# Patient Record
Sex: FEMALE | Employment: OTHER | URBAN - METROPOLITAN AREA
[De-identification: names, ages, dates, MRNs, and addresses within clinical notes are randomized per-mention and may not be internally consistent; named-entity substitution may affect disease eponyms.]

---

## 2017-02-13 ENCOUNTER — OFFICE VISIT (OUTPATIENT)
Dept: FAMILY MEDICINE CLINIC | Age: 62
End: 2017-02-13

## 2017-02-13 VITALS
WEIGHT: 193 LBS | SYSTOLIC BLOOD PRESSURE: 185 MMHG | DIASTOLIC BLOOD PRESSURE: 102 MMHG | HEIGHT: 65 IN | BODY MASS INDEX: 32.15 KG/M2 | HEART RATE: 63 BPM | TEMPERATURE: 98.1 F | RESPIRATION RATE: 18 BRPM | OXYGEN SATURATION: 99 %

## 2017-02-13 DIAGNOSIS — Z76.89 ESTABLISHING CARE WITH NEW DOCTOR, ENCOUNTER FOR: ICD-10-CM

## 2017-02-13 DIAGNOSIS — I10 ESSENTIAL HYPERTENSION: Primary | ICD-10-CM

## 2017-02-13 RX ORDER — MELOXICAM 15 MG/1
15 TABLET ORAL DAILY
COMMUNITY
End: 2017-09-25 | Stop reason: ALTCHOICE

## 2017-02-13 RX ORDER — TRAMADOL HYDROCHLORIDE 50 MG/1
TABLET ORAL
Refills: 0 | COMMUNITY
Start: 2017-02-01 | End: 2021-10-29 | Stop reason: ALTCHOICE

## 2017-02-13 RX ORDER — OXYCODONE HYDROCHLORIDE 5 MG/1
TABLET ORAL
Refills: 0 | COMMUNITY
Start: 2016-11-30 | End: 2018-04-24 | Stop reason: ALTCHOICE

## 2017-02-13 RX ORDER — ACETAMINOPHEN 500 MG
TABLET ORAL
COMMUNITY
End: 2017-09-25 | Stop reason: ALTCHOICE

## 2017-02-13 RX ORDER — MULTIVITAMIN WITH IRON
1 TABLET ORAL DAILY
COMMUNITY

## 2017-02-13 RX ORDER — GABAPENTIN 300 MG/1
CAPSULE ORAL
Refills: 0 | COMMUNITY
Start: 2017-02-01 | End: 2021-10-29 | Stop reason: SDUPTHER

## 2017-02-13 RX ORDER — LISINOPRIL 20 MG/1
20 TABLET ORAL DAILY
Qty: 30 TAB | Refills: 3 | Status: SHIPPED | OUTPATIENT
Start: 2017-02-13 | End: 2017-05-24 | Stop reason: SDUPTHER

## 2017-02-13 NOTE — MR AVS SNAPSHOT
Visit Information Date & Time Provider Department Dept. Phone Encounter #  
 2/13/2017 11:30 AM Haryr Matta MD [facility] MAIN OFFICE-ANNEX [phone] [id number] Follow-up Instructions Return in about 2 weeks (around 2/27/2017) for BP check. Upcoming Health Maintenance Date Due Hepatitis C Screening 1955 DTaP/Tdap/Td series (1 - Tdap) 12/1/1975 PAP AKA CERVICAL CYTOLOGY 12/1/1975 BREAST CANCER SCRN MAMMOGRAM 12/1/2004 FOBT Q 1 YEAR AGE 50-75 12/1/2004 ZOSTER VACCINE AGE 60> 12/1/2014 Allergies as of 2/13/2017  Review Complete On: 2/13/2017 By: Gracie Miner LPN No Known Allergies Current Immunizations  Never Reviewed No immunizations on file. Not reviewed this visit You Were Diagnosed With   
  
 Codes Comments Essential hypertension    -  Primary ICD-10-CM: I10 
ICD-9-CM: 401.9 Vitals BP Pulse Temp Resp Height(growth percentile) Weight(growth percentile) (!) 185/102 (BP 1 Location: Left arm, BP Patient Position: Sitting) 63 98.1 °F (36.7 °C) (Oral) 18 5' 4.5\" (1.638 m) 193 lb (87.5 kg) LMP SpO2 BMI OB Status Smoking Status 02/13/2005 99% 32.62 kg/m2 Postmenopausal Never Smoker Vitals History BMI and BSA Data Body Mass Index Body Surface Area  
 32.62 kg/m 2 2 m 2 Preferred Pharmacy Pharmacy Name Phone [address], [facility] [address] Your Updated Medication List  
  
   
This list is accurate as of: 2/13/17  1:12 PM.  Always use your most recent med list.  
  
  
  
  
 cyanocobalamin (vitamin B-12) 1,500 mcg Tbdl Take 1,500 mg by mouth two (2) times a day. DAILY MULTI-VITAMINS/IRON tablet Generic drug:  multivitamin with iron Take 1 Tab by mouth daily. gabapentin 300 mg capsule Commonly known as:  NEURONTIN One tablet three times a day. GLUCOSAMINE 1500 COMPLEX PO Take  by mouth.  
  
 lisinopril 20 mg tablet Commonly known as:  Samanta Reasons Take 1 Tab by mouth daily. meloxicam 15 mg tablet Commonly known as:  MOBIC Take 15 mg by mouth daily. oxyCODONE IR 5 mg immediate release tablet Commonly known as:  ROXICODONE  
one table every 4 hours as needed  
  
 traMADol 50 mg tablet Commonly known as:  ULTRAM  
one tablet every 6 hours as needed TYLENOL EXTRA STRENGTH 500 mg tablet Generic drug:  acetaminophen Take  by mouth every six (6) hours as needed for Pain. Prescriptions Sent to Pharmacy Refills  
 lisinopril (PRINIVIL, ZESTRIL) 20 mg tablet 3 Sig: Take 1 Tab by mouth daily. Class: Normal  
 Pharmacy: [address] [facility], [address] [place] [address] [address] [place] [address] #: [phone] Route: Oral  
  
We Performed the Following CBC WITH AUTOMATED DIFF [66648 CPT(R)] METABOLIC PANEL, COMPREHENSIVE [46810 CPT(R)] TSH 3RD GENERATION [24443 CPT(R)] URINALYSIS W/MICROSCOPIC [65315 CPT(R)] VITAMIN D, 25 HYDROXY [id number] CPT(R)] Follow-up Instructions Return in about 2 weeks (around 2/27/2017) for BP check. Patient Instructions Learning About High Blood Pressure What is high blood pressure? Blood pressure is a measure of how hard the blood pushes against the walls of your arteries. It's normal for blood pressure to go up and down throughout the day, but if it stays up, you have high blood pressure. Another name for high blood pressure is hypertension. Two numbers tell you your blood pressure. The first number is the systolic pressure. It shows how hard the blood pushes when your heart is pumping. The second number is the diastolic pressure. It shows how hard the blood pushes between heartbeats, when your heart is relaxed and filling with blood. A blood pressure of less than 120/80 (say \"120 over 80\") is ideal for an adult. High blood pressure is 140/90 or higher. You have high blood pressure if your top number is 140 or higher or your bottom number is 90 or higher, or both. Many people fall into the category in between, called prehypertension. People with prehypertension need to make lifestyle changes to bring their blood pressure down and help prevent or delay high blood pressure. What happens when you have high blood pressure? · Blood flows through your arteries with too much force. Over time, this damages the walls of your arteries. But you can't feel it. High blood pressure usually doesn't cause symptoms. · Fat and calcium start to build up in your arteries. This buildup is called plaque. Plaque makes your arteries narrower and stiffer. Blood can't flow through them as easily. · This lack of good blood flow starts to damage some of the organs in your body. This can lead to problems such as coronary artery disease and heart attack, heart failure, stroke, kidney failure, and eye damage. How can you prevent high blood pressure? · Stay at a healthy weight. · Try to limit how much sodium you eat to less than 2,300 milligrams (mg) a day. If you limit your sodium to 1,500 mg a day, you can lower your blood pressure even more. ¨ Buy foods that are labeled \"unsalted,\" \"sodium-free,\" or \"low-sodium. \" Foods labeled \"reduced-sodium\" and \"light sodium\" may still have too much sodium. ¨ Flavor your food with garlic, lemon juice, onion, vinegar, herbs, and spices instead of salt. Do not use soy sauce, steak sauce, onion salt, garlic salt, mustard, or ketchup on your food. ¨ Use less salt (or none) when recipes call for it. You can often use half the salt a recipe calls for without losing flavor. · Be physically active. Get at least 30 minutes of exercise on most days of the week. Walking is a good choice.  You also may want to do other activities, such as running, swimming, cycling, or playing tennis or team sports. · Limit alcohol to 2 drinks a day for men and 1 drink a day for women. · Eat plenty of fruits, vegetables, and low-fat dairy products. Eat less saturated and total fats. How is high blood pressure treated? · Your doctor will suggest making lifestyle changes. For example, your doctor may ask you to eat healthy foods, quit smoking, lose extra weight, and be more active. · If lifestyle changes don't help enough or your blood pressure is very high, you will have to take medicine every day. Follow-up care is a key part of your treatment and safety. Be sure to make and go to all appointments, and call your doctor if you are having problems. It's also a good idea to know your test results and keep a list of the medicines you take. Where can you learn more? Go to http://david-yves.info/. Enter P501 in the search box to learn more about \"Learning About High Blood Pressure. \" Current as of: March 23, 2016 Content Version: 11.1 © 9649-7666 Healthwise, Incorporated. Care instructions adapted under license by [employer] (which disclaims liability or warranty for this information). If you have questions about a medical condition or this instruction, always ask your healthcare professional. [address] any warranty or liability for your use of this information. Introducing [facility]! Yolis Murdock introduces [employer] patient portal. Now you can access parts of your medical record, email your doctor's office, and request medication refills online. 1. In your internet browser, go to [email or web address] 2. Click on the First Time User? Click Here link in the Sign In box. You will see the New Member Sign Up page. 3. Enter your [employer] Access Code exactly as it appears below.  You will not need to use this code after youve completed the sign-up process. If you do not sign up before the expiration date, you must request a new code. · [employer] Access Code: [email or web address] Expires: 5/14/2017  1:12 PM 
 
4. Enter the last four digits of your Social Security Number (xxxx) and Date of Birth (mm/dd/yyyy) as indicated and click Submit. You will be taken to the next sign-up page. 5. Create a [employer] ID. This will be your [employer] login ID and cannot be changed, so think of one that is secure and easy to remember. 6. Create a [employer] password. You can change your password at any time. 7. Enter your Password Reset Question and Answer. This can be used at a later time if you forget your password. 8. Enter your e-mail address. You will receive e-mail notification when new information is available in [address] 9. Click Sign Up. You can now view and download portions of your medical record. 10. Click the Download Summary menu link to download a portable copy of your medical information. If you have questions, please visit the Frequently Asked Questions section of the [employer] website. Remember, [employer] is NOT to be used for urgent needs. For medical emergencies, dial 911. Now available from your iPhone and Android! Please provide this summary of care documentation to your next provider. Your primary care clinician is listed as NONE. If you have any questions after today's visit, please call [phone].

## 2017-02-13 NOTE — PROGRESS NOTES
Chief Complaint   Patient presents with   [address] Dr. Robert Balderas ([address] [facility],), Neuro Surgeon Dr. Gregorio Spicer [phone]. Since surgery 6/2015 Pt has had right side weakness, left side numbness. Pt states she needs referrals for PT. Pt has home health aid [facility] [phone]. B/P elevated doctor notified.
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
## 2017-02-13 NOTE — PATIENT INSTRUCTIONS
Learning About High Blood Pressure  What is high blood pressure? Blood pressure is a measure of how hard the blood pushes against the walls of your arteries. It's normal for blood pressure to go up and down throughout the day, but if it stays up, you have high blood pressure. Another name for high blood pressure is hypertension. Two numbers tell you your blood pressure. The first number is the systolic pressure. It shows how hard the blood pushes when your heart is pumping. The second number is the diastolic pressure. It shows how hard the blood pushes between heartbeats, when your heart is relaxed and filling with blood. A blood pressure of less than 120/80 (say \"120 over 80\") is ideal for an adult. High blood pressure is 140/90 or higher. You have high blood pressure if your top number is 140 or higher or your bottom number is 90 or higher, or both. Many people fall into the category in between, called prehypertension. People with prehypertension need to make lifestyle changes to bring their blood pressure down and help prevent or delay high blood pressure. What happens when you have high blood pressure? · Blood flows through your arteries with too much force. Over time, this damages the walls of your arteries. But you can't feel it. High blood pressure usually doesn't cause symptoms. · Fat and calcium start to build up in your arteries. This buildup is called plaque. Plaque makes your arteries narrower and stiffer. Blood can't flow through them as easily. · This lack of good blood flow starts to damage some of the organs in your body. This can lead to problems such as coronary artery disease and heart attack, heart failure, stroke, kidney failure, and eye damage. How can you prevent high blood pressure? · Stay at a healthy weight. · Try to limit how much sodium you eat to less than 2,300 milligrams (mg) a day.  If you limit your sodium to 1,500 mg a day, you can lower your blood pressure even more.  ¨ Buy foods that are labeled \"unsalted,\" \"sodium-free,\" or \"low-sodium. \" Foods labeled \"reduced-sodium\" and \"light sodium\" may still have too much sodium. ¨ Flavor your food with garlic, lemon juice, onion, vinegar, herbs, and spices instead of salt. Do not use soy sauce, steak sauce, onion salt, garlic salt, mustard, or ketchup on your food. ¨ Use less salt (or none) when recipes call for it. You can often use half the salt a recipe calls for without losing flavor. · Be physically active. Get at least 30 minutes of exercise on most days of the week. Walking is a good choice. You also may want to do other activities, such as running, swimming, cycling, or playing tennis or team sports. · Limit alcohol to 2 drinks a day for men and 1 drink a day for women. · Eat plenty of fruits, vegetables, and low-fat dairy products. Eat less saturated and total fats. How is high blood pressure treated? · Your doctor will suggest making lifestyle changes. For example, your doctor may ask you to eat healthy foods, quit smoking, lose extra weight, and be more active. · If lifestyle changes don't help enough or your blood pressure is very high, you will have to take medicine every day. Follow-up care is a key part of your treatment and safety. Be sure to make and go to all appointments, and call your doctor if you are having problems. It's also a good idea to know your test results and keep a list of the medicines you take. Where can you learn more? Go to http://david-yves.info/. Enter P501 in the search box to learn more about \"Learning About High Blood Pressure. \"  Current as of: March 23, 2016  Content Version: 11.1  © 1828-7096 [employer]. Care instructions adapted under license by [employer] (which disclaims liability or warranty for this information).  If you have questions about a medical condition or this instruction, always ask your healthcare professional. [employer] disclaims any warranty or liability for your use of this information.

## 2017-02-14 LAB
25(OH)D3+25(OH)D2 SERPL-MCNC: 31.3 NG/ML (ref 30–100)
ALBUMIN SERPL-MCNC: 4.5 G/DL (ref 3.6–4.8)
ALBUMIN/GLOB SERPL: 1.4 {RATIO} (ref 1.1–2.5)
ALP SERPL-CCNC: 101 IU/L (ref 39–117)
ALT SERPL-CCNC: 25 IU/L (ref 0–32)
APPEARANCE UR: CLEAR
AST SERPL-CCNC: 23 IU/L (ref 0–40)
BACTERIA #/AREA URNS HPF: ABNORMAL /[HPF]
BASOPHILS # BLD AUTO: 0 X10E3/UL (ref 0–0.2)
BASOPHILS NFR BLD AUTO: 0 %
BILIRUB SERPL-MCNC: <0.2 MG/DL (ref 0–1.2)
BILIRUB UR QL STRIP: NEGATIVE
BUN SERPL-MCNC: 16 MG/DL (ref 8–27)
BUN/CREAT SERPL: 19 (ref 11–26)
CALCIUM SERPL-MCNC: 10 MG/DL (ref 8.7–10.3)
CASTS URNS QL MICRO: ABNORMAL /LPF
CHLORIDE SERPL-SCNC: 106 MMOL/L (ref 96–106)
CO2 SERPL-SCNC: 22 MMOL/L (ref 18–29)
COLOR UR: YELLOW
CREAT SERPL-MCNC: 0.84 MG/DL (ref 0.57–1)
CRYSTALS URNS MICRO: ABNORMAL
EOSINOPHIL # BLD AUTO: 0.1 X10E3/UL (ref 0–0.4)
EOSINOPHIL NFR BLD AUTO: 3 %
EPI CELLS #/AREA URNS HPF: ABNORMAL /HPF
ERYTHROCYTE [DISTWIDTH] IN BLOOD BY AUTOMATED COUNT: 14.4 % (ref 12.3–15.4)
GLOBULIN SER CALC-MCNC: 3.3 G/DL (ref 1.5–4.5)
GLUCOSE SERPL-MCNC: 95 MG/DL (ref 65–99)
GLUCOSE UR QL: NEGATIVE
HCT VFR BLD AUTO: 44 % (ref 34–46.6)
HGB BLD-MCNC: 14.5 G/DL (ref 11.1–15.9)
HGB UR QL STRIP: NEGATIVE
IMM GRANULOCYTES # BLD: 0 X10E3/UL (ref 0–0.1)
IMM GRANULOCYTES NFR BLD: 0 %
KETONES UR QL STRIP: NEGATIVE
LEUKOCYTE ESTERASE UR QL STRIP: NEGATIVE
LYMPHOCYTES # BLD AUTO: 2.3 X10E3/UL (ref 0.7–3.1)
LYMPHOCYTES NFR BLD AUTO: 54 %
MCH RBC QN AUTO: 31.9 PG (ref 26.6–33)
MCHC RBC AUTO-ENTMCNC: 33 G/DL (ref 31.5–35.7)
MCV RBC AUTO: 97 FL (ref 79–97)
MICRO URNS: NORMAL
MICRO URNS: NORMAL
MONOCYTES # BLD AUTO: 0.3 X10E3/UL (ref 0.1–0.9)
MONOCYTES NFR BLD AUTO: 7 %
MUCOUS THREADS URNS QL MICRO: PRESENT
NEUTROPHILS # BLD AUTO: 1.6 X10E3/UL (ref 1.4–7)
NEUTROPHILS NFR BLD AUTO: 36 %
NITRITE UR QL STRIP: NEGATIVE
PH UR STRIP: 6 [PH] (ref 5–7.5)
PLATELET # BLD AUTO: 274 X10E3/UL (ref 150–379)
POTASSIUM SERPL-SCNC: 4.3 MMOL/L (ref 3.5–5.2)
PROT SERPL-MCNC: 7.8 G/DL (ref 6–8.5)
PROT UR QL STRIP: NEGATIVE
RBC # BLD AUTO: 4.54 X10E6/UL (ref 3.77–5.28)
RBC #/AREA URNS HPF: ABNORMAL /HPF
SODIUM SERPL-SCNC: 148 MMOL/L (ref 134–144)
SP GR UR: 1.02 (ref 1–1.03)
TSH SERPL DL<=0.005 MIU/L-ACNC: 1.18 UIU/ML (ref 0.45–4.5)
UNIDENT CRYS URNS QL MICRO: PRESENT
UROBILINOGEN UR STRIP-MCNC: 0.2 MG/DL (ref 0.2–1)
WBC # BLD AUTO: 4.3 X10E3/UL (ref 3.4–10.8)
WBC #/AREA URNS HPF: ABNORMAL /HPF

## 2017-03-07 ENCOUNTER — OFFICE VISIT (OUTPATIENT)
Dept: FAMILY MEDICINE CLINIC | Age: 62
End: 2017-03-07

## 2017-03-07 VITALS
RESPIRATION RATE: 20 BRPM | BODY MASS INDEX: 38.82 KG/M2 | HEIGHT: 65 IN | HEART RATE: 65 BPM | TEMPERATURE: 98.4 F | OXYGEN SATURATION: 96 % | SYSTOLIC BLOOD PRESSURE: 169 MMHG | DIASTOLIC BLOOD PRESSURE: 87 MMHG | WEIGHT: 233 LBS

## 2017-03-07 DIAGNOSIS — Z23 ENCOUNTER FOR IMMUNIZATION: ICD-10-CM

## 2017-03-07 DIAGNOSIS — M62.81 MUSCLE WEAKNESS: ICD-10-CM

## 2017-03-07 DIAGNOSIS — I10 ESSENTIAL HYPERTENSION: Primary | ICD-10-CM

## 2017-03-07 RX ORDER — GLUCOSAMINE SULFATE 1500 MG
POWDER IN PACKET (EA) ORAL DAILY
COMMUNITY
End: 2021-07-13 | Stop reason: ALTCHOICE

## 2017-03-07 RX ORDER — HYDROCHLOROTHIAZIDE 25 MG/1
25 TABLET ORAL DAILY
Qty: 30 TAB | Refills: 3 | Status: SHIPPED | OUTPATIENT
Start: 2017-03-07 | End: 2017-04-06

## 2017-03-07 NOTE — MR AVS SNAPSHOT
Visit Information Date & Time Provider Department Dept. Phone Encounter #  
 3/7/2017 11:00 AM Harper Carpio MD [address] [place] [address] OFFICE-ANNEX [phone] [id number] Follow-up Instructions Return in about 1 month (around 4/7/2017) for recheck BP. Follow-up and Disposition History Your Appointments 4/4/2017 11:30 AM  
Any with Harper Carpio MD  
[facility] OFFICE-ANNEX ([facility]) Appt Note: 1 mo f/u  
 [address] [facility] [id number] [phone] [address] [facility] [address] Upcoming Health Maintenance Date Due Hepatitis C Screening 1955 PAP AKA CERVICAL CYTOLOGY 12/1/1975 BREAST CANCER SCRN MAMMOGRAM 12/1/2004 FOBT Q 1 YEAR AGE 50-75 12/1/2004 ZOSTER VACCINE AGE 60> 12/1/2014 DTaP/Tdap/Td series (2 - Td) 3/7/2027 Allergies as of 3/7/2017  Review Complete On: 3/7/2017 By: Antonio Henao LPN No Known Allergies Current Immunizations  Reviewed on 3/7/2017 Name Date Tdap 3/7/2017 Reviewed by Lorena Fernandez LPN on 3/1/5668 at 31:12 AM  
You Were Diagnosed With   
  
 Codes Comments Essential hypertension    -  Primary ICD-10-CM: I10 
ICD-9-CM: 401.9 Encounter for immunization     ICD-10-CM: F53 ICD-9-CM: V03.89 Muscle weakness     ICD-10-CM: M62.81 ICD-9-CM: 728.87 Vitals BP Pulse Temp Resp Height(growth percentile) Weight(growth percentile) 169/87 (BP 1 Location: Right arm, BP Patient Position: Sitting) 65 98.4 °F (36.9 °C) (Oral) 20 5' 4.5\" (1.638 m) 233 lb (105.7 kg) LMP SpO2 BMI OB Status Smoking Status 02/13/2005 96% 39.38 kg/m2 Postmenopausal Never Smoker Vitals History BMI and BSA Data Body Mass Index Body Surface Area  
 39.38 kg/m 2 2.19 m 2 Preferred Pharmacy Pharmacy Name Phone  [facility] AT [address] [phone] Your Updated Medication List  
  
   
This list is accurate as of: 3/7/17  1:03 PM.  Always use your most recent med list.  
  
  
  
  
 cyanocobalamin (vitamin B-12) 1,500 mcg Tbdl Take 1,500 mg by mouth two (2) times a day. DAILY MULTI-VITAMINS/IRON tablet Generic drug:  multivitamin with iron Take 1 Tab by mouth daily. gabapentin 300 mg capsule Commonly known as:  NEURONTIN One tablet three times a day. GLUCOSAMINE 1500 COMPLEX PO Take  by mouth. hydroCHLOROthiazide 25 mg tablet Commonly known as:  HYDRODIURIL Take 1 Tab by mouth daily for 30 days. lisinopril 20 mg tablet Commonly known as:  Delsie Commander Take 1 Tab by mouth daily. meloxicam 15 mg tablet Commonly known as:  MOBIC Take 15 mg by mouth daily. oxyCODONE IR 5 mg immediate release tablet Commonly known as:  ROXICODONE  
one table every 4 hours as needed  
  
 traMADol 50 mg tablet Commonly known as:  ULTRAM  
one tablet every 6 hours as needed TYLENOL EXTRA STRENGTH 500 mg tablet Generic drug:  acetaminophen Take  by mouth every six (6) hours as needed for Pain.  
  
 varicella zoster vacine live 19,400 unit/0.65 mL Susr injection Commonly known as:  varicella-zoster vacine live 1 Vial by SubCUTAneous route once for 1 dose. VITAMIN D3 1,000 unit Cap Generic drug:  cholecalciferol Take  by mouth daily. Prescriptions Printed Refills  
 varicella zoster vacine live (VARICELLA-ZOSTER VACINE LIVE) 19,400 unit/0.65 mL susr injection 0 Si Vial by SubCUTAneous route once for 1 dose. Class: Print Route: SubCUTAneous Prescriptions Sent to Pharmacy Refills  
 hydroCHLOROthiazide (HYDRODIURIL) 25 mg tablet 3 Sig: Take 1 Tab by mouth daily for 30 days.   
 Class: Normal  
 Pharmacy: [address] [facility], [address] [facility] OF [facility] AT [facility] Ph #: [phone] Route: Oral  
  
We Performed the Following [place] IMMUNIZ ADMIN,1 SINGLE/COMB VAC/TOXOID [id number] CPT(R)] REFERRAL TO NEUROSURGERY [EYW30 Custom] Comments:  
 Please evaluate patient for ongoing care of weakness, pt will schedule appt. TETANUS, DIPHTHERIA TOXOIDS AND ACELLULAR PERTUSSIS VACCINE (TDAP), IN INDIVIDS. >=7, IM [id number] CPT(R)] Follow-up Instructions Return in about 1 month (around 2017) for recheck BP. Referral Information Referral ID Referred By Referred To  
  
 [id number] Tre Clarke MD   
   [facility] [place] [address]. [place], [address] Phone: [phone] Fax: [phone] Visits Status Start Date End Date 1 New Request 3/7/17 3/7/18 If your referral has a status of pending review or denied, additional information will be sent to support the outcome of this decision. Patient Instructions Vaccine Information Statement Tdap (Tetanus, Diphtheria, Pertussis) Vaccine: What You Need to Know Many Vaccine Information Statements are available in Swedish and other languages. See www.immunize.org/vis. Hojas de Información Sobre Vacunas están disponibles en español y en muchos otros idiomas. Visite WorthScale.si 1. Why get vaccinated? Tetanus, diphtheria, and pertussis are very serious diseases. Tdap vaccine can protect us from these diseases. And, Tdap vaccine given to pregnant women can protect  babies against pertussis. TETANUS (Lockjaw) is rare in the [facility] today. It causes painful muscle tightening and stiffness, usually all over the body. ? It can lead to tightening of muscles in the head and neck so you cant open your mouth, swallow, or sometimes even breathe. Tetanus kills about 1 out of 10 people who are infected even after receiving the best medical care. DIPHTHERIA is also rare in the [facility] today. It can cause a thick coating to form in the back of the throat. ? It can lead to breathing problems, heart failure, paralysis, and death. PERTUSSIS (Whooping Cough) causes severe coughing spells, which can cause difficulty breathing, vomiting, and disturbed sleep. ? It can also lead to weight loss, incontinence, and rib fractures. Up to 2 in 100 adolescents and 5 in 100 adults with pertussis are hospitalized or have complications, which could include pneumonia or death. These diseases are caused by bacteria. Diphtheria and pertussis are spread from person to person through secretions from coughing or sneezing. Tetanus enters the body through cuts, scratches, or wounds. Before vaccines, as many as 200,000 cases of diphtheria, 200,000 cases of pertussis, and hundreds of cases of tetanus, were reported in the [place] each year. Since vaccination began, reports of cases for tetanus and diphtheria have dropped by about 99% and for pertussis by about 80%. 2. Tdap vaccine Tdap vaccine can protect adolescents and adults from tetanus, diphtheria, and pertussis. One dose of Tdap is routinely given at age 6 or 15. People who did not get Tdap at that age should get it as soon as possible. Tdap is especially important for health care professionals and anyone having close contact with a baby younger than 12 months. Pregnant women should get a dose of Tdap during every pregnancy, to protect the  from pertussis. Infants are most at risk for severe, life-threatening complications from pertussis. Another vaccine, called Td, protects against tetanus and diphtheria, but not pertussis. A Td booster should be given every 10 years. Tdap may be given as one of these boosters if you have never gotten Tdap before. Tdap may also be given after a severe cut or burn to prevent tetanus infection. Your doctor or the person giving you the vaccine can give you more information. Tdap may safely be given at the same time as other vaccines. 3. Some people should not get this vaccine  A person who has ever had a life-threatening allergic reaction after a previous dose of any diphtheria, tetanus or pertussis containing vaccine, OR has a severe allergy to any part of this vaccine, should not get Tdap vaccine. Tell the person giving the vaccine about any severe allergies.  Anyone who had coma or long repeated seizures within 7 days after a childhood dose of DTP or DTaP, or a previous dose of Tdap, should not get Tdap, unless a cause other than the vaccine was found. They can still get Td.  Talk to your doctor if you: 
- have seizures or another nervous system problem, 
- had severe pain or swelling after any vaccine containing diphtheria, tetanus or pertussis,  
- ever had a condition called Guillain Barré Syndrome (GBS), 
- arent feeling well on the day the shot is scheduled. 4. Risks With any medicine, including vaccines, there is a chance of side effects. These are usually mild and go away on their own. Serious reactions are also possible but are rare. Most people who get Tdap vaccine do not have any problems with it. Mild Problems following Tdap 
(Did not interfere with activities)  Pain where the shot was given (about 3 in 4 adolescents or 2 in 3 adults)  Redness or swelling where the shot was given (about 1 person in 5)  Mild fever of at least 100.4°F (up to about 1 in 25 adolescents or 1 in 100 adults)  Headache (about 3 or 4 people in 10)  Tiredness (about 1 person in 3 or 4)  Nausea, vomiting, diarrhea, stomach ache (up to 1 in 4 adolescents or 1 in 10 adults)  Chills,  sore joints (about 1 person in 10)  Body aches (about 1 person in 3 or 4)  Rash, swollen glands (uncommon) Moderate Problems following Tdap (Interfered with activities, but did not require medical attention)  Pain where the shot was given (up to 1 in 5 or 6)  Redness or swelling where the shot was given (up to about 1 in 16 adolescents or 1 in 12 adults)  Fever over 102°F (about 1 in 100 adolescents or 1 in 250 adults)  Headache (about 1 in 7 adolescents or 1 in 10 adults)  Nausea, vomiting, diarrhea, stomach ache (up to 1 or 3 people in 100)  Swelling of the entire arm where the shot was given (up to about 1 in 500). Severe Problems following Tdap 
(Unable to perform usual activities; required medical attention)  Swelling, severe pain, bleeding, and redness in the arm where the shot was given (rare). Problems that could happen after any vaccine:  People sometimes faint after a medical procedure, including vaccination. Sitting or lying down for about 15 minutes can help prevent fainting, and injuries caused by a fall. Tell your doctor if you feel dizzy, or have vision changes or ringing in the ears.  Some people get severe pain in the shoulder and have difficulty moving the arm where a shot was given. This happens very rarely.  Any medication can cause a severe allergic reaction. Such reactions from a vaccine are very rare, estimated at fewer than 1 in a million doses, and would happen within a few minutes to a few hours after the vaccination. As with any medicine, there is a very remote chance of a vaccine causing a serious injury or death. The safety of vaccines is always being monitored. For more information, visit: www.cdc.gov/vaccinesafety/ 
 
5. What if there is a serious problem? What should I look for?  Look for anything that concerns you, such as signs of a severe allergic reaction, very high fever, or unusual behavior.  
 
 Signs of a severe allergic reaction can include hives, swelling of the face and throat, difficulty breathing, a fast heartbeat, dizziness, and weakness. These would usually start a few minutes to a few hours after the vaccination. What should I do?  If you think it is a severe allergic reaction or other emergency that cant wait, call 9-1-1 or get the person to the nearest hospital. Otherwise, call your doctor.  Afterward, the reaction should be reported to the Vaccine Adverse Event Reporting System (VAERS). Your doctor might file this report, or you can do it yourself through the VAERS web site at www.vaers. [facility].gov, or by calling 0-426.325.6054. VAERS does not give medical advice. 6. The National Vaccine Injury Compensation Program 
 
The [facility] Vaccine Injury Compensation Program (VICP) is a federal program that was created to compensate people who may have been injured by certain vaccines. Persons who believe they may have been injured by a vaccine can learn about the program and about filing a claim by calling 6-206.736.6190 or visiting the [employer] website at www.[facility].gov/vaccinecompensation. There is a time limit to file a claim for compensation. 7. How can I learn more?  Ask your doctor. He or she can give you the vaccine package insert or suggest other sources of information.  Call your local or state health department.  Contact the Centers for Disease Control and Prevention (CDC): 
- Call 0-381.494.5900 (1-800-CDC-INFO) or 
- Visit CDCs website at www.cdc.gov/vaccines Vaccine Information Statement Tdap Vaccine 
(2/24/2015) 42 UG[address] Loya [id number] [facility] and [employer] Centers for Disease Control and Prevention Office Use Only Introducing [facility] HEALTH SERVICES! Romayne Duster introduces [employer] patient portal. Now you can access parts of your medical record, email your doctor's office, and request medication refills online. 1. In your internet browser, go to [email or web address] 2. Click on the First Time User? Click Here link in the Sign In box.  You will see the New Member Sign Up page. 3. Enter your [facility] Access Code exactly as it appears below. You will not need to use this code after youve completed the sign-up process. If you do not sign up before the expiration date, you must request a new code. · [facility] Access Code: [email or web address] Expires: 5/14/2017  1:12 PM 
 
4. Enter the last four digits of your Social Security Number (xxxx) and Date of Birth (mm/dd/yyyy) as indicated and click Submit. You will be taken to the next sign-up page. 5. Create a HubHubt ID. This will be your [facility] login ID and cannot be changed, so think of one that is secure and easy to remember. 6. Create a [facility] password. You can change your password at any time. 7. Enter your Password Reset Question and Answer. This can be used at a later time if you forget your password. 8. Enter your e-mail address. You will receive e-mail notification when new information is available in [address]. 9. Click Sign Up. You can now view and download portions of your medical record. 10. Click the Download Summary menu link to download a portable copy of your medical information. If you have questions, please visit the Frequently Asked Questions section of the [facility] website. Remember, [facility] is NOT to be used for urgent needs. For medical emergencies, dial 911. Now available from your iPhone and Android! Please provide this summary of care documentation to your next provider. Your primary care clinician is listed as NONE. If you have any questions after today's visit, please call [phone].
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
## 2017-03-07 NOTE — PATIENT INSTRUCTIONS
Vaccine Information Statement     Tdap (Tetanus, Diphtheria, Pertussis) Vaccine: What You Need to Know    Many Vaccine Information Statements are available in Yoruba and other languages. See www.immunize.org/vis. Hojas de Información Sobre Vacunas están disponibles en español y en muchos otros idiomas. Visite [email or web address]    1. Why get vaccinated? Tetanus, diphtheria, and pertussis are very serious diseases. Tdap vaccine can protect us from these diseases. And, Tdap vaccine given to pregnant women can protect  babies against pertussis. TETANUS (Lockjaw) is rare in the [facility] today. It causes painful muscle tightening and stiffness, usually all over the body.  It can lead to tightening of muscles in the head and neck so you cant open your mouth, swallow, or sometimes even breathe. Tetanus kills about 1 out of 10 people who are infected even after receiving the best medical care. DIPHTHERIA is also rare in the [facility] today. It can cause a thick coating to form in the back of the throat.  It can lead to breathing problems, heart failure, paralysis, and death. PERTUSSIS (Whooping Cough) causes severe coughing spells, which can cause difficulty breathing, vomiting, and disturbed sleep.  It can also lead to weight loss, incontinence, and rib fractures. Up to 2 in 100 adolescents and 5 in 100 adults with pertussis are hospitalized or have complications, which could include pneumonia or death. These diseases are caused by bacteria. Diphtheria and pertussis are spread from person to person through secretions from coughing or sneezing. Tetanus enters the body through cuts, scratches, or wounds. Before vaccines, as many as 200,000 cases of diphtheria, 200,000 cases of pertussis, and hundreds of cases of tetanus, were reported in the [place] each year.  Since vaccination began, reports of cases for tetanus and diphtheria have dropped by about 99% and for pertussis by about 80%. 2. Tdap vaccine    Tdap vaccine can protect adolescents and adults from tetanus, diphtheria, and pertussis. One dose of Tdap is routinely given at age 6 or 15. People who did not get Tdap at that age should get it as soon as possible. Tdap is especially important for health care professionals and anyone having close contact with a baby younger than 12 months. Pregnant women should get a dose of Tdap during every pregnancy, to protect the  from pertussis. Infants are most at risk for severe, life-threatening complications from pertussis. Another vaccine, called Td, protects against tetanus and diphtheria, but not pertussis. A Td booster should be given every 10 years. Tdap may be given as one of these boosters if you have never gotten Tdap before. Tdap may also be given after a severe cut or burn to prevent tetanus infection. Your doctor or the person giving you the vaccine can give you more information. Tdap may safely be given at the same time as other vaccines. 3. Some people should not get this vaccine     A person who has ever had a life-threatening allergic reaction after a previous dose of any diphtheria, tetanus or pertussis containing vaccine, OR has a severe allergy to any part of this vaccine, should not get Tdap vaccine. Tell the person giving the vaccine about any severe allergies.  Anyone who had coma or long repeated seizures within 7 days after a childhood dose of DTP or DTaP, or a previous dose of Tdap, should not get Tdap, unless a cause other than the vaccine was found. They can still get Td.  Talk to your doctor if you:  - have seizures or another nervous system problem,  - had severe pain or swelling after any vaccine containing diphtheria, tetanus or pertussis,   - ever had a condition called Guillain Barré Syndrome (GBS),  - arent feeling well on the day the shot is scheduled.     4. Risks    With any medicine, including vaccines, there is a chance of side effects. These are usually mild and go away on their own. Serious reactions are also possible but are rare. Most people who get Tdap vaccine do not have any problems with it. Mild Problems following Tdap  (Did not interfere with activities)   Pain where the shot was given (about 3 in 4 adolescents or 2 in 3 adults)   Redness or swelling where the shot was given (about 1 person in 5)   Mild fever of at least 100.4°F (up to about 1 in 25 adolescents or 1 in 100 adults)   Headache (about 3 or 4 people in 10)   Tiredness (about 1 person in 3 or 4)   Nausea, vomiting, diarrhea, stomach ache (up to 1 in 4 adolescents or 1 in 10 adults)   Chills,  sore joints (about 1 person in 10)   Body aches (about 1 person in 3 or 4)    Rash, swollen glands (uncommon)    Moderate Problems following Tdap  (Interfered with activities, but did not require medical attention)   Pain where the shot was given (up to 1 in 5 or 6)    Redness or swelling where the shot was given (up to about 1 in 16 adolescents or 1 in 12 adults)   Fever over 102°F (about 1 in 100 adolescents or 1 in 250 adults)   Headache (about 1 in 7 adolescents or 1 in 10 adults)   Nausea, vomiting, diarrhea, stomach ache (up to 1 or 3 people in 100)   Swelling of the entire arm where the shot was given (up to about 1 in 500). Severe Problems following Tdap  (Unable to perform usual activities; required medical attention)   Swelling, severe pain, bleeding, and redness in the arm where the shot was given (rare). Problems that could happen after any vaccine:     People sometimes faint after a medical procedure, including vaccination. Sitting or lying down for about 15 minutes can help prevent fainting, and injuries caused by a fall. Tell your doctor if you feel dizzy, or have vision changes or ringing in the ears.      Some people get severe pain in the shoulder and have difficulty moving the arm where a shot was given. This happens very rarely.  Any medication can cause a severe allergic reaction. Such reactions from a vaccine are very rare, estimated at fewer than 1 in a million doses, and would happen within a few minutes to a few hours after the vaccination. As with any medicine, there is a very remote chance of a vaccine causing a serious injury or death. The safety of vaccines is always being monitored. For more information, visit: www.cdc.gov/vaccinesafety/    5. What if there is a serious problem? What should I look for?  Look for anything that concerns you, such as signs of a severe allergic reaction, very high fever, or unusual behavior.  Signs of a severe allergic reaction can include hives, swelling of the face and throat, difficulty breathing, a fast heartbeat, dizziness, and weakness. These would usually start a few minutes to a few hours after the vaccination. What should I do?  If you think it is a severe allergic reaction or other emergency that cant wait, call 9-1-1 or get the person to the nearest hospital. Otherwise, call your doctor.  Afterward, the reaction should be reported to the Vaccine Adverse Event Reporting System (VAERS). Your doctor might file this report, or you can do it yourself through the VAERS web site at www.vaers. [facility].gov, or by calling 7-639.324.3218. [employer] does not give medical advice. 6. The National Vaccine Injury Compensation Program    The [facility] Vaccine Injury Compensation Program (VICP) is a federal program that was created to compensate people who may have been injured by certain vaccines. Persons who believe they may have been injured by a vaccine can learn about the program and about filing a claim by calling 9-434.954.1660 or visiting the [facility] website at www.[facility].gov/vaccinecompensation. There is a time limit to file a claim for compensation. 7. How can I learn more?  Ask your doctor.  He or she can give you the vaccine package insert or suggest other sources of information.  Call your local or state health department.  Contact the Centers for Disease Control and Prevention (CDC):  - Call 1-556.253.7273 (1-800-CDC-INFO) or  - Visit CDCs website at www.cdc.gov/vaccines      Vaccine Information Statement   Tdap Vaccine  (2/24/2015)  42 COLL[address] [place] [id number]-[id number]    Department of Health and Human Services  Centers for Disease Control and Prevention    Office Use Only

## 2017-05-24 ENCOUNTER — OFFICE VISIT (OUTPATIENT)
Dept: FAMILY MEDICINE CLINIC | Age: 62
End: 2017-05-24

## 2017-05-24 VITALS
DIASTOLIC BLOOD PRESSURE: 78 MMHG | OXYGEN SATURATION: 98 % | RESPIRATION RATE: 18 BRPM | TEMPERATURE: 98.7 F | SYSTOLIC BLOOD PRESSURE: 128 MMHG | HEART RATE: 67 BPM

## 2017-05-24 DIAGNOSIS — I10 ESSENTIAL HYPERTENSION: Primary | ICD-10-CM

## 2017-05-24 RX ORDER — ZOSTER VACCINE LIVE 19400 [PFU]/.65ML
INJECTION, POWDER, LYOPHILIZED, FOR SUSPENSION SUBCUTANEOUS
Refills: 0 | COMMUNITY
Start: 2017-03-08 | End: 2017-05-24 | Stop reason: ALTCHOICE

## 2017-05-24 RX ORDER — CYCLOBENZAPRINE HCL 10 MG
TABLET ORAL
Refills: 0 | COMMUNITY
Start: 2017-05-09 | End: 2017-09-25 | Stop reason: ALTCHOICE

## 2017-05-24 RX ORDER — LISINOPRIL 20 MG/1
20 TABLET ORAL DAILY
Qty: 30 TAB | Refills: 2 | Status: SHIPPED | OUTPATIENT
Start: 2017-05-24 | End: 2017-08-17 | Stop reason: SDUPTHER

## 2017-05-24 RX ORDER — HYDROCHLOROTHIAZIDE 25 MG/1
TABLET ORAL
Qty: 30 TAB | Refills: 2 | Status: SHIPPED | OUTPATIENT
Start: 2017-05-24 | End: 2017-09-25 | Stop reason: SDUPTHER

## 2017-05-24 RX ORDER — HYDROCHLOROTHIAZIDE 25 MG/1
TABLET ORAL
Refills: 2 | COMMUNITY
Start: 2017-04-20 | End: 2017-05-24 | Stop reason: SDUPTHER

## 2017-05-24 NOTE — MR AVS SNAPSHOT
Visit Information Date & Time Provider Department Dept. Phone Encounter #  
 5/24/2017 11:45 AM Goran Joyce MD [address] OFFICE-[facility] [phone] [id number] Upcoming Health Maintenance Date Due Hepatitis C Screening 1955 PAP AKA CERVICAL CYTOLOGY 12/1/1975 BREAST CANCER SCRN MAMMOGRAM 12/1/2004 FOBT Q 1 YEAR AGE 50-75 12/1/2004 ZOSTER VACCINE AGE 60> 12/1/2014 INFLUENZA AGE 9 TO ADULT 8/1/2017 DTaP/Tdap/Td series (2 - Td) 3/7/2027 Allergies as of 5/24/2017  Review Complete On: 5/24/2017 By: Sandro San LPN No Known Allergies Current Immunizations  Reviewed on 3/7/2017 Name Date Tdap 3/7/2017 Not reviewed this visit You Were Diagnosed With   
  
 Codes Comments Essential hypertension    -  Primary ICD-10-CM: I10 
ICD-9-CM: 401.9 Vitals BP Pulse Temp Resp LMP SpO2  
 128/78 (BP 1 Location: Right arm, BP Patient Position: Sitting) 67 98.7 °F (37.1 °C) (Oral) 18 02/13/2005 98% OB Status Smoking Status Postmenopausal Never Smoker Preferred Pharmacy Pharmacy Name Phone [facility] [address] [phone] Your Updated Medication List  
  
   
This list is accurate as of: 5/24/17 12:37 PM.  Always use your most recent med list.  
  
  
  
  
 cyanocobalamin (vitamin B-12) 1,500 mcg Tbdl Take 1,500 mg by mouth two (2) times a day. cyclobenzaprine 10 mg tablet Commonly known as:  FLEXERIL TK 1 T PO BID PRN  
  
 DAILY MULTI-VITAMINS/IRON tablet Generic drug:  multivitamin with iron Take 1 Tab by mouth daily. gabapentin 300 mg capsule Commonly known as:  NEURONTIN One tablet three times a day. GLUCOSAMINE 1500 COMPLEX PO Take  by mouth. hydroCHLOROthiazide 25 mg tablet Commonly known as:  HYDRODIURIL TK 1 T PO D  
  
 lisinopril 20 mg tablet Commonly known as:  Gael Bold Take 1 Tab by mouth daily. meloxicam 15 mg tablet Commonly known as:  MOBIC Take 15 mg by mouth daily. oxyCODONE IR 5 mg immediate release tablet Commonly known as:  ROXICODONE  
one table every 4 hours as needed  
  
 traMADol 50 mg tablet Commonly known as:  ULTRAM  
one tablet every 6 hours as needed TYLENOL EXTRA STRENGTH 500 mg tablet Generic drug:  acetaminophen Take  by mouth every six (6) hours as needed for Pain. VITAMIN D3 1,000 unit Cap Generic drug:  cholecalciferol Take  by mouth daily. Introducing [facility]! Bryan Lamar introduces [employer] patient portal. Now you can access parts of your medical record, email your doctor's office, and request medication refills online. 1. In your internet browser, go to [email or web address] 2. Click on the First Time User? Click Here link in the Sign In box. You will see the New Member Sign Up page. 3. Enter your [employer] Access Code exactly as it appears below. You will not need to use this code after youve completed the sign-up process. If you do not sign up before the expiration date, you must request a new code. · [employer] Access Code: [email or web address] Expires: 8/22/2017 12:36 PM 
 
4. Enter the last four digits of your Social Security Number (xxxx) and Date of Birth (mm/dd/yyyy) as indicated and click Submit. You will be taken to the next sign-up page. 5. Create a Stratus[employer]t ID. This will be your [employer] login ID and cannot be changed, so think of one that is secure and easy to remember. 6. Create a [employer] password. You can change your password at any time. 7. Enter your Password Reset Question and Answer. This can be used at a later time if you forget your password. 8. Enter your e-mail address. You will receive e-mail notification when new information is available in [address] 9. Click Sign Up. You can now view and download portions of your medical record. 10. Click the Download Summary menu link to download a portable copy of your medical information. If you have questions, please visit the Frequently Asked Questions section of the [employer] website. Remember, [employer] is NOT to be used for urgent needs. For medical emergencies, dial 911. Now available from your iPhone and Android! Please provide this summary of care documentation to your next provider. Your primary care clinician is listed as NONE. If you have any questions after today's visit, please call [phone].

## 2017-05-24 NOTE — PROGRESS NOTES
Chief Complaint   Patient presents with    Hypertension     1 mo check  Going to [facility] outpatient.
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
## 2017-05-25 ENCOUNTER — TELEPHONE (OUTPATIENT)
Dept: FAMILY MEDICINE CLINIC | Age: 62
End: 2017-05-25

## 2017-05-25 NOTE — TELEPHONE ENCOUNTER
----- Message from Arelis Hudson sent at 5/25/2017  4:33 PM EDT -----  Regarding: Dr Paris/telephone  Pt (p) [phone], pt would like to know how many refills are on her lisinopril  medication.

## 2017-08-17 DIAGNOSIS — I10 ESSENTIAL HYPERTENSION: ICD-10-CM

## 2017-08-17 RX ORDER — LISINOPRIL 20 MG/1
20 TABLET ORAL DAILY
Qty: 30 TAB | Refills: 0 | Status: SHIPPED | OUTPATIENT
Start: 2017-08-17 | End: 2017-09-21 | Stop reason: SDUPTHER

## 2017-08-17 NOTE — TELEPHONE ENCOUNTER
----- Message from Mattie Landa sent at 8/16/2017  6:25 PM EDT -----  Regarding: Dr. Margaret Fernández  Patient advised she is a former patient of Dr. Elizabeth Mims and needs to get a refill for Lisinopril 20mg. Appointment was scheduled and was cancelled due to vacation for Dr. Anju King. Rescheduled for September 25th. Patient will be out of medication by then. Pharmacy is [facility] on [address] [address]. Best contact number for patient is [phone].

## 2017-09-21 DIAGNOSIS — I10 ESSENTIAL HYPERTENSION: ICD-10-CM

## 2017-09-24 RX ORDER — LISINOPRIL 20 MG/1
TABLET ORAL
Qty: 30 TAB | Refills: 0 | Status: SHIPPED | OUTPATIENT
Start: 2017-09-24 | End: 2017-09-25 | Stop reason: SDUPTHER

## 2017-09-25 ENCOUNTER — OFFICE VISIT (OUTPATIENT)
Dept: FAMILY MEDICINE CLINIC | Age: 62
End: 2017-09-25

## 2017-09-25 VITALS
SYSTOLIC BLOOD PRESSURE: 108 MMHG | BODY MASS INDEX: 37.25 KG/M2 | HEART RATE: 68 BPM | TEMPERATURE: 97.6 F | DIASTOLIC BLOOD PRESSURE: 63 MMHG | OXYGEN SATURATION: 97 % | HEIGHT: 65 IN | RESPIRATION RATE: 18 BRPM | WEIGHT: 223.6 LBS

## 2017-09-25 DIAGNOSIS — Z13.1 SCREENING FOR DIABETES MELLITUS (DM): ICD-10-CM

## 2017-09-25 DIAGNOSIS — I10 ESSENTIAL HYPERTENSION: ICD-10-CM

## 2017-09-25 DIAGNOSIS — E55.9 HYPOVITAMINOSIS D: ICD-10-CM

## 2017-09-25 DIAGNOSIS — Z12.31 ENCOUNTER FOR SCREENING MAMMOGRAM FOR BREAST CANCER: ICD-10-CM

## 2017-09-25 DIAGNOSIS — G72.9 MYOPATHY: ICD-10-CM

## 2017-09-25 DIAGNOSIS — Z23 ENCOUNTER FOR IMMUNIZATION: ICD-10-CM

## 2017-09-25 DIAGNOSIS — E78.5 DYSLIPIDEMIA (HIGH LDL; LOW HDL): ICD-10-CM

## 2017-09-25 DIAGNOSIS — R60.0 BILATERAL LEG EDEMA: ICD-10-CM

## 2017-09-25 DIAGNOSIS — Z12.11 COLON CANCER SCREENING: ICD-10-CM

## 2017-09-25 DIAGNOSIS — I10 HYPERTENSION GOAL BP (BLOOD PRESSURE) < 130/80: ICD-10-CM

## 2017-09-25 DIAGNOSIS — Z00.00 ENCOUNTER FOR ANNUAL PHYSICAL EXAM: Primary | ICD-10-CM

## 2017-09-25 DIAGNOSIS — Z11.59 NEED FOR HEPATITIS C SCREENING TEST: ICD-10-CM

## 2017-09-25 PROBLEM — G62.9 NEUROPATHY: Status: ACTIVE | Noted: 2017-09-25

## 2017-09-25 RX ORDER — LISINOPRIL 20 MG/1
TABLET ORAL
Qty: 30 TAB | Refills: 5 | Status: SHIPPED | OUTPATIENT
Start: 2017-09-25 | End: 2018-04-06 | Stop reason: SDUPTHER

## 2017-09-25 RX ORDER — HYDROCHLOROTHIAZIDE 25 MG/1
TABLET ORAL
Qty: 30 TAB | Refills: 5 | Status: SHIPPED | OUTPATIENT
Start: 2017-09-25 | End: 2018-04-09 | Stop reason: SDUPTHER

## 2017-09-25 RX ORDER — IBUPROFEN 200 MG
TABLET ORAL
COMMUNITY
End: 2017-09-25 | Stop reason: DRUGHIGH

## 2017-09-25 RX ORDER — BACLOFEN 10 MG/1
TABLET ORAL 3 TIMES DAILY
COMMUNITY
End: 2022-05-04

## 2017-09-25 RX ORDER — IBUPROFEN 600 MG/1
600 TABLET ORAL
Qty: 90 TAB | Refills: 1 | Status: SHIPPED | OUTPATIENT
Start: 2017-09-25 | End: 2021-08-11 | Stop reason: ALTCHOICE

## 2017-09-25 NOTE — PATIENT INSTRUCTIONS
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
## 2017-09-25 NOTE — MR AVS SNAPSHOT
Visit Information Date & Time Provider Department Dept. Phone Encounter #  
 9/25/2017 11:45 AM Rin Atwood MD [facility] at [address] [id number] Follow-up Instructions Return in about 4 months (around 1/25/2018) for routine follow up. Upcoming Health Maintenance Date Due Hepatitis C Screening 1955 PAP AKA CERVICAL CYTOLOGY 12/1/1975 BREAST CANCER SCRN MAMMOGRAM 12/1/2004 FOBT Q 1 YEAR AGE 50-75 12/1/2004 ZOSTER VACCINE AGE 60> 10/1/2014 DTaP/Tdap/Td series (2 - Td) 3/7/2027 Allergies as of 9/25/2017  Review Complete On: 9/25/2017 By: Joel Perez LPN No Known Allergies Current Immunizations  Reviewed on 3/7/2017 Name Date Tdap 3/7/2017 Not reviewed this visit You Were Diagnosed With   
  
 Codes Comments Encounter for annual physical exam    -  Primary ICD-10-CM: Z00.00 ICD-9-CM: V70.0 Need for hepatitis C screening test     ICD-10-CM: Z11.59 
ICD-9-CM: V73.89 Hypertension goal BP (blood pressure) < 130/80     ICD-10-CM: I10 
ICD-9-CM: 401.9 Encounter for screening mammogram for breast cancer     ICD-10-CM: Z12.31 
ICD-9-CM: V76.12 Hypovitaminosis D     ICD-10-CM: E55.9 ICD-9-CM: 268.9 Dyslipidemia (high LDL; low HDL)     ICD-10-CM: E78.4 ICD-9-CM: 272.4 Screening for diabetes mellitus (DM)     ICD-10-CM: Z13.1 ICD-9-CM: V77.1 Essential hypertension     ICD-10-CM: I10 
ICD-9-CM: 401.9 Colon cancer screening     ICD-10-CM: Z12.11 ICD-9-CM: V76.51 Bilateral leg edema     ICD-10-CM: R60.0 ICD-9-CM: 316. 3 Myopathy     ICD-10-CM: G72.9 ICD-9-CM: 359.9 Vitals BP Pulse Temp Resp Height(growth percentile) Weight(growth percentile) 108/63 (BP 1 Location: Left arm, BP Patient Position: Sitting) 68 97.6 °F (36.4 °C) (Oral) 18 5' 4.5\" (1.638 m) 223 lb 9.6 oz (101.4 kg) LMP SpO2 BMI OB Status Smoking Status 02/13/2005 97% 37.79 kg/m2 Postmenopausal Never Smoker Vitals History BMI and BSA Data Body Mass Index Body Surface Area  
 37.79 kg/m 2 2.15 m 2 Preferred Pharmacy Pharmacy Name Phone [facility], [address] [place] [address] [phone] Your Updated Medication List  
  
   
This list is accurate as of: 9/25/17 12:49 PM.  Always use your most recent med list.  
  
  
  
  
 baclofen 10 mg tablet Commonly known as:  LIORESAL Take  by mouth three (3) times daily. Comp. Minesh Salmons Use for support  
  
 cyanocobalamin (vitamin B-12) 1,500 mcg Tbdi Take 1,500 mg by mouth two (2) times a day. DAILY MULTI-VITAMINS/IRON tablet Generic drug:  multivitamin with iron Take 1 Tab by mouth daily. gabapentin 300 mg capsule Commonly known as:  NEURONTIN One tablet three times a day. GLUCOSAMINE 1500 COMPLEX PO Take  by mouth. hydroCHLOROthiazide 25 mg tablet Commonly known as:  HYDRODIURIL TK 1 T PO D  
  
 ibuprofen 600 mg tablet Commonly known as:  MOTRIN Take 1 Tab by mouth every eight (8) hours as needed for Pain. Indications: Pain  
  
 lisinopril 20 mg tablet Commonly known as:  PRINIVIL, ZESTRIL  
TAKE 1 TABLET BY MOUTH DAILY  
  
 oxyCODONE IR 5 mg immediate release tablet Commonly known as:  ROXICODONE  
one table every 4 hours as needed  
  
 traMADol 50 mg tablet Commonly known as:  ULTRAM  
one tablet every 6 hours as needed VITAMIN D3 1,000 unit Cap Generic drug:  cholecalciferol Take  by mouth daily. Prescriptions Sent to Pharmacy Refills  
 lisinopril (PRINIVIL, ZESTRIL) 20 mg tablet 5 Sig: TAKE 1 TABLET BY MOUTH DAILY Class: Normal  
 Pharmacy: [facility], [address] Ph #: [phone] hydroCHLOROthiazide (HYDRODIURIL) 25 mg tablet 5 Sig: TK 1 T PO D Class: Normal  
 Pharmacy: [address] Ph #: [phone]  
 ibuprofen (MOTRIN) 600 mg tablet 1 Sig: Take 1 Tab by mouth every eight (8) hours as needed for Pain. Indications: Pain Class: Normal  
 Pharmacy: [address] Ph #: [phone] Route: Oral  
 Comp. Stocking,Thigh,Long,Large misc 3 Sig: Use for support Class: Normal  
 Pharmacy: [address] Ph #: [phone] We Performed the Following AMB SUPPLY ORDER [[id number] Custom] Comments: Toilet raiser HEMOGLOBIN A1C WITH EAG [38598 CPT(R)] HEPATITIS C AB [60153 CPT(R)] LIPID PANEL [41341 CPT(R)] METABOLIC PANEL, COMPREHENSIVE [63562 CPT(R)] REFERRAL TO GASTROENTEROLOGY [PHS55 Custom] URINALYSIS W/ RFLX MICROSCOPIC [01905 CPT(R)] VITAMIN D, 25 HYDROXY R893536 CPT(R)] Follow-up Instructions Return in about 4 months (around 1/25/2018) for routine follow up. To-Do List   
 09/25/2017 Imaging:  [facility] MAMMO BI SCREENING INCL CAD Referral Information Referral ID Referred By Referred To  
  
 [id number] [facility] [address] [place], [address] Visits Status Start Date End Date 1 New Request 9/25/17 9/25/18 If your referral has a status of pending review or denied, additional information will be sent to support the outcome of this decision. Patient Instructions DASH Diet: Care Instructions Your Care Instructions The DASH diet is an eating plan that can help lower your blood pressure. DASH stands for Dietary Approaches to Stop Hypertension.  Hypertension is high blood pressure. The DASH diet focuses on eating foods that are high in calcium, potassium, and magnesium. These nutrients can lower blood pressure. The foods that are highest in these nutrients are fruits, vegetables, low-fat dairy products, nuts, seeds, and legumes. But taking calcium, potassium, and magnesium supplements instead of eating foods that are high in those nutrients does not have the same effect. The DASH diet also includes whole grains, fish, and poultry. The DASH diet is one of several lifestyle changes your doctor may recommend to lower your high blood pressure. Your doctor may also want you to decrease the amount of sodium in your diet. Lowering sodium while following the DASH diet can lower blood pressure even further than just the DASH diet alone. Follow-up care is a key part of your treatment and safety. Be sure to make and go to all appointments, and call your doctor if you are having problems. It's also a good idea to know your test results and keep a list of the medicines you take. How can you care for yourself at home? Following the DASH diet · Eat 4 to 5 servings of fruit each day. A serving is 1 medium-sized piece of fruit, ½ cup chopped or canned fruit, 1/4 cup dried fruit, or 4 ounces (½ cup) of fruit juice. Choose fruit more often than fruit juice. · Eat 4 to 5 servings of vegetables each day. A serving is 1 cup of lettuce or raw leafy vegetables, ½ cup of chopped or cooked vegetables, or 4 ounces (½ cup) of vegetable juice. Choose vegetables more often than vegetable juice. · Get 2 to 3 servings of low-fat and fat-free dairy each day. A serving is 8 ounces of milk, 1 cup of yogurt, or 1 ½ ounces of cheese. · Eat 6 to 8 servings of grains each day. A serving is 1 slice of bread, 1 ounce of dry cereal, or ½ cup of cooked rice, pasta, or cooked cereal. Try to choose whole-grain products as much as possible. · Limit lean meat, poultry, and fish to 2 servings each day. A serving is 3 ounces, about the size of a deck of cards. · Eat 4 to 5 servings of nuts, seeds, and legumes (cooked dried beans, lentils, and split peas) each week. A serving is 1/3 cup of nuts, 2 tablespoons of seeds, or ½ cup of cooked beans or peas. · Limit fats and oils to 2 to 3 servings each day. A serving is 1 teaspoon of vegetable oil or 2 tablespoons of salad dressing. · Limit sweets and added sugars to 5 servings or less a week. A serving is 1 tablespoon jelly or jam, ½ cup sorbet, or 1 cup of lemonade. · Eat less than 2,300 milligrams (mg) of sodium a day. If you limit your sodium to 1,500 mg a day, you can lower your blood pressure even more. Tips for success · Start small. Do not try to make dramatic changes to your diet all at once. You might feel that you are missing out on your favorite foods and then be more likely to not follow the plan. Make small changes, and stick with them. Once those changes become habit, add a few more changes. · Try some of the following: ¨ Make it a goal to eat a fruit or vegetable at every meal and at snacks. This will make it easy to get the recommended amount of fruits and vegetables each day. ¨ Try yogurt topped with fruit and nuts for a snack or healthy dessert. ¨ Add lettuce, tomato, cucumber, and onion to sandwiches. ¨ Combine a ready-made pizza crust with low-fat mozzarella cheese and lots of vegetable toppings. Try using tomatoes, squash, spinach, broccoli, carrots, cauliflower, and onions. ¨ Have a variety of cut-up vegetables with a low-fat dip as an appetizer instead of chips and dip. ¨ Sprinkle sunflower seeds or chopped almonds over salads. Or try adding chopped walnuts or almonds to cooked vegetables. ¨ Try some vegetarian meals using beans and peas. Add garbanzo or kidney beans to salads. Make burritos and tacos with mashed austin beans or black beans. Where can you learn more? Go to http://david-yves.info/. Enter T677 in the search box to learn more about \"DASH Diet: Care Instructions. \" Current as of: April 3, 2017 Content Version: 11.3 © 0526-5016 [employer]. Care instructions adapted under license by [employer] (which disclaims liability or warranty for this information). If you have questions about a medical condition or this instruction, always ask your healthcare professional. [place] [address] any warranty or liability for your use of this information. Introducing [facility]! [employer] introduces [employer] patient portal. Now you can access parts of your medical record, email your doctor's office, and request medication refills online. 1. In your internet browser, go to [email or web address] 2. Click on the First Time User? Click Here link in the Sign In box. You will see the New Member Sign Up page. 3. Enter your [employer] Access Code exactly as it appears below. You will not need to use this code after youve completed the sign-up process. If you do not sign up before the expiration date, you must request a new code. · [employer] Access Code: [email or web address] Expires: 12/24/2017 12:49 PM 
 
4. Enter the last four digits of your Social Security Number (xxxx) and Date of Birth (mm/dd/yyyy) as indicated and click Submit. You will be taken to the next sign-up page. 5. Create a [employer] ID. This will be your [employer] login ID and cannot be changed, so think of one that is secure and easy to remember. 6. Create a [employer] password. You can change your password at any time. 7. Enter your Password Reset Question and Answer. This can be used at a later time if you forget your password. 8. Enter your e-mail address. You will receive e-mail notification when new information is available in [address] 9. Click Sign Up. You can now view and download portions of your medical record. 10. Click the Download Summary menu link to download a portable copy of your medical information. If you have questions, please visit the Frequently Asked Questions section of the [employer] website. Remember, [employer] is NOT to be used for urgent needs. For medical emergencies, dial 911. Now available from your iPhone and Android! Please provide this summary of care documentation to your next provider. Your primary care clinician is listed as NONE. If you have any questions after today's visit, please call [phone].

## 2017-09-25 NOTE — PROGRESS NOTES
1. Have you been to the ER, urgent care clinic since your last visit? Hospitalized since your last visit? No     2. Have you seen or consulted any other health care providers outside of the [address] [place] [address] since your last visit? Include any pap smears or colon screening. No       Pt is a former patient of dr Ashley Reyes. States she is here for follow up    Mitra Drummond is a 57 y.o. female who presents for routine immunizations. She denies any symptoms , reactions or allergies that would exclude them from being immunized today. Risks and adverse reactions were discussed and the VIS was given to them. All questions were addressed. She was observed for15 min post injection. There were no reactions observed.     El Siegel LPN

## 2017-09-27 ENCOUNTER — TELEPHONE (OUTPATIENT)
Dept: FAMILY MEDICINE CLINIC | Age: 62
End: 2017-09-27

## 2017-09-27 NOTE — TELEPHONE ENCOUNTER
[address] - calling back and added to message     Re: compression stocking     Need compression rate to be able to fill   Diagnostic code also for edema and/or swelling     Added this today:  Pt also asking for a toilet riser with handles       Best number to reach them is [phone] X [phone]    Fax [phone]

## 2017-09-29 ENCOUNTER — HOSPITAL ENCOUNTER (OUTPATIENT)
Dept: GENERAL RADIOLOGY | Age: 62
Discharge: HOME | End: 2017-09-29
Payer: COMMERCIAL

## 2017-09-29 DIAGNOSIS — R52 PAIN: ICD-10-CM

## 2017-09-29 DIAGNOSIS — R60.9 SWELLING: ICD-10-CM

## 2017-09-29 PROCEDURE — 73562 X-RAY EXAM OF KNEE 3: CPT

## 2017-10-04 ENCOUNTER — HOSPITAL ENCOUNTER (OUTPATIENT)
Dept: MAMMOGRAPHY | Age: 62
Discharge: HOME | End: 2017-10-04
Attending: INTERNAL MEDICINE
Payer: MEDICAID

## 2017-10-04 DIAGNOSIS — Z12.31 ENCOUNTER FOR SCREENING MAMMOGRAM FOR BREAST CANCER: ICD-10-CM

## 2017-10-04 PROCEDURE — 77067 SCR MAMMO BI INCL CAD: CPT

## 2017-10-06 ENCOUNTER — TELEPHONE (OUTPATIENT)
Dept: FAMILY MEDICINE CLINIC | Age: 62
End: 2017-10-06

## 2017-10-06 NOTE — TELEPHONE ENCOUNTER
Pt is calling to have toilet raiser sent to [facility] 68 number to reach her is [phone]    Theoplis Shadow [phone] Fax for Erin put on the diagnosis code and add \"with handle\"

## 2017-10-31 ENCOUNTER — HOSPITAL ENCOUNTER (OUTPATIENT)
Dept: GENERAL RADIOLOGY | Age: 62
Discharge: HOME | End: 2017-10-31
Payer: MEDICAID

## 2017-10-31 DIAGNOSIS — M25.559 HIP PAIN: ICD-10-CM

## 2017-10-31 DIAGNOSIS — M54.50 LOWER BACK PAIN: ICD-10-CM

## 2017-10-31 PROCEDURE — 72110 X-RAY EXAM L-2 SPINE 4/>VWS: CPT

## 2017-10-31 PROCEDURE — 73502 X-RAY EXAM HIP UNI 2-3 VIEWS: CPT

## 2018-01-15 ENCOUNTER — HOSPITAL ENCOUNTER (OUTPATIENT)
Dept: MRI IMAGING | Age: 63
Discharge: HOME | End: 2018-01-15
Attending: PHYSICAL MEDICINE & REHABILITATION
Payer: MEDICAID

## 2018-01-15 DIAGNOSIS — M54.16 RIGHT LUMBAR RADICULOPATHY: ICD-10-CM

## 2018-01-15 PROCEDURE — 72148 MRI LUMBAR SPINE W/O DYE: CPT

## 2018-04-06 DIAGNOSIS — I10 ESSENTIAL HYPERTENSION: ICD-10-CM

## 2018-04-06 NOTE — TELEPHONE ENCOUNTER
Sara morales/Yuri FIGUEREDO     States pt can have 90 days of lisinopril     Didn't have a incoming phone line or fax to send a request

## 2018-04-09 ENCOUNTER — HOSPITAL ENCOUNTER (OUTPATIENT)
Dept: GENERAL RADIOLOGY | Age: 63
Discharge: HOME | End: 2018-04-09
Payer: MEDICAID

## 2018-04-09 DIAGNOSIS — M25.551 HIP PAIN, RIGHT: ICD-10-CM

## 2018-04-09 PROCEDURE — 73502 X-RAY EXAM HIP UNI 2-3 VIEWS: CPT

## 2018-04-09 RX ORDER — LISINOPRIL 20 MG/1
TABLET ORAL
Qty: 90 TAB | Refills: 1 | Status: SHIPPED | OUTPATIENT
Start: 2018-04-09 | End: 2018-10-10 | Stop reason: SDUPTHER

## 2018-04-23 ENCOUNTER — OFFICE VISIT (OUTPATIENT)
Dept: FAMILY MEDICINE CLINIC | Age: 63
End: 2018-04-23

## 2018-04-23 VITALS
DIASTOLIC BLOOD PRESSURE: 61 MMHG | OXYGEN SATURATION: 97 % | RESPIRATION RATE: 20 BRPM | HEIGHT: 65 IN | BODY MASS INDEX: 33.32 KG/M2 | TEMPERATURE: 98.1 F | HEART RATE: 64 BPM | SYSTOLIC BLOOD PRESSURE: 109 MMHG | WEIGHT: 200 LBS

## 2018-04-23 DIAGNOSIS — M21.371 FOOT DROP, RIGHT: Primary | ICD-10-CM

## 2018-04-23 DIAGNOSIS — E55.9 HYPOVITAMINOSIS D: ICD-10-CM

## 2018-04-23 DIAGNOSIS — E78.5 DYSLIPIDEMIA (HIGH LDL; LOW HDL): ICD-10-CM

## 2018-04-23 DIAGNOSIS — R73.02 IGT (IMPAIRED GLUCOSE TOLERANCE): ICD-10-CM

## 2018-04-23 DIAGNOSIS — Z11.59 NEED FOR HEPATITIS C SCREENING TEST: ICD-10-CM

## 2018-04-23 DIAGNOSIS — R35.0 FREQUENCY OF URINATION: ICD-10-CM

## 2018-04-23 DIAGNOSIS — Z12.11 COLON CANCER SCREENING: ICD-10-CM

## 2018-04-23 DIAGNOSIS — Z23 ENCOUNTER FOR IMMUNIZATION: ICD-10-CM

## 2018-04-23 DIAGNOSIS — Z13.29 SCREENING FOR THYROID DISORDER: ICD-10-CM

## 2018-04-23 DIAGNOSIS — I10 HYPERTENSION GOAL BP (BLOOD PRESSURE) < 130/80: ICD-10-CM

## 2018-04-23 DIAGNOSIS — Z01.419 WOMEN'S ANNUAL ROUTINE GYNECOLOGICAL EXAMINATION: ICD-10-CM

## 2018-04-23 RX ORDER — CELECOXIB 200 MG/1
CAPSULE ORAL
Refills: 1 | COMMUNITY
Start: 2018-04-11 | End: 2021-08-11 | Stop reason: SDUPTHER

## 2018-04-23 NOTE — MR AVS SNAPSHOT
[facility] [address] [facility] 83. 
[phone] Patient: Virginia Crockett MRN: [id number] CMZ:[id number] Visit Information Date & Time Provider Department Dept. Phone Encounter #  
 4/23/2018 12:00 PM Aakash Copeland MD [address] [facility] at [address] [place] [address] [id number] Follow-up Instructions Return in about 6 months (around 10/23/2018), or if symptoms worsen or fail to improve, for routine follow up. Upcoming Health Maintenance Date Due Hepatitis C Screening 1955 PAP AKA CERVICAL CYTOLOGY 12/1/1975 FOBT Q 1 YEAR AGE 50-75 12/1/2004 ZOSTER VACCINE AGE 60> 10/1/2014 BREAST CANCER SCRN MAMMOGRAM 10/4/2019 DTaP/Tdap/Td series (2 - Td) 3/7/2027 Allergies as of 4/23/2018  Review Complete On: 4/23/2018 By: Aakash Copeland MD  
 No Known Allergies Current Immunizations  Reviewed on 3/7/2017 Name Date Influenza Vaccine (Quad) PF 9/25/2017 Tdap 3/7/2017 Not reviewed this visit You Were Diagnosed With   
  
 Codes Comments Foot drop, right    -  Primary ICD-10-CM: M21.371 ICD-9-CM: 736.79 Dyslipidemia (high LDL; low HDL)     ICD-10-CM: E78.5 ICD-9-CM: 272.4 Hypovitaminosis D     ICD-10-CM: E55.9 ICD-9-CM: 268.9 Hypertension goal BP (blood pressure) < 130/80     ICD-10-CM: I10 
ICD-9-CM: 401.9 Colon cancer screening     ICD-10-CM: Z12.11 ICD-9-CM: V76.51 Encounter for immunization     ICD-10-CM: M38 ICD-9-CM: V03.89 Frequency of urination     ICD-10-CM: R35.0 ICD-9-CM: 788.41 Screening for thyroid disorder     ICD-10-CM: Z13.29 ICD-9-CM: V77.0 Need for hepatitis C screening test     ICD-10-CM: Z11.59 
ICD-9-CM: V73.89 IGT (impaired glucose tolerance)     ICD-10-CM: R73.02 
ICD-9-CM: 790.22 Vitals BP Pulse Temp Resp Height(growth percentile) Weight(growth percentile) 109/61 64 98.1 °F (36.7 °C) (Oral) 20 5' 4.5\" (1.638 m) 200 lb (90.7 kg) LMP SpO2 BMI OB Status Smoking Status 02/13/2005 97% 33.8 kg/m2 Postmenopausal Never Smoker Vitals History BMI and BSA Data Body Mass Index Body Surface Area  
 33.8 kg/m 2 2.03 m 2 Preferred Pharmacy Pharmacy Name Phone [facility] [address] [phone] Your Updated Medication List  
  
   
This list is accurate as of 4/23/18 12:48 PM.  Always use your most recent med list.  
  
  
  
  
 baclofen 10 mg tablet Commonly known as:  LIORESAL Take  by mouth three (3) times daily. celecoxib 200 mg capsule Commonly known as:  CELEBREX TK 1 C PO QD Comp. Jason Goodness Use for support  
  
 cyanocobalamin (vitamin B-12) 1,500 mcg Tbdi Take 1,500 mg by mouth two (2) times a day. DAILY MULTI-VITAMINS/IRON tablet Generic drug:  multivitamin with iron Take 1 Tab by mouth daily. gabapentin 300 mg capsule Commonly known as:  NEURONTIN One tablet three times a day. GLUCOSAMINE 1500 COMPLEX PO Take  by mouth. hydroCHLOROthiazide 25 mg tablet Commonly known as:  HYDRODIURIL  
TAKE 1 TABLET BY MOUTH EVERY DAY  
  
 ibuprofen 600 mg tablet Commonly known as:  MOTRIN Take 1 Tab by mouth every eight (8) hours as needed for Pain. Indications: Pain  
  
 lisinopril 20 mg tablet Commonly known as:  PRINIVIL, ZESTRIL  
TAKE 1 TABLET BY MOUTH DAILY  
  
 oxyCODONE IR 5 mg immediate release tablet Commonly known as:  ROXICODONE  
one table every 4 hours as needed  
  
 traMADol 50 mg tablet Commonly known as:  ULTRAM  
one tablet every 6 hours as needed  
  
 varicella-zoster recombinant (PF) 50 mcg/0.5 mL Susr injection Commonly known as:  SHINGRIX (PF)  
0.5 mL by IntraMUSCular route once for 1 dose. VITAMIN D3 1,000 unit Cap Generic drug:  cholecalciferol Take  by mouth daily. Prescriptions Sent to Pharmacy Refills  
 varicella-zoster recombinant, PF, (SHINGRIX, PF,) 50 mcg/0.5 mL susr injection 0 Si.5 mL by IntraMUSCular route once for 1 dose. Class: Normal  
 Pharmacy: [facility] [place], [address] Ph #: [phone] Route: IntraMUSCular We Performed the Following HEMOGLOBIN A1C WITH EAG [80345 CPT(R)] HEPATITIS C AB [22643 CPT(R)] LIPID PANEL [54832 CPT(R)] METABOLIC PANEL, COMPREHENSIVE [90870 CPT(R)] REFERRAL TO GASTROENTEROLOGY [WAO20 Custom] TSH 3RD GENERATION [64858 CPT(R)] URINALYSIS W/ RFLX MICROSCOPIC [82301 CPT(R)] VITAMIN D, 25 HYDROXY [id number] CPT(R)] Follow-up Instructions Return in about 6 months (around 10/23/2018), or if symptoms worsen or fail to improve, for routine follow up. Referral Information Referral ID Referred By Referred To  
  
 [id number] [facility] [address] [place], [address] [facility] Visits Status Start Date End Date 1 New Request 18 If your referral has a status of pending review or denied, additional information will be sent to support the outcome of this decision. Patient Instructions Well Visit, Women 48 to 72: Care Instructions Your Care Instructions Physical exams can help you stay healthy. Your doctor has checked your overall health and may have suggested ways to take good care of yourself. He or she also may have recommended tests. At home, you can help prevent illness with healthy eating, regular exercise, and other steps. Follow-up care is a key part of your treatment and safety. Be sure to make and go to all appointments, and call your doctor if you are having problems.  It's also a good idea to know your test results and keep a list of the medicines you take. How can you care for yourself at home? · Reach and stay at a healthy weight. This will lower your risk for many problems, such as obesity, diabetes, heart disease, and high blood pressure. · Get at least 30 minutes of exercise on most days of the week. Walking is a good choice. You also may want to do other activities, such as running, swimming, cycling, or playing tennis or team sports. · Do not smoke. Smoking can make health problems worse. If you need help quitting, talk to your doctor about stop-smoking programs and medicines. These can increase your chances of quitting for good. · Protect your skin from too much sun. When you're outdoors from 10 a.m. to 4 p.m., stay in the shade or cover up with clothing and a hat with a wide brim. Wear sunglasses that block UV rays. Even when it's cloudy, put broad-spectrum sunscreen (SPF 30 or higher) on any exposed skin. · See a dentist one or two times a year for checkups and to have your teeth cleaned. · Wear a seat belt in the car. · Limit alcohol to 1 drink a day. Too much alcohol can cause health problems. Follow your doctor's advice about when to have certain tests. These tests can spot problems early. · Cholesterol. Your doctor will tell you how often to have this done based on your age, family history, or other things that can increase your risk for heart attack and stroke. · Blood pressure. Have your blood pressure checked during a routine doctor visit. Your doctor will tell you how often to check your blood pressure based on your age, your blood pressure results, and other factors. · Mammogram. Ask your doctor how often you should have a mammogram, which is an X-ray of your breasts. A mammogram can spot breast cancer before it can be felt and when it is easiest to treat. · Pap test and pelvic exam. Ask your doctor how often you should have a Pap test. You may not need to have a Pap test as often as you used to. · Vision. Have your eyes checked every year or two or as often as your doctor suggests. Some experts recommend that you have yearly exams for glaucoma and other age-related eye problems starting at age 48. · Hearing. Tell your doctor if you notice any change in your hearing. You can have tests to find out how well you hear. · Diabetes. Ask your doctor whether you should have tests for diabetes. · Colon cancer. You should begin tests for colon cancer at age 48. You may have one of several tests. Your doctor will tell you how often to have tests based on your age and risk. Risks include whether you already had a precancerous polyp removed from your colon or whether your parents, sisters and brothers, or children have had colon cancer. · Thyroid disease. Talk to your doctor about whether to have your thyroid checked as part of a regular physical exam. Women have an increased chance of a thyroid problem. · Osteoporosis. You should begin tests for bone density at age 72. If you are younger than 72, ask your doctor whether you have factors that may increase your risk for this disease. You may want to have this test before age 72. · Heart attack and stroke risk. At least every 4 to 6 years, you should have your risk for heart attack and stroke assessed. Your doctor uses factors such as your age, blood pressure, cholesterol, and whether you smoke or have diabetes to show what your risk for a heart attack or stroke is over the next 10 years. When should you call for help? Watch closely for changes in your health, and be sure to contact your doctor if you have any problems or symptoms that concern you. Where can you learn more? Go to http://david-yves.info/. Enter B146 in the search box to learn more about \"Well Visit, Women 50 to 72: Care Instructions. \" Current as of: May 12, 2017 Content Version: 11.4 © 7381-1471 Healthwise, Incorporated.  Care instructions adapted under license by [address] (which disclaims liability or warranty for this information). If you have questions about a medical condition or this instruction, always ask your healthcare professional. [address] any warranty or liability for your use of this information. Introducing [facility] & HEALTH SERVICES! [facility] introduces Gecko patient portal. Now you can access parts of your medical record, email your doctor's office, and request medication refills online. 1. In your internet browser, go to [email or web address] 2. Click on the First Time User? Click Here link in the Sign In box. You will see the New Member Sign Up page. 3. Enter your Gecko Access Code exactly as it appears below. You will not need to use this code after youve completed the sign-up process. If you do not sign up before the expiration date, you must request a new code. · Gecko Access Code: [email or web address] Expires: 7/22/2018 12:47 PM 
 
4. Enter the last four digits of your Social Security Number (xxxx) and Date of Birth (mm/dd/yyyy) as indicated and click Submit. You will be taken to the next sign-up page. 5. Create a Gecko ID. This will be your Gecko login ID and cannot be changed, so think of one that is secure and easy to remember. 6. Create a Gecko password. You can change your password at any time. 7. Enter your Password Reset Question and Answer. This can be used at a later time if you forget your password. 8. Enter your e-mail address. You will receive e-mail notification when new information is available in [address] 9. Click Sign Up. You can now view and download portions of your medical record. 10. Click the Download Summary menu link to download a portable copy of your medical information. If you have questions, please visit the Frequently Asked Questions section of the Gecko website.  Remember, Gecko is NOT to be used for urgent needs. For medical emergencies, dial 911. Now available from your iPhone and Android! Please provide this summary of care documentation to your next provider. Your primary care clinician is listed as Barbra Rivers. If you have any questions after today's visit, please call [phone].

## 2018-04-23 NOTE — PATIENT INSTRUCTIONS
Well Visit, Women 48 to 72: Care Instructions  Your Care Instructions    Physical exams can help you stay healthy. Your doctor has checked your overall health and may have suggested ways to take good care of yourself. He or she also may have recommended tests. At home, you can help prevent illness with healthy eating, regular exercise, and other steps. Follow-up care is a key part of your treatment and safety. Be sure to make and go to all appointments, and call your doctor if you are having problems. It's also a good idea to know your test results and keep a list of the medicines you take. How can you care for yourself at home? · Reach and stay at a healthy weight. This will lower your risk for many problems, such as obesity, diabetes, heart disease, and high blood pressure. · Get at least 30 minutes of exercise on most days of the week. Walking is a good choice. You also may want to do other activities, such as running, swimming, cycling, or playing tennis or team sports. · Do not smoke. Smoking can make health problems worse. If you need help quitting, talk to your doctor about stop-smoking programs and medicines. These can increase your chances of quitting for good. · Protect your skin from too much sun. When you're outdoors from 10 a.m. to 4 p.m., stay in the shade or cover up with clothing and a hat with a wide brim. Wear sunglasses that block UV rays. Even when it's cloudy, put broad-spectrum sunscreen (SPF 30 or higher) on any exposed skin. · See a dentist one or two times a year for checkups and to have your teeth cleaned. · Wear a seat belt in the car. · Limit alcohol to 1 drink a day. Too much alcohol can cause health problems. Follow your doctor's advice about when to have certain tests. These tests can spot problems early. · Cholesterol.  Your doctor will tell you how often to have this done based on your age, family history, or other things that can increase your risk for heart attack and stroke. · Blood pressure. Have your blood pressure checked during a routine doctor visit. Your doctor will tell you how often to check your blood pressure based on your age, your blood pressure results, and other factors. · Mammogram. Ask your doctor how often you should have a mammogram, which is an X-ray of your breasts. A mammogram can spot breast cancer before it can be felt and when it is easiest to treat. · Pap test and pelvic exam. Ask your doctor how often you should have a Pap test. You may not need to have a Pap test as often as you used to. · Vision. Have your eyes checked every year or two or as often as your doctor suggests. Some experts recommend that you have yearly exams for glaucoma and other age-related eye problems starting at age 48. · Hearing. Tell your doctor if you notice any change in your hearing. You can have tests to find out how well you hear. · Diabetes. Ask your doctor whether you should have tests for diabetes. · Colon cancer. You should begin tests for colon cancer at age 48. You may have one of several tests. Your doctor will tell you how often to have tests based on your age and risk. Risks include whether you already had a precancerous polyp removed from your colon or whether your parents, sisters and brothers, or children have had colon cancer. · Thyroid disease. Talk to your doctor about whether to have your thyroid checked as part of a regular physical exam. Women have an increased chance of a thyroid problem. · Osteoporosis. You should begin tests for bone density at age 72. If you are younger than 72, ask your doctor whether you have factors that may increase your risk for this disease. You may want to have this test before age 72. · Heart attack and stroke risk. At least every 4 to 6 years, you should have your risk for heart attack and stroke assessed.  Your doctor uses factors such as your age, blood pressure, cholesterol, and whether you smoke or have diabetes to show what your risk for a heart attack or stroke is over the next 10 years. When should you call for help? Watch closely for changes in your health, and be sure to contact your doctor if you have any problems or symptoms that concern you. Where can you learn more? Go to http://david-yves.info/. Enter M152 in the search box to learn more about \"Well Visit, Women 50 to 72: Care Instructions. \"  Current as of: May 12, 2017  Content Version: 11.4  © 4832-1773 Healthwise, Incorporated. Care instructions adapted under license by [employer] (which disclaims liability or warranty for this information). If you have questions about a medical condition or this instruction, always ask your healthcare professional. [address] any warranty or liability for your use of this information.
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
## 2018-04-24 LAB
25(OH)D3+25(OH)D2 SERPL-MCNC: 40.6 NG/ML (ref 30–100)
ALBUMIN SERPL-MCNC: 4.2 G/DL (ref 3.6–4.8)
ALBUMIN/GLOB SERPL: 1.4 {RATIO} (ref 1.2–2.2)
ALP SERPL-CCNC: 76 IU/L (ref 39–117)
ALT SERPL-CCNC: 18 IU/L (ref 0–32)
APPEARANCE UR: CLEAR
AST SERPL-CCNC: 20 IU/L (ref 0–40)
BILIRUB SERPL-MCNC: 0.4 MG/DL (ref 0–1.2)
BILIRUB UR QL STRIP: NEGATIVE
BUN SERPL-MCNC: 21 MG/DL (ref 8–27)
BUN/CREAT SERPL: 27 (ref 12–28)
CALCIUM SERPL-MCNC: 10 MG/DL (ref 8.7–10.3)
CHLORIDE SERPL-SCNC: 103 MMOL/L (ref 96–106)
CHOLEST SERPL-MCNC: 305 MG/DL (ref 100–199)
CO2 SERPL-SCNC: 26 MMOL/L (ref 18–29)
COLOR UR: YELLOW
COMMENT, 011824: ABNORMAL
CREAT SERPL-MCNC: 0.77 MG/DL (ref 0.57–1)
EST. AVERAGE GLUCOSE BLD GHB EST-MCNC: 103 MG/DL
GFR SERPLBLD CREATININE-BSD FMLA CKD-EPI: 82 ML/MIN/1.73
GFR SERPLBLD CREATININE-BSD FMLA CKD-EPI: 95 ML/MIN/1.73
GLOBULIN SER CALC-MCNC: 2.9 G/DL (ref 1.5–4.5)
GLUCOSE SERPL-MCNC: 83 MG/DL (ref 65–99)
GLUCOSE UR QL: NEGATIVE
HBA1C MFR BLD: 5.2 % (ref 4.8–5.6)
HCV AB S/CO SERPL IA: <0.1 S/CO RATIO (ref 0–0.9)
HDLC SERPL-MCNC: 75 MG/DL
HGB UR QL STRIP: NEGATIVE
KETONES UR QL STRIP: NEGATIVE
LDLC SERPL CALC-MCNC: 217 MG/DL (ref 0–99)
LEUKOCYTE ESTERASE UR QL STRIP: NEGATIVE
MICRO URNS: NORMAL
NITRITE UR QL STRIP: NEGATIVE
PH UR STRIP: 6.5 [PH] (ref 5–7.5)
POTASSIUM SERPL-SCNC: 4 MMOL/L (ref 3.5–5.2)
PROT SERPL-MCNC: 7.1 G/DL (ref 6–8.5)
PROT UR QL STRIP: NEGATIVE
SODIUM SERPL-SCNC: 143 MMOL/L (ref 134–144)
SP GR UR: 1.03 (ref 1–1.03)
TRIGL SERPL-MCNC: 63 MG/DL (ref 0–149)
TSH SERPL DL<=0.005 MIU/L-ACNC: 0.42 UIU/ML (ref 0.45–4.5)
UROBILINOGEN UR STRIP-MCNC: 0.2 MG/DL (ref 0.2–1)
VLDLC SERPL CALC-MCNC: 13 MG/DL (ref 5–40)

## 2018-10-10 DIAGNOSIS — I10 ESSENTIAL HYPERTENSION: ICD-10-CM

## 2018-10-11 RX ORDER — LISINOPRIL 20 MG/1
TABLET ORAL
Qty: 90 TAB | Refills: 0 | Status: SHIPPED | OUTPATIENT
Start: 2018-10-11 | End: 2019-01-08 | Stop reason: SDUPTHER

## 2018-12-07 DIAGNOSIS — I10 ESSENTIAL HYPERTENSION: ICD-10-CM

## 2018-12-13 RX ORDER — HYDROCHLOROTHIAZIDE 25 MG/1
TABLET ORAL
Qty: 30 TAB | Refills: 0 | Status: SHIPPED | OUTPATIENT
Start: 2018-12-13 | End: 2019-08-23 | Stop reason: SDUPTHER

## 2019-01-08 DIAGNOSIS — I10 ESSENTIAL HYPERTENSION: ICD-10-CM

## 2019-01-08 RX ORDER — LISINOPRIL 20 MG/1
TABLET ORAL
Qty: 90 TAB | Refills: 0 | Status: SHIPPED | OUTPATIENT
Start: 2019-01-08 | End: 2019-04-20 | Stop reason: SDUPTHER

## 2019-04-20 DIAGNOSIS — I10 ESSENTIAL HYPERTENSION: ICD-10-CM

## 2019-04-20 RX ORDER — LISINOPRIL 20 MG/1
TABLET ORAL
Qty: 90 TAB | Refills: 0 | Status: SHIPPED | OUTPATIENT
Start: 2019-04-20 | End: 2019-07-22 | Stop reason: SDUPTHER

## 2019-07-22 DIAGNOSIS — I10 ESSENTIAL HYPERTENSION: ICD-10-CM

## 2019-07-22 NOTE — TELEPHONE ENCOUNTER
----- Message from [facility] sent at 7/22/2019  3:25 PM EDT -----  Regarding: /Refill   Contact: [phone]  Pt is requesting a Rx refill on \"Lisinopril 20 mg\" to be called into [facility] listed on file. Pt advised that she completely out of medication and unable to wait until scheduled appt.

## 2019-07-23 RX ORDER — LISINOPRIL 20 MG/1
TABLET ORAL
Qty: 90 TAB | Refills: 0 | Status: SHIPPED | OUTPATIENT
Start: 2019-07-23 | End: 2019-10-21 | Stop reason: SDUPTHER

## 2019-08-23 ENCOUNTER — OFFICE VISIT (OUTPATIENT)
Dept: FAMILY MEDICINE CLINIC | Age: 64
End: 2019-08-23

## 2019-08-23 VITALS
HEART RATE: 58 BPM | OXYGEN SATURATION: 97 % | HEIGHT: 64 IN | WEIGHT: 162 LBS | SYSTOLIC BLOOD PRESSURE: 97 MMHG | RESPIRATION RATE: 18 BRPM | TEMPERATURE: 98.5 F | DIASTOLIC BLOOD PRESSURE: 53 MMHG | BODY MASS INDEX: 27.66 KG/M2

## 2019-08-23 DIAGNOSIS — E78.5 DYSLIPIDEMIA (HIGH LDL; LOW HDL): ICD-10-CM

## 2019-08-23 DIAGNOSIS — E55.9 HYPOVITAMINOSIS D: ICD-10-CM

## 2019-08-23 DIAGNOSIS — R73.02 IGT (IMPAIRED GLUCOSE TOLERANCE): ICD-10-CM

## 2019-08-23 DIAGNOSIS — R35.0 FREQUENCY OF URINATION: ICD-10-CM

## 2019-08-23 DIAGNOSIS — Z13.29 SCREENING FOR THYROID DISORDER: ICD-10-CM

## 2019-08-23 DIAGNOSIS — Z01.00 ENCOUNTER FOR COMPLETE EYE EXAM: ICD-10-CM

## 2019-08-23 DIAGNOSIS — Z00.00 ENCOUNTER FOR ANNUAL PHYSICAL EXAM: Primary | ICD-10-CM

## 2019-08-23 DIAGNOSIS — R60.0 BILATERAL LEG EDEMA: ICD-10-CM

## 2019-08-23 DIAGNOSIS — M25.571 ACUTE RIGHT ANKLE PAIN: ICD-10-CM

## 2019-08-23 RX ORDER — ASCORBIC ACID 500 MG
500 TABLET ORAL DAILY
COMMUNITY
End: 2021-07-13 | Stop reason: ALTCHOICE
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
## 2019-08-23 NOTE — PATIENT INSTRUCTIONS
Leg and Ankle Edema: Care Instructions  Your Care Instructions  Swelling in the legs, ankles, and feet is called edema. It is common after you sit or stand for a while. Long plane flights or car rides often cause swelling in the legs and feet. You may also have swelling if you have to stand for long periods of time at your job. Problems with the veins in the legs (varicose veins) and changes in hormones can also cause swelling. Sometimes the swelling in the ankles and feet is caused by a more serious problem, such as heart failure, infection, blood clots, or liver or kidney disease. Follow-up care is a key part of your treatment and safety. Be sure to make and go to all appointments, and call your doctor if you are having problems. It's also a good idea to know your test results and keep a list of the medicines you take. How can you care for yourself at home? · If your doctor gave you medicine, take it as prescribed. Call your doctor if you think you are having a problem with your medicine. · Whenever you are resting, raise your legs up. Try to keep the swollen area higher than the level of your heart. · Take breaks from standing or sitting in one position. ? Walk around to increase the blood flow in your lower legs. ? Move your feet and ankles often while you stand, or tighten and relax your leg muscles. · Wear support stockings. Put them on in the morning, before swelling gets worse. · Eat a balanced diet. Lose weight if you need to. · Limit the amount of salt (sodium) in your diet. Salt holds fluid in the body and may increase swelling. When should you call for help? Call 911 anytime you think you may need emergency care. For example, call if:    · You have symptoms of a blood clot in your lung (called a pulmonary embolism). These may include:  ? Sudden chest pain. ? Trouble breathing. ?  Coughing up blood.    Call your doctor now or seek immediate medical care if:    · You have signs of a blood clot, such as:  ? Pain in your calf, back of the knee, thigh, or groin. ? Redness and swelling in your leg or groin.     · You have symptoms of infection, such as:  ? Increased pain, swelling, warmth, or redness. ? Red streaks or pus. ? A fever.    Watch closely for changes in your health, and be sure to contact your doctor if:    · Your swelling is getting worse.     · You have new or worsening pain in your legs.     · You do not get better as expected. Where can you learn more? Go to http://david-yves.info/. Enter C066 in the search box to learn more about \"Leg and Ankle Edema: Care Instructions. \"  Current as of: September 23, 2018  Content Version: 12.1  © 7460-4329 [employer]. Care instructions adapted under license by [employer] (which disclaims liability or warranty for this information). If you have questions about a medical condition or this instruction, always ask your healthcare professional. [place] [address] any warranty or liability for your use of this information.

## 2019-08-31 LAB
25(OH)D3+25(OH)D2 SERPL-MCNC: 34 NG/ML (ref 30–100)
ALBUMIN SERPL-MCNC: 4.2 G/DL (ref 3.6–4.8)
ALBUMIN/GLOB SERPL: 1.5 {RATIO} (ref 1.2–2.2)
ALP SERPL-CCNC: 51 IU/L (ref 39–117)
ALT SERPL-CCNC: 18 IU/L (ref 0–32)
APPEARANCE UR: CLEAR
AST SERPL-CCNC: 22 IU/L (ref 0–40)
BASOPHILS # BLD AUTO: 0 X10E3/UL (ref 0–0.2)
BASOPHILS NFR BLD AUTO: 1 %
BILIRUB SERPL-MCNC: 0.4 MG/DL (ref 0–1.2)
BILIRUB UR QL STRIP: NEGATIVE
BUN SERPL-MCNC: 12 MG/DL (ref 8–27)
BUN/CREAT SERPL: 13 (ref 12–28)
CALCIUM SERPL-MCNC: 10.1 MG/DL (ref 8.7–10.3)
CHLORIDE SERPL-SCNC: 105 MMOL/L (ref 96–106)
CHOLEST SERPL-MCNC: 248 MG/DL (ref 100–199)
CO2 SERPL-SCNC: 25 MMOL/L (ref 20–29)
COLOR UR: YELLOW
CREAT SERPL-MCNC: 0.93 MG/DL (ref 0.57–1)
EOSINOPHIL # BLD AUTO: 0.1 X10E3/UL (ref 0–0.4)
EOSINOPHIL NFR BLD AUTO: 3 %
ERYTHROCYTE [DISTWIDTH] IN BLOOD BY AUTOMATED COUNT: 15.6 % (ref 12.3–15.4)
EST. AVERAGE GLUCOSE BLD GHB EST-MCNC: 97 MG/DL
GLOBULIN SER CALC-MCNC: 2.8 G/DL (ref 1.5–4.5)
GLUCOSE SERPL-MCNC: 75 MG/DL (ref 65–99)
GLUCOSE UR QL: NEGATIVE
HBA1C MFR BLD: 5 % (ref 4.8–5.6)
HCT VFR BLD AUTO: 39.6 % (ref 34–46.6)
HDLC SERPL-MCNC: 81 MG/DL
HGB BLD-MCNC: 13 G/DL (ref 11.1–15.9)
HGB UR QL STRIP: NEGATIVE
IMM GRANULOCYTES # BLD AUTO: 0 X10E3/UL (ref 0–0.1)
IMM GRANULOCYTES NFR BLD AUTO: 0 %
KETONES UR QL STRIP: NEGATIVE
LDLC SERPL CALC-MCNC: 158 MG/DL (ref 0–99)
LEUKOCYTE ESTERASE UR QL STRIP: NEGATIVE
LYMPHOCYTES # BLD AUTO: 2 X10E3/UL (ref 0.7–3.1)
LYMPHOCYTES NFR BLD AUTO: 49 %
MCH RBC QN AUTO: 33.7 PG (ref 26.6–33)
MCHC RBC AUTO-ENTMCNC: 32.8 G/DL (ref 31.5–35.7)
MCV RBC AUTO: 103 FL (ref 79–97)
MICRO URNS: NORMAL
MONOCYTES # BLD AUTO: 0.3 X10E3/UL (ref 0.1–0.9)
MONOCYTES NFR BLD AUTO: 7 %
NEUTROPHILS # BLD AUTO: 1.6 X10E3/UL (ref 1.4–7)
NEUTROPHILS NFR BLD AUTO: 40 %
NITRITE UR QL STRIP: NEGATIVE
PH UR STRIP: 6 [PH] (ref 5–7.5)
PLATELET # BLD AUTO: 250 X10E3/UL (ref 150–450)
POTASSIUM SERPL-SCNC: 3.8 MMOL/L (ref 3.5–5.2)
PROT SERPL-MCNC: 7 G/DL (ref 6–8.5)
PROT UR QL STRIP: NEGATIVE
RBC # BLD AUTO: 3.86 X10E6/UL (ref 3.77–5.28)
SODIUM SERPL-SCNC: 145 MMOL/L (ref 134–144)
SP GR UR: 1.01 (ref 1–1.03)
TRIGL SERPL-MCNC: 46 MG/DL (ref 0–149)
TSH SERPL DL<=0.005 MIU/L-ACNC: 0.61 UIU/ML (ref 0.45–4.5)
UROBILINOGEN UR STRIP-MCNC: 0.2 MG/DL (ref 0.2–1)
VLDLC SERPL CALC-MCNC: 9 MG/DL (ref 5–40)
WBC # BLD AUTO: 3.9 X10E3/UL (ref 3.4–10.8)

## 2019-10-21 DIAGNOSIS — I10 ESSENTIAL HYPERTENSION: ICD-10-CM

## 2019-10-22 RX ORDER — LISINOPRIL 20 MG/1
TABLET ORAL
Qty: 90 TAB | Refills: 0 | Status: SHIPPED | OUTPATIENT
Start: 2019-10-22 | End: 2021-10-29

## 2021-07-13 ENCOUNTER — OFFICE VISIT (OUTPATIENT)
Dept: FAMILY MEDICINE CLINIC | Age: 66
End: 2021-07-13
Payer: MEDICARE

## 2021-07-13 VITALS
RESPIRATION RATE: 16 BRPM | TEMPERATURE: 99.4 F | HEART RATE: 60 BPM | OXYGEN SATURATION: 95 % | DIASTOLIC BLOOD PRESSURE: 66 MMHG | WEIGHT: 181.6 LBS | BODY MASS INDEX: 31 KG/M2 | HEIGHT: 64 IN | SYSTOLIC BLOOD PRESSURE: 137 MMHG

## 2021-07-13 DIAGNOSIS — R35.0 FREQUENCY OF URINATION: ICD-10-CM

## 2021-07-13 DIAGNOSIS — D64.9 NORMOCHROMIC NORMOCYTIC ANEMIA: ICD-10-CM

## 2021-07-13 DIAGNOSIS — Z00.00 ENCOUNTER FOR MEDICARE ANNUAL WELLNESS EXAM: Primary | ICD-10-CM

## 2021-07-13 DIAGNOSIS — Z12.11 COLON CANCER SCREENING: ICD-10-CM

## 2021-07-13 DIAGNOSIS — M81.0 POSTMENOPAUSAL OSTEOPOROSIS: ICD-10-CM

## 2021-07-13 DIAGNOSIS — E55.9 HYPOVITAMINOSIS D: ICD-10-CM

## 2021-07-13 DIAGNOSIS — Z12.31 ENCOUNTER FOR SCREENING MAMMOGRAM FOR BREAST CANCER: ICD-10-CM

## 2021-07-13 DIAGNOSIS — M25.451 SWELLING OF JOINT OF PELVIC REGION OR THIGH, RIGHT: ICD-10-CM

## 2021-07-13 DIAGNOSIS — Z71.89 ADVANCED CARE PLANNING/COUNSELING DISCUSSION: ICD-10-CM

## 2021-07-13 DIAGNOSIS — R73.02 IGT (IMPAIRED GLUCOSE TOLERANCE): ICD-10-CM

## 2021-07-13 DIAGNOSIS — Z13.29 SCREENING FOR THYROID DISORDER: ICD-10-CM

## 2021-07-13 DIAGNOSIS — E78.5 DYSLIPIDEMIA (HIGH LDL; LOW HDL): ICD-10-CM

## 2021-07-13 PROCEDURE — 3017F COLORECTAL CA SCREEN DOC REV: CPT | Performed by: INTERNAL MEDICINE

## 2021-07-13 PROCEDURE — G9899 SCRN MAM PERF RSLTS DOC: HCPCS | Performed by: INTERNAL MEDICINE

## 2021-07-13 PROCEDURE — G8427 DOCREV CUR MEDS BY ELIG CLIN: HCPCS | Performed by: INTERNAL MEDICINE

## 2021-07-13 PROCEDURE — 1158F ADVNC CARE PLAN TLK DOCD: CPT | Performed by: INTERNAL MEDICINE

## 2021-07-13 PROCEDURE — G8754 DIAS BP LESS 90: HCPCS | Performed by: INTERNAL MEDICINE

## 2021-07-13 PROCEDURE — G8510 SCR DEP NEG, NO PLAN REQD: HCPCS | Performed by: INTERNAL MEDICINE

## 2021-07-13 PROCEDURE — G8417 CALC BMI ABV UP PARAM F/U: HCPCS | Performed by: INTERNAL MEDICINE

## 2021-07-13 PROCEDURE — G8536 NO DOC ELDER MAL SCRN: HCPCS | Performed by: INTERNAL MEDICINE

## 2021-07-13 PROCEDURE — 1101F PT FALLS ASSESS-DOCD LE1/YR: CPT | Performed by: INTERNAL MEDICINE

## 2021-07-13 PROCEDURE — G8752 SYS BP LESS 140: HCPCS | Performed by: INTERNAL MEDICINE

## 2021-07-13 PROCEDURE — G0439 PPPS, SUBSEQ VISIT: HCPCS | Performed by: INTERNAL MEDICINE

## 2021-07-13 RX ORDER — PHENYTOIN SODIUM 100 MG/1
CAPSULE, EXTENDED RELEASE ORAL
Status: ON HOLD | COMMUNITY
Start: 2021-07-09 | End: 2022-08-11 | Stop reason: SDUPTHER

## 2021-07-13 RX ORDER — CARBAMAZEPINE 100 MG/1
TABLET, CHEWABLE ORAL
Status: ON HOLD | COMMUNITY
Start: 2021-04-05 | End: 2022-08-11 | Stop reason: SDUPTHER

## 2021-07-13 NOTE — PATIENT INSTRUCTIONS
Leg and Ankle Edema: Care Instructions  Your Care Instructions  Swelling in the legs, ankles, and feet is called edema. It is common after you sit or stand for a while. Long plane flights or car rides often cause swelling in the legs and feet. You may also have swelling if you have to stand for long periods of time at your job. Problems with the veins in the legs (varicose veins) and changes in hormones can also cause swelling. Sometimes the swelling in the ankles and feet is caused by a more serious problem, such as heart failure, infection, blood clots, or liver or kidney disease. Follow-up care is a key part of your treatment and safety. Be sure to make and go to all appointments, and call your doctor if you are having problems. It's also a good idea to know your test results and keep a list of the medicines you take. How can you care for yourself at home? · If your doctor gave you medicine, take it as prescribed. Call your doctor if you think you are having a problem with your medicine. · Whenever you are resting, raise your legs up. Try to keep the swollen area higher than the level of your heart. · Take breaks from standing or sitting in one position. ? Walk around to increase the blood flow in your lower legs. ? Move your feet and ankles often while you stand, or tighten and relax your leg muscles. · Wear support stockings. Put them on in the morning, before swelling gets worse. · Eat a balanced diet. Lose weight if you need to. · Limit the amount of salt (sodium) in your diet. Salt holds fluid in the body and may increase swelling. When should you call for help? Call 911 anytime you think you may need emergency care. For example, call if:    · You have symptoms of a blood clot in your lung (called a pulmonary embolism). These may include:  ? Sudden chest pain. ? Trouble breathing. ? Coughing up blood.    Call your doctor now or seek immediate medical care if:    · You have signs of a blood clot, such as:  ? Pain in your calf, back of the knee, thigh, or groin. ? Redness and swelling in your leg or groin.     · You have symptoms of infection, such as:  ? Increased pain, swelling, warmth, or redness. ? Red streaks or pus. ? A fever. Watch closely for changes in your health, and be sure to contact your doctor if:    · Your swelling is getting worse.     · You have new or worsening pain in your legs.     · You do not get better as expected. Where can you learn more? Go to http://www.gray.com/  Enter M521 in the search box to learn more about \"Leg and Ankle Edema: Care Instructions. \"  Current as of: February 26, 2020               Content Version: 12.8  © 2006-2021 [employer]. Care instructions adapted under license by [employer] (which disclaims liability or warranty for this information). If you have questions about a medical condition or this instruction, always ask your healthcare professional. [place] [address] any warranty or liability for your use of this information. Leg and Ankle Edema: Care Instructions  Your Care Instructions  Swelling in the legs, ankles, and feet is called edema. It is common after you sit or stand for a while. Long plane flights or car rides often cause swelling in the legs and feet. You may also have swelling if you have to stand for long periods of time at your job. Problems with the veins in the legs (varicose veins) and changes in hormones can also cause swelling. Sometimes the swelling in the ankles and feet is caused by a more serious problem, such as heart failure, infection, blood clots, or liver or kidney disease. Follow-up care is a key part of your treatment and safety. Be sure to make and go to all appointments, and call your doctor if you are having problems. It's also a good idea to know your test results and keep a list of the medicines you take.   How can you care for yourself at home? · If your doctor gave you medicine, take it as prescribed. Call your doctor if you think you are having a problem with your medicine. · Whenever you are resting, raise your legs up. Try to keep the swollen area higher than the level of your heart. · Take breaks from standing or sitting in one position. ? Walk around to increase the blood flow in your lower legs. ? Move your feet and ankles often while you stand, or tighten and relax your leg muscles. · Wear support stockings. Put them on in the morning, before swelling gets worse. · Eat a balanced diet. Lose weight if you need to. · Limit the amount of salt (sodium) in your diet. Salt holds fluid in the body and may increase swelling. When should you call for help? Call 911 anytime you think you may need emergency care. For example, call if:    · You have symptoms of a blood clot in your lung (called a pulmonary embolism). These may include:  ? Sudden chest pain. ? Trouble breathing. ? Coughing up blood. Call your doctor now or seek immediate medical care if:    · You have signs of a blood clot, such as:  ? Pain in your calf, back of the knee, thigh, or groin. ? Redness and swelling in your leg or groin.     · You have symptoms of infection, such as:  ? Increased pain, swelling, warmth, or redness. ? Red streaks or pus. ? A fever. Watch closely for changes in your health, and be sure to contact your doctor if:    · Your swelling is getting worse.     · You have new or worsening pain in your legs.     · You do not get better as expected. Where can you learn more? Go to http://www.gray.com/  Enter W056 in the search box to learn more about \"Leg and Ankle Edema: Care Instructions. \"  Current as of: February 26, 2020               Content Version: 12.8  © 1062-8702 [employer].    Care instructions adapted under license by [employer] (which disclaims liability or warranty for this information). If you have questions about a medical condition or this instruction, always ask your healthcare professional. [place] [address] any warranty or liability for your use of this information.

## 2021-07-13 NOTE — PROGRESS NOTES
Chief Complaint   Patient presents with    Follow-up     discuss R leg swelling        1. Have you been to the ER, urgent care clinic since your last visit? Hospitalized since your last visit? No    2. Have you seen or consulted any other health care providers outside of the [address] [place] [address] since your last visit? Include any pap smears or colon screening.  No
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
## 2021-07-20 ENCOUNTER — HOSPITAL ENCOUNTER (OUTPATIENT)
Dept: ULTRASOUND IMAGING | Age: 66
Discharge: HOME | End: 2021-07-20
Attending: INTERNAL MEDICINE
Payer: MEDICARE

## 2021-07-20 DIAGNOSIS — M25.451 SWELLING OF JOINT OF PELVIC REGION OR THIGH, RIGHT: ICD-10-CM

## 2021-07-20 PROCEDURE — 93971 EXTREMITY STUDY: CPT

## 2021-07-27 ENCOUNTER — HOSPITAL ENCOUNTER (OUTPATIENT)
Dept: MAMMOGRAPHY | Age: 66
Discharge: HOME | End: 2021-07-27
Attending: INTERNAL MEDICINE
Payer: MEDICARE

## 2021-07-27 DIAGNOSIS — Z12.31 ENCOUNTER FOR SCREENING MAMMOGRAM FOR BREAST CANCER: ICD-10-CM

## 2021-07-27 PROCEDURE — 77067 SCR MAMMO BI INCL CAD: CPT

## 2021-07-30 ENCOUNTER — HOSPITAL ENCOUNTER (OUTPATIENT)
Dept: MAMMOGRAPHY | Age: 66
Discharge: HOME | End: 2021-07-30
Attending: INTERNAL MEDICINE
Payer: MEDICARE

## 2021-07-30 DIAGNOSIS — M81.0 POSTMENOPAUSAL OSTEOPOROSIS: ICD-10-CM

## 2021-07-30 PROCEDURE — 77080 DXA BONE DENSITY AXIAL: CPT

## 2021-08-10 ENCOUNTER — OFFICE VISIT (OUTPATIENT)
Dept: FAMILY MEDICINE CLINIC | Age: 66
End: 2021-08-10
Payer: COMMERCIAL

## 2021-08-10 VITALS
HEART RATE: 62 BPM | WEIGHT: 186 LBS | BODY MASS INDEX: 31.76 KG/M2 | DIASTOLIC BLOOD PRESSURE: 73 MMHG | OXYGEN SATURATION: 98 % | RESPIRATION RATE: 20 BRPM | TEMPERATURE: 98.3 F | HEIGHT: 64 IN | SYSTOLIC BLOOD PRESSURE: 129 MMHG

## 2021-08-10 DIAGNOSIS — M25.451 SWELLING OF JOINT OF PELVIC REGION OR THIGH, RIGHT: ICD-10-CM

## 2021-08-10 DIAGNOSIS — E78.5 DYSLIPIDEMIA (HIGH LDL; LOW HDL): ICD-10-CM

## 2021-08-10 DIAGNOSIS — Z23 ENCOUNTER FOR IMMUNIZATION: ICD-10-CM

## 2021-08-10 DIAGNOSIS — M85.80 OSTEOPENIA, UNSPECIFIED LOCATION: Primary | ICD-10-CM

## 2021-08-10 PROCEDURE — G9899 SCRN MAM PERF RSLTS DOC: HCPCS | Performed by: INTERNAL MEDICINE

## 2021-08-10 PROCEDURE — 99214 OFFICE O/P EST MOD 30 MIN: CPT | Performed by: INTERNAL MEDICINE

## 2021-08-10 PROCEDURE — G8417 CALC BMI ABV UP PARAM F/U: HCPCS | Performed by: INTERNAL MEDICINE

## 2021-08-10 PROCEDURE — G8754 DIAS BP LESS 90: HCPCS | Performed by: INTERNAL MEDICINE

## 2021-08-10 PROCEDURE — 1090F PRES/ABSN URINE INCON ASSESS: CPT | Performed by: INTERNAL MEDICINE

## 2021-08-10 PROCEDURE — 90732 PPSV23 VACC 2 YRS+ SUBQ/IM: CPT | Performed by: INTERNAL MEDICINE

## 2021-08-10 PROCEDURE — 1101F PT FALLS ASSESS-DOCD LE1/YR: CPT | Performed by: INTERNAL MEDICINE

## 2021-08-10 PROCEDURE — 3017F COLORECTAL CA SCREEN DOC REV: CPT | Performed by: INTERNAL MEDICINE

## 2021-08-10 PROCEDURE — G8399 PT W/DXA RESULTS DOCUMENT: HCPCS | Performed by: INTERNAL MEDICINE

## 2021-08-10 PROCEDURE — G8536 NO DOC ELDER MAL SCRN: HCPCS | Performed by: INTERNAL MEDICINE

## 2021-08-10 PROCEDURE — G8432 DEP SCR NOT DOC, RNG: HCPCS | Performed by: INTERNAL MEDICINE

## 2021-08-10 PROCEDURE — 90471 IMMUNIZATION ADMIN: CPT | Performed by: INTERNAL MEDICINE

## 2021-08-10 PROCEDURE — G8752 SYS BP LESS 140: HCPCS | Performed by: INTERNAL MEDICINE

## 2021-08-10 PROCEDURE — G8427 DOCREV CUR MEDS BY ELIG CLIN: HCPCS | Performed by: INTERNAL MEDICINE

## 2021-08-10 RX ORDER — CALCIUM ACETATE 667 MG/1
TABLET ORAL
Qty: 90 TABLET | Refills: 0 | Status: SHIPPED | OUTPATIENT
Start: 2021-08-10 | End: 2021-11-13

## 2021-08-10 RX ORDER — ACETAMINOPHEN 500 MG
2000 TABLET ORAL DAILY
Qty: 90 CAPSULE | Refills: 1 | Status: SHIPPED | OUTPATIENT
Start: 2021-08-10 | End: 2021-10-29 | Stop reason: SDUPTHER

## 2021-08-10 NOTE — PATIENT INSTRUCTIONS
Learning About Low Bone Density  What is low bone density? Low bone density (sometimes called osteopenia) is a decrease in thickness, or density, in bones. That means the bones become thinner and weaker. It is much more common in women than in men. It's important to know that low bone density is not a disease. It can happen normally with aging. Having low bone density means that there is a greater risk that you may get osteoporosis. It also means that you are more likely to break a bone than someone who does not have low bone density. But not everyone with low bone density gets osteoporosis or breaks a bone. Low bone density doesn't cause any symptoms. It's usually found with a type of X-ray called a bone density test. Low bone density means that your bone density result (T-score) is between -1.0 and -2.5. What increases your risk for low bone density? Things that increase your risk include:  · Getting older. · Having a family history of osteoporosis. · Being thin. · Being white or . · Getting too little physical activity. · Smoking. · Drinking too much alcohol often. · Using certain medicines such as steroids. How can you prevent osteoporosis? There are things you can do to help prevent osteoporosis. Certain lifestyle changes will help slow the loss of bone density. · Eat food that has plenty of calcium and vitamin D. Yogurt, cheese, milk, and dark green vegetables are high in calcium. Eggs, fatty fish, cereal, and fortified milk are high in vitamin D.  · Ask your doctor if you need to take a calcium plus vitamin D supplement. You may be able to get enough calcium and vitamin D through your diet. · Get regular exercise. ? Do 30 minutes of weight-bearing exercise on most days of the week. Walking, jogging, stair climbing, and dancing are good choices. ? Do resistance exercises with weights or elastic bands 2 or 3 days a week.   · Limit alcohol to 2 drinks a day for men and 1 drink a day for women. Too much alcohol can cause health problems. · Do not smoke. Smoking can make bones thin faster. If you need help quitting, talk to your doctor about stop-smoking programs and medicines. These can increase your chances of quitting for good. Prescription medicines are available for treating low bone density. But these are more often used to treat osteoporosis. Follow-up care is a key part of your treatment and safety. Be sure to make and go to all appointments, and call your doctor if you are having problems. It's also a good idea to know your test results and keep a list of the medicines you take. Where can you learn more? Go to http://www.gray.com/  Enter M898 in the search box to learn more about \"Learning About Low Bone Density. \"  Current as of: December 7, 2020               Content Version: 12.8  © 5693-1631 [employer]. Care instructions adapted under license by [employer] (which disclaims liability or warranty for this information). If you have questions about a medical condition or this instruction, always ask your healthcare professional. [address] any warranty or liability for your use of this information.

## 2021-08-11 RX ORDER — CELECOXIB 200 MG/1
CAPSULE ORAL
Qty: 30 CAPSULE | Refills: 1 | Status: SHIPPED | OUTPATIENT
Start: 2021-08-11 | End: 2022-07-05
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
## 2021-09-21 DIAGNOSIS — I10 ESSENTIAL HYPERTENSION: ICD-10-CM

## 2021-09-21 RX ORDER — HYDROCHLOROTHIAZIDE 25 MG/1
25 TABLET ORAL DAILY
Qty: 30 TABLET | Refills: 0 | Status: SHIPPED | OUTPATIENT
Start: 2021-09-21 | End: 2021-09-21

## 2021-09-21 RX ORDER — HYDROCHLOROTHIAZIDE 25 MG/1
25 TABLET ORAL DAILY
Qty: 90 TABLET | Refills: 1 | Status: SHIPPED | OUTPATIENT
Start: 2021-09-21 | End: 2021-09-22 | Stop reason: DRUGHIGH

## 2021-09-21 NOTE — TELEPHONE ENCOUNTER
Patient is calling for a refill on her:hydroCHLOROthiazide (HYDRODIURIL) 25 mg tablet;  Please call @[phone].

## 2021-09-22 RX ORDER — HYDROCHLOROTHIAZIDE 12.5 MG/1
CAPSULE ORAL
Qty: 90 CAPSULE | Refills: 1 | Status: SHIPPED | OUTPATIENT
Start: 2021-09-22 | End: 2022-02-04 | Stop reason: SDUPTHER

## 2021-09-22 RX ORDER — HYDROCHLOROTHIAZIDE 12.5 MG/1
CAPSULE ORAL
Qty: 90 CAPSULE | Refills: 0 | Status: SHIPPED | OUTPATIENT
Start: 2021-09-22 | End: 2021-10-29 | Stop reason: SDUPTHER

## 2021-10-20 NOTE — TELEPHONE ENCOUNTER
----- Message from Shahzad Figueroa sent at 10/18/2021  8:19 AM EDT -----  Subject: Message to Provider    QUESTIONS  Information for Provider? Pt scheduled for 10/29 for med refills and would   like refills for her Gabapentin medication until then if possible because   she's completely out. Pt states that she was told that it was approved and   will be refills for her, please call her to further discuss, pt has   question regarding the screening and CSA signing.   ---------------------------------------------------------------------------  --------------  [address]  What is the best way for the office to contact you? OK to leave message on   voicemail  Preferred Call Back Phone Number? [phone]  ---------------------------------------------------------------------------  --------------  SCRIPT ANSWERS  Relationship to Patient?  Self

## 2021-10-29 ENCOUNTER — OFFICE VISIT (OUTPATIENT)
Dept: FAMILY MEDICINE CLINIC | Age: 66
End: 2021-10-29
Payer: COMMERCIAL

## 2021-10-29 VITALS
HEART RATE: 64 BPM | RESPIRATION RATE: 20 BRPM | BODY MASS INDEX: 31.58 KG/M2 | DIASTOLIC BLOOD PRESSURE: 65 MMHG | HEIGHT: 64 IN | OXYGEN SATURATION: 98 % | WEIGHT: 185 LBS | TEMPERATURE: 97.3 F | SYSTOLIC BLOOD PRESSURE: 124 MMHG

## 2021-10-29 DIAGNOSIS — Z02.89 PAIN MANAGEMENT CONTRACT AGREEMENT: ICD-10-CM

## 2021-10-29 DIAGNOSIS — G62.9 NEUROPATHY: ICD-10-CM

## 2021-10-29 DIAGNOSIS — E78.5 DYSLIPIDEMIA (HIGH LDL; LOW HDL): Primary | ICD-10-CM

## 2021-10-29 DIAGNOSIS — Z23 NEEDS FLU SHOT: ICD-10-CM

## 2021-10-29 DIAGNOSIS — M85.80 OSTEOPENIA, UNSPECIFIED LOCATION: ICD-10-CM

## 2021-10-29 DIAGNOSIS — R60.0 BILATERAL LEG EDEMA: ICD-10-CM

## 2021-10-29 PROCEDURE — 90471 IMMUNIZATION ADMIN: CPT | Performed by: INTERNAL MEDICINE

## 2021-10-29 PROCEDURE — 99214 OFFICE O/P EST MOD 30 MIN: CPT | Performed by: INTERNAL MEDICINE

## 2021-10-29 PROCEDURE — 90694 VACC AIIV4 NO PRSRV 0.5ML IM: CPT | Performed by: INTERNAL MEDICINE

## 2021-10-29 RX ORDER — ACETAMINOPHEN 500 MG
2000 TABLET ORAL DAILY
Qty: 90 CAPSULE | Refills: 1 | Status: SHIPPED | OUTPATIENT
Start: 2021-10-29 | End: 2022-02-04 | Stop reason: SDUPTHER

## 2021-10-29 RX ORDER — LOVASTATIN 20 MG/1
20 TABLET ORAL
Qty: 30 TABLET | Refills: 3 | Status: SHIPPED | OUTPATIENT
Start: 2021-10-29 | End: 2022-02-28

## 2021-10-29 RX ORDER — GABAPENTIN 300 MG/1
CAPSULE ORAL
Qty: 270 CAPSULE | Refills: 0 | Status: SHIPPED | OUTPATIENT
Start: 2021-10-29 | End: 2022-01-31
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
## 2021-10-29 NOTE — LETTER
10/29/2021    Ms. Ge Virginia Metzger  [address] [place] [address]    Dear Glenn Irizarry:    Please find your most recent results below.       Resulted Orders   TSH 3RD GENERATION   Result Value Ref Range    TSH 0.96 0.36 - 3.74 uIU/mL   URINALYSIS W/ RFLX MICROSCOPIC   Result Value Ref Range    Color YELLOW/STRAW      Appearance CLEAR CLEAR      Specific gravity 1.020 1.003 - 1.030      pH (UA) 5.5 5.0 - 8.0      Protein Negative Negative mg/dL    Glucose Negative Negative mg/dL    Ketone Negative Negative mg/dL    Bilirubin Negative Negative      Blood Negative Negative      Urobilinogen 0.2 0.2 - 1.0 EU/dL    Nitrites Negative Negative      Leukocyte Esterase TRACE (A) Negative     VITAMIN D, 25 HYDROXY   Result Value Ref Range    Vitamin D 25-Hydroxy 29.0 (L) 30 - 100 ng/mL   HEMOGLOBIN A1C WITH EAG   Result Value Ref Range    Hemoglobin A1c 5.0 4.0 - 5.6 %    Est. average glucose 97 mg/dL   LIPID PANEL   Result Value Ref Range    Cholesterol, total 300 (H) <200 MG/DL    Triglyceride 54 <150 MG/DL    HDL Cholesterol 107 MG/DL    LDL, calculated 182.2 (H) 0 - 100 MG/DL    VLDL, calculated 10.8 MG/DL    CHOL/HDL Ratio 2.8 0.0 - 5.0     CBC W/O DIFF   Result Value Ref Range    WBC 3.5 (L) 3.6 - 11.0 K/uL    RBC 4.05 3.80 - 5.20 M/uL    HGB 13.1 11.5 - 16.0 g/dL    HCT 40.0 35.0 - 47.0 %    MCV 98.8 80.0 - 99.0 FL    MCH 32.3 26.0 - 34.0 PG    MCHC 32.8 30.0 - 36.5 g/dL    RDW 13.4 11.5 - 14.5 %    PLATELET 887 925 - 255 K/uL    MPV 10.3 8.9 - 12.9 FL    NRBC 0.0 0  WBC    ABSOLUTE NRBC 0.00 0.00 - 9.13 K/uL   METABOLIC PANEL, COMPREHENSIVE   Result Value Ref Range    Sodium 142 136 - 145 mmol/L    Potassium 4.0 3.5 - 5.1 mmol/L    Chloride 108 97 - 108 mmol/L    CO2 32 21 - 32 mmol/L    Anion gap 2 (L) 5 - 15 mmol/L    Glucose 73 65 - 100 mg/dL    BUN 22 (H) 6 - 20 MG/DL    Creatinine 0.70 0.55 - 1.02 MG/DL    BUN/Creatinine ratio 31 (H) 12 - 20      GFR est AA >60 >60 ml/min/1.73m2    GFR est non-AA >60 >60 ml/min/1.73m2    Calcium 9.1 8.5 - 10.1 MG/DL    Bilirubin, total 0.3 0.2 - 1.0 MG/DL    ALT (SGPT) 33 12 - 78 U/L    AST (SGOT) 24 15 - 37 U/L    Alk. phosphatase 107 45 - 117 U/L    Protein, total 7.2 6.4 - 8.2 g/dL    Albumin 3.7 3.5 - 5.0 g/dL    Globulin 3.5 2.0 - 4.0 g/dL    A-G Ratio 1.1 1.1 - 2.2     URINE MICROSCOPIC ONLY   Result Value Ref Range    WBC 5-10 0 - 4 /hpf    RBC 0-5 0 - 5 /hpf    Epithelial cells FEW FEW /lpf    Bacteria Negative Negative /hpf     RECOMMENDATIONS:  Normal TSH, urine show trace leuk, Serum vit D is slightly low, continue supplement  Tot chol & LDL compared to last are elevated; Discuss result in clinic  Work on diet and exercise. Continue with current  medications.     Please call me if you have any questions: [phone]    Sincerely,      Miguel Davila MD

## 2021-10-31 NOTE — PATIENT INSTRUCTIONS
Influenza (Flu) Vaccine (Inactivated or Recombinant): What You Need to Know  Why get vaccinated? Influenza vaccine can prevent influenza (flu). Flu is a contagious disease that spreads around the [place] every year, usually between October and May. Anyone can get the flu, but it is more dangerous for some people. Infants and young children, people 72years of age and older, pregnant women, and people with certain health conditions or a weakened immune system are at greatest risk of flu complications. Pneumonia, bronchitis, sinus infections and ear infections are examples of flu-related complications. If you have a medical condition, such as heart disease, cancer or diabetes, flu can make it worse. Flu can cause fever and chills, sore throat, muscle aches, fatigue, cough, headache, and runny or stuffy nose. Some people may have vomiting and diarrhea, though this is more common in children than adults. Each year, thousands of people in the [facility] die from flu, and many more are hospitalized. Flu vaccine prevents millions of illnesses and flu-related visits to the doctor each year. Influenza vaccine  CDC recommends everyone 10months of age and older get vaccinated every flu season. Children 6 months through 6years of age may need 2 doses during a single flu season. Everyone else needs only 1 dose each flu season. It takes about 2 weeks for protection to develop after vaccination. There are many flu viruses, and they are always changing. Each year a new flu vaccine is made to protect against three or four viruses that are likely to cause disease in the upcoming flu season. Even when the vaccine doesn't exactly match these viruses, it may still provide some protection. Influenza vaccine does not cause flu. Influenza vaccine may be given at the same time as other vaccines.   Talk with your health care provider  Tell your vaccine provider if the person getting the vaccine:  · Has had an allergic reaction after a previous dose of influenza vaccine, or has any severe, life-threatening allergies. · Has ever had Guillain-Barré Syndrome (also called GBS). In some cases, your health care provider may decide to postpone influenza vaccination to a future visit. People with minor illnesses, such as a cold, may be vaccinated. People who are moderately or severely ill should usually wait until they recover before getting influenza vaccine. Your health care provider can give you more information. Risks of a vaccine reaction  · Soreness, redness, and swelling where shot is given, fever, muscle aches, and headache can happen after influenza vaccine. · There may be a very small increased risk of Guillain-Barré Syndrome (GBS) after inactivated influenza vaccine (the flu shot). [facility] children who get the flu shot along with pneumococcal vaccine (PCV13), and/or DTaP vaccine at the same time might be slightly more likely to have a seizure caused by fever. Tell your health care provider if a child who is getting flu vaccine has ever had a seizure. People sometimes faint after medical procedures, including vaccination. Tell your provider if you feel dizzy or have vision changes or ringing in the ears. As with any medicine, there is a very remote chance of a vaccine causing a severe allergic reaction, other serious injury, or death. What if there is a serious problem? An allergic reaction could occur after the vaccinated person leaves the clinic. If you see signs of a severe allergic reaction (hives, swelling of the face and throat, difficulty breathing, a fast heartbeat, dizziness, or weakness), call 9-1-1 and get the person to the nearest hospital.  For other signs that concern you, call your health care provider. Adverse reactions should be reported to the Vaccine Adverse Event Reporting System (VAERS). Your health care provider will usually file this report, or you can do it yourself.  Visit the VAERS website at www.vaers. [facility].gov or call 7-747.282.5804. VAERS is only for reporting reactions, and VAERS staff do not give medical advice. The National Vaccine Injury Compensation Program  The National Vaccine Injury Compensation Program (VICP) is a federal program that was created to compensate people who may have been injured by certain vaccines. Visit the VICP website at www.[facility].gov/vaccinecompensation or call 2-197.295.3335 to learn about the program and about filing a claim. There is a time limit to file a claim for compensation. How can I learn more? · Ask your healthcare provider. · Call your local or state health department. · Contact the Centers for Disease Control and Prevention (CDC):  ? Call 4-721.703.5726 (1-800-CDC-INFO) or  ? Visit CDC's website at www.cdc.gov/flu  Vaccine Information Statement (Interim)  Inactivated Influenza Vaccine  8/15/2019  42 U. [place] Chloe [id number]  Department of Health and Human Services  Centers for Disease Control and Prevention  Many Vaccine Information Statements are available in Hebrew and other languages. See www.immunize.org/vis. Muchas hojas de información sobre vacunas están disponibles en español y en otros idiomas. Visite www.immunize.org/vis. Care instructions adapted under license by [employer] (which disclaims liability or warranty for this information). If you have questions about a medical condition or this instruction, always ask your healthcare professional. [place] [address] any warranty or liability for your use of this information.

## 2021-11-09 LAB
ALPRAZ UR QL: NEGATIVE
AMPHETAMINES UR QL SCN: NEGATIVE NG/ML
BARBITURATES UR QL SCN: NEGATIVE NG/ML
BENZODIAZ UR QL: NEGATIVE NG/ML
BZE UR QL SCN: NEGATIVE NG/ML
CANNABINOIDS UR QL SCN: NEGATIVE NG/ML
CLONAZEPAM UR QL: NEGATIVE
CREAT UR-MCNC: 32.6 MG/DL (ref 20–300)
FLURAZEPAM UR QL: NEGATIVE
LORAZEPAM UR QL: NEGATIVE
METHADONE UR QL SCN: NEGATIVE NG/ML
MIDAZOLAM UR QL CFM: NEGATIVE
NORDIAZEPAM UR QL: NEGATIVE
OPIATES UR QL SCN: NEGATIVE NG/ML
OXAZEPAM UR QL: NEGATIVE
OXYCODONE+OXYMORPHONE UR QL SCN: NEGATIVE NG/ML
PCP UR QL: NEGATIVE NG/ML
PH UR: 5.8 [PH] (ref 4.5–8.9)
PLEASE NOTE:, 733157: NORMAL
PROPOXYPH UR QL SCN: NEGATIVE NG/ML
SP GR UR: 1.01
TEMAZEPAM UR QL CFM: NEGATIVE
TRIAZOLAM UR QL: NEGATIVE

## 2021-11-13 DIAGNOSIS — M85.80 OSTEOPENIA, UNSPECIFIED LOCATION: ICD-10-CM

## 2021-11-13 RX ORDER — CALCIUM ACETATE 667 MG/1
TABLET ORAL
Qty: 90 TABLET | Refills: 0 | Status: SHIPPED | OUTPATIENT
Start: 2021-11-13 | End: 2022-02-28

## 2022-01-28 DIAGNOSIS — G62.9 NEUROPATHY: ICD-10-CM

## 2022-01-31 RX ORDER — GABAPENTIN 300 MG/1
CAPSULE ORAL
Qty: 270 CAPSULE | Refills: 0 | Status: SHIPPED | OUTPATIENT
Start: 2022-01-31 | End: 2022-05-03

## 2022-02-02 ENCOUNTER — TELEPHONE (OUTPATIENT)
Dept: FAMILY MEDICINE CLINIC | Age: 67
End: 2022-02-02

## 2022-02-02 NOTE — TELEPHONE ENCOUNTER
----- Message from Indira Miller sent at 1/28/2022  2:20 PM EST -----  Subject: Refill Request    QUESTIONS  Name of Medication? gabapentin (NEURONTIN) 300 mg capsule  Patient-reported dosage and instructions? 300mg cap - 1 morning, 1 evening   and 2 at bedtime. How many days do you have left? 2  [facility]erred [facility]? [id number]  Pharmacy phone number (if available)? [phone]  ---------------------------------------------------------------------------  --------------  Omelia Counter INFO  What is the best way for the office to contact you? OK to leave message on   voicemail  Preferred Call Back Phone Number?  [phone]

## 2022-02-04 ENCOUNTER — OFFICE VISIT (OUTPATIENT)
Dept: FAMILY MEDICINE CLINIC | Age: 67
End: 2022-02-04
Payer: MEDICARE

## 2022-02-04 VITALS
HEIGHT: 64 IN | DIASTOLIC BLOOD PRESSURE: 67 MMHG | BODY MASS INDEX: 34.11 KG/M2 | WEIGHT: 199.8 LBS | RESPIRATION RATE: 16 BRPM | OXYGEN SATURATION: 99 % | SYSTOLIC BLOOD PRESSURE: 132 MMHG | HEART RATE: 60 BPM | TEMPERATURE: 98.7 F

## 2022-02-04 DIAGNOSIS — I89.0 LYMPHEDEMA: Primary | ICD-10-CM

## 2022-02-04 DIAGNOSIS — R35.0 FREQUENCY OF URINATION: ICD-10-CM

## 2022-02-04 DIAGNOSIS — E55.9 HYPOVITAMINOSIS D: ICD-10-CM

## 2022-02-04 DIAGNOSIS — E78.5 DYSLIPIDEMIA (HIGH LDL; LOW HDL): ICD-10-CM

## 2022-02-04 PROCEDURE — 99214 OFFICE O/P EST MOD 30 MIN: CPT | Performed by: INTERNAL MEDICINE

## 2022-02-04 PROCEDURE — G0463 HOSPITAL OUTPT CLINIC VISIT: HCPCS | Performed by: INTERNAL MEDICINE

## 2022-02-04 RX ORDER — HYDROCHLOROTHIAZIDE 12.5 MG/1
CAPSULE ORAL
Qty: 90 CAPSULE | Refills: 1 | Status: SHIPPED | OUTPATIENT
Start: 2022-02-04 | End: 2022-07-11 | Stop reason: SDUPTHER

## 2022-02-04 RX ORDER — ACETAMINOPHEN 500 MG
2000 TABLET ORAL DAILY
Qty: 90 CAPSULE | Refills: 1 | Status: SHIPPED | OUTPATIENT
Start: 2022-02-04 | End: 2022-05-03
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
## 2022-02-04 NOTE — PATIENT INSTRUCTIONS
Lymphedema: Care Instructions  Your Care Instructions     Lymphedema is fluid that builds up in the arms or legs. It is often caused by surgery to remove lymph nodes during cancer treatment, especially breast cancer surgery, which can cause fluid to build up in the arm. It can happen after radiation treatment to an area that involves lymph nodes. It also can be caused by a fractured bone or surgery to fix a fracture. And some medicines also can cause lymphedema. Some people get it for unknown reasons. Normally, lymph nodes trap bacteria and other substances as fluid flows through them. Then, the white cells in the body's defense, or immune, system can destroy the substances. But if there are few or no lymph nodes--or if the lymph system in an arm or leg has been damaged--fluid can build up in the affected arm or leg. You can take simple steps at home to help treat or prevent fluid buildup. Treatment may include raising the arm or leg to let gravity drain the fluid. You also can wear compression stockings or sleeves. Follow-up care is a key part of your treatment and safety. Be sure to make and go to all appointments, and call your doctor if you are having problems. It's also a good idea to know your test results and keep a list of the medicines you take. How can you care for yourself at home? · Wear a compression stocking or sleeve as your doctor suggests. It can help keep fluid from pooling in an arm or leg. Wear it during air travel. · Prop up the swollen arm or leg on a pillow anytime you sit or lie down. Try to keep it above the level of your heart. This will help reduce swelling. · Avoid crossing your legs if your legs are swollen. · Get some exercise on most days of the week. Increase the intensity of exercise slowly. Water aerobics can help reduce swelling by helping fluid move around. Wear your compression stocking or sleeve during exercise, but not during water exercise.   · See a physical therapist. He or she can teach you how to do self-massage to help fluid move around. You also can learn what activities would be best for you. · Keep your feet clean and wear clean socks or stockings every day. Check your feet often for signs of infection, such as redness or heat. Do not walk barefoot. · If you have had lymph nodes removed from under your arm:  ? Do not have blood drawn from the arm on the side of the lymph node surgery. ? Do not allow a blood pressure cuff to be placed on that arm. If you are in the hospital, make sure your nurse and other hospital staff know of your condition. ? Wear gloves when gardening or doing other activities that may lead to cuts on your fingers or hands. · If you have had lymph nodes removed from your groin:  ? Bathe your feet daily in lukewarm, not hot, water. Use a mild soap that has a moisturizer, or use a moisturizer separately. ? Check your feet for blisters or cuts. ? Wear comfortable and supportive shoes that fit properly. ? Wear the correct size of panty hose and stockings. Avoid garters or knee-high or thigh-high stockings. · Ask your doctor how to treat any cuts, scratches, insect bites, or other injuries that may occur. · Use sunscreen and insect repellent when outdoors to protect your skin from sunburn and insect bites. · Wear medical alert jewelry that says you have lymphedema. You can buy these at most drugstores and on the Internet. When should you call for help? Call your doctor now or seek immediate medical care if:    · You have signs of infection, such as:  ? Increased pain, swelling, warmth, or redness. ? Red streaks leading from the area. ? Pus draining from the area. ? A fever. Watch closely for changes in your health, and be sure to contact your doctor if:    · You have new or worse symptoms from lymphedema.     · You do not get better as expected. Where can you learn more?   Go to http://www.gray.com/  Enter V398 in the search box to learn more about \"Lymphedema: Care Instructions. \"  Current as of: December 17, 2020               Content Version: 13.0  © 4407-9213 [employer]. Care instructions adapted under license by [employer] (which disclaims liability or warranty for this information). If you have questions about a medical condition or this instruction, always ask your healthcare professional. [place] [address] any warranty or liability for your use of this information.

## 2022-02-04 NOTE — PROGRESS NOTES
Chief Complaint   Patient presents with    Cholesterol Problem     Check meds    1. Have you been to the ER, urgent care clinic since your last visit? Hospitalized since your last visit? No     2. Have you seen or consulted any other health care providers outside of the [address] [place] [address] since your last visit? Include any pap smears or colon screening.  No

## 2022-02-25 DIAGNOSIS — E78.5 DYSLIPIDEMIA (HIGH LDL; LOW HDL): ICD-10-CM

## 2022-02-25 DIAGNOSIS — M85.80 OSTEOPENIA, UNSPECIFIED LOCATION: ICD-10-CM

## 2022-02-28 RX ORDER — CALCIUM ACETATE 667 MG/1
CAPSULE ORAL
Qty: 90 CAPSULE | Refills: 1 | Status: ON HOLD | OUTPATIENT
Start: 2022-02-28 | End: 2022-08-03

## 2022-02-28 RX ORDER — LOVASTATIN 20 MG/1
TABLET ORAL
Qty: 30 TABLET | Refills: 3 | Status: SHIPPED | OUTPATIENT
Start: 2022-02-28 | End: 2022-06-09

## 2022-03-18 PROBLEM — I10 HYPERTENSION GOAL BP (BLOOD PRESSURE) < 130/80: Status: ACTIVE | Noted: 2017-09-25

## 2022-03-19 PROBLEM — E55.9 HYPOVITAMINOSIS D: Status: ACTIVE | Noted: 2017-09-25

## 2022-03-19 PROBLEM — G62.9 NEUROPATHY: Status: ACTIVE | Noted: 2017-09-25

## 2022-03-20 PROBLEM — E78.5 DYSLIPIDEMIA (HIGH LDL; LOW HDL): Status: ACTIVE | Noted: 2017-09-25

## 2022-05-02 DIAGNOSIS — G62.9 NEUROPATHY: ICD-10-CM

## 2022-05-02 DIAGNOSIS — E55.9 HYPOVITAMINOSIS D: ICD-10-CM

## 2022-05-03 RX ORDER — NICOTINE 11MG/24HR
PATCH, TRANSDERMAL 24 HOURS TRANSDERMAL
Qty: 90 CAPSULE | Refills: 1 | Status: SHIPPED | OUTPATIENT
Start: 2022-05-03

## 2022-05-03 RX ORDER — GABAPENTIN 300 MG/1
CAPSULE ORAL
Qty: 270 CAPSULE | Refills: 0 | Status: SHIPPED | OUTPATIENT
Start: 2022-05-03 | End: 2022-09-15

## 2022-05-04 RX ORDER — TRAMADOL HYDROCHLORIDE 50 MG/1
TABLET ORAL
Qty: 120 TABLET | OUTPATIENT
Start: 2022-05-04

## 2022-05-04 RX ORDER — BACLOFEN 10 MG/1
TABLET ORAL
Qty: 270 TABLET | Refills: 0 | Status: ON HOLD | OUTPATIENT
Start: 2022-05-04 | End: 2022-08-03

## 2022-05-06 ENCOUNTER — OFFICE VISIT (OUTPATIENT)
Dept: FAMILY MEDICINE CLINIC | Age: 67
End: 2022-05-06
Payer: MEDICARE

## 2022-05-06 VITALS
RESPIRATION RATE: 20 BRPM | SYSTOLIC BLOOD PRESSURE: 165 MMHG | DIASTOLIC BLOOD PRESSURE: 76 MMHG | BODY MASS INDEX: 33.63 KG/M2 | OXYGEN SATURATION: 97 % | WEIGHT: 197 LBS | HEART RATE: 56 BPM | TEMPERATURE: 97.7 F | HEIGHT: 64 IN

## 2022-05-06 DIAGNOSIS — E78.5 DYSLIPIDEMIA (HIGH LDL; LOW HDL): Primary | ICD-10-CM

## 2022-05-06 DIAGNOSIS — I89.0 LYMPHEDEMA: ICD-10-CM

## 2022-05-06 DIAGNOSIS — I10 HYPERTENSION GOAL BP (BLOOD PRESSURE) < 130/80: ICD-10-CM

## 2022-05-06 PROCEDURE — G8417 CALC BMI ABV UP PARAM F/U: HCPCS | Performed by: INTERNAL MEDICINE

## 2022-05-06 PROCEDURE — G8427 DOCREV CUR MEDS BY ELIG CLIN: HCPCS | Performed by: INTERNAL MEDICINE

## 2022-05-06 PROCEDURE — G8432 DEP SCR NOT DOC, RNG: HCPCS | Performed by: INTERNAL MEDICINE

## 2022-05-06 PROCEDURE — 99214 OFFICE O/P EST MOD 30 MIN: CPT | Performed by: INTERNAL MEDICINE

## 2022-05-06 PROCEDURE — 1090F PRES/ABSN URINE INCON ASSESS: CPT | Performed by: INTERNAL MEDICINE

## 2022-05-06 PROCEDURE — G8536 NO DOC ELDER MAL SCRN: HCPCS | Performed by: INTERNAL MEDICINE

## 2022-05-06 PROCEDURE — G8399 PT W/DXA RESULTS DOCUMENT: HCPCS | Performed by: INTERNAL MEDICINE

## 2022-05-06 PROCEDURE — 1101F PT FALLS ASSESS-DOCD LE1/YR: CPT | Performed by: INTERNAL MEDICINE

## 2022-05-06 PROCEDURE — G8754 DIAS BP LESS 90: HCPCS | Performed by: INTERNAL MEDICINE

## 2022-05-06 PROCEDURE — G8753 SYS BP > OR = 140: HCPCS | Performed by: INTERNAL MEDICINE

## 2022-05-06 PROCEDURE — 3017F COLORECTAL CA SCREEN DOC REV: CPT | Performed by: INTERNAL MEDICINE

## 2022-05-06 PROCEDURE — G9899 SCRN MAM PERF RSLTS DOC: HCPCS | Performed by: INTERNAL MEDICINE

## 2022-05-06 RX ORDER — LISINOPRIL 10 MG/1
10 TABLET ORAL DAILY
Qty: 30 TABLET | Refills: 3 | Status: SHIPPED | OUTPATIENT
Start: 2022-05-06 | End: 2022-07-11 | Stop reason: SDUPTHER
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
## 2022-05-06 NOTE — PATIENT INSTRUCTIONS
Learning About Low-Sodium Foods  What foods are low in sodium? The foods you eat contain nutrients, such as vitamins and minerals. Sodium is a nutrient. Your body needs the right amount to stay healthy and work as it should. You can use the list below to help you make choices about which foods to eat. Fruits  · Fresh, frozen, canned, or dried fruit  Vegetables  · Fresh or frozen vegetables, with no added salt  · Canned vegetables, low-sodium or with no added salt  Grains  · Bagels without salted tops  · Cereal with no added salt  · Corn tortillas  · Crackers with no added salt  · Oatmeal, cooked without salt  · Popcorn with no salt  · Pasta and noodles, cooked without salt  · Rice, cooked without salt  · Unsalted pretzels  Dairy and dairy alternatives  · Butter, unsalted  · Cream cheese  · Ice cream  · Milk  · Soy milk  Meats and other protein foods  · Beans and peas, canned with no salt  · Eggs  · Fresh fish (not smoked)  · Fresh meats (not smoked or cured)  · Nuts and nut butter, prepared without salt  · Poultry, not packaged with sodium solution  · Tofu, unseasoned  · Tuna, canned without salt  Seasonings  · Garlic  · Herbs and spices  · Lemon juice  · Mustard  · Olive oil  · Salt-free seasoning mixes  · Vinegar  Work with your doctor to find out how much of this nutrient you need. Depending on your health, you may need more or less of it in your diet. Where can you learn more? Go to http://www.gray.com/  Enter S460 in the search box to learn more about \"Learning About Low-Sodium Foods. \"  Current as of: September 8, 2021               Content Version: 13.2  © 8849-4879 [employer]. Care instructions adapted under license by [employer] (which disclaims liability or warranty for this information).  If you have questions about a medical condition or this instruction, always ask your healthcare professional. [employer] disclaims any warranty or liability for your use of this information.

## 2022-05-06 NOTE — LETTER
5/6/2022    [address]  [address] [facility] [address]      Dear Apolonia Elmore:    Please find your most recent results below. Resulted Orders   LIPID PANEL   Result Value Ref Range    Cholesterol, total 295 (H) <200 MG/DL    Triglyceride 50 <150 MG/DL    HDL Cholesterol 106 MG/DL    LDL, calculated 179 (H) 0 - 100 MG/DL    VLDL, calculated 10 MG/DL    CHOL/HDL Ratio 2.8 0.0 - 5.0     METABOLIC PANEL, COMPREHENSIVE   Result Value Ref Range    Sodium 139 136 - 145 mmol/L    Potassium 4.2 3.5 - 5.1 mmol/L    Chloride 105 97 - 108 mmol/L    CO2 27 21 - 32 mmol/L    Anion gap 7 5 - 15 mmol/L    Glucose 81 65 - 100 mg/dL    BUN 25 (H) 6 - 20 MG/DL    Creatinine 0.73 0.55 - 1.02 MG/DL    BUN/Creatinine ratio 34 (H) 12 - 20      GFR est AA >60 >60 ml/min/1.73m2    GFR est non-AA >60 >60 ml/min/1.73m2    Calcium 9.5 8.5 - 10.1 MG/DL    Bilirubin, total 0.3 0.2 - 1.0 MG/DL    ALT (SGPT) 48 12 - 78 U/L    AST (SGOT) 29 15 - 37 U/L    Alk. phosphatase 113 45 - 117 U/L    Protein, total 7.7 6.4 - 8.2 g/dL    Albumin 3.8 3.5 - 5.0 g/dL    Globulin 3.9 2.0 - 4.0 g/dL    A-G Ratio 1.0 (L) 1.1 - 2.2     VITAMIN D, 25 HYDROXY   Result Value Ref Range    Vitamin D 25-Hydroxy 31.9 30 - 100 ng/mL   URINALYSIS W/ RFLX MICROSCOPIC   Result Value Ref Range    Color YELLOW/STRAW      Appearance CLEAR CLEAR      Specific gravity 1.016 1.003 - 1.030      pH (UA) 6.0 5.0 - 8.0      Protein Negative Negative mg/dL    Glucose Negative Negative mg/dL    Ketone Negative Negative mg/dL    Bilirubin Negative Negative      Blood Negative Negative      Urobilinogen 0.2 0.2 - 1.0 EU/dL    Nitrites Negative Negative      Leukocyte Esterase MODERATE (A) Negative      WBC 5-10 0 - 4 /hpf    RBC 0-5 0 - 5 /hpf    Epithelial cells FEW FEW /lpf    Bacteria Negative Negative /hpf    Hyaline cast 0-2 0 - 5 /lpf       RECOMMENDATIONS:  None. Keep up the good work! Continue with current  medications.     Please call me if you have any questions: [phone]    Sincerely,      Eileen Guevara MD

## 2022-06-09 DIAGNOSIS — E78.5 DYSLIPIDEMIA (HIGH LDL; LOW HDL): ICD-10-CM

## 2022-06-09 RX ORDER — LOVASTATIN 20 MG/1
TABLET ORAL
Qty: 30 TABLET | Refills: 3 | Status: SHIPPED | OUTPATIENT
Start: 2022-06-09

## 2022-07-01 DIAGNOSIS — M25.451 SWELLING OF JOINT OF PELVIC REGION OR THIGH, RIGHT: ICD-10-CM

## 2022-07-01 NOTE — TELEPHONE ENCOUNTER
Patient called, stating she needs a refill for    Celebrex 200 mg    She is aware Dr Rose Huitron is out of the office,  Can someone else fill for her    [id number]    Patient's phone  [phone]

## 2022-07-05 RX ORDER — CELECOXIB 200 MG/1
CAPSULE ORAL
Qty: 30 CAPSULE | Refills: 1 | Status: SHIPPED | OUTPATIENT
Start: 2022-07-05 | End: 2022-07-06 | Stop reason: SDUPTHER

## 2022-07-06 RX ORDER — CELECOXIB 200 MG/1
CAPSULE ORAL
Qty: 30 CAPSULE | Refills: 1 | Status: SHIPPED | OUTPATIENT
Start: 2022-07-06

## 2022-07-11 ENCOUNTER — VIRTUAL VISIT (OUTPATIENT)
Dept: FAMILY MEDICINE CLINIC | Age: 67
End: 2022-07-11
Payer: MEDICARE

## 2022-07-11 DIAGNOSIS — Z02.89 PAIN MANAGEMENT CONTRACT AGREEMENT: ICD-10-CM

## 2022-07-11 DIAGNOSIS — Z12.11 COLON CANCER SCREENING: ICD-10-CM

## 2022-07-11 DIAGNOSIS — G62.9 NEUROPATHY: Primary | ICD-10-CM

## 2022-07-11 DIAGNOSIS — I89.0 LYMPHEDEMA: ICD-10-CM

## 2022-07-11 DIAGNOSIS — Z12.31 ENCOUNTER FOR SCREENING MAMMOGRAM FOR BREAST CANCER: ICD-10-CM

## 2022-07-11 DIAGNOSIS — I10 HYPERTENSION GOAL BP (BLOOD PRESSURE) < 130/80: ICD-10-CM

## 2022-07-11 PROCEDURE — 1090F PRES/ABSN URINE INCON ASSESS: CPT | Performed by: INTERNAL MEDICINE

## 2022-07-11 PROCEDURE — G8536 NO DOC ELDER MAL SCRN: HCPCS | Performed by: INTERNAL MEDICINE

## 2022-07-11 PROCEDURE — 99214 OFFICE O/P EST MOD 30 MIN: CPT | Performed by: INTERNAL MEDICINE

## 2022-07-11 PROCEDURE — 1101F PT FALLS ASSESS-DOCD LE1/YR: CPT | Performed by: INTERNAL MEDICINE

## 2022-07-11 PROCEDURE — G8756 NO BP MEASURE DOC: HCPCS | Performed by: INTERNAL MEDICINE

## 2022-07-11 PROCEDURE — G9899 SCRN MAM PERF RSLTS DOC: HCPCS | Performed by: INTERNAL MEDICINE

## 2022-07-11 PROCEDURE — 3017F COLORECTAL CA SCREEN DOC REV: CPT | Performed by: INTERNAL MEDICINE

## 2022-07-11 PROCEDURE — G8427 DOCREV CUR MEDS BY ELIG CLIN: HCPCS | Performed by: INTERNAL MEDICINE

## 2022-07-11 PROCEDURE — G8432 DEP SCR NOT DOC, RNG: HCPCS | Performed by: INTERNAL MEDICINE

## 2022-07-11 PROCEDURE — G8399 PT W/DXA RESULTS DOCUMENT: HCPCS | Performed by: INTERNAL MEDICINE

## 2022-07-11 PROCEDURE — 1123F ACP DISCUSS/DSCN MKR DOCD: CPT | Performed by: INTERNAL MEDICINE

## 2022-07-11 PROCEDURE — G8417 CALC BMI ABV UP PARAM F/U: HCPCS | Performed by: INTERNAL MEDICINE

## 2022-07-11 RX ORDER — TRAMADOL HYDROCHLORIDE 50 MG/1
50 TABLET ORAL
Qty: 90 TABLET | Refills: 0 | Status: ON HOLD | OUTPATIENT
Start: 2022-07-11 | End: 2022-08-11

## 2022-07-11 RX ORDER — LISINOPRIL 10 MG/1
10 TABLET ORAL DAILY
Qty: 30 TABLET | Refills: 3 | Status: ON HOLD | OUTPATIENT
Start: 2022-07-11 | End: 2022-08-03

## 2022-07-11 RX ORDER — HYDROCHLOROTHIAZIDE 12.5 MG/1
CAPSULE ORAL
Qty: 90 CAPSULE | Refills: 1 | Status: SHIPPED | OUTPATIENT
Start: 2022-07-11

## 2022-07-11 NOTE — PROGRESS NOTES
Chief Complaint   Patient presents with    Follow-up     Routine check up     Neck Pain      crackling sound on bothside x 3 weeks      1. \"Have you been to the ER, urgent care clinic since your last visit? Hospitalized since your last visit? \"NO      2. \"Have you seen or consulted any other health care providers outside of the [address] [place] [address] since your last visit? \" NO    3. For patients aged 39-70: Has the patient had a colonoscopy / FIT/ Cologuard? If the patient is female:    4. For patients aged 41-77: Has the patient had a mammogram within the past 2 years? NO      5. For patients aged 21-65: Has the patient had a pap smear?  NO
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
## 2022-07-29 ENCOUNTER — HOSPITAL ENCOUNTER (EMERGENCY)
Dept: CT IMAGING | Age: 67
Discharge: HOME | End: 2022-07-29
Attending: STUDENT IN AN ORGANIZED HEALTH CARE EDUCATION/TRAINING PROGRAM
Payer: MEDICARE

## 2022-07-29 ENCOUNTER — HOSPITAL ENCOUNTER (EMERGENCY)
Age: 67
Discharge: HOME | End: 2022-07-29
Attending: STUDENT IN AN ORGANIZED HEALTH CARE EDUCATION/TRAINING PROGRAM
Payer: MEDICARE

## 2022-07-29 VITALS
OXYGEN SATURATION: 100 % | DIASTOLIC BLOOD PRESSURE: 70 MMHG | HEART RATE: 60 BPM | RESPIRATION RATE: 18 BRPM | SYSTOLIC BLOOD PRESSURE: 127 MMHG | TEMPERATURE: 99.8 F

## 2022-07-29 DIAGNOSIS — M48.05 SPINAL STENOSIS OF THORACOLUMBAR REGION: Primary | ICD-10-CM

## 2022-07-29 DIAGNOSIS — S32.10XA CLOSED FRACTURE OF SACRUM, UNSPECIFIED PORTION OF SACRUM, INITIAL ENCOUNTER (HCC): ICD-10-CM

## 2022-07-29 PROCEDURE — 72131 CT LUMBAR SPINE W/O DYE: CPT

## 2022-07-29 PROCEDURE — 96372 THER/PROPH/DIAG INJ SC/IM: CPT

## 2022-07-29 PROCEDURE — 74011250636 HC RX REV CODE- 250/636: Performed by: STUDENT IN AN ORGANIZED HEALTH CARE EDUCATION/TRAINING PROGRAM

## 2022-07-29 PROCEDURE — 72192 CT PELVIS W/O DYE: CPT

## 2022-07-29 PROCEDURE — 99284 EMERGENCY DEPT VISIT MOD MDM: CPT

## 2022-07-29 RX ORDER — KETOROLAC TROMETHAMINE 30 MG/ML
30 INJECTION, SOLUTION INTRAMUSCULAR; INTRAVENOUS
Status: COMPLETED | OUTPATIENT
Start: 2022-07-29 | End: 2022-07-29

## 2022-07-29 RX ORDER — TRAMADOL HYDROCHLORIDE 50 MG/1
50 TABLET ORAL
Qty: 18 TABLET | Refills: 0 | Status: SHIPPED | OUTPATIENT
Start: 2022-07-29 | End: 2022-07-29

## 2022-07-29 RX ORDER — HYDROCODONE BITARTRATE AND ACETAMINOPHEN 5; 325 MG/1; MG/1
1 TABLET ORAL
Qty: 18 TABLET | Refills: 0 | Status: ON HOLD | OUTPATIENT
Start: 2022-07-29 | End: 2022-08-11

## 2022-07-29 RX ORDER — DEXAMETHASONE SODIUM PHOSPHATE 10 MG/ML
10 INJECTION INTRAMUSCULAR; INTRAVENOUS ONCE
Status: COMPLETED | OUTPATIENT
Start: 2022-07-29 | End: 2022-07-29

## 2022-07-29 RX ADMIN — KETOROLAC TROMETHAMINE 30 MG: 30 INJECTION, SOLUTION INTRAMUSCULAR; INTRAVENOUS at 13:13

## 2022-07-29 RX ADMIN — DEXAMETHASONE SODIUM PHOSPHATE 10 MG: 10 INJECTION, SOLUTION INTRAMUSCULAR; INTRAVENOUS at 13:13

## 2022-07-29 NOTE — ED TRIAGE NOTES
Pt arrives for low mid back pain after leaning to put trash in can 2 days ago. Today pt had difficulty getting up. Pain 9/10. Pt also has had some neck clicking that she has noticed recently.  EMS reports low grade fevers to 100.8f.
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
## 2022-08-02 ENCOUNTER — APPOINTMENT (OUTPATIENT)
Dept: GENERAL RADIOLOGY | Age: 67
DRG: 551 | End: 2022-08-02
Attending: EMERGENCY MEDICINE
Payer: MEDICARE

## 2022-08-02 ENCOUNTER — HOSPITAL ENCOUNTER (INPATIENT)
Age: 67
LOS: 9 days | Discharge: NURSING, REHAB OR LONG-TERM CARE | DRG: 551 | End: 2022-08-11
Attending: EMERGENCY MEDICINE | Admitting: FAMILY MEDICINE
Payer: MEDICARE

## 2022-08-02 DIAGNOSIS — Z02.89 PAIN MANAGEMENT CONTRACT AGREEMENT: ICD-10-CM

## 2022-08-02 DIAGNOSIS — S32.591D CLOSED BILATERAL FRACTURE OF PUBIC RAMI WITH ROUTINE HEALING, SUBSEQUENT ENCOUNTER: ICD-10-CM

## 2022-08-02 DIAGNOSIS — M48.05 SPINAL STENOSIS OF THORACOLUMBAR REGION: ICD-10-CM

## 2022-08-02 DIAGNOSIS — G62.9 NEUROPATHY: ICD-10-CM

## 2022-08-02 DIAGNOSIS — S32.592D CLOSED BILATERAL FRACTURE OF PUBIC RAMI WITH ROUTINE HEALING, SUBSEQUENT ENCOUNTER: ICD-10-CM

## 2022-08-02 DIAGNOSIS — S32.10XA CLOSED FRACTURE OF SACRUM, UNSPECIFIED PORTION OF SACRUM, INITIAL ENCOUNTER (HCC): Primary | ICD-10-CM

## 2022-08-02 DIAGNOSIS — R26.2 AMBULATORY DYSFUNCTION: ICD-10-CM

## 2022-08-02 PROBLEM — M53.3 SACRAL PAIN: Status: ACTIVE | Noted: 2022-08-02

## 2022-08-02 LAB
ALBUMIN SERPL-MCNC: 2 G/DL (ref 3.5–5)
ALBUMIN/GLOB SERPL: 0.4 (ref 1.1–2.2)
ALP SERPL-CCNC: 83 U/L (ref 45–117)
ALT SERPL-CCNC: 51 U/L (ref 12–78)
ANION GAP SERPL CALC-SCNC: 8 MMOL/L (ref 5–15)
APPEARANCE UR: CLEAR
AST SERPL-CCNC: 36 U/L (ref 15–37)
BACTERIA URNS QL MICRO: ABNORMAL /HPF
BASOPHILS # BLD: 0 K/UL (ref 0–0.1)
BASOPHILS NFR BLD: 0 % (ref 0–1)
BILIRUB SERPL-MCNC: 0.4 MG/DL (ref 0.2–1)
BILIRUB UR QL: NEGATIVE
BUN SERPL-MCNC: 11 MG/DL (ref 6–20)
BUN/CREAT SERPL: 21 (ref 12–20)
CALCIUM SERPL-MCNC: 9.7 MG/DL (ref 8.5–10.1)
CHLORIDE SERPL-SCNC: 105 MMOL/L (ref 97–108)
CO2 SERPL-SCNC: 28 MMOL/L (ref 21–32)
COLOR UR: ABNORMAL
CREAT SERPL-MCNC: 0.52 MG/DL (ref 0.55–1.02)
DIFFERENTIAL METHOD BLD: ABNORMAL
EOSINOPHIL # BLD: 0.1 K/UL (ref 0–0.4)
EOSINOPHIL NFR BLD: 1 % (ref 0–7)
EPITH CASTS URNS QL MICRO: ABNORMAL /LPF
ERYTHROCYTE [DISTWIDTH] IN BLOOD BY AUTOMATED COUNT: 13.3 % (ref 11.5–14.5)
GLOBULIN SER CALC-MCNC: 4.7 G/DL (ref 2–4)
GLUCOSE SERPL-MCNC: 109 MG/DL (ref 65–100)
GLUCOSE UR STRIP.AUTO-MCNC: NEGATIVE MG/DL
HCT VFR BLD AUTO: 35 % (ref 35–47)
HGB BLD-MCNC: 12.1 G/DL (ref 11.5–16)
HGB UR QL STRIP: ABNORMAL
IMM GRANULOCYTES # BLD AUTO: 0.1 K/UL (ref 0–0.04)
IMM GRANULOCYTES NFR BLD AUTO: 1 % (ref 0–0.5)
KETONES UR QL STRIP.AUTO: >80 MG/DL
LEUKOCYTE ESTERASE UR QL STRIP.AUTO: NEGATIVE
LYMPHOCYTES # BLD: 1.5 K/UL (ref 0.8–3.5)
LYMPHOCYTES NFR BLD: 16 % (ref 12–49)
MCH RBC QN AUTO: 34.3 PG (ref 26–34)
MCHC RBC AUTO-ENTMCNC: 34.6 G/DL (ref 30–36.5)
MCV RBC AUTO: 99.2 FL (ref 80–99)
MONOCYTES # BLD: 0.9 K/UL (ref 0–1)
MONOCYTES NFR BLD: 9 % (ref 5–13)
NEUTS SEG # BLD: 6.6 K/UL (ref 1.8–8)
NEUTS SEG NFR BLD: 73 % (ref 32–75)
NITRITE UR QL STRIP.AUTO: POSITIVE
NRBC # BLD: 0 K/UL (ref 0–0.01)
NRBC BLD-RTO: 0 PER 100 WBC
PH UR STRIP: 6 (ref 5–8)
PLATELET # BLD AUTO: 239 K/UL (ref 150–400)
PMV BLD AUTO: 10 FL (ref 8.9–12.9)
POTASSIUM SERPL-SCNC: 3.4 MMOL/L (ref 3.5–5.1)
PROT SERPL-MCNC: 6.7 G/DL (ref 6.4–8.2)
PROT UR STRIP-MCNC: 100 MG/DL
RBC # BLD AUTO: 3.53 M/UL (ref 3.8–5.2)
RBC #/AREA URNS HPF: ABNORMAL /HPF (ref 0–5)
SODIUM SERPL-SCNC: 141 MMOL/L (ref 136–145)
SP GR UR REFRACTOMETRY: 1.02 (ref 1–1.03)
UA: UC IF INDICATED,UAUC: ABNORMAL
UROBILINOGEN UR QL STRIP.AUTO: 1 EU/DL (ref 0.2–1)
WBC # BLD AUTO: 9 K/UL (ref 3.6–11)
WBC URNS QL MICRO: ABNORMAL /HPF (ref 0–4)

## 2022-08-02 PROCEDURE — 87186 SC STD MICRODIL/AGAR DIL: CPT

## 2022-08-02 PROCEDURE — 87086 URINE CULTURE/COLONY COUNT: CPT

## 2022-08-02 PROCEDURE — 74011250637 HC RX REV CODE- 250/637: Performed by: EMERGENCY MEDICINE

## 2022-08-02 PROCEDURE — 65270000032 HC RM SEMIPRIVATE

## 2022-08-02 PROCEDURE — 85025 COMPLETE CBC W/AUTO DIFF WBC: CPT

## 2022-08-02 PROCEDURE — 99285 EMERGENCY DEPT VISIT HI MDM: CPT

## 2022-08-02 PROCEDURE — 36415 COLL VENOUS BLD VENIPUNCTURE: CPT

## 2022-08-02 PROCEDURE — 81001 URINALYSIS AUTO W/SCOPE: CPT

## 2022-08-02 PROCEDURE — 87077 CULTURE AEROBIC IDENTIFY: CPT

## 2022-08-02 PROCEDURE — 73502 X-RAY EXAM HIP UNI 2-3 VIEWS: CPT

## 2022-08-02 PROCEDURE — 80053 COMPREHEN METABOLIC PANEL: CPT

## 2022-08-02 PROCEDURE — 74011250637 HC RX REV CODE- 250/637: Performed by: STUDENT IN AN ORGANIZED HEALTH CARE EDUCATION/TRAINING PROGRAM

## 2022-08-02 PROCEDURE — 74011000250 HC RX REV CODE- 250: Performed by: STUDENT IN AN ORGANIZED HEALTH CARE EDUCATION/TRAINING PROGRAM

## 2022-08-02 RX ORDER — CARBAMAZEPINE 200 MG/1
200 TABLET ORAL 2 TIMES DAILY
Status: DISCONTINUED | OUTPATIENT
Start: 2022-08-02 | End: 2022-08-11 | Stop reason: HOSPADM

## 2022-08-02 RX ORDER — SODIUM CHLORIDE 0.9 % (FLUSH) 0.9 %
5-40 SYRINGE (ML) INJECTION AS NEEDED
Status: DISCONTINUED | OUTPATIENT
Start: 2022-08-02 | End: 2022-08-11 | Stop reason: HOSPADM

## 2022-08-02 RX ORDER — HYDROCHLOROTHIAZIDE 25 MG/1
12.5 TABLET ORAL DAILY
Status: DISCONTINUED | OUTPATIENT
Start: 2022-08-03 | End: 2022-08-11 | Stop reason: HOSPADM

## 2022-08-02 RX ORDER — POLYETHYLENE GLYCOL 3350 17 G/17G
17 POWDER, FOR SOLUTION ORAL DAILY PRN
Status: DISCONTINUED | OUTPATIENT
Start: 2022-08-02 | End: 2022-08-11 | Stop reason: HOSPADM

## 2022-08-02 RX ORDER — PHENYTOIN SODIUM 100 MG/1
100 CAPSULE, EXTENDED RELEASE ORAL 3 TIMES DAILY
Status: DISCONTINUED | OUTPATIENT
Start: 2022-08-02 | End: 2022-08-11 | Stop reason: HOSPADM

## 2022-08-02 RX ORDER — HYDROCODONE BITARTRATE AND ACETAMINOPHEN 5; 325 MG/1; MG/1
2 TABLET ORAL
Status: DISCONTINUED | OUTPATIENT
Start: 2022-08-02 | End: 2022-08-11 | Stop reason: HOSPADM

## 2022-08-02 RX ORDER — LISINOPRIL 10 MG/1
10 TABLET ORAL DAILY
Status: DISCONTINUED | OUTPATIENT
Start: 2022-08-03 | End: 2022-08-11 | Stop reason: HOSPADM

## 2022-08-02 RX ORDER — PHENYTOIN SODIUM 100 MG/1
100 CAPSULE, EXTENDED RELEASE ORAL 2 TIMES DAILY
Status: DISCONTINUED | OUTPATIENT
Start: 2022-08-02 | End: 2022-08-02

## 2022-08-02 RX ORDER — CARBAMAZEPINE 100 MG/1
100 TABLET, CHEWABLE ORAL 2 TIMES DAILY
Status: DISCONTINUED | OUTPATIENT
Start: 2022-08-02 | End: 2022-08-02

## 2022-08-02 RX ORDER — ONDANSETRON 2 MG/ML
4 INJECTION INTRAMUSCULAR; INTRAVENOUS
Status: DISCONTINUED | OUTPATIENT
Start: 2022-08-02 | End: 2022-08-11 | Stop reason: HOSPADM

## 2022-08-02 RX ORDER — ONDANSETRON 4 MG/1
4 TABLET, ORALLY DISINTEGRATING ORAL
Status: DISCONTINUED | OUTPATIENT
Start: 2022-08-02 | End: 2022-08-11 | Stop reason: HOSPADM

## 2022-08-02 RX ORDER — CEPHALEXIN 250 MG/1
500 CAPSULE ORAL EVERY 6 HOURS
Status: DISCONTINUED | OUTPATIENT
Start: 2022-08-02 | End: 2022-08-03

## 2022-08-02 RX ORDER — ENOXAPARIN SODIUM 100 MG/ML
40 INJECTION SUBCUTANEOUS DAILY
Status: DISCONTINUED | OUTPATIENT
Start: 2022-08-03 | End: 2022-08-11 | Stop reason: HOSPADM

## 2022-08-02 RX ORDER — ACETAMINOPHEN 650 MG/1
650 SUPPOSITORY RECTAL
Status: DISCONTINUED | OUTPATIENT
Start: 2022-08-02 | End: 2022-08-11 | Stop reason: HOSPADM

## 2022-08-02 RX ORDER — ATORVASTATIN CALCIUM 10 MG/1
10 TABLET, FILM COATED ORAL
Status: DISCONTINUED | OUTPATIENT
Start: 2022-08-02 | End: 2022-08-11 | Stop reason: HOSPADM

## 2022-08-02 RX ORDER — TRAMADOL HYDROCHLORIDE 50 MG/1
50 TABLET ORAL
Status: DISCONTINUED | OUTPATIENT
Start: 2022-08-02 | End: 2022-08-11 | Stop reason: HOSPADM

## 2022-08-02 RX ORDER — CALCIUM ACETATE 667 MG/1
1 TABLET ORAL
Status: DISCONTINUED | OUTPATIENT
Start: 2022-08-03 | End: 2022-08-11 | Stop reason: HOSPADM

## 2022-08-02 RX ORDER — BACLOFEN 10 MG/1
10 TABLET ORAL 3 TIMES DAILY
Status: DISCONTINUED | OUTPATIENT
Start: 2022-08-02 | End: 2022-08-11 | Stop reason: HOSPADM

## 2022-08-02 RX ORDER — ACETAMINOPHEN 325 MG/1
650 TABLET ORAL
Status: DISCONTINUED | OUTPATIENT
Start: 2022-08-02 | End: 2022-08-11 | Stop reason: HOSPADM

## 2022-08-02 RX ORDER — SODIUM CHLORIDE 0.9 % (FLUSH) 0.9 %
5-40 SYRINGE (ML) INJECTION EVERY 8 HOURS
Status: DISCONTINUED | OUTPATIENT
Start: 2022-08-02 | End: 2022-08-11 | Stop reason: HOSPADM

## 2022-08-02 RX ORDER — CELECOXIB 200 MG/1
200 CAPSULE ORAL DAILY
Status: DISCONTINUED | OUTPATIENT
Start: 2022-08-03 | End: 2022-08-11 | Stop reason: HOSPADM

## 2022-08-02 RX ORDER — OXYCODONE AND ACETAMINOPHEN 5; 325 MG/1; MG/1
1 TABLET ORAL
Status: COMPLETED | OUTPATIENT
Start: 2022-08-02 | End: 2022-08-02

## 2022-08-02 RX ORDER — GABAPENTIN 300 MG/1
300 CAPSULE ORAL 3 TIMES DAILY
Status: DISCONTINUED | OUTPATIENT
Start: 2022-08-02 | End: 2022-08-11 | Stop reason: HOSPADM

## 2022-08-02 RX ADMIN — GABAPENTIN 300 MG: 300 CAPSULE ORAL at 21:28

## 2022-08-02 RX ADMIN — TRAMADOL HYDROCHLORIDE 50 MG: 50 TABLET ORAL at 16:26

## 2022-08-02 RX ADMIN — CARBAMAZEPINE 200 MG: 200 TABLET ORAL at 21:28

## 2022-08-02 RX ADMIN — ATORVASTATIN CALCIUM 10 MG: 10 TABLET, FILM COATED ORAL at 21:28

## 2022-08-02 RX ADMIN — GABAPENTIN 300 MG: 300 CAPSULE ORAL at 16:26

## 2022-08-02 RX ADMIN — BACLOFEN 10 MG: 10 TABLET ORAL at 21:28

## 2022-08-02 RX ADMIN — PHENYTOIN SODIUM 100 MG: 100 CAPSULE ORAL at 21:32

## 2022-08-02 RX ADMIN — ACETAMINOPHEN 650 MG: 325 TABLET ORAL at 21:28

## 2022-08-02 RX ADMIN — OXYCODONE AND ACETAMINOPHEN 1 TABLET: 5; 325 TABLET ORAL at 10:41

## 2022-08-02 RX ADMIN — SODIUM CHLORIDE, PRESERVATIVE FREE 10 ML: 5 INJECTION INTRAVENOUS at 21:29

## 2022-08-02 RX ADMIN — BACLOFEN 10 MG: 10 TABLET ORAL at 16:26

## 2022-08-02 NOTE — PROGRESS NOTES
Occupational Therapy  08/02/22    Order received, chart reviewed. Noted pt unable to ambulate, 7/29 CT shows acute nondisplaced fractures of the right sacrum as well as the right inferior pubic ramus, and plan for medical admission with ortho sx consult. Will defer OT evaluation assessment pending ortho POC and recommendations.       Thank you,   Carmela Calix, OTRENEE, OTR/L
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
## 2022-08-02 NOTE — ED NOTES
Has a final transfer review been performed? Yes    Reason for Admission:   Fractured sacrum unable to ambulate  Patient comes from: home  Mental Status: Alert and oriented  ADL:total assistance  Ambulation:wheelchair bound  Pertinent Info/Safety Concerns: fall and pain    COVID Status: Not Tested  MEWS Score: 1  Vitals:    08/02/22 0940 08/02/22 1440 08/02/22 1500   BP: (!) 145/88 (!) 165/74 (!) 139/54   Pulse:   80   Resp: 18  20   Temp: 98.3 °F (36.8 °C)  98.8 °F (37.1 °C)   SpO2: 99% 98% 99%   LMP: 02/13/2005     Transport mode:ED stretcher    TRANSFER - OUT REPORT:    Traci Conley  being transferred to 5E(unit) for routine progression of care     \"Notification of etransfer note given to (Name) Brady Perdue (Title)     Report consisted of patient's Situation, Background, Assessment and   Recommendations(SBAR). Lines:       Opportunity for questions and clarification was provided.
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
## 2022-08-02 NOTE — PROGRESS NOTES
Physical Therapy - defer  Order received, chart reviewed. Noted pt unable to ambulate, 7/29 CT shows acute nondisplaced fractures of the right sacrum as well as the right inferior pubic ramus, and plan for medical admission with ortho consult. Will defer mobility assessment pending ortho rec. Will follow peripherally.

## 2022-08-02 NOTE — ED NOTES
Bedside and Verbal shift change report given to Breanna Harmon RN  (oncoming nurse) by Jamar Singer RN  (offgoing nurse). Report included the following information SBAR, Kardex, ED Summary, Procedure Summary and Intake/Output.

## 2022-08-02 NOTE — CONSULTS
Orthopedic Consult Note    Date of Consultation:  August 2, 2022  Referring Physician:  Annika Yee MD  CC: R hip pain    S: Batsheva Ewing is a 78 y.o. female who c/o R hip, R lower back/buttock pain. Pain is localized, deep, achy, sharp with movement, severe; endorses that she has had intermittent back pain for years, and some chronic RLE weakness. Hip pain is new since fall. She was seen in ED 7/29 and had a CT which showed Pelvic fractures but was discharged. She has been unable ambulate since. Difficult to ascertain if there has been change in bladder habits since injury, pt states she cannot get up to go to the bathroom so she is wearing briefs, but not that she incontinence. Denies new focal weakness, new numbness, tingling, fever, chills, abd pain, cp, sob, Fecal incontinence. O: : Lying in bed comfortably, no resp distress; she is ttp in the lower r sacrum, buttock, and lateral hip posteriorly; skin intact, no ecchymosis; Pelvis stable with gentle rotational force; BLE normal alignment without obvious deformity or limb length discrepancy; Passive hip flexion, IR/ER, abduction did not illicit pain; DF/PF 4/5, cannot raise leg off bed which she says is baseline, there is movement with attempt indicating some muscle tone; SILT in distribution of sural, superficial and deep peroneal n.; DP 2+, foot warm, cap refill <2secs.  and rectal exam deferred. No data found. No results for input(s): HGB, HCT, PLT, BUN, CREA, K, NA, HGBEXT, HCTEXT, PLTEXT in the last 72 hours. XR HIP RT W OR WO PELV 2-3 VWS  Narrative: EXAM: XR HIP RT W OR WO PELV 2-3 VWS    INDICATION: recently dx with R sacral and inf pupbi rami facture; has worsening  pain and suspected to have another fall. COMPARISON: 4/9/2018. FINDINGS: AP view of the pelvis and a frogleg lateral view of the right hip  demonstrate no fracture, dislocation or other acute abnormality. Degenerative  changes are seen in the hip joints bilaterally.  The nondisplaced right sacral  and right inferior pubic rami fractures are not well evaluated by this  examination. Impression: No acute abnormality is identified. Bilateral hip osteoarthritis. A/P:  R sacral fracture; do not see R inferior pubic rami fracture; No hip fracture, or occult fracture suspected  No neurovascular or associated visceral injury suspected. As a result of this fall; hx of baseline Lumbar pain and RLE weakness.      - Nonoperative management, WBAT, Pain control   - If patient is unable to progress with therapy and/or pain is intolerable patient may need further evaluation and management by Spine Surgeon or [facility] trained surgeon to address options for sacral fracture; otherwise they can follow up with Dr. Elisa Sawant in 2-3 weeks, call their office for appt. - Above history, exam, imaging discussed with Dr. Elisa Sawant and they agree with plan.     NEY Lemus  Orthopedic Trauma [facility]  [address] [facility]
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
## 2022-08-02 NOTE — ED NOTES
Has a final transfer review been performed? Yes    Reason for Admission:   Fractured sacrum unable to ambulate  Patient comes from: home  Mental Status: Alert and oriented  ADL:total assistance  Ambulation:wheelchair bound  Pertinent Info/Safety Concerns: fall and pain    COVID Status: Not Tested  MEWS Score: 1  Vitals:    08/02/22 0940 08/02/22 1440 08/02/22 1500   BP: (!) 145/88 (!) 165/74 (!) 139/54   Pulse:   80   Resp: 18  20   Temp: 98.3 °F (36.8 °C)  98.8 °F (37.1 °C)   SpO2: 99% 98% 99%   LMP: 02/13/2005     Transport mode:ED stretcher    TRANSFER - OUT REPORT:    Gloria Toussaint  being transferred to 5E(unit) for routine progression of care     \"Notification of etransfer note given to (Name) Nette Alexander (Title)     Report consisted of patient's Situation, Background, Assessment and   Recommendations(SBAR). Lines:       Opportunity for questions and clarification was provided.

## 2022-08-02 NOTE — CONSULTS
Orthopedic Consult Note    Date of Consultation:  August 2, 2022  Referring Physician:  Miguel Thakkar MD  CC: R hip pain    S: Al Sabillon is a 78 y.o. female who c/o R hip, R lower back/buttock pain. Pain is localized, deep, achy, sharp with movement, severe; endorses that she has had intermittent back pain for years, and some chronic RLE weakness. Hip pain is new since fall. She was seen in ED 7/29 and had a CT which showed Pelvic fractures but was discharged. She has been unable ambulate since. Difficult to ascertain if there has been change in bladder habits since injury, pt states she cannot get up to go to the bathroom so she is wearing briefs, but not that she incontinence. Denies new focal weakness, new numbness, tingling, fever, chills, abd pain, cp, sob, Fecal incontinence. O: : Lying in bed comfortably, no resp distress; she is ttp in the lower r sacrum, buttock, and lateral hip posteriorly; skin intact, no ecchymosis; Pelvis stable with gentle rotational force; BLE normal alignment without obvious deformity or limb length discrepancy; Passive hip flexion, IR/ER, abduction did not illicit pain; DF/PF 4/5, cannot raise leg off bed which she says is baseline, there is movement with attempt indicating some muscle tone; SILT in distribution of sural, superficial and deep peroneal n.; DP 2+, foot warm, cap refill <2secs.  and rectal exam deferred. No data found. No results for input(s): HGB, HCT, PLT, BUN, CREA, K, NA, HGBEXT, HCTEXT, PLTEXT in the last 72 hours. XR HIP RT W OR WO PELV 2-3 VWS  Narrative: EXAM: XR HIP RT W OR WO PELV 2-3 VWS    INDICATION: recently dx with R sacral and inf pupbi rami facture; has worsening  pain and suspected to have another fall. COMPARISON: 4/9/2018. FINDINGS: AP view of the pelvis and a frogleg lateral view of the right hip  demonstrate no fracture, dislocation or other acute abnormality. Degenerative  changes are seen in the hip joints bilaterally.  The nondisplaced right sacral  and right inferior pubic rami fractures are not well evaluated by this  examination. Impression: No acute abnormality is identified. Bilateral hip osteoarthritis. A/P:  R sacral fracture; do not see R inferior pubic rami fracture; No hip fracture, or occult fracture suspected  No neurovascular or associated visceral injury suspected. As a result of this fall; hx of baseline Lumbar pain and RLE weakness.      - Nonoperative management, WBAT, Pain control   - If patient is unable to progress with therapy and/or pain is intolerable patient may need further evaluation and management by Spine Surgeon or [facility] trained surgeon to address options for sacral fracture; otherwise they can follow up with Dr. Cesar Haney in 2-3 weeks, call their office for appt. - Above history, exam, imaging discussed with Dr. Cesar Haney and they agree with plan.     NEY Turner  Orthopedic Trauma Service  [address] [facility]
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
## 2022-08-02 NOTE — ED NOTES
Verbal shift change report given to Ok Asp (oncoming nurse) by Elieser Roland RN (offgoing nurse). Report included the following information SBAR, Kardex and ED Summary.

## 2022-08-02 NOTE — ED NOTES
Verbal shift change report given to Marjan Kelly (oncoming nurse) by Russel Cuevas RN (offgoing nurse). Report included the following information SBAR, Kardex and ED Summary.

## 2022-08-02 NOTE — ED NOTES
Bedside and Verbal shift change report given to Vola Osler, RN  (oncoming nurse) by Kathryn Cardoso RN  (offgoing nurse). Report included the following information SBAR, Kardex, ED Summary, Procedure Summary and Intake/Output.

## 2022-08-02 NOTE — PROGRESS NOTES
Occupational Therapy  08/02/22    Order received, chart reviewed. Noted pt unable to ambulate, 7/29 CT shows acute nondisplaced fractures of the right sacrum as well as the right inferior pubic ramus, and plan for medical admission with ortho sx consult. Will defer OT evaluation assessment pending ortho POC and recommendations.       Thank you,   Alecia Mosley, OTD, OTR/L

## 2022-08-02 NOTE — ED TRIAGE NOTES
Patient arrived via EMS from home with sharp pain in lower back and right leg on Thursday. 11/10. Seen here for knee pain and swelling this weekend. Denies injury. Used walker but cannot walk today due to pain.

## 2022-08-03 ENCOUNTER — APPOINTMENT (OUTPATIENT)
Dept: CT IMAGING | Age: 67
DRG: 551 | End: 2022-08-03
Attending: STUDENT IN AN ORGANIZED HEALTH CARE EDUCATION/TRAINING PROGRAM
Payer: MEDICARE

## 2022-08-03 DIAGNOSIS — I10 HYPERTENSION GOAL BP (BLOOD PRESSURE) < 130/80: ICD-10-CM

## 2022-08-03 DIAGNOSIS — M85.80 OSTEOPENIA, UNSPECIFIED LOCATION: ICD-10-CM

## 2022-08-03 LAB
ANION GAP SERPL CALC-SCNC: 9 MMOL/L (ref 5–15)
BASOPHILS # BLD: 0 K/UL (ref 0–0.1)
BASOPHILS NFR BLD: 1 % (ref 0–1)
BUN SERPL-MCNC: 10 MG/DL (ref 6–20)
BUN/CREAT SERPL: 21 (ref 12–20)
CALCIUM SERPL-MCNC: 9.4 MG/DL (ref 8.5–10.1)
CHLORIDE SERPL-SCNC: 105 MMOL/L (ref 97–108)
CO2 SERPL-SCNC: 26 MMOL/L (ref 21–32)
CREAT SERPL-MCNC: 0.47 MG/DL (ref 0.55–1.02)
DIFFERENTIAL METHOD BLD: ABNORMAL
EOSINOPHIL # BLD: 0.1 K/UL (ref 0–0.4)
EOSINOPHIL NFR BLD: 2 % (ref 0–7)
ERYTHROCYTE [DISTWIDTH] IN BLOOD BY AUTOMATED COUNT: 12.8 % (ref 11.5–14.5)
GLUCOSE SERPL-MCNC: 86 MG/DL (ref 65–100)
HCT VFR BLD AUTO: 32.9 % (ref 35–47)
HGB BLD-MCNC: 11.2 G/DL (ref 11.5–16)
IMM GRANULOCYTES # BLD AUTO: 0.1 K/UL (ref 0–0.04)
IMM GRANULOCYTES NFR BLD AUTO: 1 % (ref 0–0.5)
LYMPHOCYTES # BLD: 1.4 K/UL (ref 0.8–3.5)
LYMPHOCYTES NFR BLD: 17 % (ref 12–49)
MCH RBC QN AUTO: 33.5 PG (ref 26–34)
MCHC RBC AUTO-ENTMCNC: 34 G/DL (ref 30–36.5)
MCV RBC AUTO: 98.5 FL (ref 80–99)
MONOCYTES # BLD: 0.9 K/UL (ref 0–1)
MONOCYTES NFR BLD: 11 % (ref 5–13)
NEUTS SEG # BLD: 5.8 K/UL (ref 1.8–8)
NEUTS SEG NFR BLD: 68 % (ref 32–75)
NRBC # BLD: 0 K/UL (ref 0–0.01)
NRBC BLD-RTO: 0 PER 100 WBC
PLATELET # BLD AUTO: 234 K/UL (ref 150–400)
PMV BLD AUTO: 9.3 FL (ref 8.9–12.9)
POTASSIUM SERPL-SCNC: 3.4 MMOL/L (ref 3.5–5.1)
RBC # BLD AUTO: 3.34 M/UL (ref 3.8–5.2)
SODIUM SERPL-SCNC: 140 MMOL/L (ref 136–145)
WBC # BLD AUTO: 8.3 K/UL (ref 3.6–11)

## 2022-08-03 PROCEDURE — 74011000636 HC RX REV CODE- 636: Performed by: STUDENT IN AN ORGANIZED HEALTH CARE EDUCATION/TRAINING PROGRAM

## 2022-08-03 PROCEDURE — 85025 COMPLETE CBC W/AUTO DIFF WBC: CPT

## 2022-08-03 PROCEDURE — 74011250636 HC RX REV CODE- 250/636: Performed by: STUDENT IN AN ORGANIZED HEALTH CARE EDUCATION/TRAINING PROGRAM

## 2022-08-03 PROCEDURE — 72131 CT LUMBAR SPINE W/O DYE: CPT

## 2022-08-03 PROCEDURE — 65270000032 HC RM SEMIPRIVATE

## 2022-08-03 PROCEDURE — 80048 BASIC METABOLIC PNL TOTAL CA: CPT

## 2022-08-03 PROCEDURE — 74177 CT ABD & PELVIS W/CONTRAST: CPT

## 2022-08-03 PROCEDURE — 74011000250 HC RX REV CODE- 250: Performed by: STUDENT IN AN ORGANIZED HEALTH CARE EDUCATION/TRAINING PROGRAM

## 2022-08-03 PROCEDURE — 74011250637 HC RX REV CODE- 250/637: Performed by: STUDENT IN AN ORGANIZED HEALTH CARE EDUCATION/TRAINING PROGRAM

## 2022-08-03 PROCEDURE — 36415 COLL VENOUS BLD VENIPUNCTURE: CPT

## 2022-08-03 RX ORDER — CALCIUM ACETATE 667 MG/1
CAPSULE ORAL
Qty: 90 CAPSULE | Refills: 1 | Status: SHIPPED | OUTPATIENT
Start: 2022-08-03

## 2022-08-03 RX ORDER — LISINOPRIL 10 MG/1
10 TABLET ORAL DAILY
Qty: 30 TABLET | Refills: 3 | Status: SHIPPED | OUTPATIENT
Start: 2022-08-03 | End: 2022-08-11

## 2022-08-03 RX ORDER — LISINOPRIL 10 MG/1
10 TABLET ORAL DAILY
Qty: 30 TABLET | Refills: 3 | Status: SHIPPED | OUTPATIENT
Start: 2022-08-03

## 2022-08-03 RX ORDER — BACLOFEN 10 MG/1
TABLET ORAL
Qty: 270 TABLET | Refills: 0 | Status: SHIPPED | OUTPATIENT
Start: 2022-08-03

## 2022-08-03 RX ADMIN — CELECOXIB 200 MG: 200 CAPSULE ORAL at 11:03

## 2022-08-03 RX ADMIN — PHENYTOIN SODIUM 100 MG: 100 CAPSULE ORAL at 21:15

## 2022-08-03 RX ADMIN — CEPHALEXIN 500 MG: 250 CAPSULE ORAL at 01:10

## 2022-08-03 RX ADMIN — GABAPENTIN 300 MG: 300 CAPSULE ORAL at 11:02

## 2022-08-03 RX ADMIN — SODIUM CHLORIDE, PRESERVATIVE FREE 10 ML: 5 INJECTION INTRAVENOUS at 10:00

## 2022-08-03 RX ADMIN — CEPHALEXIN 500 MG: 250 CAPSULE ORAL at 06:33

## 2022-08-03 RX ADMIN — GABAPENTIN 300 MG: 300 CAPSULE ORAL at 15:35

## 2022-08-03 RX ADMIN — BACLOFEN 10 MG: 10 TABLET ORAL at 11:02

## 2022-08-03 RX ADMIN — HYDROCHLOROTHIAZIDE 12.5 MG: 25 TABLET ORAL at 11:05

## 2022-08-03 RX ADMIN — HYDROCODONE BITARTRATE AND ACETAMINOPHEN 2 TABLET: 5; 325 TABLET ORAL at 21:16

## 2022-08-03 RX ADMIN — SODIUM CHLORIDE, PRESERVATIVE FREE 1 G: 5 INJECTION INTRAVENOUS at 11:43

## 2022-08-03 RX ADMIN — ATORVASTATIN CALCIUM 10 MG: 10 TABLET, FILM COATED ORAL at 21:15

## 2022-08-03 RX ADMIN — SODIUM CHLORIDE, PRESERVATIVE FREE 10 ML: 5 INJECTION INTRAVENOUS at 21:18

## 2022-08-03 RX ADMIN — ENOXAPARIN SODIUM 40 MG: 100 INJECTION SUBCUTANEOUS at 11:39

## 2022-08-03 RX ADMIN — LISINOPRIL 10 MG: 10 TABLET ORAL at 11:39

## 2022-08-03 RX ADMIN — BACLOFEN 10 MG: 10 TABLET ORAL at 21:15

## 2022-08-03 RX ADMIN — PHENYTOIN SODIUM 100 MG: 100 CAPSULE ORAL at 11:02

## 2022-08-03 RX ADMIN — GABAPENTIN 300 MG: 300 CAPSULE ORAL at 21:15

## 2022-08-03 RX ADMIN — CARBAMAZEPINE 200 MG: 200 TABLET ORAL at 21:15

## 2022-08-03 RX ADMIN — HYDROCODONE BITARTRATE AND ACETAMINOPHEN 2 TABLET: 5; 325 TABLET ORAL at 15:36

## 2022-08-03 RX ADMIN — PHENYTOIN SODIUM 100 MG: 100 CAPSULE ORAL at 15:36

## 2022-08-03 RX ADMIN — BACLOFEN 10 MG: 10 TABLET ORAL at 16:00

## 2022-08-03 RX ADMIN — HYDROCODONE BITARTRATE AND ACETAMINOPHEN 2 TABLET: 5; 325 TABLET ORAL at 11:00

## 2022-08-03 RX ADMIN — IOPAMIDOL 100 ML: 755 INJECTION, SOLUTION INTRAVENOUS at 13:57

## 2022-08-03 RX ADMIN — POTASSIUM BICARBONATE 20 MEQ: 782 TABLET, EFFERVESCENT ORAL at 15:34

## 2022-08-03 RX ADMIN — CARBAMAZEPINE 200 MG: 200 TABLET ORAL at 11:02

## 2022-08-03 NOTE — PROGRESS NOTES
PHYSICAL THERAPY    Orders acknowledged and chart reviewed in prep for PT evaluation. Attempted to see but patient is off the floor in CT scan. Will follow up tomorrow.     Annett Romberg

## 2022-08-03 NOTE — PROGRESS NOTES
PHYSICAL THERAPY    Orders acknowledged and chart reviewed in prep for PT evaluation. Attempted to see but patient is off the floor in CT scan. Will follow up tomorrow.     Brian Rossi

## 2022-08-03 NOTE — PROGRESS NOTES
Occupational Therapy    Order's acknowledged, chart reviewed in prep for skilled OT treatment; however, pt JORGE for CT scan. Will hold and follow up later as able and appropriate.     Thank you,  Marquita Ward, OT

## 2022-08-03 NOTE — PROGRESS NOTES
[facility] Adult  Hospitalist Group                                                                                          Hospitalist Progress Note  Olivia Gonzalez MD  Answering service: [phone] OR [phone] from in house phone        Date of Service:  8/3/2022  NAME:  Bear Messina  :  1955  MRN:  [id number]      Admission Summary:   Bear Messina is a 78 y.o. female with history of lumbar stenosis, chronic back pain, HTN, seizure disorder, recent right sacral fracture who presents with worsening pain and limited mobility in the setting of R sacrum fracture and R inferior pubic ramus fracture. She was evaluated by orthopedic surgery, who recommended WBAT. PT/OT were consulted. Patient also endorsed new dysuria and dark colored urine, found to have UTI and started on abx therapy. Interval history / Subjective: This morning, patient reports continued RLE pain and weakness. Feels that her weakness is worse than last week. Does have some abdominal pain, but no nausea or vomiting. Low appetite but tolerating oral intake. Has not had a BM. No fevers, chills, chest pain, SOB, nausea, vomiting, diarrhea, constipation, LE swelling, confusion. Endorses back pain, R buttock pain, RLE weakness, abdominal pain, dysuria. Assessment & Plan:     #Sacral fracture, right, POA  #Inferior pubic ramus fracture, right, POA  #Limited mobility  #Mechanical Fall  - Presented to ED approx 4 days ago, diagnosed with sacral frcture and pubic ramus fracture. Discharged to home with pain medication per pt request  - Since then, has had 1 fall and worsening mobility. Difficulty with IADLs.   - Limited mobility of RLE, 1/5 strength at hip, which pt states is chronic for her  - Given worsening of RLE weakness and increased pain with fall since previous imaging, will obtain repeat imaging  Plan:  - CT Pelvis and Lumbar Spine to eval for worsening RLE weakness and worsening pain since fall 4 days ago   - PT/OT eval and treat  - WBAT per ortho recommendations   - Norco 10mg q4h PRN severe pain  - Tramodol 50mg q6h prn moderate pain  - Dispo pending PT eval   - Ortho consulted, plan for OP F/U in 2-3 weeks pending clinical improvement     #Acute cystitis, POA  #Abdominal pain, nPOA  - UA with moderate blood and nitrites  - Pt endorses dysuria and dark colored urine  - Febrile to 100.6 overnight, no leukocytosis on labs this morning  Plan:  - CT Abdomen WWO to eval for pyelo given significant right sided flank/back pain  - Rocephin 1g q24h   - F/U urine culture     #Lumbar stenosis  #Chronic pain  - PT/OT eval and treat  - Consider OP referral to primary spine provider  - Continue home baclofen 10mg daily; gabapentin 300mg TID     #HTN  - Continue home lisinopril and HCTZ     #Seizure disorder  - Continue home carbamezapine and home phenytoin           Code status: FULL CODE  Prophylaxis: Lovenox  Care Plan discussed with: patient  Anticipated Disposition: 24 hours     Hospital Problems  Date Reviewed: 7/11/2022            Codes Class Noted POA    Sacral pain ICD-10-CM: M53.3  ICD-9-CM: 724.6  8/2/2022 Unknown             Review of Systems:   A comprehensive review of systems was negative except for that written in the HPI. Vital Signs:    Last 24hrs VS reviewed since prior progress note. Most recent are:  Visit Vitals  BP (!) 144/73   Pulse 76   Temp 99.8 °F (37.7 °C)   Resp 18   SpO2 94%         Intake/Output Summary (Last 24 hours) at 8/3/2022 2710  Last data filed at 8/3/2022 3981  Gross per 24 hour   Intake --   Output 400 ml   Net -400 ml        Physical Examination:     I had a face to face encounter with this patient and independently examined them on 8/3/2022 as outlined below:          Constitutional:  No acute distress, cooperative, pleasant    ENT:  Oral mucosa moist, oropharynx benign. Resp:  CTA bilaterally. No wheezing/rhonchi/rales. No accessory muscle use.     CV:  Regular rhythm, normal rate, no murmurs, gallops, rubs    GI:  Soft, non distended, normoactive bowel sounds, no hepatosplenomegaly. Mildly tender to the RLQ and epigastric area. MSK:  No edema, warm, 2+ pulses throughout. 1/5 strength of RLE. 4/5 strength of LLE. 1+ DTRs of bilateral LEs, symmetrical. Normal babinski. Neurologic:  AAOx3, CN II-XII reviewed            Data Review:    Review and/or order of clinical lab test  Review and/or order of tests in the radiology section of [facility]  Review and/or order of tests in the medicine section of [facility]      Labs:     Recent Labs     08/03/22 0343 08/02/22  1507   WBC 8.3 9.0   HGB 11.2* 12.1   HCT 32.9* 35.0    239     Recent Labs     08/03/22 0343 08/02/22 2041    141   K 3.4* 3.4*    105   CO2 26 28   BUN 10 11   CREA 0.47* 0.52*   GLU 86 109*   CA 9.4 9.7     Recent Labs     08/02/22 2041   ALT 51   AP 83   TBILI 0.4   TP 6.7   ALB 2.0*   GLOB 4.7*     No results for input(s): INR, PTP, APTT, INREXT, INREXT in the last 72 hours. No results for input(s): FE, TIBC, PSAT, FERR in the last 72 hours. No results found for: FOL, RBCF   No results for input(s): PH, PCO2, PO2 in the last 72 hours. No results for input(s): CPK, CKNDX, TROIQ in the last 72 hours.     No lab exists for component: CPKMB  Lab Results   Component Value Date/Time    Cholesterol, total 295 (H) 05/05/2022 02:20 PM    HDL Cholesterol 106 05/05/2022 02:20 PM    LDL, calculated 179 (H) 05/05/2022 02:20 PM    Triglyceride 50 05/05/2022 02:20 PM    CHOL/HDL Ratio 2.8 05/05/2022 02:20 PM     No results found for: [facility]  Lab Results   Component Value Date/Time    Color YELLOW/STRAW 08/02/2022 03:07 PM    Appearance CLEAR 08/02/2022 03:07 PM    Specific gravity 1.024 08/02/2022 03:07 PM    Specific gravity 1.020 08/10/2021 03:08 PM    pH (UA) 6.0 08/02/2022 03:07 PM    Protein 100 (A) 08/02/2022 03:07 PM    Glucose Negative 08/02/2022 03:07 PM    Ketone >80 (A) 08/02/2022 03:07 PM Bilirubin Negative 08/02/2022 03:07 PM    Urobilinogen 1.0 08/02/2022 03:07 PM    Nitrites Positive (A) 08/02/2022 03:07 PM    Leukocyte Esterase Negative 08/02/2022 03:07 PM    Epithelial cells FEW 08/02/2022 03:07 PM    Bacteria 2+ (A) 08/02/2022 03:07 PM    WBC 0-4 08/02/2022 03:07 PM    RBC 0-5 08/02/2022 03:07 PM         Medications Reviewed:     Current Facility-Administered Medications   Medication Dose Route Frequency    cefTRIAXone (ROCEPHIN) 1 g in 0.9% sodium chloride 10 mL IV syringe  1 g IntraVENous Q24H    baclofen (LIORESAL) tablet 10 mg  10 mg Oral TID    traMADoL (ULTRAM) tablet 50 mg  50 mg Oral Q6H PRN    atorvastatin (LIPITOR) tablet 10 mg  10 mg Oral QHS    lisinopriL (PRINIVIL, ZESTRIL) tablet 10 mg  10 mg Oral DAILY    HYDROcodone-acetaminophen (NORCO) 5-325 mg per tablet 2 Tablet  2 Tablet Oral Q4H PRN    gabapentin (NEURONTIN) capsule 300 mg  300 mg Oral TID    sodium chloride (NS) flush 5-40 mL  5-40 mL IntraVENous Q8H    sodium chloride (NS) flush 5-40 mL  5-40 mL IntraVENous PRN    acetaminophen (TYLENOL) tablet 650 mg  650 mg Oral Q6H PRN    Or    acetaminophen (TYLENOL) suppository 650 mg  650 mg Rectal Q6H PRN    polyethylene glycol (MIRALAX) packet 17 g  17 g Oral DAILY PRN    ondansetron (ZOFRAN ODT) tablet 4 mg  4 mg Oral Q8H PRN    Or    ondansetron (ZOFRAN) injection 4 mg  4 mg IntraVENous Q6H PRN    enoxaparin (LOVENOX) injection 40 mg  40 mg SubCUTAneous DAILY    calcium acetate (phosphate binder) (PHOSLO) tablet 667 mg  1 Tablet Oral DAILY WITH BREAKFAST    celecoxib (CELEBREX) capsule 200 mg  200 mg Oral DAILY    hydroCHLOROthiazide (HYDRODIURIL) tablet 12.5 mg  12.5 mg Oral DAILY    carBAMazepine (TEGretol) tablet 200 mg  200 mg Oral BID    phenytoin ER (DILANTIN ER) ER capsule 100 mg  100 mg Oral TID     ______________________________________________________________________  EXPECTED LENGTH OF STAY: - - -  ACTUAL LENGTH OF STAY:          1                 Jensen Mccann MD

## 2022-08-03 NOTE — PROGRESS NOTES
Transition of Care: TBD; possibly home; possibly home with home health; possibly rehab; pending recommendations from PT/OT and medical progress (patient normally lives in a one level apartment alone)     Transport Plan: possibly BLS (not set up yet) vs possibly in car with family/friend    RUR: 11%    DX: sacral pain    Main contacts are sister- Leo Butler- [phone] or friend Vandana Caba [phone]    Discharge pending:  -pending CT scan (to be done today 8/3)  -pending PT/OT consults and recommendations  -patient continues on IV antibiotics  -pending ortho final recommendations    1315: this CM met with patient at bedside; she is alert and oriented x 3; patient states she lives alone at stated address; it is at [address] at [facility] apartments; there are no steps to enter; it is one level; patient uses and has a wheelchair, walker, cane, power wheelchair; grab bars in shower, shower bench, has a raised toilet seat but is looking to replace it with another one with arms to hold onto when she stands up; patient states she is not currently driving but has plans to drive again soon; her neighbor, Kumar Ugarte drives her places if needed and she also uses [facility] [employer] services; she does not have a car at this time; patient confirms her pcp and insurance- she states she has a  [facility]; patient is fairly independent in her ADLs and uses her equipment to maneuver around her apartment; patient lists her sister- DELTA Haji and friend COLBY Villegas as her emergency contacts; she is unsure of who her primary decision maker would be at this time; patient states she has been to [address] [place] [address] in 2016 and has had home health services in the past but cannot recall name of home health company    Reason for Admission:  sacral pain                     RUR Score:     11%                Plan for utilizing home health:    TBD; will possibly need HH/PT/OT; pending final recommendations PCP: First and Last name:  Leona Rivero MD     Name of Practice:    Are you a current patient: Yes/No: yes   Approximate date of last visit: July 2022   Can you participate in a virtual visit with your PCP: yes                    Current Advanced Directive/Advance Care Plan: Full Code      Healthcare Decision Maker:  patient states she is unsure at this time  Click here to complete [address] including selection of the Healthcare Decision Maker Relationship (ie \"Primary\")                             Transition of Care Plan:      TBD; possibly home vs home with home health vs rehab       Care Management Interventions  PCP Verified by CM: Yes Av Albarran MD)  Last Visit to PCP: 07/11/22  Mode of Transport at Discharge: Other (see comment) (TBD)  Physical Therapy Consult: Yes  Occupational Therapy Consult: Yes  Support Systems: Other Family Member(s), Friend/Neighbor  Discharge Location  Patient Expects to be Discharged to[de-identified] Other: (TBD; home vs rehab)     CM following  Sophia Wilkinson RN, [address]              Medicare pt has received, reviewed,1st IM letter informing them of their right to appeal the discharge. Signed copy has been placed on pt bedside chart.

## 2022-08-03 NOTE — PROGRESS NOTES
Occupational Therapy    Order's acknowledged, chart reviewed in prep for skilled OT treatment; however, pt JORGE for CT scan. Will hold and follow up later as able and appropriate.     Thank you,  Gracia Begum, OT

## 2022-08-03 NOTE — PROGRESS NOTES
[place] Adult  Hospitalist Group                                                                                          Hospitalist Progress Note  Farzad Mayers MD  Answering service: [phone] OR [phone] from in house phone        Date of Service:  8/3/2022  NAME:  Karolina Bryan  :  1955  MRN:  [id number]      Admission Summary:   Karolina Bryan is a 78 y.o. female with history of lumbar stenosis, chronic back pain, HTN, seizure disorder, recent right sacral fracture who presents with worsening pain and limited mobility in the setting of R sacrum fracture and R inferior pubic ramus fracture. She was evaluated by orthopedic surgery, who recommended WBAT. PT/OT were consulted. Patient also endorsed new dysuria and dark colored urine, found to have UTI and started on abx therapy. Interval history / Subjective: This morning, patient reports continued RLE pain and weakness. Feels that her weakness is worse than last week. Does have some abdominal pain, but no nausea or vomiting. Low appetite but tolerating oral intake. Has not had a BM. No fevers, chills, chest pain, SOB, nausea, vomiting, diarrhea, constipation, LE swelling, confusion. Endorses back pain, R buttock pain, RLE weakness, abdominal pain, dysuria. Assessment & Plan:     #Sacral fracture, right, POA  #Inferior pubic ramus fracture, right, POA  #Limited mobility  #Mechanical Fall  - Presented to ED approx 4 days ago, diagnosed with sacral frcture and pubic ramus fracture. Discharged to home with pain medication per pt request  - Since then, has had 1 fall and worsening mobility. Difficulty with IADLs.   - Limited mobility of RLE, 1/5 strength at hip, which pt states is chronic for her  - Given worsening of RLE weakness and increased pain with fall since previous imaging, will obtain repeat imaging  Plan:  - CT Pelvis and Lumbar Spine to eval for worsening RLE weakness and worsening pain since fall 4 days ago   - PT/OT eval and treat  - WBAT per ortho recommendations   - Norco 10mg q4h PRN severe pain  - Tramodol 50mg q6h prn moderate pain  - Dispo pending PT eval   - Ortho consulted, plan for OP F/U in 2-3 weeks pending clinical improvement     #Acute cystitis, POA  #Abdominal pain, nPOA  - UA with moderate blood and nitrites  - Pt endorses dysuria and dark colored urine  - Febrile to 100.6 overnight, no leukocytosis on labs this morning  Plan:  - CT Abdomen WWO to eval for pyelo given significant right sided flank/back pain  - Rocephin 1g q24h   - F/U urine culture     #Lumbar stenosis  #Chronic pain  - PT/OT eval and treat  - Consider OP referral to primary spine provider  - Continue home baclofen 10mg daily; gabapentin 300mg TID     #HTN  - Continue home lisinopril and HCTZ     #Seizure disorder  - Continue home carbamezapine and home phenytoin           Code status: FULL CODE  Prophylaxis: Lovenox  Care Plan discussed with: patient  Anticipated Disposition: 24 hours     Hospital Problems  Date Reviewed: 7/11/2022            Codes Class Noted POA    Sacral pain ICD-10-CM: M53.3  ICD-9-CM: 724.6  8/2/2022 Unknown             Review of Systems:   A comprehensive review of systems was negative except for that written in the HPI. Vital Signs:    Last 24hrs VS reviewed since prior progress note. Most recent are:  Visit Vitals  BP (!) 144/73   Pulse 76   Temp 99.8 °F (37.7 °C)   Resp 18   SpO2 94%         Intake/Output Summary (Last 24 hours) at 8/3/2022 4884  Last data filed at 8/3/2022 4676  Gross per 24 hour   Intake --   Output 400 ml   Net -400 ml        Physical Examination:     I had a face to face encounter with this patient and independently examined them on 8/3/2022 as outlined below:          Constitutional:  No acute distress, cooperative, pleasant    ENT:  Oral mucosa moist, oropharynx benign. Resp:  CTA bilaterally. No wheezing/rhonchi/rales. No accessory muscle use.     CV:  Regular rhythm, normal rate, no murmurs, gallops, rubs    GI:  Soft, non distended, normoactive bowel sounds, no hepatosplenomegaly. Mildly tender to the RLQ and epigastric area. MSK:  No edema, warm, 2+ pulses throughout. 1/5 strength of RLE. 4/5 strength of LLE. 1+ DTRs of bilateral LEs, symmetrical. Normal babinski. Neurologic:  AAOx3, CN II-XII reviewed            Data Review:    Review and/or order of clinical lab test  Review and/or order of tests in the radiology section of [facility]  Review and/or order of tests in the medicine section of [facility]      Labs:     Recent Labs     08/03/22 0343 08/02/22  1507   WBC 8.3 9.0   HGB 11.2* 12.1   HCT 32.9* 35.0    239     Recent Labs     08/03/22 0343 08/02/22 2041    141   K 3.4* 3.4*    105   CO2 26 28   BUN 10 11   CREA 0.47* 0.52*   GLU 86 109*   CA 9.4 9.7     Recent Labs     08/02/22 2041   ALT 51   AP 83   TBILI 0.4   TP 6.7   ALB 2.0*   GLOB 4.7*     No results for input(s): INR, PTP, APTT, INREXT, INREXT in the last 72 hours. No results for input(s): FE, TIBC, PSAT, FERR in the last 72 hours. No results found for: FOL, RBCF   No results for input(s): PH, PCO2, PO2 in the last 72 hours. No results for input(s): CPK, CKNDX, TROIQ in the last 72 hours.     No lab exists for component: CPKMB  Lab Results   Component Value Date/Time    Cholesterol, total 295 (H) 05/05/2022 02:20 PM    HDL Cholesterol 106 05/05/2022 02:20 PM    LDL, calculated 179 (H) 05/05/2022 02:20 PM    Triglyceride 50 05/05/2022 02:20 PM    CHOL/HDL Ratio 2.8 05/05/2022 02:20 PM     No results found for: [facility]  Lab Results   Component Value Date/Time    Color YELLOW/STRAW 08/02/2022 03:07 PM    Appearance CLEAR 08/02/2022 03:07 PM    Specific gravity 1.024 08/02/2022 03:07 PM    Specific gravity 1.020 08/10/2021 03:08 PM    pH (UA) 6.0 08/02/2022 03:07 PM    Protein 100 (A) 08/02/2022 03:07 PM    Glucose Negative 08/02/2022 03:07 PM    Ketone >80 (A) 08/02/2022 03:07 PM Bilirubin Negative 08/02/2022 03:07 PM    Urobilinogen 1.0 08/02/2022 03:07 PM    Nitrites Positive (A) 08/02/2022 03:07 PM    Leukocyte Esterase Negative 08/02/2022 03:07 PM    Epithelial cells FEW 08/02/2022 03:07 PM    Bacteria 2+ (A) 08/02/2022 03:07 PM    WBC 0-4 08/02/2022 03:07 PM    RBC 0-5 08/02/2022 03:07 PM         Medications Reviewed:     Current Facility-Administered Medications   Medication Dose Route Frequency    cefTRIAXone (ROCEPHIN) 1 g in 0.9% sodium chloride 10 mL IV syringe  1 g IntraVENous Q24H    baclofen (LIORESAL) tablet 10 mg  10 mg Oral TID    traMADoL (ULTRAM) tablet 50 mg  50 mg Oral Q6H PRN    atorvastatin (LIPITOR) tablet 10 mg  10 mg Oral QHS    lisinopriL (PRINIVIL, ZESTRIL) tablet 10 mg  10 mg Oral DAILY    HYDROcodone-acetaminophen (NORCO) 5-325 mg per tablet 2 Tablet  2 Tablet Oral Q4H PRN    gabapentin (NEURONTIN) capsule 300 mg  300 mg Oral TID    sodium chloride (NS) flush 5-40 mL  5-40 mL IntraVENous Q8H    sodium chloride (NS) flush 5-40 mL  5-40 mL IntraVENous PRN    acetaminophen (TYLENOL) tablet 650 mg  650 mg Oral Q6H PRN    Or    acetaminophen (TYLENOL) suppository 650 mg  650 mg Rectal Q6H PRN    polyethylene glycol (MIRALAX) packet 17 g  17 g Oral DAILY PRN    ondansetron (ZOFRAN ODT) tablet 4 mg  4 mg Oral Q8H PRN    Or    ondansetron (ZOFRAN) injection 4 mg  4 mg IntraVENous Q6H PRN    enoxaparin (LOVENOX) injection 40 mg  40 mg SubCUTAneous DAILY    calcium acetate (phosphate binder) (PHOSLO) tablet 667 mg  1 Tablet Oral DAILY WITH BREAKFAST    celecoxib (CELEBREX) capsule 200 mg  200 mg Oral DAILY    hydroCHLOROthiazide (HYDRODIURIL) tablet 12.5 mg  12.5 mg Oral DAILY    carBAMazepine (TEGretol) tablet 200 mg  200 mg Oral BID    phenytoin ER (DILANTIN ER) ER capsule 100 mg  100 mg Oral TID     ______________________________________________________________________  EXPECTED LENGTH OF STAY: - - -  ACTUAL LENGTH OF STAY:          1                 Lan Cunha MD

## 2022-08-03 NOTE — PROGRESS NOTES
Transition of Care: TBD; possibly home; possibly home with home health; possibly rehab; pending recommendations from PT/OT and medical progress (patient normally lives in a one level apartment alone)     Transport Plan: possibly BLS (not set up yet) vs possibly in car with family/friend    RUR: 11%    DX: sacral pain    Main contacts are sister- Saray Negron- [phone] or friend Macie Higginbotham [phone]    Discharge pending:  -pending CT scan (to be done today 8/3)  -pending PT/OT consults and recommendations  -patient continues on IV antibiotics  -pending ortho final recommendations    1315: this CM met with patient at bedside; she is alert and oriented x 3; patient states she lives alone at stated address; it is at [address] at [facility] apartments; there are no steps to enter; it is one level; patient uses and has a wheelchair, walker, cane, power wheelchair; grab bars in shower, shower bench, has a raised toilet seat but is looking to replace it with another one with arms to hold onto when she stands up; patient states she is not currently driving but has plans to drive again soon; her neighbor, Payton Duvall drives her places if needed and she also uses [facility] [employer] services; she does not have a car at this time; patient confirms her pcp and insurance- she states she has a  [facility]; patient is fairly independent in her ADLs and uses her equipment to maneuver around her apartment; patient lists her sister- DELTA Gordon and friend COLBY Villegas as her emergency contacts; she is unsure of who her primary decision maker would be at this time; patient states she has been to [address] [place] [address] in 2016 and has had home health services in the past but cannot recall name of home health company    Reason for Admission:  sacral pain                     RUR Score:     11%                Plan for utilizing home health:    TBD; will possibly need HH/PT/OT; pending final recommendations PCP: First and Last name:  Cata Gonzalez MD     Name of Practice:    Are you a current patient: Yes/No: yes   Approximate date of last visit: July 2022   Can you participate in a virtual visit with your PCP: yes                    Current Advanced Directive/Advance Care Plan: Full Code      Healthcare Decision Maker:  patient states she is unsure at this time  Click here to complete [address] including selection of the Healthcare Decision Maker Relationship (ie \"Primary\")                             Transition of Care Plan:      TBD; possibly home vs home with home health vs rehab       Care Management Interventions  PCP Verified by CM: Yes Louvella Epley, MD)  Last Visit to PCP: 07/11/22  Mode of Transport at Discharge: Other (see comment) (TBD)  Physical Therapy Consult: Yes  Occupational Therapy Consult: Yes  Support Systems: Other Family Member(s), Friend/Neighbor  Discharge Location  Patient Expects to be Discharged to[de-identified] Other: (TBD; home vs rehab)     CM following  Suzette Cerda RN, [address]              Medicare pt has received, reviewed,1st IM letter informing them of their right to appeal the discharge. Signed copy has been placed on pt bedside chart.

## 2022-08-04 LAB
ANION GAP SERPL CALC-SCNC: 5 MMOL/L (ref 5–15)
BASOPHILS # BLD: 0 K/UL (ref 0–0.1)
BASOPHILS NFR BLD: 1 % (ref 0–1)
BUN SERPL-MCNC: 10 MG/DL (ref 6–20)
BUN/CREAT SERPL: 17 (ref 12–20)
CALCIUM SERPL-MCNC: 9.2 MG/DL (ref 8.5–10.1)
CHLORIDE SERPL-SCNC: 104 MMOL/L (ref 97–108)
CO2 SERPL-SCNC: 31 MMOL/L (ref 21–32)
CREAT SERPL-MCNC: 0.59 MG/DL (ref 0.55–1.02)
DIFFERENTIAL METHOD BLD: ABNORMAL
EOSINOPHIL # BLD: 0.3 K/UL (ref 0–0.4)
EOSINOPHIL NFR BLD: 5 % (ref 0–7)
ERYTHROCYTE [DISTWIDTH] IN BLOOD BY AUTOMATED COUNT: 12.9 % (ref 11.5–14.5)
GLUCOSE SERPL-MCNC: 88 MG/DL (ref 65–100)
HCT VFR BLD AUTO: 33.2 % (ref 35–47)
HGB BLD-MCNC: 11.2 G/DL (ref 11.5–16)
IMM GRANULOCYTES # BLD AUTO: 0.1 K/UL (ref 0–0.04)
IMM GRANULOCYTES NFR BLD AUTO: 1 % (ref 0–0.5)
LYMPHOCYTES # BLD: 1.2 K/UL (ref 0.8–3.5)
LYMPHOCYTES NFR BLD: 19 % (ref 12–49)
MCH RBC QN AUTO: 33.9 PG (ref 26–34)
MCHC RBC AUTO-ENTMCNC: 33.7 G/DL (ref 30–36.5)
MCV RBC AUTO: 100.6 FL (ref 80–99)
MONOCYTES # BLD: 0.6 K/UL (ref 0–1)
MONOCYTES NFR BLD: 10 % (ref 5–13)
NEUTS SEG # BLD: 4 K/UL (ref 1.8–8)
NEUTS SEG NFR BLD: 64 % (ref 32–75)
NRBC # BLD: 0 K/UL (ref 0–0.01)
NRBC BLD-RTO: 0 PER 100 WBC
PLATELET # BLD AUTO: 298 K/UL (ref 150–400)
PMV BLD AUTO: 9.5 FL (ref 8.9–12.9)
POTASSIUM SERPL-SCNC: 3.8 MMOL/L (ref 3.5–5.1)
RBC # BLD AUTO: 3.3 M/UL (ref 3.8–5.2)
SODIUM SERPL-SCNC: 140 MMOL/L (ref 136–145)
WBC # BLD AUTO: 6.1 K/UL (ref 3.6–11)

## 2022-08-04 PROCEDURE — 77030038269 HC DRN EXT URIN PURWCK BARD -A

## 2022-08-04 PROCEDURE — 74011250637 HC RX REV CODE- 250/637: Performed by: INTERNAL MEDICINE

## 2022-08-04 PROCEDURE — 36415 COLL VENOUS BLD VENIPUNCTURE: CPT

## 2022-08-04 PROCEDURE — 80048 BASIC METABOLIC PNL TOTAL CA: CPT

## 2022-08-04 PROCEDURE — 97165 OT EVAL LOW COMPLEX 30 MIN: CPT

## 2022-08-04 PROCEDURE — 65270000032 HC RM SEMIPRIVATE

## 2022-08-04 PROCEDURE — 97530 THERAPEUTIC ACTIVITIES: CPT

## 2022-08-04 PROCEDURE — 74011250637 HC RX REV CODE- 250/637: Performed by: STUDENT IN AN ORGANIZED HEALTH CARE EDUCATION/TRAINING PROGRAM

## 2022-08-04 PROCEDURE — 97161 PT EVAL LOW COMPLEX 20 MIN: CPT

## 2022-08-04 PROCEDURE — 74011250636 HC RX REV CODE- 250/636: Performed by: STUDENT IN AN ORGANIZED HEALTH CARE EDUCATION/TRAINING PROGRAM

## 2022-08-04 PROCEDURE — 97535 SELF CARE MNGMENT TRAINING: CPT

## 2022-08-04 PROCEDURE — 85025 COMPLETE CBC W/AUTO DIFF WBC: CPT

## 2022-08-04 PROCEDURE — 74011000250 HC RX REV CODE- 250: Performed by: STUDENT IN AN ORGANIZED HEALTH CARE EDUCATION/TRAINING PROGRAM

## 2022-08-04 RX ORDER — DICLOFENAC SODIUM 10 MG/G
2 GEL TOPICAL 4 TIMES DAILY
Status: DISCONTINUED | OUTPATIENT
Start: 2022-08-04 | End: 2022-08-11 | Stop reason: HOSPADM

## 2022-08-04 RX ADMIN — DICLOFENAC SODIUM 2 G: 10 GEL TOPICAL at 21:46

## 2022-08-04 RX ADMIN — CELECOXIB 200 MG: 200 CAPSULE ORAL at 08:49

## 2022-08-04 RX ADMIN — SODIUM CHLORIDE, PRESERVATIVE FREE 10 ML: 5 INJECTION INTRAVENOUS at 14:00

## 2022-08-04 RX ADMIN — HYDROCODONE BITARTRATE AND ACETAMINOPHEN 2 TABLET: 5; 325 TABLET ORAL at 03:47

## 2022-08-04 RX ADMIN — PHENYTOIN SODIUM 100 MG: 100 CAPSULE ORAL at 08:50

## 2022-08-04 RX ADMIN — CARBAMAZEPINE 200 MG: 200 TABLET ORAL at 08:50

## 2022-08-04 RX ADMIN — HYDROCODONE BITARTRATE AND ACETAMINOPHEN 2 TABLET: 5; 325 TABLET ORAL at 08:54

## 2022-08-04 RX ADMIN — DICLOFENAC SODIUM 2 G: 10 GEL TOPICAL at 17:22

## 2022-08-04 RX ADMIN — ENOXAPARIN SODIUM 40 MG: 100 INJECTION SUBCUTANEOUS at 08:50

## 2022-08-04 RX ADMIN — CARBAMAZEPINE 200 MG: 200 TABLET ORAL at 21:46

## 2022-08-04 RX ADMIN — BACLOFEN 10 MG: 10 TABLET ORAL at 08:49

## 2022-08-04 RX ADMIN — GABAPENTIN 300 MG: 300 CAPSULE ORAL at 17:20

## 2022-08-04 RX ADMIN — SODIUM CHLORIDE, PRESERVATIVE FREE 1 G: 5 INJECTION INTRAVENOUS at 07:23

## 2022-08-04 RX ADMIN — BACLOFEN 10 MG: 10 TABLET ORAL at 21:46

## 2022-08-04 RX ADMIN — HYDROCHLOROTHIAZIDE 12.5 MG: 25 TABLET ORAL at 08:50

## 2022-08-04 RX ADMIN — PHENYTOIN SODIUM 100 MG: 100 CAPSULE ORAL at 17:19

## 2022-08-04 RX ADMIN — LISINOPRIL 10 MG: 10 TABLET ORAL at 08:50

## 2022-08-04 RX ADMIN — ATORVASTATIN CALCIUM 10 MG: 10 TABLET, FILM COATED ORAL at 21:46

## 2022-08-04 RX ADMIN — DICLOFENAC SODIUM 2 G: 10 GEL TOPICAL at 08:51

## 2022-08-04 RX ADMIN — GABAPENTIN 300 MG: 300 CAPSULE ORAL at 21:46

## 2022-08-04 RX ADMIN — PHENYTOIN SODIUM 100 MG: 100 CAPSULE ORAL at 21:46

## 2022-08-04 RX ADMIN — GABAPENTIN 300 MG: 300 CAPSULE ORAL at 08:50

## 2022-08-04 RX ADMIN — BACLOFEN 10 MG: 10 TABLET ORAL at 17:20

## 2022-08-04 RX ADMIN — HYDROCODONE BITARTRATE AND ACETAMINOPHEN 2 TABLET: 5; 325 TABLET ORAL at 22:00

## 2022-08-04 RX ADMIN — DICLOFENAC SODIUM 2 G: 10 GEL TOPICAL at 13:00

## 2022-08-04 RX ADMIN — Medication 667 MG: at 08:49

## 2022-08-04 NOTE — PROGRESS NOTES
Bedside and Verbal shift change report given to Ben Messer (oncoming nurse) by Tevin Cunha (offgoing nurse). Report included the following information SBAR, Kardex, Procedure Summary, Intake/Output, MAR, and Recent Results.

## 2022-08-04 NOTE — PROGRESS NOTES
Spiritual Care Partner Volunteer visited patient in [address] [place] [address] on 8.4.22    Documented by Robby Vidal, Staff , on 8.4.22  Please call [phone]) to page  if needed
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
## 2022-08-04 NOTE — PROGRESS NOTES
[facility] Adult  Hospitalist Group                                                                                          Hospitalist Progress Note  Ria Ribera MD  Answering service: [phone] from in house phone        Date of Service:  2022  NAME:  Ingrid Allen  :  1955  MRN:  [id number]      Admission Summary:   Ingrid Allen is a 78 y.o. female with history of lumbar stenosis, chronic back pain, HTN, seizure disorder, recent right sacral fracture who presents with worsening pain and limited mobility in the setting of R sacrum fracture and R inferior pubic ramus fracture. She was evaluated by orthopedic surgery, who recommended WBAT. PT/OT were consulted. Patient also endorsed new dysuria and dark colored urine, found to have UTI and started on abx therapy. Interval history / Subjective:   NAD, no acute events over night  + back pain, unable to ambulate due to pain  + right hip pain  Will consult Spine surgeon  Pt might need rehab    No fevers, chills, chest pain, SOB, nausea, vomiting, diarrhea, constipation, LE swelling, confusion. Endorses back pain, R buttock pain, RLE weakness, abdominal pain, dysuria. Assessment & Plan:     Sacral fracture, right, POA  Inferior pubic ramus fracture, right, POA  Limited mobility  Mechanical Fall  - Presented to ED approx 4 days ago, diagnosed with sacral frcture and pubic ramus fracture. Discharged to home with pain medication per pt request  - had recurrent fall and worsening mobility. Difficulty with IADLs. - Limited mobility of RLE, 1/5 strength at hip, which pt states is chronic for her  - Given worsening of RLE weakness and increased pain with fall since previous imaging, will obtain repeat imaging  Plan:  - CT Pelvis and Lumbar Spine to eval for worsening RLE weakness and worsening pain: Multilevel spondylosis with high-grade spinal canal narrowing at L4-5 and L5-S1.  High-grade narrowing of the bilateral T11-T12, L1-L2, left L2-L3, L3-L4, right L4-5 and L5-S1 neural foramina  - PT/OT eval and treat  - WBAT per ortho recommendations   - Norco 10mg q4h PRN severe pain  - Tramodol 50mg q6h prn moderate pain  - Dispo pending PT eval   - Ortho consulted, plan for OP F/U in 2-3 weeks pending clinical improvement     Acute cystitis,   Abdominal pain, controlled  - UA with moderate blood and nitrites  - Pt endorses dysuria and dark colored urine  - fever resolved, no leukocytosis   Plan:  - CT Abdomen: negative  - Rocephin 1g q24h   - F/U urine culture:    STAPHYLOCOCCUS LUGDUNENSIS         Lumbar stenosis  Chronic pain  - PT/OT eval and treat  - Consider OP referral to primary spine provider  - Continue home baclofen 10mg daily; gabapentin 300mg TID  The patient is unable to ambulate with PT due to pain, will consult spine surgeon     HTN  - Continue home lisinopril and HCTZ     Seizure disorder  - Continue home carbamezapine and home phenytoin           Code status: FULL CODE  Prophylaxis: Lovenox  Care Plan discussed with: patient  Anticipated Disposition: D       Hospital Problems  Date Reviewed: 7/11/2022            Codes Class Noted POA    Sacral pain ICD-10-CM: M53.3  ICD-9-CM: 724.6  8/2/2022 Unknown           Review of Systems:   A comprehensive review of systems was negative except for that written in the HPI. Vital Signs:    Last 24hrs VS reviewed since prior progress note. Most recent are:  Visit Vitals  /83 (BP 1 Location: Right upper arm, BP Patient Position: Supine; At rest)   Pulse 89   Temp 98.4 °F (36.9 °C)   Resp 18   SpO2 96%         Intake/Output Summary (Last 24 hours) at 8/4/2022 1523  Last data filed at 8/3/2022 1907  Gross per 24 hour   Intake --   Output 1000 ml   Net -1000 ml          Physical Examination:     I had a face to face encounter with this patient and independently examined them on 8/4/2022 as outlined below:          Constitutional:  No acute distress, cooperative, pleasant    ENT:  Oral mucosa moist, oropharynx benign. Resp:  CTA bilaterally. No wheezing/rhonchi/rales. No accessory muscle use. CV:  Regular rhythm, normal rate, no murmurs, gallops, rubs    GI:  Soft, non distended, normoactive bowel sounds, no hepatosplenomegaly. Mildly tender to the RLQ and epigastric area. MSK:  No edema, warm, 2+ pulses throughout. 1/5 strength of RLE. 4/5 strength of LLE. 1+ DTRs of bilateral LEs, symmetrical. Normal babinski. Neurologic:  AAOx3, CN II-XII reviewed, unable to move RLE since 2015  + back pain with minimal movement            Data Review:    Review and/or order of clinical lab test  Review and/or order of tests in the radiology section of CPT  Review and/or order of tests in the medicine section of CPT      Labs:     Recent Labs     08/04/22 0329 08/03/22 0343   WBC 6.1 8.3   HGB 11.2* 11.2*   HCT 33.2* 32.9*    234       Recent Labs     08/04/22 0329 08/03/22 0343 08/02/22 2041    140 141   K 3.8 3.4* 3.4*    105 105   CO2 31 26 28   BUN 10 10 11   CREA 0.59 0.47* 0.52*   GLU 88 86 109*   CA 9.2 9.4 9.7       Recent Labs     08/02/22 2041   ALT 51   AP 83   TBILI 0.4   TP 6.7   ALB 2.0*   GLOB 4.7*       No results for input(s): INR, PTP, APTT, INREXT, INREXT in the last 72 hours. No results for input(s): FE, TIBC, PSAT, FERR in the last 72 hours. No results found for: FOL, RBCF   No results for input(s): PH, PCO2, PO2 in the last 72 hours. No results for input(s): CPK, CKNDX, TROIQ in the last 72 hours.     No lab exists for component: CPKMB  Lab Results   Component Value Date/Time    Cholesterol, total 295 (H) 05/05/2022 02:20 PM    HDL Cholesterol 106 05/05/2022 02:20 PM    LDL, calculated 179 (H) 05/05/2022 02:20 PM    Triglyceride 50 05/05/2022 02:20 PM    CHOL/HDL Ratio 2.8 05/05/2022 02:20 PM     No results found for: [facility]  Lab Results   Component Value Date/Time    Color YELLOW/STRAW 08/02/2022 03:07 PM Appearance CLEAR 08/02/2022 03:07 PM    Specific gravity 1.024 08/02/2022 03:07 PM    Specific gravity 1.020 08/10/2021 03:08 PM    pH (UA) 6.0 08/02/2022 03:07 PM    Protein 100 (A) 08/02/2022 03:07 PM    Glucose Negative 08/02/2022 03:07 PM    Ketone >80 (A) 08/02/2022 03:07 PM    Bilirubin Negative 08/02/2022 03:07 PM    Urobilinogen 1.0 08/02/2022 03:07 PM    Nitrites Positive (A) 08/02/2022 03:07 PM    Leukocyte Esterase Negative 08/02/2022 03:07 PM    Epithelial cells FEW 08/02/2022 03:07 PM    Bacteria 2+ (A) 08/02/2022 03:07 PM    WBC 0-4 08/02/2022 03:07 PM    RBC 0-5 08/02/2022 03:07 PM         Medications Reviewed:     Current Facility-Administered Medications   Medication Dose Route Frequency    diclofenac (VOLTAREN) 1 % topical gel 2 g  2 g Topical QID    cefTRIAXone (ROCEPHIN) 1 g in 0.9% sodium chloride 10 mL IV syringe  1 g IntraVENous Q24H    baclofen (LIORESAL) tablet 10 mg  10 mg Oral TID    traMADoL (ULTRAM) tablet 50 mg  50 mg Oral Q6H PRN    atorvastatin (LIPITOR) tablet 10 mg  10 mg Oral QHS    lisinopriL (PRINIVIL, ZESTRIL) tablet 10 mg  10 mg Oral DAILY    HYDROcodone-acetaminophen (NORCO) 5-325 mg per tablet 2 Tablet  2 Tablet Oral Q4H PRN    gabapentin (NEURONTIN) capsule 300 mg  300 mg Oral TID    sodium chloride (NS) flush 5-40 mL  5-40 mL IntraVENous Q8H    sodium chloride (NS) flush 5-40 mL  5-40 mL IntraVENous PRN    acetaminophen (TYLENOL) tablet 650 mg  650 mg Oral Q6H PRN    Or    acetaminophen (TYLENOL) suppository 650 mg  650 mg Rectal Q6H PRN    polyethylene glycol (MIRALAX) packet 17 g  17 g Oral DAILY PRN    ondansetron (ZOFRAN ODT) tablet 4 mg  4 mg Oral Q8H PRN    Or    ondansetron (ZOFRAN) injection 4 mg  4 mg IntraVENous Q6H PRN    enoxaparin (LOVENOX) injection 40 mg  40 mg SubCUTAneous DAILY    calcium acetate (phosphate binder) (PHOSLO) tablet 667 mg  1 Tablet Oral DAILY WITH BREAKFAST    celecoxib (CELEBREX) capsule 200 mg  200 mg Oral DAILY    hydroCHLOROthiazide (HYDRODIURIL) tablet 12.5 mg  12.5 mg Oral DAILY    carBAMazepine (TEGretol) tablet 200 mg  200 mg Oral BID    phenytoin ER (DILANTIN ER) ER capsule 100 mg  100 mg Oral TID     ______________________________________________________________________  EXPECTED LENGTH OF STAY: 2d 21h  ACTUAL LENGTH OF STAY:          2                 Herve Renteria MD

## 2022-08-04 NOTE — PROGRESS NOTES
Spiritual Care Partner Volunteer visited patient in [address] [place] [address] on 8.4.22    Documented by Luis Enrique Forrest, Staff , on 8.4.22  Please call [phone] to page  if needed
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
## 2022-08-04 NOTE — PROGRESS NOTES
0840: RN was worried about this patient being on her back and not being turned. RN attempted to turn patient, but the patient stated that she was in a lot of pain and discomfort she could not stay on the side position with the pillow under her back. Bedside and Verbal shift change report given to [employer] (oncoming nurse) by Nathan Thayer (offgoing nurse). Report included the following information SBAR, Kardex, Procedure Summary, Intake/Output, MAR, and Recent Results.
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
## 2022-08-04 NOTE — PROGRESS NOTES
Bedside and Verbal shift change report given to Ben Messer (oncoming nurse) by Nette Torres (offgoing nurse). Report included the following information SBAR, Kardex, Procedure Summary, Intake/Output, MAR, and Recent Results.

## 2022-08-04 NOTE — PROGRESS NOTES
[facility] Adult  Hospitalist Group                                                                                          Hospitalist Progress Note  Bhavya Merritt MD  Answering service: [phone] from in house phone        Date of Service:  2022  NAME:  Fortunato Hidalgo  :  1955  MRN:  [id number]      Admission Summary:   Fortunato Hidalgo is a 78 y.o. female with history of lumbar stenosis, chronic back pain, HTN, seizure disorder, recent right sacral fracture who presents with worsening pain and limited mobility in the setting of R sacrum fracture and R inferior pubic ramus fracture. She was evaluated by orthopedic surgery, who recommended WBAT. PT/OT were consulted. Patient also endorsed new dysuria and dark colored urine, found to have UTI and started on abx therapy. Interval history / Subjective:   NAD, no acute events over night  + back pain, unable to ambulate due to pain  + right hip pain  Will consult Spine surgeon  Pt might need rehab    No fevers, chills, chest pain, SOB, nausea, vomiting, diarrhea, constipation, LE swelling, confusion. Endorses back pain, R buttock pain, RLE weakness, abdominal pain, dysuria. Assessment & Plan:     Sacral fracture, right, POA  Inferior pubic ramus fracture, right, POA  Limited mobility  Mechanical Fall  - Presented to ED approx 4 days ago, diagnosed with sacral frcture and pubic ramus fracture. Discharged to home with pain medication per pt request  - had recurrent fall and worsening mobility. Difficulty with IADLs. - Limited mobility of RLE, 1/5 strength at hip, which pt states is chronic for her  - Given worsening of RLE weakness and increased pain with fall since previous imaging, will obtain repeat imaging  Plan:  - CT Pelvis and Lumbar Spine to eval for worsening RLE weakness and worsening pain: Multilevel spondylosis with high-grade spinal canal narrowing at L4-5 and L5-S1.  High-grade narrowing of the bilateral T11-T12, L1-L2, left L2-L3, L3-L4, right L4-5 and L5-S1 neural foramina  - PT/OT eval and treat  - WBAT per ortho recommendations   - Norco 10mg q4h PRN severe pain  - Tramodol 50mg q6h prn moderate pain  - Dispo pending PT eval   - Ortho consulted, plan for OP F/U in 2-3 weeks pending clinical improvement     Acute cystitis,   Abdominal pain, controlled  - UA with moderate blood and nitrites  - Pt endorses dysuria and dark colored urine  - fever resolved, no leukocytosis   Plan:  - CT Abdomen: negative  - Rocephin 1g q24h   - F/U urine culture:    STAPHYLOCOCCUS LUGDUNENSIS         Lumbar stenosis  Chronic pain  - PT/OT eval and treat  - Consider OP referral to primary spine provider  - Continue home baclofen 10mg daily; gabapentin 300mg TID  The patient is unable to ambulate with PT due to pain, will consult spine surgeon     HTN  - Continue home lisinopril and HCTZ     Seizure disorder  - Continue home carbamezapine and home phenytoin           Code status: FULL CODE  Prophylaxis: Lovenox  Care Plan discussed with: patient  Anticipated Disposition: D       Hospital Problems  Date Reviewed: 7/11/2022            Codes Class Noted POA    Sacral pain ICD-10-CM: M53.3  ICD-9-CM: 724.6  8/2/2022 Unknown           Review of Systems:   A comprehensive review of systems was negative except for that written in the HPI. Vital Signs:    Last 24hrs VS reviewed since prior progress note. Most recent are:  Visit Vitals  /83 (BP 1 Location: Right upper arm, BP Patient Position: Supine; At rest)   Pulse 89   Temp 98.4 °F (36.9 °C)   Resp 18   SpO2 96%         Intake/Output Summary (Last 24 hours) at 8/4/2022 1523  Last data filed at 8/3/2022 1907  Gross per 24 hour   Intake --   Output 1000 ml   Net -1000 ml          Physical Examination:     I had a face to face encounter with this patient and independently examined them on 8/4/2022 as outlined below:          Constitutional:  No acute distress, cooperative, pleasant    ENT:  Oral mucosa moist, oropharynx benign. Resp:  CTA bilaterally. No wheezing/rhonchi/rales. No accessory muscle use. CV:  Regular rhythm, normal rate, no murmurs, gallops, rubs    GI:  Soft, non distended, normoactive bowel sounds, no hepatosplenomegaly. Mildly tender to the RLQ and epigastric area. MSK:  No edema, warm, 2+ pulses throughout. 1/5 strength of RLE. 4/5 strength of LLE. 1+ DTRs of bilateral LEs, symmetrical. Normal babinski. Neurologic:  AAOx3, CN II-XII reviewed, unable to move RLE since 2015  + back pain with minimal movement            Data Review:    Review and/or order of clinical lab test  Review and/or order of tests in the radiology section of CPT  Review and/or order of tests in the medicine section of CPT      Labs:     Recent Labs     08/04/22 0329 08/03/22 0343   WBC 6.1 8.3   HGB 11.2* 11.2*   HCT 33.2* 32.9*    234       Recent Labs     08/04/22 0329 08/03/22 0343 08/02/22 2041    140 141   K 3.8 3.4* 3.4*    105 105   CO2 31 26 28   BUN 10 10 11   CREA 0.59 0.47* 0.52*   GLU 88 86 109*   CA 9.2 9.4 9.7       Recent Labs     08/02/22 2041   ALT 51   AP 83   TBILI 0.4   TP 6.7   ALB 2.0*   GLOB 4.7*       No results for input(s): INR, PTP, APTT, INREXT, INREXT in the last 72 hours. No results for input(s): FE, TIBC, PSAT, FERR in the last 72 hours. No results found for: FOL, RBCF   No results for input(s): PH, PCO2, PO2 in the last 72 hours. No results for input(s): CPK, CKNDX, TROIQ in the last 72 hours.     No lab exists for component: CPKMB  Lab Results   Component Value Date/Time    Cholesterol, total 295 (H) 05/05/2022 02:20 PM    HDL Cholesterol 106 05/05/2022 02:20 PM    LDL, calculated 179 (H) 05/05/2022 02:20 PM    Triglyceride 50 05/05/2022 02:20 PM    CHOL/HDL Ratio 2.8 05/05/2022 02:20 PM     No results found for: [facility]  Lab Results   Component Value Date/Time    Color YELLOW/STRAW 08/02/2022 03:07 PM Appearance CLEAR 08/02/2022 03:07 PM    Specific gravity 1.024 08/02/2022 03:07 PM    Specific gravity 1.020 08/10/2021 03:08 PM    pH (UA) 6.0 08/02/2022 03:07 PM    Protein 100 (A) 08/02/2022 03:07 PM    Glucose Negative 08/02/2022 03:07 PM    Ketone >80 (A) 08/02/2022 03:07 PM    Bilirubin Negative 08/02/2022 03:07 PM    Urobilinogen 1.0 08/02/2022 03:07 PM    Nitrites Positive (A) 08/02/2022 03:07 PM    Leukocyte Esterase Negative 08/02/2022 03:07 PM    Epithelial cells FEW 08/02/2022 03:07 PM    Bacteria 2+ (A) 08/02/2022 03:07 PM    WBC 0-4 08/02/2022 03:07 PM    RBC 0-5 08/02/2022 03:07 PM         Medications Reviewed:     Current Facility-Administered Medications   Medication Dose Route Frequency    diclofenac (VOLTAREN) 1 % topical gel 2 g  2 g Topical QID    cefTRIAXone (ROCEPHIN) 1 g in 0.9% sodium chloride 10 mL IV syringe  1 g IntraVENous Q24H    baclofen (LIORESAL) tablet 10 mg  10 mg Oral TID    traMADoL (ULTRAM) tablet 50 mg  50 mg Oral Q6H PRN    atorvastatin (LIPITOR) tablet 10 mg  10 mg Oral QHS    lisinopriL (PRINIVIL, ZESTRIL) tablet 10 mg  10 mg Oral DAILY    HYDROcodone-acetaminophen (NORCO) 5-325 mg per tablet 2 Tablet  2 Tablet Oral Q4H PRN    gabapentin (NEURONTIN) capsule 300 mg  300 mg Oral TID    sodium chloride (NS) flush 5-40 mL  5-40 mL IntraVENous Q8H    sodium chloride (NS) flush 5-40 mL  5-40 mL IntraVENous PRN    acetaminophen (TYLENOL) tablet 650 mg  650 mg Oral Q6H PRN    Or    acetaminophen (TYLENOL) suppository 650 mg  650 mg Rectal Q6H PRN    polyethylene glycol (MIRALAX) packet 17 g  17 g Oral DAILY PRN    ondansetron (ZOFRAN ODT) tablet 4 mg  4 mg Oral Q8H PRN    Or    ondansetron (ZOFRAN) injection 4 mg  4 mg IntraVENous Q6H PRN    enoxaparin (LOVENOX) injection 40 mg  40 mg SubCUTAneous DAILY    calcium acetate (phosphate binder) (PHOSLO) tablet 667 mg  1 Tablet Oral DAILY WITH BREAKFAST    celecoxib (CELEBREX) capsule 200 mg  200 mg Oral DAILY    hydroCHLOROthiazide (HYDRODIURIL) tablet 12.5 mg  12.5 mg Oral DAILY    carBAMazepine (TEGretol) tablet 200 mg  200 mg Oral BID    phenytoin ER (DILANTIN ER) ER capsule 100 mg  100 mg Oral TID     ______________________________________________________________________  EXPECTED LENGTH OF STAY: 2d 21h  ACTUAL LENGTH OF STAY:          2                 Quentin Ramires MD

## 2022-08-04 NOTE — PROGRESS NOTES
0840: RN was worried about this patient being on her back and not being turned. RN attempted to turn patient, but the patient stated that she was in a lot of pain and discomfort she could not stay on the side position with the pillow under her back. Bedside and Verbal shift change report given to [employer] (oncoming nurse) by Vivian Goldberg (offgoing nurse). Report included the following information SBAR, Kardex, Procedure Summary, Intake/Output, MAR, and Recent Results.
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
## 2022-08-05 LAB
ANION GAP SERPL CALC-SCNC: 6 MMOL/L (ref 5–15)
BACTERIA SPEC CULT: ABNORMAL
BASOPHILS # BLD: 0 K/UL (ref 0–0.1)
BASOPHILS NFR BLD: 0 % (ref 0–1)
BUN SERPL-MCNC: 10 MG/DL (ref 6–20)
BUN/CREAT SERPL: 18 (ref 12–20)
CALCIUM SERPL-MCNC: 9.4 MG/DL (ref 8.5–10.1)
CC UR VC: ABNORMAL
CHLORIDE SERPL-SCNC: 104 MMOL/L (ref 97–108)
CO2 SERPL-SCNC: 29 MMOL/L (ref 21–32)
CREAT SERPL-MCNC: 0.57 MG/DL (ref 0.55–1.02)
DIFFERENTIAL METHOD BLD: ABNORMAL
EOSINOPHIL # BLD: 0.3 K/UL (ref 0–0.4)
EOSINOPHIL NFR BLD: 6 % (ref 0–7)
ERYTHROCYTE [DISTWIDTH] IN BLOOD BY AUTOMATED COUNT: 13.1 % (ref 11.5–14.5)
GLUCOSE SERPL-MCNC: 84 MG/DL (ref 65–100)
HCT VFR BLD AUTO: 35.3 % (ref 35–47)
HGB BLD-MCNC: 11.8 G/DL (ref 11.5–16)
IMM GRANULOCYTES # BLD AUTO: 0.1 K/UL (ref 0–0.04)
IMM GRANULOCYTES NFR BLD AUTO: 1 % (ref 0–0.5)
LYMPHOCYTES # BLD: 1.3 K/UL (ref 0.8–3.5)
LYMPHOCYTES NFR BLD: 27 % (ref 12–49)
MCH RBC QN AUTO: 33.7 PG (ref 26–34)
MCHC RBC AUTO-ENTMCNC: 33.4 G/DL (ref 30–36.5)
MCV RBC AUTO: 100.9 FL (ref 80–99)
MONOCYTES # BLD: 0.4 K/UL (ref 0–1)
MONOCYTES NFR BLD: 9 % (ref 5–13)
NEUTS SEG # BLD: 2.8 K/UL (ref 1.8–8)
NEUTS SEG NFR BLD: 57 % (ref 32–75)
NRBC # BLD: 0 K/UL (ref 0–0.01)
NRBC BLD-RTO: 0 PER 100 WBC
PLATELET # BLD AUTO: 343 K/UL (ref 150–400)
PMV BLD AUTO: 9.4 FL (ref 8.9–12.9)
POTASSIUM SERPL-SCNC: 4 MMOL/L (ref 3.5–5.1)
RBC # BLD AUTO: 3.5 M/UL (ref 3.8–5.2)
SERVICE CMNT-IMP: ABNORMAL
SODIUM SERPL-SCNC: 139 MMOL/L (ref 136–145)
WBC # BLD AUTO: 4.9 K/UL (ref 3.6–11)

## 2022-08-05 PROCEDURE — 97530 THERAPEUTIC ACTIVITIES: CPT | Performed by: PHYSICAL THERAPIST

## 2022-08-05 PROCEDURE — 85025 COMPLETE CBC W/AUTO DIFF WBC: CPT

## 2022-08-05 PROCEDURE — 80048 BASIC METABOLIC PNL TOTAL CA: CPT

## 2022-08-05 PROCEDURE — 74011250637 HC RX REV CODE- 250/637: Performed by: STUDENT IN AN ORGANIZED HEALTH CARE EDUCATION/TRAINING PROGRAM

## 2022-08-05 PROCEDURE — 65270000029 HC RM PRIVATE

## 2022-08-05 PROCEDURE — 36415 COLL VENOUS BLD VENIPUNCTURE: CPT

## 2022-08-05 PROCEDURE — 74011250636 HC RX REV CODE- 250/636: Performed by: STUDENT IN AN ORGANIZED HEALTH CARE EDUCATION/TRAINING PROGRAM

## 2022-08-05 PROCEDURE — 97535 SELF CARE MNGMENT TRAINING: CPT

## 2022-08-05 PROCEDURE — 74011000250 HC RX REV CODE- 250: Performed by: STUDENT IN AN ORGANIZED HEALTH CARE EDUCATION/TRAINING PROGRAM

## 2022-08-05 RX ADMIN — ENOXAPARIN SODIUM 40 MG: 100 INJECTION SUBCUTANEOUS at 08:30

## 2022-08-05 RX ADMIN — CARBAMAZEPINE 200 MG: 200 TABLET ORAL at 08:30

## 2022-08-05 RX ADMIN — GABAPENTIN 300 MG: 300 CAPSULE ORAL at 21:10

## 2022-08-05 RX ADMIN — GABAPENTIN 300 MG: 300 CAPSULE ORAL at 08:30

## 2022-08-05 RX ADMIN — CARBAMAZEPINE 200 MG: 200 TABLET ORAL at 21:58

## 2022-08-05 RX ADMIN — PHENYTOIN SODIUM 100 MG: 100 CAPSULE ORAL at 21:57

## 2022-08-05 RX ADMIN — PHENYTOIN SODIUM 100 MG: 100 CAPSULE ORAL at 08:30

## 2022-08-05 RX ADMIN — DICLOFENAC SODIUM 2 G: 10 GEL TOPICAL at 22:00

## 2022-08-05 RX ADMIN — DICLOFENAC SODIUM 2 G: 10 GEL TOPICAL at 14:08

## 2022-08-05 RX ADMIN — LISINOPRIL 10 MG: 10 TABLET ORAL at 08:31

## 2022-08-05 RX ADMIN — BACLOFEN 10 MG: 10 TABLET ORAL at 08:30

## 2022-08-05 RX ADMIN — SODIUM CHLORIDE, PRESERVATIVE FREE 10 ML: 5 INJECTION INTRAVENOUS at 06:42

## 2022-08-05 RX ADMIN — HYDROCODONE BITARTRATE AND ACETAMINOPHEN 2 TABLET: 5; 325 TABLET ORAL at 21:12

## 2022-08-05 RX ADMIN — BACLOFEN 10 MG: 10 TABLET ORAL at 16:03

## 2022-08-05 RX ADMIN — PHENYTOIN SODIUM 100 MG: 100 CAPSULE ORAL at 16:03

## 2022-08-05 RX ADMIN — GABAPENTIN 300 MG: 300 CAPSULE ORAL at 16:03

## 2022-08-05 RX ADMIN — BACLOFEN 10 MG: 10 TABLET ORAL at 21:10

## 2022-08-05 RX ADMIN — SODIUM CHLORIDE, PRESERVATIVE FREE 1 G: 5 INJECTION INTRAVENOUS at 06:41

## 2022-08-05 RX ADMIN — DICLOFENAC SODIUM 2 G: 10 GEL TOPICAL at 08:31

## 2022-08-05 RX ADMIN — DICLOFENAC SODIUM 2 G: 10 GEL TOPICAL at 19:27

## 2022-08-05 RX ADMIN — SODIUM CHLORIDE, PRESERVATIVE FREE 10 ML: 5 INJECTION INTRAVENOUS at 21:14

## 2022-08-05 RX ADMIN — ATORVASTATIN CALCIUM 10 MG: 10 TABLET, FILM COATED ORAL at 21:10

## 2022-08-05 RX ADMIN — CELECOXIB 200 MG: 200 CAPSULE ORAL at 08:30

## 2022-08-05 RX ADMIN — HYDROCODONE BITARTRATE AND ACETAMINOPHEN 2 TABLET: 5; 325 TABLET ORAL at 13:58

## 2022-08-05 RX ADMIN — Medication 667 MG: at 08:00

## 2022-08-05 RX ADMIN — SODIUM CHLORIDE, PRESERVATIVE FREE 10 ML: 5 INJECTION INTRAVENOUS at 14:00

## 2022-08-05 RX ADMIN — SODIUM CHLORIDE, PRESERVATIVE FREE 1 G: 5 INJECTION INTRAVENOUS at 08:00

## 2022-08-05 RX ADMIN — HYDROCHLOROTHIAZIDE 12.5 MG: 25 TABLET ORAL at 08:30

## 2022-08-05 NOTE — PROGRESS NOTES
Problem: Mobility Impaired (Adult and Pediatric)  Goal: *Acute Goals and Plan of Care (Insert Text)  Description: FUNCTIONAL STATUS PRIOR TO ADMISSION: The patient was functional at the wheelchair level and was modified independent for transfers to the chair. Able to utilize a walker for a few steps into her bathroom and for transfers. Reports a history of spinal surgery in 2015 that she \"woke up paralyzed\" from. Has chronic LE weakness, L LE numbness, and R LE hypersensitivity. Recent falls leading to sacral fracture-- seen in the ED but refused PT eval or admission and d/c'ed home to her apartment. Does not drive. HOME SUPPORT PRIOR TO ADMISSION: The patient lived alone with a supportive neighbor to provide assistance. Also has caregivers Monday through Saturday from 9-3 M-F and 9-5 Saturday. Physical Therapy Goals  Initiated 8/4/2022  1. Patient will move from supine to sit and sit to supine , scoot up and down, and roll side to side in bed with moderate assistance x2 within 7 day(s). 2.  Patient will sit EOB with close stand by assist while participating in LE exercises within 7 days. 3.  Patient will perform sit to stand with maximal assistance within 7 day(s). 4.  Patient will tolerate bed in chair position 30 min TID for meals within 7 days. Outcome: Progressing Towards Goal       PHYSICAL THERAPY TREATMENT  Patient: Laurence Rodriguez (28 y.o. female)  Date: 8/5/2022  Diagnosis: Sacral pain [M53.3] <principal problem not specified>      Precautions: WBAT, Fall  Chart, physical therapy assessment, plan of care and goals were reviewed. ASSESSMENT  Patient continues with skilled PT services and is progressing towards goals. Patient limited mainly by pain but able to progress to EOB today. Needing maxA to come to EOB and has good sitting balance once feet on the floor. Sit to stand with modA x 2 and cues for technique. Able to stand approx 30 secs without overt LOB but increased pain. Unable to take any steps at the moment. Will continue to follow for mobility progression. Other factors to consider for discharge: at risk for falls, below baseline         PLAN :  Patient continues to benefit from skilled intervention to address the above impairments. Continue treatment per established plan of care. to address goals. Recommendation for discharge: (in order for the patient to meet his/her long term goals)  Therapy up to 5 days/week in SNF setting        IF patient discharges home will need the following DME: to be determined (TBD)       SUBJECTIVE:   Patient stated I don't do much walking at home.     OBJECTIVE DATA SUMMARY:   Critical Behavior:  Neurologic State: Alert  Orientation Level: Oriented X4  Cognition: Follows commands  Safety/Judgement: Awareness of environment  Functional Mobility Training:  Bed Mobility:     Supine to Sit: Maximum assistance;Assist x2;Bed Modified  Sit to Supine: Maximum assistance;Assist x2  Scooting: Moderate assistance (towards [facility]; patient declining side steps towards [facility])        Transfers:  Sit to Stand: Moderate assistance;Assist x2  Stand to Sit: Moderate assistance;Assist x2                             Balance:  Sitting: Impaired; Without support  Sitting - Static: Good (unsupported)  Sitting - Dynamic: Fair (occasional)  Standing: Impaired; With support  Standing - Static: Constant support; Fair  Standing - Dynamic : Not tested  Ambulation/Gait Training:                Pain Rating:  Reports high pain levels with activity    Activity Tolerance:   Fair and requires rest breaks    After treatment patient left in no apparent distress:   Supine in bed, Call bell within reach, and Side rails x 3    COMMUNICATION/COLLABORATION:   The patients plan of care was discussed with: Physical therapist, Occupational therapist, and Registered nurse.      Casa Frank, PT, DPT   Time Calculation: 25 mins

## 2022-08-05 NOTE — PROGRESS NOTES
[facility] Adult  Hospitalist Group                                                                                          Hospitalist Progress Note  Ashley Bray MD  Answering service: [phone] from in house phone        Date of Service:  2022  NAME:  Juan Antonio Hurtado  :  1955  MRN:  [id number]      Admission Summary:   Juan Antonio Hurtado is a 78 y.o. female with history of lumbar stenosis, chronic back pain, HTN, seizure disorder, recent right sacral fracture who presents with worsening pain and limited mobility in the setting of R sacrum fracture and R inferior pubic ramus fracture. She was evaluated by orthopedic surgery, who recommended WBAT. PT/OT were consulted. Patient also endorsed new dysuria and dark colored urine, found to have UTI and started on abx therapy. Interval history / Subjective:   Patient seen and examined earlier this morning by me for sacral and pubic ramus fractures. Patient informed me that all her issues with walking happened after a lumbar compression surgery in . She has worked a bit with physical therapy and Occupational Therapy. No acute changes in her state. Assessment & Plan:     Sacral fracture, right, POA  Inferior pubic ramus fracture, right, POA  Limited mobility  Mechanical Fall  - Presented to ED approx 4 days ago, diagnosed with sacral frcture and pubic ramus fracture. Discharged to home with pain medication per pt request  - had recurrent fall and worsening mobility. Difficulty with IADLs. - Limited mobility of RLE, 1/5 strength at hip, which pt states is chronic for her  - Given worsening of RLE weakness and increased pain with fall since previous imaging, will obtain repeat imaging  Plan:  - CT Pelvis and Lumbar Spine to eval for worsening RLE weakness and worsening pain: Multilevel spondylosis with high-grade spinal canal narrowing at L4-5 and L5-S1.  High-grade narrowing of the bilateral T11-T12, L1-L2, left L2-L3, L3-L4, right L4-5 and L5-S1 neural foramina  - PT/OT eval and treat  - WBAT per ortho recommendations   - Norco 10mg q4h PRN severe pain  - Tramodol 50mg q6h prn moderate pain  - Dispo pending PT eval   - Ortho consulted, plan for OP F/U in 2-3 weeks pending clinical improvement    At this time I believe her issues are the acute fractures plus her chronic back issues. At this time I am not sure that the spine surgeon will be warranted as the more acute issues of the pubic and sacral fractures need to be addressed patient needs some physical therapy before we can determine how much worse her issues are made by her spine. She does have issues in her lumbar and lower thoracic spine. We will discussed with her and get spine surgery involved as needed. Acute cystitis,   Abdominal pain, controlled  - UA with moderate blood and nitrites  - Pt endorses dysuria and dark colored urine  - fever resolved, no leukocytosis   Plan:  - CT Abdomen: negative  - Rocephin 1g q24h   - F/U urine culture:    STAPHYLOCOCCUS LUGDUNENSIS         Lumbar stenosis  Chronic pain  - PT/OT eval and treat  - Consider OP referral to primary spine provider  - Continue home baclofen 10mg daily; gabapentin 300mg TID  The patient is unable to ambulate with PT due to pain, will consult spine surgeon     HTN  - Continue home lisinopril and HCTZ     Seizure disorder  - Continue home carbamezapine and home phenytoin           Code status: FULL CODE  Prophylaxis: Lovenox  Care Plan discussed with: patient  Anticipated Disposition: TBD       Hospital Problems  Date Reviewed: 7/11/2022            Codes Class Noted POA    Sacral pain ICD-10-CM: M53.3  ICD-9-CM: 724.6  8/2/2022 Unknown         Review of Systems:   A comprehensive review of systems was negative except for that written in the HPI. Vital Signs:    Last 24hrs VS reviewed since prior progress note.  Most recent are:  Visit Vitals  BP (!) 142/75   Pulse 71   Temp 98.2 °F (36.8 °C)   Resp 18   SpO2 96%       No intake or output data in the 24 hours ending 08/05/22 1524       Physical Examination:     I had a face to face encounter with this patient and independently examined them on 8/5/2022 as outlined below:          Constitutional:  No acute distress, cooperative, pleasant    ENT:  Oral mucosa moist, oropharynx benign. Resp:  CTA bilaterally. No wheezing/rhonchi/rales. No accessory muscle use. CV:  Regular rhythm, normal rate, no murmurs, gallops, rubs    GI:  Soft, non distended, normoactive bowel sounds, no hepatosplenomegaly. Mildly tender to the RLQ and epigastric area. MSK:  No edema, warm, 2+ pulses throughout. 1/5 strength of RLE. 4/5 strength of LLE. 1+ DTRs of bilateral LEs, symmetrical. Normal babinski. Neurologic:  AAOx3, CN II-XII reviewed, unable to move RLE since 2015  + back pain with minimal movement            Data Review:    Review and/or order of clinical lab test  Review and/or order of tests in the radiology section of CPT  Review and/or order of tests in the medicine section of CPT      Labs:     Recent Labs     08/05/22  0410 08/04/22  0329   WBC 4.9 6.1   HGB 11.8 11.2*   HCT 35.3 33.2*    298       Recent Labs     08/05/22  0410 08/04/22  0329 08/03/22  0343    140 140   K 4.0 3.8 3.4*    104 105   CO2 29 31 26   BUN 10 10 10   CREA 0.57 0.59 0.47*   GLU 84 88 86   CA 9.4 9.2 9.4       Recent Labs     08/02/22  2041   ALT 51   AP 83   TBILI 0.4   TP 6.7   ALB 2.0*   GLOB 4.7*       No results for input(s): INR, PTP, APTT, INREXT, INREXT in the last 72 hours. No results for input(s): FE, TIBC, PSAT, FERR in the last 72 hours. No results found for: FOL, RBCF   No results for input(s): PH, PCO2, PO2 in the last 72 hours. No results for input(s): CPK, CKNDX, TROIQ in the last 72 hours.     No lab exists for component: CPKMB  Lab Results   Component Value Date/Time    Cholesterol, total 295 (H) 05/05/2022 02:20 PM    HDL Cholesterol 106 05/05/2022 02:20 PM    LDL, calculated 179 (H) 05/05/2022 02:20 PM    Triglyceride 50 05/05/2022 02:20 PM    CHOL/HDL Ratio 2.8 05/05/2022 02:20 PM     No results found for: [facility]  Lab Results   Component Value Date/Time    Color YELLOW/STRAW 08/02/2022 03:07 PM    Appearance CLEAR 08/02/2022 03:07 PM    Specific gravity 1.024 08/02/2022 03:07 PM    Specific gravity 1.020 08/10/2021 03:08 PM    pH (UA) 6.0 08/02/2022 03:07 PM    Protein 100 (A) 08/02/2022 03:07 PM    Glucose Negative 08/02/2022 03:07 PM    Ketone >80 (A) 08/02/2022 03:07 PM    Bilirubin Negative 08/02/2022 03:07 PM    Urobilinogen 1.0 08/02/2022 03:07 PM    Nitrites Positive (A) 08/02/2022 03:07 PM    Leukocyte Esterase Negative 08/02/2022 03:07 PM    Epithelial cells FEW 08/02/2022 03:07 PM    Bacteria 2+ (A) 08/02/2022 03:07 PM    WBC 0-4 08/02/2022 03:07 PM    RBC 0-5 08/02/2022 03:07 PM         Medications Reviewed:     Current Facility-Administered Medications   Medication Dose Route Frequency    diclofenac (VOLTAREN) 1 % topical gel 2 g  2 g Topical QID    cefTRIAXone (ROCEPHIN) 1 g in 0.9% sodium chloride 10 mL IV syringe  1 g IntraVENous Q24H    baclofen (LIORESAL) tablet 10 mg  10 mg Oral TID    traMADoL (ULTRAM) tablet 50 mg  50 mg Oral Q6H PRN    atorvastatin (LIPITOR) tablet 10 mg  10 mg Oral QHS    lisinopriL (PRINIVIL, ZESTRIL) tablet 10 mg  10 mg Oral DAILY    HYDROcodone-acetaminophen (NORCO) 5-325 mg per tablet 2 Tablet  2 Tablet Oral Q4H PRN    gabapentin (NEURONTIN) capsule 300 mg  300 mg Oral TID    sodium chloride (NS) flush 5-40 mL  5-40 mL IntraVENous Q8H    sodium chloride (NS) flush 5-40 mL  5-40 mL IntraVENous PRN    acetaminophen (TYLENOL) tablet 650 mg  650 mg Oral Q6H PRN    Or    acetaminophen (TYLENOL) suppository 650 mg  650 mg Rectal Q6H PRN    polyethylene glycol (MIRALAX) packet 17 g  17 g Oral DAILY PRN    ondansetron (ZOFRAN ODT) tablet 4 mg  4 mg Oral Q8H PRN    Or    ondansetron (ZOFRAN) injection 4 mg  4 mg IntraVENous Q6H PRN    enoxaparin (LOVENOX) injection 40 mg  40 mg SubCUTAneous DAILY    calcium acetate (phosphate binder) (PHOSLO) tablet 667 mg  1 Tablet Oral DAILY WITH BREAKFAST    celecoxib (CELEBREX) capsule 200 mg  200 mg Oral DAILY    hydroCHLOROthiazide (HYDRODIURIL) tablet 12.5 mg  12.5 mg Oral DAILY    carBAMazepine (TEGretol) tablet 200 mg  200 mg Oral BID    phenytoin ER (DILANTIN ER) ER capsule 100 mg  100 mg Oral TID     ______________________________________________________________________  EXPECTED LENGTH OF STAY: 2d 21h  ACTUAL LENGTH OF STAY:          [address] Neel Acuña MD

## 2022-08-05 NOTE — PROGRESS NOTES
Problem: Self Care Deficits Care Plan (Adult)  Goal: *Acute Goals and Plan of Care (Insert Text)  Description: FUNCTIONAL STATUS PRIOR TO ADMISSION: Pt reports she lives alone, with strong support for next door neighbor, as well as, paid caregiver assist from 9-3 M-F and 9-5 on Saturday. Reports Mod [place] with W/C level ADLs, using RW to ambulate short distance from bathroom doorway to commode. States she has tub-shower combo and transfer bench. Intermittent confusion noted during PLOF questioning. HOME SUPPORT: The patient lived with neighbor and paid caregiver assist for PRN ADL/IADL task completion. (Paid caregivers 9-3 M-F and 9-5 on Saturday). Occupational Therapy Goals  Initiated 8/4/2022  1. Patient will perform EOB grooming with contact guard assist within 7 day(s). 2.  Patient will perform EOB upper body dressing with supervision/set-up within 7 day(s). 3.  Patient will perform EOB UB bathing with minimal assistance within 7 day(s). 4.  Patient will roll on/off bedpan with minimal assistance within 7 day(s). Outcome: Progressing Towards Goal     OCCUPATIONAL THERAPY TREATMENT  Patient: Susannah Tavera (40 y.o. female)  Date: 8/5/2022  Diagnosis: Sacral pain [M53.3] <principal problem not specified>      Precautions: WBAT, Fall  Chart, occupational therapy assessment, plan of care, and goals were reviewed. ASSESSMENT  Patient continues with skilled OT services and is slowly progressing towards goals as evidenced by sit<>stand max A x2 in prep for standing ADLs. Patient engagement in ADLs limited by general deconditioning, global weakness (especially RLE), impaired sitting balance, impaired static and dynamic standing balance, limited standing and sitting tolerance 2* pain RLE, and impaired activity tolerance. Patient benefits from continued mobilization EOB for ADL engagement, encouragement to stand with RW in prep for standing ADLs, and position changes to reduce RLE pain. Current Level of Function Impacting Discharge (ADLs): setup seated grooming; max A x2 functional mobility     Other factors to consider for discharge: paid caregivers in home, modified independent w/c level at baseline         PLAN :  Patient continues to benefit from skilled intervention to address the above impairments. Continue treatment per established plan of care to address goals. Recommend with staff: EOB 3x/day, encourage participation in daily ADLs as able    Recommend next OT session: BSC transfer    Recommendation for discharge: (in order for the patient to meet his/her long term goals)  Therapy up to 5 days/week in SNF setting    This discharge recommendation:  Has been made in collaboration with the attending provider and/or case management    IF patient discharges home will need the following DME: bedside commode, gait belt, hospital bed       SUBJECTIVE:   Patient stated I need some pain medicine .     OBJECTIVE DATA SUMMARY:   Cognitive/Behavioral Status:  Neurologic State: Alert  Orientation Level: Oriented X4  Cognition: Follows commands  Perception: Appears intact  Perseveration: No perseveration noted  Safety/Judgement: Awareness of environment    Functional Mobility and Transfers for ADLs:  Bed Mobility: exit L side 2* space  Supine to Sit: Maximum assistance;Assist x2;Bed Modified  Sit to Supine: Maximum assistance;Assist x2  Scooting: Moderate assistance (towards HOB; patient declining side steps towards HOB 2* pain); verbal cues hand placement    Transfers: RW  Sit to Stand: Maximum assistance;Assist x2; verbal cues hand placement from PT (co-treat)  Stand to Sit: Maximum assistance; assist x2          Balance:  Sitting: Impaired; Without support  Sitting - Static: Fair (occasional)  Sitting - Dynamic: Fair (occasional)  Standing: Impaired; With support  Standing - Static: Poor;Fair;Constant support  Standing - Dynamic : Not tested    ADL Intervention:       Grooming  Position Performed: Seated edge of bed for approximately 5 minutes  Washing Face: Set-up         Type of Bath: Chlorhexidine (CHG)                        Cognitive Retraining  Safety/Judgement: Awareness of environment      Pain:  Pain throughout RLE (especially at sacrum and hip) that increased following return supine. Patient requesting pain meds and nurse notified. Activity Tolerance:   Poor and requires rest breaks    After treatment patient left in no apparent distress:   Supine in bed, Call bell within reach, and Side rails x 3    COMMUNICATION/COLLABORATION:   The patients plan of care was discussed with: Physical therapist and Registered nurse. Alice Vigil  Time Calculation: 24 mins  Regarding student involvement in patient care:  A student participated in this treatment session. Per CMS Medicare statements and AOTA guidelines I certify that the following was true:  1. I was present and directly observed the entire session. 2. I made all skilled judgments and clinical decisions regarding care. 3. I am the practitioner responsible for assessment, treatment, and documentation.

## 2022-08-05 NOTE — CONSULTS
Ortho     Note for compliance purposes only. Orthopedics not available for spine consults today. Please contact appropriate provider based on dedicated spine call schedule. Please see orthopedic surgery consult note from 8/2/22 for recommendations re: pelvic fracture.      Ania Bennett PA-C  8/5/11  1100

## 2022-08-05 NOTE — PROGRESS NOTES
[facility] Adult  Hospitalist Group                                                                                          Hospitalist Progress Note  Nick Pena MD  Answering service: [phone] from in house phone        Date of Service:  2022  NAME:  Cate Valderrama  :  1955  MRN:  [id number]      Admission Summary:   Cate Valderrama is a 78 y.o. female with history of lumbar stenosis, chronic back pain, HTN, seizure disorder, recent right sacral fracture who presents with worsening pain and limited mobility in the setting of R sacrum fracture and R inferior pubic ramus fracture. She was evaluated by orthopedic surgery, who recommended WBAT. PT/OT were consulted. Patient also endorsed new dysuria and dark colored urine, found to have UTI and started on abx therapy. Interval history / Subjective:   Patient seen and examined earlier this morning by me for sacral and pubic ramus fractures. Patient informed me that all her issues with walking happened after a lumbar compression surgery in . She has worked a bit with physical therapy and Occupational Therapy. No acute changes in her state. Assessment & Plan:     Sacral fracture, right, POA  Inferior pubic ramus fracture, right, POA  Limited mobility  Mechanical Fall  - Presented to ED approx 4 days ago, diagnosed with sacral frcture and pubic ramus fracture. Discharged to home with pain medication per pt request  - had recurrent fall and worsening mobility. Difficulty with IADLs. - Limited mobility of RLE, 1/5 strength at hip, which pt states is chronic for her  - Given worsening of RLE weakness and increased pain with fall since previous imaging, will obtain repeat imaging  Plan:  - CT Pelvis and Lumbar Spine to eval for worsening RLE weakness and worsening pain: Multilevel spondylosis with high-grade spinal canal narrowing at L4-5 and L5-S1.  High-grade narrowing of the bilateral T11-T12, L1-L2, left L2-L3, L3-L4, right L4-5 and L5-S1 neural foramina  - PT/OT eval and treat  - WBAT per ortho recommendations   - Norco 10mg q4h PRN severe pain  - Tramodol 50mg q6h prn moderate pain  - Dispo pending PT eval   - Ortho consulted, plan for OP F/U in 2-3 weeks pending clinical improvement    At this time I believe her issues are the acute fractures plus her chronic back issues. At this time I am not sure that the spine surgeon will be warranted as the more acute issues of the pubic and sacral fractures need to be addressed patient needs some physical therapy before we can determine how much worse her issues are made by her spine. She does have issues in her lumbar and lower thoracic spine. We will discussed with her and get spine surgery involved as needed. Acute cystitis,   Abdominal pain, controlled  - UA with moderate blood and nitrites  - Pt endorses dysuria and dark colored urine  - fever resolved, no leukocytosis   Plan:  - CT Abdomen: negative  - Rocephin 1g q24h   - F/U urine culture:    STAPHYLOCOCCUS LUGDUNENSIS         Lumbar stenosis  Chronic pain  - PT/OT eval and treat  - Consider OP referral to primary spine provider  - Continue home baclofen 10mg daily; gabapentin 300mg TID  The patient is unable to ambulate with PT due to pain, will consult spine surgeon     HTN  - Continue home lisinopril and HCTZ     Seizure disorder  - Continue home carbamezapine and home phenytoin           Code status: FULL CODE  Prophylaxis: Lovenox  Care Plan discussed with: patient  Anticipated Disposition: TBD       Hospital Problems  Date Reviewed: 7/11/2022            Codes Class Noted POA    Sacral pain ICD-10-CM: M53.3  ICD-9-CM: 724.6  8/2/2022 Unknown         Review of Systems:   A comprehensive review of systems was negative except for that written in the HPI. Vital Signs:    Last 24hrs VS reviewed since prior progress note.  Most recent are:  Visit Vitals  BP (!) 142/75   Pulse 71   Temp 98.2 °F (36.8 °C)   Resp 18   SpO2 96%       No intake or output data in the 24 hours ending 08/05/22 1524       Physical Examination:     I had a face to face encounter with this patient and independently examined them on 8/5/2022 as outlined below:          Constitutional:  No acute distress, cooperative, pleasant    ENT:  Oral mucosa moist, oropharynx benign. Resp:  CTA bilaterally. No wheezing/rhonchi/rales. No accessory muscle use. CV:  Regular rhythm, normal rate, no murmurs, gallops, rubs    GI:  Soft, non distended, normoactive bowel sounds, no hepatosplenomegaly. Mildly tender to the RLQ and epigastric area. MSK:  No edema, warm, 2+ pulses throughout. 1/5 strength of RLE. 4/5 strength of LLE. 1+ DTRs of bilateral LEs, symmetrical. Normal babinski. Neurologic:  AAOx3, CN II-XII reviewed, unable to move RLE since 2015  + back pain with minimal movement            Data Review:    Review and/or order of clinical lab test  Review and/or order of tests in the radiology section of CPT  Review and/or order of tests in the medicine section of CPT      Labs:     Recent Labs     08/05/22  0410 08/04/22  0329   WBC 4.9 6.1   HGB 11.8 11.2*   HCT 35.3 33.2*    298       Recent Labs     08/05/22  0410 08/04/22  0329 08/03/22  0343    140 140   K 4.0 3.8 3.4*    104 105   CO2 29 31 26   BUN 10 10 10   CREA 0.57 0.59 0.47*   GLU 84 88 86   CA 9.4 9.2 9.4       Recent Labs     08/02/22  2041   ALT 51   AP 83   TBILI 0.4   TP 6.7   ALB 2.0*   GLOB 4.7*       No results for input(s): INR, PTP, APTT, INREXT, INREXT in the last 72 hours. No results for input(s): FE, TIBC, PSAT, FERR in the last 72 hours. No results found for: FOL, RBCF   No results for input(s): PH, PCO2, PO2 in the last 72 hours. No results for input(s): CPK, CKNDX, TROIQ in the last 72 hours.     No lab exists for component: CPKMB  Lab Results   Component Value Date/Time    Cholesterol, total 295 (H) 05/05/2022 02:20 PM    HDL Cholesterol 106 05/05/2022 02:20 PM    LDL, calculated 179 (H) 05/05/2022 02:20 PM    Triglyceride 50 05/05/2022 02:20 PM    CHOL/HDL Ratio 2.8 05/05/2022 02:20 PM     No results found for: [facility]  Lab Results   Component Value Date/Time    Color YELLOW/STRAW 08/02/2022 03:07 PM    Appearance CLEAR 08/02/2022 03:07 PM    Specific gravity 1.024 08/02/2022 03:07 PM    Specific gravity 1.020 08/10/2021 03:08 PM    pH (UA) 6.0 08/02/2022 03:07 PM    Protein 100 (A) 08/02/2022 03:07 PM    Glucose Negative 08/02/2022 03:07 PM    Ketone >80 (A) 08/02/2022 03:07 PM    Bilirubin Negative 08/02/2022 03:07 PM    Urobilinogen 1.0 08/02/2022 03:07 PM    Nitrites Positive (A) 08/02/2022 03:07 PM    Leukocyte Esterase Negative 08/02/2022 03:07 PM    Epithelial cells FEW 08/02/2022 03:07 PM    Bacteria 2+ (A) 08/02/2022 03:07 PM    WBC 0-4 08/02/2022 03:07 PM    RBC 0-5 08/02/2022 03:07 PM         Medications Reviewed:     Current Facility-Administered Medications   Medication Dose Route Frequency    diclofenac (VOLTAREN) 1 % topical gel 2 g  2 g Topical QID    cefTRIAXone (ROCEPHIN) 1 g in 0.9% sodium chloride 10 mL IV syringe  1 g IntraVENous Q24H    baclofen (LIORESAL) tablet 10 mg  10 mg Oral TID    traMADoL (ULTRAM) tablet 50 mg  50 mg Oral Q6H PRN    atorvastatin (LIPITOR) tablet 10 mg  10 mg Oral QHS    lisinopriL (PRINIVIL, ZESTRIL) tablet 10 mg  10 mg Oral DAILY    HYDROcodone-acetaminophen (NORCO) 5-325 mg per tablet 2 Tablet  2 Tablet Oral Q4H PRN    gabapentin (NEURONTIN) capsule 300 mg  300 mg Oral TID    sodium chloride (NS) flush 5-40 mL  5-40 mL IntraVENous Q8H    sodium chloride (NS) flush 5-40 mL  5-40 mL IntraVENous PRN    acetaminophen (TYLENOL) tablet 650 mg  650 mg Oral Q6H PRN    Or    acetaminophen (TYLENOL) suppository 650 mg  650 mg Rectal Q6H PRN    polyethylene glycol (MIRALAX) packet 17 g  17 g Oral DAILY PRN    ondansetron (ZOFRAN ODT) tablet 4 mg  4 mg Oral Q8H PRN    Or    ondansetron (ZOFRAN) injection 4 mg  4 mg IntraVENous Q6H PRN    enoxaparin (LOVENOX) injection 40 mg  40 mg SubCUTAneous DAILY    calcium acetate (phosphate binder) (PHOSLO) tablet 667 mg  1 Tablet Oral DAILY WITH BREAKFAST    celecoxib (CELEBREX) capsule 200 mg  200 mg Oral DAILY    hydroCHLOROthiazide (HYDRODIURIL) tablet 12.5 mg  12.5 mg Oral DAILY    carBAMazepine (TEGretol) tablet 200 mg  200 mg Oral BID    phenytoin ER (DILANTIN ER) ER capsule 100 mg  100 mg Oral TID     ______________________________________________________________________  EXPECTED LENGTH OF STAY: 2d 21h  ACTUAL LENGTH OF STAY:          [address] [place] Bita Ferrara MD

## 2022-08-05 NOTE — PROGRESS NOTES
Problem: Self Care Deficits Care Plan (Adult)  Goal: *Acute Goals and Plan of Care (Insert Text)  Description: FUNCTIONAL STATUS PRIOR TO ADMISSION: Pt reports she lives alone, with strong support for next door neighbor, as well as, paid caregiver assist from 9-3 M-F and 9-5 on Saturday. Reports Mod [place] with W/C level ADLs, using RW to ambulate short distance from bathroom doorway to commode. States she has tub-shower combo and transfer bench. Intermittent confusion noted during PLOF questioning. HOME SUPPORT: The patient lived with neighbor and paid caregiver assist for PRN ADL/IADL task completion. (Paid caregivers 9-3 M-F and 9-5 on Saturday). Occupational Therapy Goals  Initiated 8/4/2022  1. Patient will perform EOB grooming with contact guard assist within 7 day(s). 2.  Patient will perform EOB upper body dressing with supervision/set-up within 7 day(s). 3.  Patient will perform EOB UB bathing with minimal assistance within 7 day(s). 4.  Patient will roll on/off bedpan with minimal assistance within 7 day(s). Outcome: Progressing Towards Goal     OCCUPATIONAL THERAPY TREATMENT  Patient: Karolina Bryan (41 y.o. female)  Date: 8/5/2022  Diagnosis: Sacral pain [M53.3] <principal problem not specified>      Precautions: WBAT, Fall  Chart, occupational therapy assessment, plan of care, and goals were reviewed. ASSESSMENT  Patient continues with skilled OT services and is slowly progressing towards goals as evidenced by sit<>stand max A x2 in prep for standing ADLs. Patient engagement in ADLs limited by general deconditioning, global weakness (especially RLE), impaired sitting balance, impaired static and dynamic standing balance, limited standing and sitting tolerance 2* pain RLE, and impaired activity tolerance. Patient benefits from continued mobilization EOB for ADL engagement, encouragement to stand with RW in prep for standing ADLs, and position changes to reduce RLE pain. Current Level of Function Impacting Discharge (ADLs): setup seated grooming; max A x2 functional mobility     Other factors to consider for discharge: paid caregivers in home, modified independent w/c level at baseline         PLAN :  Patient continues to benefit from skilled intervention to address the above impairments. Continue treatment per established plan of care to address goals. Recommend with staff: EOB 3x/day, encourage participation in daily ADLs as able    Recommend next OT session: BSC transfer    Recommendation for discharge: (in order for the patient to meet his/her long term goals)  Therapy up to 5 days/week in SNF setting    This discharge recommendation:  Has been made in collaboration with the attending provider and/or case management    IF patient discharges home will need the following DME: bedside commode, gait belt, hospital bed       SUBJECTIVE:   Patient stated I need some pain medicine .     OBJECTIVE DATA SUMMARY:   Cognitive/Behavioral Status:  Neurologic State: Alert  Orientation Level: Oriented X4  Cognition: Follows commands  Perception: Appears intact  Perseveration: No perseveration noted  Safety/Judgement: Awareness of environment    Functional Mobility and Transfers for ADLs:  Bed Mobility: exit L side 2* space  Supine to Sit: Maximum assistance;Assist x2;Bed Modified  Sit to Supine: Maximum assistance;Assist x2  Scooting: Moderate assistance (towards HOB; patient declining side steps towards HOB 2* pain); verbal cues hand placement    Transfers: RW  Sit to Stand: Maximum assistance;Assist x2; verbal cues hand placement from PT (co-treat)  Stand to Sit: Maximum assistance; assist x2          Balance:  Sitting: Impaired; Without support  Sitting - Static: Fair (occasional)  Sitting - Dynamic: Fair (occasional)  Standing: Impaired; With support  Standing - Static: Poor;Fair;Constant support  Standing - Dynamic : Not tested    ADL Intervention:       Grooming  Position Performed: Seated edge of bed for approximately 5 minutes  Washing Face: Set-up         Type of Bath: Chlorhexidine (CHG)                        Cognitive Retraining  Safety/Judgement: Awareness of environment      Pain:  Pain throughout RLE (especially at sacrum and hip) that increased following return supine. Patient requesting pain meds and nurse notified. Activity Tolerance:   Poor and requires rest breaks    After treatment patient left in no apparent distress:   Supine in bed, Call bell within reach, and Side rails x 3    COMMUNICATION/COLLABORATION:   The patients plan of care was discussed with: Physical therapist and Registered nurse. Kathy Gomez  Time Calculation: 24 mins  Regarding student involvement in patient care:  A student participated in this treatment session. Per CMS Medicare statements and AOTA guidelines I certify that the following was true:  1. I was present and directly observed the entire session. 2. I made all skilled judgments and clinical decisions regarding care. 3. I am the practitioner responsible for assessment, treatment, and documentation.

## 2022-08-05 NOTE — PROGRESS NOTES
SARITA: anticipate d/c to SNF; referrals pending to:  [facility]st choice: [facility]  2nd choice: [facility]  3rd choice: [facility]    Pt has been fully vaccinated and boosted for Covid 19  Vaccine dates in chart record    Pt will need insurance auth for SNF placement  Pt will need likely need a Rapid Covid test     BLS Transport    RUR: 9%  -right Sacral fracture, inferior pubic ramus fracture, s/p fall  -UTI, continues IV ABX  -Orthopedic surgery consult, plan for OP follow up in 2-3 weeks pending clinical improvement  -PT/OT recs. SNF  -1200-CM reviewed pt chart & discussed pt's case in rounds. Pt/Ot recs. SNF. CM met with pt at bedside to provide SNF list. Pt stated she intends to speak with her sister and for CM to return at 3:30pm for her choices. CM to follow. -1530-CM met with pt and and received her SNF choices as noted above, [facility] CM to follow. Mikie Barnett RN BSN CCM  Transition of Care Plan:     The Plan for Transition of Care is related to the following treatment goals: SNF    The Patient and/or patient representative was provided with a choice of provider and agrees  with the discharge plan. Yes [x] No []    A Freedom of choice list was provided with basic dialogue that supports the patient's individualized plan of care/goals and shares the quality data associated with the providers.        Yes [x] No []

## 2022-08-05 NOTE — PROGRESS NOTES
Problem: Mobility Impaired (Adult and Pediatric)  Goal: *Acute Goals and Plan of Care (Insert Text)  Description: FUNCTIONAL STATUS PRIOR TO ADMISSION: The patient was functional at the wheelchair level and was modified independent for transfers to the chair. Able to utilize a walker for a few steps into her bathroom and for transfers. Reports a history of spinal surgery in 2015 that she \"woke up paralyzed\" from. Has chronic LE weakness, L LE numbness, and R LE hypersensitivity. Recent falls leading to sacral fracture-- seen in the ED but refused PT eval or admission and d/c'ed home to her apartment. Does not drive. HOME SUPPORT PRIOR TO ADMISSION: The patient lived alone with a supportive neighbor to provide assistance. Also has caregivers Monday through Saturday from 9-3 M-F and 9-5 Saturday. Physical Therapy Goals  Initiated 8/4/2022  1. Patient will move from supine to sit and sit to supine , scoot up and down, and roll side to side in bed with moderate assistance x2 within 7 day(s). 2.  Patient will sit EOB with close stand by assist while participating in LE exercises within 7 days. 3.  Patient will perform sit to stand with maximal assistance within 7 day(s). 4.  Patient will tolerate bed in chair position 30 min TID for meals within 7 days. Outcome: Progressing Towards Goal       PHYSICAL THERAPY TREATMENT  Patient: Nancy Lester (59 y.o. female)  Date: 8/5/2022  Diagnosis: Sacral pain [M53.3] <principal problem not specified>      Precautions: WBAT, Fall  Chart, physical therapy assessment, plan of care and goals were reviewed. ASSESSMENT  Patient continues with skilled PT services and is progressing towards goals. Patient limited mainly by pain but able to progress to EOB today. Needing maxA to come to EOB and has good sitting balance once feet on the floor. Sit to stand with modA x 2 and cues for technique. Able to stand approx 30 secs without overt LOB but increased pain. Unable to take any steps at the moment. Will continue to follow for mobility progression. Other factors to consider for discharge: at risk for falls, below baseline         PLAN :  Patient continues to benefit from skilled intervention to address the above impairments. Continue treatment per established plan of care. to address goals. Recommendation for discharge: (in order for the patient to meet his/her long term goals)  Therapy up to 5 days/week in SNF setting        IF patient discharges home will need the following DME: to be determined (TBD)       SUBJECTIVE:   Patient stated I don't do much walking at home.     OBJECTIVE DATA SUMMARY:   Critical Behavior:  Neurologic State: Alert  Orientation Level: Oriented X4  Cognition: Follows commands  Safety/Judgement: Awareness of environment  Functional Mobility Training:  Bed Mobility:     Supine to Sit: Maximum assistance;Assist x2;Bed Modified  Sit to Supine: Maximum assistance;Assist x2  Scooting: Moderate assistance (towards [facility]; patient declining side steps towards [facility])        Transfers:  Sit to Stand: Moderate assistance;Assist x2  Stand to Sit: Moderate assistance;Assist x2                             Balance:  Sitting: Impaired; Without support  Sitting - Static: Good (unsupported)  Sitting - Dynamic: Fair (occasional)  Standing: Impaired; With support  Standing - Static: Constant support; Fair  Standing - Dynamic : Not tested  Ambulation/Gait Training:                Pain Rating:  Reports high pain levels with activity    Activity Tolerance:   Fair and requires rest breaks    After treatment patient left in no apparent distress:   Supine in bed, Call bell within reach, and Side rails x 3    COMMUNICATION/COLLABORATION:   The patients plan of care was discussed with: Physical therapist, Occupational therapist, and Registered nurse.      Pamela Andrews, PT, DPT   Time Calculation: 25 mins

## 2022-08-05 NOTE — CONSULTS
Ortho     Note for compliance purposes only. Orthopedics not available for spine consults today. Please contact appropriate provider based on dedicated spine call schedule. Please see orthopedic surgery consult note from 8/2/22 for recommendations re: pelvic fracture.      Billy Anthony PA-C  8/5/11  1100

## 2022-08-06 LAB
ANION GAP SERPL CALC-SCNC: 5 MMOL/L (ref 5–15)
BASOPHILS # BLD: 0 K/UL (ref 0–0.1)
BASOPHILS NFR BLD: 1 % (ref 0–1)
BUN SERPL-MCNC: 17 MG/DL (ref 6–20)
BUN/CREAT SERPL: 24 (ref 12–20)
CALCIUM SERPL-MCNC: 9.8 MG/DL (ref 8.5–10.1)
CHLORIDE SERPL-SCNC: 103 MMOL/L (ref 97–108)
CO2 SERPL-SCNC: 31 MMOL/L (ref 21–32)
CREAT SERPL-MCNC: 0.72 MG/DL (ref 0.55–1.02)
DIFFERENTIAL METHOD BLD: ABNORMAL
EOSINOPHIL # BLD: 0.3 K/UL (ref 0–0.4)
EOSINOPHIL NFR BLD: 5 % (ref 0–7)
ERYTHROCYTE [DISTWIDTH] IN BLOOD BY AUTOMATED COUNT: 13 % (ref 11.5–14.5)
GLUCOSE SERPL-MCNC: 95 MG/DL (ref 65–100)
HCT VFR BLD AUTO: 36.5 % (ref 35–47)
HGB BLD-MCNC: 12.4 G/DL (ref 11.5–16)
IMM GRANULOCYTES # BLD AUTO: 0.1 K/UL (ref 0–0.04)
IMM GRANULOCYTES NFR BLD AUTO: 1 % (ref 0–0.5)
LYMPHOCYTES # BLD: 1.3 K/UL (ref 0.8–3.5)
LYMPHOCYTES NFR BLD: 22 % (ref 12–49)
MCH RBC QN AUTO: 34 PG (ref 26–34)
MCHC RBC AUTO-ENTMCNC: 34 G/DL (ref 30–36.5)
MCV RBC AUTO: 100 FL (ref 80–99)
MONOCYTES # BLD: 0.4 K/UL (ref 0–1)
MONOCYTES NFR BLD: 7 % (ref 5–13)
NEUTS SEG # BLD: 3.8 K/UL (ref 1.8–8)
NEUTS SEG NFR BLD: 64 % (ref 32–75)
NRBC # BLD: 0 K/UL (ref 0–0.01)
NRBC BLD-RTO: 0 PER 100 WBC
PLATELET # BLD AUTO: 406 K/UL (ref 150–400)
PMV BLD AUTO: 9 FL (ref 8.9–12.9)
POTASSIUM SERPL-SCNC: 4 MMOL/L (ref 3.5–5.1)
RBC # BLD AUTO: 3.65 M/UL (ref 3.8–5.2)
SODIUM SERPL-SCNC: 139 MMOL/L (ref 136–145)
WBC # BLD AUTO: 5.8 K/UL (ref 3.6–11)

## 2022-08-06 PROCEDURE — 80048 BASIC METABOLIC PNL TOTAL CA: CPT

## 2022-08-06 PROCEDURE — 77030027138 HC INCENT SPIROMETER -A

## 2022-08-06 PROCEDURE — 36415 COLL VENOUS BLD VENIPUNCTURE: CPT

## 2022-08-06 PROCEDURE — 74011250636 HC RX REV CODE- 250/636: Performed by: STUDENT IN AN ORGANIZED HEALTH CARE EDUCATION/TRAINING PROGRAM

## 2022-08-06 PROCEDURE — 74011250637 HC RX REV CODE- 250/637: Performed by: STUDENT IN AN ORGANIZED HEALTH CARE EDUCATION/TRAINING PROGRAM

## 2022-08-06 PROCEDURE — 85025 COMPLETE CBC W/AUTO DIFF WBC: CPT

## 2022-08-06 PROCEDURE — 65270000029 HC RM PRIVATE

## 2022-08-06 PROCEDURE — 74011000250 HC RX REV CODE- 250: Performed by: STUDENT IN AN ORGANIZED HEALTH CARE EDUCATION/TRAINING PROGRAM

## 2022-08-06 RX ADMIN — Medication 667 MG: at 07:45

## 2022-08-06 RX ADMIN — LISINOPRIL 10 MG: 10 TABLET ORAL at 10:37

## 2022-08-06 RX ADMIN — CARBAMAZEPINE 200 MG: 200 TABLET ORAL at 10:38

## 2022-08-06 RX ADMIN — SODIUM CHLORIDE, PRESERVATIVE FREE 10 ML: 5 INJECTION INTRAVENOUS at 07:42

## 2022-08-06 RX ADMIN — PHENYTOIN SODIUM 100 MG: 100 CAPSULE ORAL at 15:39

## 2022-08-06 RX ADMIN — SODIUM CHLORIDE, PRESERVATIVE FREE 1 G: 5 INJECTION INTRAVENOUS at 07:42

## 2022-08-06 RX ADMIN — CARBAMAZEPINE 200 MG: 200 TABLET ORAL at 21:03

## 2022-08-06 RX ADMIN — GABAPENTIN 300 MG: 300 CAPSULE ORAL at 15:39

## 2022-08-06 RX ADMIN — PHENYTOIN SODIUM 100 MG: 100 CAPSULE ORAL at 21:03

## 2022-08-06 RX ADMIN — BACLOFEN 10 MG: 10 TABLET ORAL at 15:39

## 2022-08-06 RX ADMIN — ENOXAPARIN SODIUM 40 MG: 100 INJECTION SUBCUTANEOUS at 10:37

## 2022-08-06 RX ADMIN — CELECOXIB 200 MG: 200 CAPSULE ORAL at 10:37

## 2022-08-06 RX ADMIN — ATORVASTATIN CALCIUM 10 MG: 10 TABLET, FILM COATED ORAL at 21:03

## 2022-08-06 RX ADMIN — SODIUM CHLORIDE, PRESERVATIVE FREE 10 ML: 5 INJECTION INTRAVENOUS at 15:33

## 2022-08-06 RX ADMIN — BACLOFEN 10 MG: 10 TABLET ORAL at 10:37

## 2022-08-06 RX ADMIN — SODIUM CHLORIDE, PRESERVATIVE FREE 10 ML: 5 INJECTION INTRAVENOUS at 21:03

## 2022-08-06 RX ADMIN — PHENYTOIN SODIUM 100 MG: 100 CAPSULE ORAL at 10:37

## 2022-08-06 RX ADMIN — GABAPENTIN 300 MG: 300 CAPSULE ORAL at 10:37

## 2022-08-06 RX ADMIN — HYDROCODONE BITARTRATE AND ACETAMINOPHEN 2 TABLET: 5; 325 TABLET ORAL at 15:39

## 2022-08-06 RX ADMIN — GABAPENTIN 300 MG: 300 CAPSULE ORAL at 21:03

## 2022-08-06 RX ADMIN — HYDROCHLOROTHIAZIDE 12.5 MG: 25 TABLET ORAL at 10:37

## 2022-08-06 RX ADMIN — DICLOFENAC SODIUM 2 G: 10 GEL TOPICAL at 21:05

## 2022-08-06 RX ADMIN — HYDROCODONE BITARTRATE AND ACETAMINOPHEN 2 TABLET: 5; 325 TABLET ORAL at 10:37

## 2022-08-06 RX ADMIN — DICLOFENAC SODIUM 2 G: 10 GEL TOPICAL at 10:43

## 2022-08-06 RX ADMIN — DICLOFENAC SODIUM 2 G: 10 GEL TOPICAL at 15:30

## 2022-08-06 RX ADMIN — BACLOFEN 10 MG: 10 TABLET ORAL at 21:03

## 2022-08-06 NOTE — PROGRESS NOTES
Physician Progress Note      Katarina Redmond  CSN #:                  [id number]  :                       1955  ADMIT DATE:       2022 10:14 AM  DISCH DATE:  RESPONDING  PROVIDER #:        Jaswant Jean MD          QUERY TEXT:    Dear Attending,    Pt admitted with R hip pain. Fracture pubis and sacrum. Pt noted to have . If possible, please document in progress notes and discharge summary if you are evaluating and/or treating any of the following: The medical record reflects the following:  Risk Factors: s/p ground level fall,  Clinical Indicators: R hip and back pain, RLE weakness right Sacral fracture, inferior pubic ramus fracture per Xray  Treatment: PT/OT,  Options provided:  -- Pathological pubis and sacrum fracture  -- Pathological pubis and sacrum fracture due to osteopenia  -- Pathological Fracture pubis and sacrum fracture due to osteopenia following fall which would not usually break a normal, healthy bone  -- Osteoporotic pubis and sacrum Fracture  -- Osteoporotic pubis and sacrum fracture following fall which would not usually break a normal, healthy bone  -- Traumatic pubis and sacrum fracture  -- Other - I will add my own diagnosis  -- Disagree - Not applicable / Not valid  -- Disagree - Clinically unable to determine / Unknown  -- Refer to Clinical Documentation Reviewer    PROVIDER RESPONSE TEXT:    This patient has a traumatic pubis and sacrum fracture. Query created by:  Misty Kent on 2022 12:46 PM      Electronically signed by:  Jaswant Jean MD 2022 1:58 PM

## 2022-08-06 NOTE — PROGRESS NOTES
[facility] Adult  Hospitalist Group                                                                                          Hospitalist Progress Note  Nicanor Harmon MD  Answering service: [phone] from in house phone        Date of Service:  2022  NAME:  Nancy Lester  :  1955  MRN:  [id number]      Admission Summary:   Nancy Lester is a 78 y.o. female with history of lumbar stenosis, chronic back pain, HTN, seizure disorder, recent right sacral fracture who presents with worsening pain and limited mobility in the setting of R sacrum fracture and R inferior pubic ramus fracture. She was evaluated by orthopedic surgery, who recommended WBAT. PT/OT were consulted. Patient also endorsed new dysuria and dark colored urine, found to have UTI and started on abx therapy. Interval history / Subjective:   Patient seen and examined earlier this morning by me for sacral and pubic ramus fractures. No new complaints at the time of my exam.  We discussed physical therapy and her need to work with them. Assessment & Plan:     Sacral fracture, right, POA  Inferior pubic ramus fracture, right, POA  Limited mobility  Mechanical Fall  - Presented to ED approx 4 days ago, diagnosed with sacral frcture and pubic ramus fracture. Discharged to home with pain medication per pt request  - had recurrent fall and worsening mobility. Difficulty with IADLs. - Limited mobility of RLE, 1/5 strength at hip, which pt states is chronic for her  - Given worsening of RLE weakness and increased pain with fall since previous imaging, will obtain repeat imaging  Plan:  - CT Pelvis and Lumbar Spine to eval for worsening RLE weakness and worsening pain: Multilevel spondylosis with high-grade spinal canal narrowing at L4-5 and L5-S1.  High-grade narrowing of the bilateral T11-T12, L1-L2, left L2-L3, L3-L4, right L4-5 and L5-S1 neural foramina  - PT/OT eval and treat  - WBAT per ortho recommendations   - Norco 10mg q4h PRN severe pain  - Tramodol 50mg q6h prn moderate pain  - Dispo pending PT eval   - Ortho consulted, plan for OP F/U in 2-3 weeks pending clinical improvement    At this time I believe her issues are the acute fractures plus her chronic back issues. At this time I am not sure that the spine surgeon will be warranted as the more acute issues of the pubic and sacral fractures need to be addressed patient needs some physical therapy before we can determine how much worse her issues are made by her spine. She does have issues in her lumbar and lower thoracic spine. We will discussed with her and get spine surgery involved as needed. Patient has no interest in surgery at this time. Sinew to work with physical therapy and aim towards SNF     Acute cystitis,   Abdominal pain, controlled  - UA with moderate blood and nitrites  - Pt endorses dysuria and dark colored urine  - fever resolved, no leukocytosis   Plan:  - CT Abdomen: negative  - Rocephin 1g q24h   - F/U urine culture:    STAPHYLOCOCCUS LUGDUNENSIS         Lumbar stenosis  Chronic pain  - PT/OT eval and treat  - Consider OP referral to primary spine provider  - Continue home baclofen 10mg daily; gabapentin 300mg TID  If patient continues to show difficulty with pain and working with PT then we will talk to spine surgery     HTN  - Continue home lisinopril and HCTZ     Seizure disorder  - Continue home carbamezapine and home phenytoin           Code status: FULL CODE  Prophylaxis: Lovenox  Care Plan discussed with: patient  Anticipated Disposition: D       Hospital Problems  Date Reviewed: 7/11/2022            Codes Class Noted POA    Sacral pain ICD-10-CM: M53.3  ICD-9-CM: 724.6  8/2/2022 Unknown         Review of Systems:   A comprehensive review of systems was negative except for that written in the HPI. Vital Signs:    Last 24hrs VS reviewed since prior progress note.  Most recent are:  Visit Vitals  /74 (BP 1 Location: Left upper arm, BP Patient Position: Lying)   Pulse 70   Temp 98.7 °F (37.1 °C)   Resp 18   SpO2 92%         Intake/Output Summary (Last 24 hours) at 8/6/2022 1547  Last data filed at 8/6/2022 0404  Gross per 24 hour   Intake --   Output 700 ml   Net -700 ml          Physical Examination:     I had a face to face encounter with this patient and independently examined them on 8/6/2022 as outlined below:          Constitutional:  No acute distress, cooperative, pleasant    ENT:  Oral mucosa moist, oropharynx benign. Resp:  CTA bilaterally. No wheezing/rhonchi/rales. No accessory muscle use. CV:  Regular rhythm, normal rate, no murmurs, gallops, rubs    GI:  Soft, non distended, normoactive bowel sounds, no hepatosplenomegaly. Mildly tender to the RLQ and epigastric area. MSK:  No edema, warm, 2+ pulses throughout. 1/5 strength of RLE. 4/5 strength of LLE. 1+ DTRs of bilateral LEs, symmetrical. Normal babinski. Neurologic:  AAOx3, CN II-XII reviewed, unable to move RLE since 2015  + back pain with minimal movement            Data Review:    Review and/or order of clinical lab test  Review and/or order of tests in the radiology section of CPT  Review and/or order of tests in the medicine section of CPT      Labs:     Recent Labs     08/06/22  0345 08/05/22  0410   WBC 5.8 4.9   HGB 12.4 11.8   HCT 36.5 35.3   * 343       Recent Labs     08/06/22  0345 08/05/22  0410 08/04/22  0329    139 140   K 4.0 4.0 3.8    104 104   CO2 31 29 31   BUN 17 10 10   CREA 0.72 0.57 0.59   GLU 95 84 88   CA 9.8 9.4 9.2       No results for input(s): ALT, AP, TBIL, TBILI, TP, ALB, GLOB, GGT, AML, LPSE in the last 72 hours. No lab exists for component: SGOT, GPT, AMYP, HLPSE    No results for input(s): INR, PTP, APTT, INREXT, INREXT in the last 72 hours. No results for input(s): FE, TIBC, PSAT, FERR in the last 72 hours.    No results found for: FOL, RBCF   No results for input(s): PH, PCO2, PO2 in the last 72 hours. No results for input(s): CPK, CKNDX, TROIQ in the last 72 hours.     No lab exists for component: CPKMB  Lab Results   Component Value Date/Time    Cholesterol, total 295 (H) 05/05/2022 02:20 PM    HDL Cholesterol 106 05/05/2022 02:20 PM    LDL, calculated 179 (H) 05/05/2022 02:20 PM    Triglyceride 50 05/05/2022 02:20 PM    CHOL/HDL Ratio 2.8 05/05/2022 02:20 PM     No results found for: [facility]  Lab Results   Component Value Date/Time    Color YELLOW/STRAW 08/02/2022 03:07 PM    Appearance CLEAR 08/02/2022 03:07 PM    Specific gravity 1.024 08/02/2022 03:07 PM    Specific gravity 1.020 08/10/2021 03:08 PM    pH (UA) 6.0 08/02/2022 03:07 PM    Protein 100 (A) 08/02/2022 03:07 PM    Glucose Negative 08/02/2022 03:07 PM    Ketone >80 (A) 08/02/2022 03:07 PM    Bilirubin Negative 08/02/2022 03:07 PM    Urobilinogen 1.0 08/02/2022 03:07 PM    Nitrites Positive (A) 08/02/2022 03:07 PM    Leukocyte Esterase Negative 08/02/2022 03:07 PM    Epithelial cells FEW 08/02/2022 03:07 PM    Bacteria 2+ (A) 08/02/2022 03:07 PM    WBC 0-4 08/02/2022 03:07 PM    RBC 0-5 08/02/2022 03:07 PM         Medications Reviewed:     Current Facility-Administered Medications   Medication Dose Route Frequency    diclofenac (VOLTAREN) 1 % topical gel 2 g  2 g Topical QID    baclofen (LIORESAL) tablet 10 mg  10 mg Oral TID    traMADoL (ULTRAM) tablet 50 mg  50 mg Oral Q6H PRN    atorvastatin (LIPITOR) tablet 10 mg  10 mg Oral QHS    lisinopriL (PRINIVIL, ZESTRIL) tablet 10 mg  10 mg Oral DAILY    HYDROcodone-acetaminophen (NORCO) 5-325 mg per tablet 2 Tablet  2 Tablet Oral Q4H PRN    gabapentin (NEURONTIN) capsule 300 mg  300 mg Oral TID    sodium chloride (NS) flush 5-40 mL  5-40 mL IntraVENous Q8H    sodium chloride (NS) flush 5-40 mL  5-40 mL IntraVENous PRN    acetaminophen (TYLENOL) tablet 650 mg  650 mg Oral Q6H PRN    Or    acetaminophen (TYLENOL) suppository 650 mg  650 mg Rectal Q6H PRN    polyethylene glycol (MIRALAX) packet 17 g  17 g Oral DAILY PRN    ondansetron (ZOFRAN ODT) tablet 4 mg  4 mg Oral Q8H PRN    Or    ondansetron (ZOFRAN) injection 4 mg  4 mg IntraVENous Q6H PRN    enoxaparin (LOVENOX) injection 40 mg  40 mg SubCUTAneous DAILY    calcium acetate (phosphate binder) (PHOSLO) tablet 667 mg  1 Tablet Oral DAILY WITH BREAKFAST    celecoxib (CELEBREX) capsule 200 mg  200 mg Oral DAILY    hydroCHLOROthiazide (HYDRODIURIL) tablet 12.5 mg  12.5 mg Oral DAILY    carBAMazepine (TEGretol) tablet 200 mg  200 mg Oral BID    phenytoin ER (DILANTIN ER) ER capsule 100 mg  100 mg Oral TID     ______________________________________________________________________  EXPECTED LENGTH OF STAY: 2d 21h  ACTUAL LENGTH OF STAY:          Peewee Hernandez MD

## 2022-08-06 NOTE — PROGRESS NOTES
[facility] · RUR- 8% Low  · DISPOSITION: SNF  · F/U with PCP/Specialist    · Transport: BLS    Referrals sent to [facility] and [facility] are still pending. [facility] has denied as the patient is out of network.     JESUS Blanco, Care Manager

## 2022-08-06 NOTE — PROGRESS NOTES
Physician Progress Note      Claude Villaseñor  CSN #:                  [id number]  :                       1955  ADMIT DATE:       2022 10:14 AM  DISCH DATE:  RESPONDING  PROVIDER #:        Jossue Aguirre MD          QUERY TEXT:    Dear Attending,    Pt admitted with R hip pain. Fracture pubis and sacrum. Pt noted to have . If possible, please document in progress notes and discharge summary if you are evaluating and/or treating any of the following: The medical record reflects the following:  Risk Factors: s/p ground level fall,  Clinical Indicators: R hip and back pain, RLE weakness right Sacral fracture, inferior pubic ramus fracture per Xray  Treatment: PT/OT,  Options provided:  -- Pathological pubis and sacrum fracture  -- Pathological pubis and sacrum fracture due to osteopenia  -- Pathological Fracture pubis and sacrum fracture due to osteopenia following fall which would not usually break a normal, healthy bone  -- Osteoporotic pubis and sacrum Fracture  -- Osteoporotic pubis and sacrum fracture following fall which would not usually break a normal, healthy bone  -- Traumatic pubis and sacrum fracture  -- Other - I will add my own diagnosis  -- Disagree - Not applicable / Not valid  -- Disagree - Clinically unable to determine / Unknown  -- Refer to Clinical Documentation Reviewer    PROVIDER RESPONSE TEXT:    This patient has a traumatic pubis and sacrum fracture. Query created by:  Emiliano Alamo on 2022 12:46 PM      Electronically signed by:  Jossue Aguirre MD 2022 1:58 PM

## 2022-08-06 NOTE — PROGRESS NOTES
[address] [facility] Adult  Hospitalist Group                                                                                          Hospitalist Progress Note  Zheng Ugarte MD  Answering service: [phone] OR [phone] from in house phone        Date of Service:  2022  NAME:  Jackeline Garces  :  1955  MRN:  [id number]      Admission Summary:   Jackeline Garces is a 78 y.o. female with history of lumbar stenosis, chronic back pain, HTN, seizure disorder, recent right sacral fracture who presents with worsening pain and limited mobility in the setting of R sacrum fracture and R inferior pubic ramus fracture. She was evaluated by orthopedic surgery, who recommended WBAT. PT/OT were consulted. Patient also endorsed new dysuria and dark colored urine, found to have UTI and started on abx therapy. Interval history / Subjective:   Patient seen and examined earlier this morning by me for sacral and pubic ramus fractures. No new complaints at the time of my exam.  We discussed physical therapy and her need to work with them. Assessment & Plan:     Sacral fracture, right, POA  Inferior pubic ramus fracture, right, POA  Limited mobility  Mechanical Fall  - Presented to ED approx 4 days ago, diagnosed with sacral frcture and pubic ramus fracture. Discharged to home with pain medication per pt request  - had recurrent fall and worsening mobility. Difficulty with IADLs. - Limited mobility of RLE, 1/5 strength at hip, which pt states is chronic for her  - Given worsening of RLE weakness and increased pain with fall since previous imaging, will obtain repeat imaging  Plan:  - CT Pelvis and Lumbar Spine to eval for worsening RLE weakness and worsening pain: Multilevel spondylosis with high-grade spinal canal narrowing at L4-5 and L5-S1.  High-grade narrowing of the bilateral T11-T12, L1-L2, left L2-L3, L3-L4, right L4-5 and L5-S1 neural foramina  - PT/OT eval and treat  - WBAT per ortho recommendations   - Norco 10mg q4h PRN severe pain  - Tramodol 50mg q6h prn moderate pain  - Dispo pending PT eval   - Ortho consulted, plan for OP F/U in 2-3 weeks pending clinical improvement    At this time I believe her issues are the acute fractures plus her chronic back issues. At this time I am not sure that the spine surgeon will be warranted as the more acute issues of the pubic and sacral fractures need to be addressed patient needs some physical therapy before we can determine how much worse her issues are made by her spine. She does have issues in her lumbar and lower thoracic spine. We will discussed with her and get spine surgery involved as needed. Patient has no interest in surgery at this time. Sinew to work with physical therapy and aim towards SNF     Acute cystitis,   Abdominal pain, controlled  - UA with moderate blood and nitrites  - Pt endorses dysuria and dark colored urine  - fever resolved, no leukocytosis   Plan:  - CT Abdomen: negative  - Rocephin 1g q24h   - F/U urine culture:    STAPHYLOCOCCUS LUGDUNENSIS         Lumbar stenosis  Chronic pain  - PT/OT eval and treat  - Consider OP referral to primary spine provider  - Continue home baclofen 10mg daily; gabapentin 300mg TID  If patient continues to show difficulty with pain and working with PT then we will talk to spine surgery     HTN  - Continue home lisinopril and HCTZ     Seizure disorder  - Continue home carbamezapine and home phenytoin           Code status: FULL CODE  Prophylaxis: Lovenox  Care Plan discussed with: patient  Anticipated Disposition: D       Hospital Problems  Date Reviewed: 7/11/2022            Codes Class Noted POA    Sacral pain ICD-10-CM: M53.3  ICD-9-CM: 724.6  8/2/2022 Unknown         Review of Systems:   A comprehensive review of systems was negative except for that written in the HPI. Vital Signs:    Last 24hrs VS reviewed since prior progress note.  Most recent are:  Visit Vitals  /74 (BP 1 Location: Left upper arm, BP Patient Position: Lying)   Pulse 70   Temp 98.7 °F (37.1 °C)   Resp 18   SpO2 92%         Intake/Output Summary (Last 24 hours) at 8/6/2022 1547  Last data filed at 8/6/2022 0404  Gross per 24 hour   Intake --   Output 700 ml   Net -700 ml          Physical Examination:     I had a face to face encounter with this patient and independently examined them on 8/6/2022 as outlined below:          Constitutional:  No acute distress, cooperative, pleasant    ENT:  Oral mucosa moist, oropharynx benign. Resp:  CTA bilaterally. No wheezing/rhonchi/rales. No accessory muscle use. CV:  Regular rhythm, normal rate, no murmurs, gallops, rubs    GI:  Soft, non distended, normoactive bowel sounds, no hepatosplenomegaly. Mildly tender to the RLQ and epigastric area. MSK:  No edema, warm, 2+ pulses throughout. 1/5 strength of RLE. 4/5 strength of LLE. 1+ DTRs of bilateral LEs, symmetrical. Normal babinski. Neurologic:  AAOx3, CN II-XII reviewed, unable to move RLE since 2015  + back pain with minimal movement            Data Review:    Review and/or order of clinical lab test  Review and/or order of tests in the radiology section of CPT  Review and/or order of tests in the medicine section of CPT      Labs:     Recent Labs     08/06/22  0345 08/05/22  0410   WBC 5.8 4.9   HGB 12.4 11.8   HCT 36.5 35.3   * 343       Recent Labs     08/06/22  0345 08/05/22  0410 08/04/22  0329    139 140   K 4.0 4.0 3.8    104 104   CO2 31 29 31   BUN 17 10 10   CREA 0.72 0.57 0.59   GLU 95 84 88   CA 9.8 9.4 9.2       No results for input(s): ALT, AP, TBIL, TBILI, TP, ALB, GLOB, GGT, AML, LPSE in the last 72 hours. No lab exists for component: SGOT, GPT, AMYP, HLPSE    No results for input(s): INR, PTP, APTT, INREXT, INREXT in the last 72 hours. No results for input(s): FE, TIBC, PSAT, FERR in the last 72 hours.    No results found for: FOL, RBCF   No results for input(s): PH, PCO2, PO2 in the last 72 hours. No results for input(s): CPK, CKNDX, TROIQ in the last 72 hours.     No lab exists for component: CPKMB  Lab Results   Component Value Date/Time    Cholesterol, total 295 (H) 05/05/2022 02:20 PM    HDL Cholesterol 106 05/05/2022 02:20 PM    LDL, calculated 179 (H) 05/05/2022 02:20 PM    Triglyceride 50 05/05/2022 02:20 PM    CHOL/HDL Ratio 2.8 05/05/2022 02:20 PM     No results found for: [facility]  Lab Results   Component Value Date/Time    Color YELLOW/STRAW 08/02/2022 03:07 PM    Appearance CLEAR 08/02/2022 03:07 PM    Specific gravity 1.024 08/02/2022 03:07 PM    Specific gravity 1.020 08/10/2021 03:08 PM    pH (UA) 6.0 08/02/2022 03:07 PM    Protein 100 (A) 08/02/2022 03:07 PM    Glucose Negative 08/02/2022 03:07 PM    Ketone >80 (A) 08/02/2022 03:07 PM    Bilirubin Negative 08/02/2022 03:07 PM    Urobilinogen 1.0 08/02/2022 03:07 PM    Nitrites Positive (A) 08/02/2022 03:07 PM    Leukocyte Esterase Negative 08/02/2022 03:07 PM    Epithelial cells FEW 08/02/2022 03:07 PM    Bacteria 2+ (A) 08/02/2022 03:07 PM    WBC 0-4 08/02/2022 03:07 PM    RBC 0-5 08/02/2022 03:07 PM         Medications Reviewed:     Current Facility-Administered Medications   Medication Dose Route Frequency    diclofenac (VOLTAREN) 1 % topical gel 2 g  2 g Topical QID    baclofen (LIORESAL) tablet 10 mg  10 mg Oral TID    traMADoL (ULTRAM) tablet 50 mg  50 mg Oral Q6H PRN    atorvastatin (LIPITOR) tablet 10 mg  10 mg Oral QHS    lisinopriL (PRINIVIL, ZESTRIL) tablet 10 mg  10 mg Oral DAILY    HYDROcodone-acetaminophen (NORCO) 5-325 mg per tablet 2 Tablet  2 Tablet Oral Q4H PRN    gabapentin (NEURONTIN) capsule 300 mg  300 mg Oral TID    sodium chloride (NS) flush 5-40 mL  5-40 mL IntraVENous Q8H    sodium chloride (NS) flush 5-40 mL  5-40 mL IntraVENous PRN    acetaminophen (TYLENOL) tablet 650 mg  650 mg Oral Q6H PRN    Or    acetaminophen (TYLENOL) suppository 650 mg  650 mg Rectal Q6H PRN    polyethylene glycol (MIRALAX) packet 17 g  17 g Oral DAILY PRN    ondansetron (ZOFRAN ODT) tablet 4 mg  4 mg Oral Q8H PRN    Or    ondansetron (ZOFRAN) injection 4 mg  4 mg IntraVENous Q6H PRN    enoxaparin (LOVENOX) injection 40 mg  40 mg SubCUTAneous DAILY    calcium acetate (phosphate binder) (PHOSLO) tablet 667 mg  1 Tablet Oral DAILY WITH BREAKFAST    celecoxib (CELEBREX) capsule 200 mg  200 mg Oral DAILY    hydroCHLOROthiazide (HYDRODIURIL) tablet 12.5 mg  12.5 mg Oral DAILY    carBAMazepine (TEGretol) tablet 200 mg  200 mg Oral BID    phenytoin ER (DILANTIN ER) ER capsule 100 mg  100 mg Oral TID     ______________________________________________________________________  EXPECTED LENGTH OF STAY: 2d 21h  ACTUAL LENGTH OF STAY:          Peewee Silva MD

## 2022-08-06 NOTE — PROGRESS NOTES
Nurse Carlos Shah gave bedside report to oncoming nurse John Paul Mcdonald RN by BECKY and Rhianna [facility]

## 2022-08-07 LAB
ANION GAP SERPL CALC-SCNC: 6 MMOL/L (ref 5–15)
BASOPHILS # BLD: 0 K/UL (ref 0–0.1)
BASOPHILS NFR BLD: 1 % (ref 0–1)
BUN SERPL-MCNC: 17 MG/DL (ref 6–20)
BUN/CREAT SERPL: 24 (ref 12–20)
CALCIUM SERPL-MCNC: 9.5 MG/DL (ref 8.5–10.1)
CHLORIDE SERPL-SCNC: 104 MMOL/L (ref 97–108)
CO2 SERPL-SCNC: 28 MMOL/L (ref 21–32)
CREAT SERPL-MCNC: 0.7 MG/DL (ref 0.55–1.02)
DIFFERENTIAL METHOD BLD: ABNORMAL
EOSINOPHIL # BLD: 0.3 K/UL (ref 0–0.4)
EOSINOPHIL NFR BLD: 5 % (ref 0–7)
ERYTHROCYTE [DISTWIDTH] IN BLOOD BY AUTOMATED COUNT: 12.9 % (ref 11.5–14.5)
GLUCOSE SERPL-MCNC: 86 MG/DL (ref 65–100)
HCT VFR BLD AUTO: 38.4 % (ref 35–47)
HGB BLD-MCNC: 13.1 G/DL (ref 11.5–16)
IMM GRANULOCYTES # BLD AUTO: 0.1 K/UL (ref 0–0.04)
IMM GRANULOCYTES NFR BLD AUTO: 1 % (ref 0–0.5)
LYMPHOCYTES # BLD: 1.7 K/UL (ref 0.8–3.5)
LYMPHOCYTES NFR BLD: 28 % (ref 12–49)
MCH RBC QN AUTO: 33.9 PG (ref 26–34)
MCHC RBC AUTO-ENTMCNC: 34.1 G/DL (ref 30–36.5)
MCV RBC AUTO: 99.5 FL (ref 80–99)
MONOCYTES # BLD: 0.4 K/UL (ref 0–1)
MONOCYTES NFR BLD: 6 % (ref 5–13)
NEUTS SEG # BLD: 3.6 K/UL (ref 1.8–8)
NEUTS SEG NFR BLD: 59 % (ref 32–75)
NRBC # BLD: 0 K/UL (ref 0–0.01)
NRBC BLD-RTO: 0 PER 100 WBC
PLATELET # BLD AUTO: 403 K/UL (ref 150–400)
PMV BLD AUTO: 9.5 FL (ref 8.9–12.9)
POTASSIUM SERPL-SCNC: 5 MMOL/L (ref 3.5–5.1)
RBC # BLD AUTO: 3.86 M/UL (ref 3.8–5.2)
SODIUM SERPL-SCNC: 138 MMOL/L (ref 136–145)
WBC # BLD AUTO: 6 K/UL (ref 3.6–11)

## 2022-08-07 PROCEDURE — 36415 COLL VENOUS BLD VENIPUNCTURE: CPT

## 2022-08-07 PROCEDURE — 85025 COMPLETE CBC W/AUTO DIFF WBC: CPT

## 2022-08-07 PROCEDURE — 74011000250 HC RX REV CODE- 250: Performed by: STUDENT IN AN ORGANIZED HEALTH CARE EDUCATION/TRAINING PROGRAM

## 2022-08-07 PROCEDURE — 74011250636 HC RX REV CODE- 250/636: Performed by: STUDENT IN AN ORGANIZED HEALTH CARE EDUCATION/TRAINING PROGRAM

## 2022-08-07 PROCEDURE — 80048 BASIC METABOLIC PNL TOTAL CA: CPT

## 2022-08-07 PROCEDURE — 74011250637 HC RX REV CODE- 250/637: Performed by: STUDENT IN AN ORGANIZED HEALTH CARE EDUCATION/TRAINING PROGRAM

## 2022-08-07 PROCEDURE — 65270000029 HC RM PRIVATE

## 2022-08-07 RX ADMIN — PHENYTOIN SODIUM 100 MG: 100 CAPSULE ORAL at 17:18

## 2022-08-07 RX ADMIN — GABAPENTIN 300 MG: 300 CAPSULE ORAL at 17:18

## 2022-08-07 RX ADMIN — BACLOFEN 10 MG: 10 TABLET ORAL at 21:19

## 2022-08-07 RX ADMIN — PHENYTOIN SODIUM 100 MG: 100 CAPSULE ORAL at 21:19

## 2022-08-07 RX ADMIN — PHENYTOIN SODIUM 100 MG: 100 CAPSULE ORAL at 09:12

## 2022-08-07 RX ADMIN — CELECOXIB 200 MG: 200 CAPSULE ORAL at 09:12

## 2022-08-07 RX ADMIN — LISINOPRIL 10 MG: 10 TABLET ORAL at 09:12

## 2022-08-07 RX ADMIN — GABAPENTIN 300 MG: 300 CAPSULE ORAL at 21:19

## 2022-08-07 RX ADMIN — SODIUM CHLORIDE, PRESERVATIVE FREE 10 ML: 5 INJECTION INTRAVENOUS at 21:19

## 2022-08-07 RX ADMIN — ATORVASTATIN CALCIUM 10 MG: 10 TABLET, FILM COATED ORAL at 21:19

## 2022-08-07 RX ADMIN — BACLOFEN 10 MG: 10 TABLET ORAL at 17:18

## 2022-08-07 RX ADMIN — DICLOFENAC SODIUM 2 G: 10 GEL TOPICAL at 21:20

## 2022-08-07 RX ADMIN — CARBAMAZEPINE 200 MG: 200 TABLET ORAL at 09:12

## 2022-08-07 RX ADMIN — Medication 667 MG: at 07:19

## 2022-08-07 RX ADMIN — SODIUM CHLORIDE, PRESERVATIVE FREE 10 ML: 5 INJECTION INTRAVENOUS at 06:36

## 2022-08-07 RX ADMIN — BACLOFEN 10 MG: 10 TABLET ORAL at 09:12

## 2022-08-07 RX ADMIN — CARBAMAZEPINE 200 MG: 200 TABLET ORAL at 21:19

## 2022-08-07 RX ADMIN — DICLOFENAC SODIUM 2 G: 10 GEL TOPICAL at 15:21

## 2022-08-07 RX ADMIN — ENOXAPARIN SODIUM 40 MG: 100 INJECTION SUBCUTANEOUS at 09:11

## 2022-08-07 RX ADMIN — DICLOFENAC SODIUM 2 G: 10 GEL TOPICAL at 09:11

## 2022-08-07 RX ADMIN — HYDROCODONE BITARTRATE AND ACETAMINOPHEN 2 TABLET: 5; 325 TABLET ORAL at 20:35

## 2022-08-07 RX ADMIN — SODIUM CHLORIDE, PRESERVATIVE FREE 10 ML: 5 INJECTION INTRAVENOUS at 14:00

## 2022-08-07 RX ADMIN — GABAPENTIN 300 MG: 300 CAPSULE ORAL at 09:12

## 2022-08-07 RX ADMIN — HYDROCHLOROTHIAZIDE 12.5 MG: 25 TABLET ORAL at 09:12

## 2022-08-07 NOTE — PROGRESS NOTES
T.O.C. · RUR- 9% Low  · DISPOSITION: SNF pending placement  · F/U with PCP/Specialist    · Transport: S    CM attempted to call Selena Bedoya to check the status of the SNF referral sent through [address]. I was not able to connect with anyone, but left a VM with Dominic Scott regarding this referral.     CM also left a VM with Ileana at [facility] to follow up on the [facility] referral. CM to follow.      JESUS Marin, Care Manager

## 2022-08-07 NOTE — PROGRESS NOTES
[address] [facility] Adult  Hospitalist Group                                                                                          Hospitalist Progress Note  Christine Henley MD  Answering service: [phone] from in house phone        Date of Service:  2022  NAME:  Jluis Henao  :  1955  MRN:  [id number]      Admission Summary:   Jluis Henao is a 78 y.o. female with history of lumbar stenosis, chronic back pain, HTN, seizure disorder, recent right sacral fracture who presents with worsening pain and limited mobility in the setting of R sacrum fracture and R inferior pubic ramus fracture. She was evaluated by orthopedic surgery, who recommended WBAT. PT/OT were consulted. Patient also endorsed new dysuria and dark colored urine, found to have UTI and started on abx therapy. Interval history / Subjective:   Patient seen and examined earlier this morning by me for sacral and pubic ramus fractures. Patient in bed with a friend visiting. No acute events overnight. Assessment & Plan:     Sacral fracture, right, POA  Inferior pubic ramus fracture, right, POA  Limited mobility  Mechanical Fall  - Presented to ED approx 4 days ago, diagnosed with sacral frcture and pubic ramus fracture. Discharged to home with pain medication per pt request  - had recurrent fall and worsening mobility. Difficulty with IADLs. - Limited mobility of RLE, 1/5 strength at hip, which pt states is chronic for her  - Given worsening of RLE weakness and increased pain with fall since previous imaging, will obtain repeat imaging  Plan:  - CT Pelvis and Lumbar Spine to eval for worsening RLE weakness and worsening pain: Multilevel spondylosis with high-grade spinal canal narrowing at L4-5 and L5-S1.  High-grade narrowing of the bilateral T11-T12, L1-L2, left L2-L3, L3-L4, right L4-5 and L5-S1 neural foramina  - PT/OT eval and treat  - WBAT per ortho recommendations   - Norco 10mg q4h PRN severe pain  - Tramodol 50mg q6h prn moderate pain  - Dispo pending PT eval   - Ortho consulted, plan for OP F/U in 2-3 weeks pending clinical improvement    At this time I believe her issues are the acute fractures plus her chronic back issues. At this time I am not sure that the spine surgeon will be warranted as the more acute issues of the pubic and sacral fractures need to be addressed patient needs some physical therapy before we can determine how much worse her issues are made by her spine. She does have issues in her lumbar and lower thoracic spine. We will discussed with her and get spine surgery involved as needed. Patient has no interest in surgery at this time. Continue to work with physical therapy and aim towards SNF    Pending SNF approval    I want the patient to be able to work with physical therapy and Occupational Therapy tomorrow and see where she is at at this point. Acute cystitis,   Abdominal pain, controlled  - UA with moderate blood and nitrites  - Pt endorses dysuria and dark colored urine  - fever resolved, no leukocytosis   Plan:  - CT Abdomen: negative  - Rocephin 1g q24h   - F/U urine culture:    STAPHYLOCOCCUS LUGDUNENSIS         Lumbar stenosis  Chronic pain  - PT/OT eval and treat  - Consider OP referral to primary spine provider  - Continue home baclofen 10mg daily; gabapentin 300mg TID  If patient continues to show difficulty with pain and working with PT then we will talk to spine surgery     HTN  - Continue home lisinopril and HCTZ     Seizure disorder  - Continue home carbamezapine and home phenytoin           Code status: FULL CODE  Prophylaxis: Lovenox  Care Plan discussed with: patient  Anticipated Disposition: TBD       Hospital Problems  Date Reviewed: 7/11/2022            Codes Class Noted POA    Sacral pain ICD-10-CM: M53.3  ICD-9-CM: 724.6  8/2/2022 Unknown       Review of Systems:   A comprehensive review of systems was negative except for that written in the HPI. Vital Signs:    Last 24hrs VS reviewed since prior progress note. Most recent are:  Visit Vitals  /82 (BP 1 Location: Left upper arm, BP Patient Position: Lying)   Pulse 71   Temp 98.8 °F (37.1 °C)   Resp 16   SpO2 94%         Intake/Output Summary (Last 24 hours) at 8/7/2022 6895  Last data filed at 8/7/2022 1214  Gross per 24 hour   Intake --   Output 1300 ml   Net -1300 ml          Physical Examination:     I had a face to face encounter with this patient and independently examined them on 8/7/2022 as outlined below:          Constitutional:  No acute distress, cooperative, pleasant    ENT:  Oral mucosa moist, oropharynx benign. Resp:  CTA bilaterally. No wheezing/rhonchi/rales. No accessory muscle use. CV:  Regular rhythm, normal rate, no murmurs, gallops, rubs    GI:  Soft, non distended, normoactive bowel sounds, no hepatosplenomegaly. Mildly tender to the RLQ and epigastric area. MSK:  No edema, warm, 2+ pulses throughout. 1/5 strength of RLE. 4/5 strength of LLE. 1+ DTRs of bilateral LEs, symmetrical. Normal babinski. Neurologic:  AAOx3, CN II-XII reviewed, unable to move RLE since 2015  + back pain with minimal movement            Data Review:    Review and/or order of clinical lab test  Review and/or order of tests in the radiology section of CPT  Review and/or order of tests in the medicine section of CPT      Labs:     Recent Labs     08/07/22  0142 08/06/22  0345   WBC 6.0 5.8   HGB 13.1 12.4   HCT 38.4 36.5   * 406*       Recent Labs     08/07/22  0142 08/06/22  0345 08/05/22  0410    139 139   K 5.0 4.0 4.0    103 104   CO2 28 31 29   BUN 17 17 10   CREA 0.70 0.72 0.57   GLU 86 95 84   CA 9.5 9.8 9.4       No results for input(s): ALT, AP, TBIL, TBILI, TP, ALB, GLOB, GGT, AML, LPSE in the last 72 hours. No lab exists for component: SGOT, GPT, AMYP, HLPSE    No results for input(s): INR, PTP, APTT, INREXT, INREXT in the last 72 hours.    No results for input(s): FE, TIBC, PSAT, FERR in the last 72 hours. No results found for: FOL, RBCF   No results for input(s): PH, PCO2, PO2 in the last 72 hours. No results for input(s): CPK, CKNDX, TROIQ in the last 72 hours.     No lab exists for component: CPKMB  Lab Results   Component Value Date/Time    Cholesterol, total 295 (H) 05/05/2022 02:20 PM    HDL Cholesterol 106 05/05/2022 02:20 PM    LDL, calculated 179 (H) 05/05/2022 02:20 PM    Triglyceride 50 05/05/2022 02:20 PM    CHOL/HDL Ratio 2.8 05/05/2022 02:20 PM     No results found for: [facility]  Lab Results   Component Value Date/Time    Color YELLOW/STRAW 08/02/2022 03:07 PM    Appearance CLEAR 08/02/2022 03:07 PM    Specific gravity 1.024 08/02/2022 03:07 PM    Specific gravity 1.020 08/10/2021 03:08 PM    pH (UA) 6.0 08/02/2022 03:07 PM    Protein 100 (A) 08/02/2022 03:07 PM    Glucose Negative 08/02/2022 03:07 PM    Ketone >80 (A) 08/02/2022 03:07 PM    Bilirubin Negative 08/02/2022 03:07 PM    Urobilinogen 1.0 08/02/2022 03:07 PM    Nitrites Positive (A) 08/02/2022 03:07 PM    Leukocyte Esterase Negative 08/02/2022 03:07 PM    Epithelial cells FEW 08/02/2022 03:07 PM    Bacteria 2+ (A) 08/02/2022 03:07 PM    WBC 0-4 08/02/2022 03:07 PM    RBC 0-5 08/02/2022 03:07 PM         Medications Reviewed:     Current Facility-Administered Medications   Medication Dose Route Frequency    diclofenac (VOLTAREN) 1 % topical gel 2 g  2 g Topical QID    baclofen (LIORESAL) tablet 10 mg  10 mg Oral TID    traMADoL (ULTRAM) tablet 50 mg  50 mg Oral Q6H PRN    atorvastatin (LIPITOR) tablet 10 mg  10 mg Oral QHS    lisinopriL (PRINIVIL, ZESTRIL) tablet 10 mg  10 mg Oral DAILY    HYDROcodone-acetaminophen (NORCO) 5-325 mg per tablet 2 Tablet  2 Tablet Oral Q4H PRN    gabapentin (NEURONTIN) capsule 300 mg  300 mg Oral TID    sodium chloride (NS) flush 5-40 mL  5-40 mL IntraVENous Q8H    sodium chloride (NS) flush 5-40 mL  5-40 mL IntraVENous PRN    acetaminophen (TYLENOL) tablet 650 mg  650 mg Oral Q6H PRN    Or    acetaminophen (TYLENOL) suppository 650 mg  650 mg Rectal Q6H PRN    polyethylene glycol (MIRALAX) packet 17 g  17 g Oral DAILY PRN    ondansetron (ZOFRAN ODT) tablet 4 mg  4 mg Oral Q8H PRN    Or    ondansetron (ZOFRAN) injection 4 mg  4 mg IntraVENous Q6H PRN    enoxaparin (LOVENOX) injection 40 mg  40 mg SubCUTAneous DAILY    calcium acetate (phosphate binder) (PHOSLO) tablet 667 mg  1 Tablet Oral DAILY WITH BREAKFAST    celecoxib (CELEBREX) capsule 200 mg  200 mg Oral DAILY    hydroCHLOROthiazide (HYDRODIURIL) tablet 12.5 mg  12.5 mg Oral DAILY    carBAMazepine (TEGretol) tablet 200 mg  200 mg Oral BID    phenytoin ER (DILANTIN ER) ER capsule 100 mg  100 mg Oral TID     ______________________________________________________________________  EXPECTED LENGTH OF STAY: 2d 21h  ACTUAL LENGTH OF STAY:          [address] Karis Varghese MD

## 2022-08-07 NOTE — PROGRESS NOTES
Bedside and Verbal shift change report given to VALENTIN RN (oncoming nurse) by Devan Neal LPN  (offgoing nurse). Report included the following information SBAR, Kardex, Intake/Output, and MAR.

## 2022-08-07 NOTE — PROGRESS NOTES
Problem: Pressure Injury - Risk of  Goal: *Prevention of pressure injury  Description: Document Kike Scale and appropriate interventions in the flowsheet. Outcome: Progressing Towards Goal  Note: Pressure Injury Interventions: Activity Interventions: PT/OT evaluation    Mobility Interventions: PT/OT evaluation    Nutrition Interventions: Offer support with meals,snacks and hydration    Friction and Shear Interventions: Minimize layers                Problem: Patient Education: Go to Patient Education Activity  Goal: Patient/Family Education  Outcome: Progressing Towards Goal     Problem: Falls - Risk of  Goal: *Absence of Falls  Description: Document Mahin Fall Risk and appropriate interventions in the flowsheet.   Outcome: Progressing Towards Goal  Note: Fall Risk Interventions:  Mobility Interventions: Bed/chair exit alarm         Medication Interventions: Patient to call before getting OOB    Elimination Interventions: Call light in reach    History of Falls Interventions: Bed/chair exit alarm         Problem: Patient Education: Go to Patient Education Activity  Goal: Patient/Family Education  Outcome: Progressing Towards Goal     Problem: Patient Education: Go to Patient Education Activity  Goal: Patient/Family Education  Outcome: Progressing Towards Goal     Problem: Patient Education: Go to Patient Education Activity  Goal: Patient/Family Education  Outcome: Progressing Towards Goal

## 2022-08-07 NOTE — PROGRESS NOTES
[facility] Adult  Hospitalist Group                                                                                          Hospitalist Progress Note  Gui Santos MD  Answering service: [phone] from in house phone        Date of Service:  2022  NAME:  Gloria Toussaint  :  1955  MRN:  [id number]      Admission Summary:   Gloria Toussaint is a 78 y.o. female with history of lumbar stenosis, chronic back pain, HTN, seizure disorder, recent right sacral fracture who presents with worsening pain and limited mobility in the setting of R sacrum fracture and R inferior pubic ramus fracture. She was evaluated by orthopedic surgery, who recommended WBAT. PT/OT were consulted. Patient also endorsed new dysuria and dark colored urine, found to have UTI and started on abx therapy. Interval history / Subjective:   Patient seen and examined earlier this morning by me for sacral and pubic ramus fractures. Patient in bed with a friend visiting. No acute events overnight. Assessment & Plan:     Sacral fracture, right, POA  Inferior pubic ramus fracture, right, POA  Limited mobility  Mechanical Fall  - Presented to ED approx 4 days ago, diagnosed with sacral frcture and pubic ramus fracture. Discharged to home with pain medication per pt request  - had recurrent fall and worsening mobility. Difficulty with IADLs. - Limited mobility of RLE, 1/5 strength at hip, which pt states is chronic for her  - Given worsening of RLE weakness and increased pain with fall since previous imaging, will obtain repeat imaging  Plan:  - CT Pelvis and Lumbar Spine to eval for worsening RLE weakness and worsening pain: Multilevel spondylosis with high-grade spinal canal narrowing at L4-5 and L5-S1.  High-grade narrowing of the bilateral T11-T12, L1-L2, left L2-L3, L3-L4, right L4-5 and L5-S1 neural foramina  - PT/OT eval and treat  - WBAT per ortho recommendations   - Norco 10mg q4h PRN severe pain  - Tramodol 50mg q6h prn moderate pain  - Dispo pending PT eval   - Ortho consulted, plan for OP F/U in 2-3 weeks pending clinical improvement    At this time I believe her issues are the acute fractures plus her chronic back issues. At this time I am not sure that the spine surgeon will be warranted as the more acute issues of the pubic and sacral fractures need to be addressed patient needs some physical therapy before we can determine how much worse her issues are made by her spine. She does have issues in her lumbar and lower thoracic spine. We will discussed with her and get spine surgery involved as needed. Patient has no interest in surgery at this time. Continue to work with physical therapy and aim towards SNF    Pending SNF approval    I want the patient to be able to work with physical therapy and Occupational Therapy tomorrow and see where she is at at this point. Acute cystitis,   Abdominal pain, controlled  - UA with moderate blood and nitrites  - Pt endorses dysuria and dark colored urine  - fever resolved, no leukocytosis   Plan:  - CT Abdomen: negative  - Rocephin 1g q24h   - F/U urine culture:    STAPHYLOCOCCUS LUGDUNENSIS         Lumbar stenosis  Chronic pain  - PT/OT eval and treat  - Consider OP referral to primary spine provider  - Continue home baclofen 10mg daily; gabapentin 300mg TID  If patient continues to show difficulty with pain and working with PT then we will talk to spine surgery     HTN  - Continue home lisinopril and HCTZ     Seizure disorder  - Continue home carbamezapine and home phenytoin           Code status: FULL CODE  Prophylaxis: Lovenox  Care Plan discussed with: patient  Anticipated Disposition: TBD       Hospital Problems  Date Reviewed: 7/11/2022            Codes Class Noted POA    Sacral pain ICD-10-CM: M53.3  ICD-9-CM: 724.6  8/2/2022 Unknown       Review of Systems:   A comprehensive review of systems was negative except for that written in the HPI. Vital Signs:    Last 24hrs VS reviewed since prior progress note. Most recent are:  Visit Vitals  /82 (BP 1 Location: Left upper arm, BP Patient Position: Lying)   Pulse 71   Temp 98.8 °F (37.1 °C)   Resp 16   SpO2 94%         Intake/Output Summary (Last 24 hours) at 8/7/2022 1755  Last data filed at 8/7/2022 0491  Gross per 24 hour   Intake --   Output 1300 ml   Net -1300 ml          Physical Examination:     I had a face to face encounter with this patient and independently examined them on 8/7/2022 as outlined below:          Constitutional:  No acute distress, cooperative, pleasant    ENT:  Oral mucosa moist, oropharynx benign. Resp:  CTA bilaterally. No wheezing/rhonchi/rales. No accessory muscle use. CV:  Regular rhythm, normal rate, no murmurs, gallops, rubs    GI:  Soft, non distended, normoactive bowel sounds, no hepatosplenomegaly. Mildly tender to the RLQ and epigastric area. MSK:  No edema, warm, 2+ pulses throughout. 1/5 strength of RLE. 4/5 strength of LLE. 1+ DTRs of bilateral LEs, symmetrical. Normal babinski. Neurologic:  AAOx3, CN II-XII reviewed, unable to move RLE since 2015  + back pain with minimal movement            Data Review:    Review and/or order of clinical lab test  Review and/or order of tests in the radiology section of CPT  Review and/or order of tests in the medicine section of CPT      Labs:     Recent Labs     08/07/22  0142 08/06/22  0345   WBC 6.0 5.8   HGB 13.1 12.4   HCT 38.4 36.5   * 406*       Recent Labs     08/07/22  0142 08/06/22  0345 08/05/22  0410    139 139   K 5.0 4.0 4.0    103 104   CO2 28 31 29   BUN 17 17 10   CREA 0.70 0.72 0.57   GLU 86 95 84   CA 9.5 9.8 9.4       No results for input(s): ALT, AP, TBIL, TBILI, TP, ALB, GLOB, GGT, AML, LPSE in the last 72 hours. No lab exists for component: SGOT, GPT, AMYP, HLPSE    No results for input(s): INR, PTP, APTT, INREXT, INREXT in the last 72 hours.    No results for input(s): FE, TIBC, PSAT, FERR in the last 72 hours. No results found for: FOL, RBCF   No results for input(s): PH, PCO2, PO2 in the last 72 hours. No results for input(s): CPK, CKNDX, TROIQ in the last 72 hours.     No lab exists for component: CPKMB  Lab Results   Component Value Date/Time    Cholesterol, total 295 (H) 05/05/2022 02:20 PM    HDL Cholesterol 106 05/05/2022 02:20 PM    LDL, calculated 179 (H) 05/05/2022 02:20 PM    Triglyceride 50 05/05/2022 02:20 PM    CHOL/HDL Ratio 2.8 05/05/2022 02:20 PM     No results found for: [facility]  Lab Results   Component Value Date/Time    Color YELLOW/STRAW 08/02/2022 03:07 PM    Appearance CLEAR 08/02/2022 03:07 PM    Specific gravity 1.024 08/02/2022 03:07 PM    Specific gravity 1.020 08/10/2021 03:08 PM    pH (UA) 6.0 08/02/2022 03:07 PM    Protein 100 (A) 08/02/2022 03:07 PM    Glucose Negative 08/02/2022 03:07 PM    Ketone >80 (A) 08/02/2022 03:07 PM    Bilirubin Negative 08/02/2022 03:07 PM    Urobilinogen 1.0 08/02/2022 03:07 PM    Nitrites Positive (A) 08/02/2022 03:07 PM    Leukocyte Esterase Negative 08/02/2022 03:07 PM    Epithelial cells FEW 08/02/2022 03:07 PM    Bacteria 2+ (A) 08/02/2022 03:07 PM    WBC 0-4 08/02/2022 03:07 PM    RBC 0-5 08/02/2022 03:07 PM         Medications Reviewed:     Current Facility-Administered Medications   Medication Dose Route Frequency    diclofenac (VOLTAREN) 1 % topical gel 2 g  2 g Topical QID    baclofen (LIORESAL) tablet 10 mg  10 mg Oral TID    traMADoL (ULTRAM) tablet 50 mg  50 mg Oral Q6H PRN    atorvastatin (LIPITOR) tablet 10 mg  10 mg Oral QHS    lisinopriL (PRINIVIL, ZESTRIL) tablet 10 mg  10 mg Oral DAILY    HYDROcodone-acetaminophen (NORCO) 5-325 mg per tablet 2 Tablet  2 Tablet Oral Q4H PRN    gabapentin (NEURONTIN) capsule 300 mg  300 mg Oral TID    sodium chloride (NS) flush 5-40 mL  5-40 mL IntraVENous Q8H    sodium chloride (NS) flush 5-40 mL  5-40 mL IntraVENous PRN    acetaminophen (TYLENOL) tablet 650 mg  650 mg Oral Q6H PRN    Or    acetaminophen (TYLENOL) suppository 650 mg  650 mg Rectal Q6H PRN    polyethylene glycol (MIRALAX) packet 17 g  17 g Oral DAILY PRN    ondansetron (ZOFRAN ODT) tablet 4 mg  4 mg Oral Q8H PRN    Or    ondansetron (ZOFRAN) injection 4 mg  4 mg IntraVENous Q6H PRN    enoxaparin (LOVENOX) injection 40 mg  40 mg SubCUTAneous DAILY    calcium acetate (phosphate binder) (PHOSLO) tablet 667 mg  1 Tablet Oral DAILY WITH BREAKFAST    celecoxib (CELEBREX) capsule 200 mg  200 mg Oral DAILY    hydroCHLOROthiazide (HYDRODIURIL) tablet 12.5 mg  12.5 mg Oral DAILY    carBAMazepine (TEGretol) tablet 200 mg  200 mg Oral BID    phenytoin ER (DILANTIN ER) ER capsule 100 mg  100 mg Oral TID     ______________________________________________________________________  EXPECTED LENGTH OF STAY: 2d 21h  ACTUAL LENGTH OF STAY:          [address] Eli Rogers MD

## 2022-08-07 NOTE — PROGRESS NOTES
T.O.C. · RUR- 9% Low  · DISPOSITION: SNF pending placement  · F/U with PCP/Specialist    · Transport: S    CM attempted to call [facility] to check the status of the SNF referral sent through [facility]. I was not able to connect with anyone, but left a VM with Angeline Reyes regarding this referral.     CM also left a VM with Ileana at [facility] to follow up on the [address] [place] [address],[facility] Floor referral. CM to follow.      JESUS Coombs, Care Manager

## 2022-08-08 LAB
ANION GAP SERPL CALC-SCNC: 4 MMOL/L (ref 5–15)
BUN SERPL-MCNC: 26 MG/DL (ref 6–20)
BUN/CREAT SERPL: 24 (ref 12–20)
CALCIUM SERPL-MCNC: 9.9 MG/DL (ref 8.5–10.1)
CHLORIDE SERPL-SCNC: 104 MMOL/L (ref 97–108)
CO2 SERPL-SCNC: 31 MMOL/L (ref 21–32)
CREAT SERPL-MCNC: 1.1 MG/DL (ref 0.55–1.02)
GLUCOSE SERPL-MCNC: 89 MG/DL (ref 65–100)
POTASSIUM SERPL-SCNC: 4.2 MMOL/L (ref 3.5–5.1)
SODIUM SERPL-SCNC: 139 MMOL/L (ref 136–145)

## 2022-08-08 PROCEDURE — 74011250637 HC RX REV CODE- 250/637: Performed by: INTERNAL MEDICINE

## 2022-08-08 PROCEDURE — 65270000029 HC RM PRIVATE

## 2022-08-08 PROCEDURE — 97530 THERAPEUTIC ACTIVITIES: CPT | Performed by: OCCUPATIONAL THERAPIST

## 2022-08-08 PROCEDURE — 74011000250 HC RX REV CODE- 250: Performed by: STUDENT IN AN ORGANIZED HEALTH CARE EDUCATION/TRAINING PROGRAM

## 2022-08-08 PROCEDURE — 36415 COLL VENOUS BLD VENIPUNCTURE: CPT

## 2022-08-08 PROCEDURE — 97535 SELF CARE MNGMENT TRAINING: CPT | Performed by: OCCUPATIONAL THERAPIST

## 2022-08-08 PROCEDURE — 74011250636 HC RX REV CODE- 250/636: Performed by: STUDENT IN AN ORGANIZED HEALTH CARE EDUCATION/TRAINING PROGRAM

## 2022-08-08 PROCEDURE — 80048 BASIC METABOLIC PNL TOTAL CA: CPT

## 2022-08-08 PROCEDURE — 97530 THERAPEUTIC ACTIVITIES: CPT

## 2022-08-08 PROCEDURE — 77030038269 HC DRN EXT URIN PURWCK BARD -A

## 2022-08-08 PROCEDURE — 97110 THERAPEUTIC EXERCISES: CPT

## 2022-08-08 PROCEDURE — 74011250637 HC RX REV CODE- 250/637: Performed by: STUDENT IN AN ORGANIZED HEALTH CARE EDUCATION/TRAINING PROGRAM

## 2022-08-08 RX ADMIN — ATORVASTATIN CALCIUM 10 MG: 10 TABLET, FILM COATED ORAL at 22:21

## 2022-08-08 RX ADMIN — SODIUM CHLORIDE, PRESERVATIVE FREE 10 ML: 5 INJECTION INTRAVENOUS at 06:00

## 2022-08-08 RX ADMIN — SODIUM CHLORIDE, PRESERVATIVE FREE 10 ML: 5 INJECTION INTRAVENOUS at 17:45

## 2022-08-08 RX ADMIN — PHENYTOIN SODIUM 100 MG: 100 CAPSULE ORAL at 22:21

## 2022-08-08 RX ADMIN — GABAPENTIN 300 MG: 300 CAPSULE ORAL at 09:26

## 2022-08-08 RX ADMIN — GABAPENTIN 300 MG: 300 CAPSULE ORAL at 17:45

## 2022-08-08 RX ADMIN — DICLOFENAC SODIUM 2 G: 10 GEL TOPICAL at 17:45

## 2022-08-08 RX ADMIN — PHENYTOIN SODIUM 100 MG: 100 CAPSULE ORAL at 09:26

## 2022-08-08 RX ADMIN — LISINOPRIL 10 MG: 10 TABLET ORAL at 09:25

## 2022-08-08 RX ADMIN — DICLOFENAC SODIUM 2 G: 10 GEL TOPICAL at 09:25

## 2022-08-08 RX ADMIN — PHENYTOIN SODIUM 100 MG: 100 CAPSULE ORAL at 17:45

## 2022-08-08 RX ADMIN — HYDROCODONE BITARTRATE AND ACETAMINOPHEN 2 TABLET: 5; 325 TABLET ORAL at 22:26

## 2022-08-08 RX ADMIN — CARBAMAZEPINE 200 MG: 200 TABLET ORAL at 22:21

## 2022-08-08 RX ADMIN — CARBAMAZEPINE 200 MG: 200 TABLET ORAL at 09:26

## 2022-08-08 RX ADMIN — DICLOFENAC SODIUM 2 G: 10 GEL TOPICAL at 02:00

## 2022-08-08 RX ADMIN — BACLOFEN 10 MG: 10 TABLET ORAL at 17:45

## 2022-08-08 RX ADMIN — Medication 667 MG: at 08:14

## 2022-08-08 RX ADMIN — DICLOFENAC SODIUM 2 G: 10 GEL TOPICAL at 12:59

## 2022-08-08 RX ADMIN — HYDROCODONE BITARTRATE AND ACETAMINOPHEN 2 TABLET: 5; 325 TABLET ORAL at 01:47

## 2022-08-08 RX ADMIN — ENOXAPARIN SODIUM 40 MG: 100 INJECTION SUBCUTANEOUS at 09:27

## 2022-08-08 RX ADMIN — SODIUM CHLORIDE, PRESERVATIVE FREE 10 ML: 5 INJECTION INTRAVENOUS at 22:21

## 2022-08-08 RX ADMIN — GABAPENTIN 300 MG: 300 CAPSULE ORAL at 22:21

## 2022-08-08 RX ADMIN — HYDROCHLOROTHIAZIDE 12.5 MG: 25 TABLET ORAL at 09:25

## 2022-08-08 RX ADMIN — CELECOXIB 200 MG: 200 CAPSULE ORAL at 09:25

## 2022-08-08 RX ADMIN — DICLOFENAC SODIUM 2 G: 10 GEL TOPICAL at 22:22

## 2022-08-08 RX ADMIN — BACLOFEN 10 MG: 10 TABLET ORAL at 22:20

## 2022-08-08 RX ADMIN — BACLOFEN 10 MG: 10 TABLET ORAL at 09:26

## 2022-08-08 NOTE — PROGRESS NOTES
[facility] Adult  Hospitalist Group                                                                                          Hospitalist Progress Note  Marci Machado MD  Answering service: [phone] OR [phone] from in house phone        Date of Service:  2022  NAME:  Juan Diego Donnelly  :  1955  MRN:  [id number]      Admission Summary:   Juan Diego Donnelly is a 78 y.o. female with history of lumbar stenosis, chronic back pain, HTN, seizure disorder, recent right sacral fracture who presents with worsening pain and limited mobility in the setting of R sacrum fracture and R inferior pubic ramus fracture. She was evaluated by orthopedic surgery, who recommended WBAT. PT/OT were consulted. Patient also endorsed new dysuria and dark colored urine, found to have UTI and started on abx therapy. Interval history / Subjective:   Patient seen and examined earlier this morning by me for sacral and pubic ramus fractures. No new complaints at the time of my exam.     Assessment & Plan:     Sacral fracture, right, POA  Inferior pubic ramus fracture, right, POA  Limited mobility  Mechanical Fall  - Presented to ED approx 4 days ago, diagnosed with sacral frcture and pubic ramus fracture. Discharged to home with pain medication per pt request  - had recurrent fall and worsening mobility. Difficulty with IADLs. - Limited mobility of RLE, 1/5 strength at hip, which pt states is chronic for her  - Given worsening of RLE weakness and increased pain with fall since previous imaging, will obtain repeat imaging  Plan:  - CT Pelvis and Lumbar Spine to eval for worsening RLE weakness and worsening pain: Multilevel spondylosis with high-grade spinal canal narrowing at L4-5 and L5-S1.  High-grade narrowing of the bilateral T11-T12, L1-L2, left L2-L3, L3-L4, right L4-5 and L5-S1 neural foramina  - PT/OT eval and treat  - WBAT per ortho recommendations   - Norco 10mg q4h PRN severe pain  - Tramodol 50mg q6h prn moderate pain  - Dispo pending PT eval   - Ortho consulted, plan for OP F/U in 2-3 weeks pending clinical improvement    At this time I believe her issues are the acute fractures plus her chronic back issues. At this time I am not sure that the spine surgeon will be warranted as the more acute issues of the pubic and sacral fractures need to be addressed patient needs some physical therapy before we can determine how much worse her issues are made by her spine. She does have issues in her lumbar and lower thoracic spine. We will discussed with her and get spine surgery involved as needed. Patient has no interest in surgery at this time. Continue to work with physical therapy and aim towards SNF    Pending SNF approval.  Referral sent. Pending SNF placement. Acute cystitis,   Abdominal pain, controlled  - UA with moderate blood and nitrites  - Pt endorses dysuria and dark colored urine  - fever resolved, no leukocytosis   Plan:  - CT Abdomen: negative  - Rocephin 1g q24h   - F/U urine culture:    STAPHYLOCOCCUS LUGDUNENSIS       Lumbar stenosis  Chronic pain  - PT/OT eval and treat  - Consider OP referral to primary spine provider  - Continue home baclofen 10mg daily; gabapentin 300mg TID  If patient continues to show difficulty with pain and working with PT then we will talk to spine surgery     HTN  - Continue home lisinopril and HCTZ     Seizure disorder  - Continue home carbamezapine and home phenytoin         Code status: FULL CODE  Prophylaxis: Lovenox  Care Plan discussed with: patient  Anticipated Disposition: D       Hospital Problems  Date Reviewed: 7/11/2022            Codes Class Noted POA    Sacral pain ICD-10-CM: M53.3  ICD-9-CM: 724.6  8/2/2022 Unknown       Review of Systems:   A comprehensive review of systems was negative except for that written in the HPI. Vital Signs:    Last 24hrs VS reviewed since prior progress note.  Most recent are:  Visit Vitals  BP 99/63   Pulse 91   Temp 99.7 °F (37.6 °C)   Resp 16   Wt 84.7 kg (186 lb 12.8 oz)   SpO2 94%   BMI 32.06 kg/m²         Intake/Output Summary (Last 24 hours) at 8/8/2022 1614  Last data filed at 8/8/2022 0241  Gross per 24 hour   Intake --   Output 1000 ml   Net -1000 ml          Physical Examination:     I had a face to face encounter with this patient and independently examined them on 8/8/2022 as outlined below:          Constitutional:  No acute distress, cooperative, pleasant    ENT:  Oral mucosa moist, oropharynx benign. Resp:  CTA bilaterally. No wheezing/rhonchi/rales. No accessory muscle use. CV:  Regular rhythm, normal rate, no murmurs, gallops, rubs    GI:  Soft, non distended, normoactive bowel sounds, no hepatosplenomegaly. Mildly tender to the RLQ and epigastric area. MSK:  No edema, warm, 2+ pulses throughout. 1/5 strength of RLE. 4/5 strength of LLE. 1+ DTRs of bilateral LEs, symmetrical. Normal babinski. Neurologic:  AAOx3, CN II-XII reviewed, unable to move RLE since 2015  + back pain with minimal movement            Data Review:    Review and/or order of clinical lab test  Review and/or order of tests in the radiology section of CPT  Review and/or order of tests in the medicine section of CPT      Labs:     Recent Labs     08/07/22  0142 08/06/22  0345   WBC 6.0 5.8   HGB 13.1 12.4   HCT 38.4 36.5   * 406*       Recent Labs     08/08/22  0136 08/07/22  0142 08/06/22  0345    138 139   K 4.2 5.0 4.0    104 103   CO2 31 28 31   BUN 26* 17 17   CREA 1.10* 0.70 0.72   GLU 89 86 95   CA 9.9 9.5 9.8       No results for input(s): ALT, AP, TBIL, TBILI, TP, ALB, GLOB, GGT, AML, LPSE in the last 72 hours. No lab exists for component: SGOT, GPT, AMYP, HLPSE    No results for input(s): INR, PTP, APTT, INREXT, INREXT in the last 72 hours. No results for input(s): FE, TIBC, PSAT, FERR in the last 72 hours.    No results found for: FOL, RBCF   No results for input(s): PH, PCO2, PO2 in the last 72 hours. No results for input(s): CPK, CKNDX, TROIQ in the last 72 hours.     No lab exists for component: CPKMB  Lab Results   Component Value Date/Time    Cholesterol, total 295 (H) 05/05/2022 02:20 PM    HDL Cholesterol 106 05/05/2022 02:20 PM    LDL, calculated 179 (H) 05/05/2022 02:20 PM    Triglyceride 50 05/05/2022 02:20 PM    CHOL/HDL Ratio 2.8 05/05/2022 02:20 PM     No results found for: [facility]  Lab Results   Component Value Date/Time    Color YELLOW/STRAW 08/02/2022 03:07 PM    Appearance CLEAR 08/02/2022 03:07 PM    Specific gravity 1.024 08/02/2022 03:07 PM    Specific gravity 1.020 08/10/2021 03:08 PM    pH (UA) 6.0 08/02/2022 03:07 PM    Protein 100 (A) 08/02/2022 03:07 PM    Glucose Negative 08/02/2022 03:07 PM    Ketone >80 (A) 08/02/2022 03:07 PM    Bilirubin Negative 08/02/2022 03:07 PM    Urobilinogen 1.0 08/02/2022 03:07 PM    Nitrites Positive (A) 08/02/2022 03:07 PM    Leukocyte Esterase Negative 08/02/2022 03:07 PM    Epithelial cells FEW 08/02/2022 03:07 PM    Bacteria 2+ (A) 08/02/2022 03:07 PM    WBC 0-4 08/02/2022 03:07 PM    RBC 0-5 08/02/2022 03:07 PM         Medications Reviewed:     Current Facility-Administered Medications   Medication Dose Route Frequency    diclofenac (VOLTAREN) 1 % topical gel 2 g  2 g Topical QID    baclofen (LIORESAL) tablet 10 mg  10 mg Oral TID    traMADoL (ULTRAM) tablet 50 mg  50 mg Oral Q6H PRN    atorvastatin (LIPITOR) tablet 10 mg  10 mg Oral QHS    lisinopriL (PRINIVIL, ZESTRIL) tablet 10 mg  10 mg Oral DAILY    HYDROcodone-acetaminophen (NORCO) 5-325 mg per tablet 2 Tablet  2 Tablet Oral Q4H PRN    gabapentin (NEURONTIN) capsule 300 mg  300 mg Oral TID    sodium chloride (NS) flush 5-40 mL  5-40 mL IntraVENous Q8H    sodium chloride (NS) flush 5-40 mL  5-40 mL IntraVENous PRN    acetaminophen (TYLENOL) tablet 650 mg  650 mg Oral Q6H PRN    Or    acetaminophen (TYLENOL) suppository 650 mg  650 mg Rectal Q6H PRN    polyethylene glycol (MIRALAX) packet 17 g  17 g Oral DAILY PRN    ondansetron (ZOFRAN ODT) tablet 4 mg  4 mg Oral Q8H PRN    Or    ondansetron (ZOFRAN) injection 4 mg  4 mg IntraVENous Q6H PRN    enoxaparin (LOVENOX) injection 40 mg  40 mg SubCUTAneous DAILY    calcium acetate (phosphate binder) (PHOSLO) tablet 667 mg  1 Tablet Oral DAILY WITH BREAKFAST    celecoxib (CELEBREX) capsule 200 mg  200 mg Oral DAILY    hydroCHLOROthiazide (HYDRODIURIL) tablet 12.5 mg  12.5 mg Oral DAILY    carBAMazepine (TEGretol) tablet 200 mg  200 mg Oral BID    phenytoin ER (DILANTIN ER) ER capsule 100 mg  100 mg Oral TID     ______________________________________________________________________  EXPECTED LENGTH OF STAY: 2d 21h  ACTUAL LENGTH OF STAY:          6                 Lauri Cheema MD

## 2022-08-08 NOTE — PROGRESS NOTES
RUR 10 %     Transition of Care- SNF placement. - referral sent to [facility] via [address] And accepted. [address] was started today 8/8. The patient will need BLS vs  van for transport. [facility] referral pending- declined- no bed available  [facility]- declined -no bed available. [facility] declined    CM to get additional SNF choices- list provided- referral sent to [facility]    Transition of Care Plan:     The Plan for Transition of Care is related to the following treatment goals: SNF    The Patient and/or patient representative  was provided with a choice of provider and agrees  with the discharge plan. Yes [x] No []    A Freedom of choice list was provided with basic dialogue that supports the patient's individualized plan of care/goals and shares the quality data associated with the providers. Yes [x] No []      2nd IMM needed prior to discharge.      Alfred Vasquez, RICKW

## 2022-08-08 NOTE — PROGRESS NOTES
[facility] [facility] Adult  Hospitalist Group                                                                                          Hospitalist Progress Note  Emiliana Carlson MD  Answering service: [phone] from in house phone        Date of Service:  2022  NAME:  Katie Goodman  :  1955  MRN:  [id number]      Admission Summary:   Katie Goodman is a 78 y.o. female with history of lumbar stenosis, chronic back pain, HTN, seizure disorder, recent right sacral fracture who presents with worsening pain and limited mobility in the setting of R sacrum fracture and R inferior pubic ramus fracture. She was evaluated by orthopedic surgery, who recommended WBAT. PT/OT were consulted. Patient also endorsed new dysuria and dark colored urine, found to have UTI and started on abx therapy. Interval history / Subjective:   Patient seen and examined earlier this morning by me for sacral and pubic ramus fractures. No new complaints at the time of my exam.     Assessment & Plan:     Sacral fracture, right, POA  Inferior pubic ramus fracture, right, POA  Limited mobility  Mechanical Fall  - Presented to ED approx 4 days ago, diagnosed with sacral frcture and pubic ramus fracture. Discharged to home with pain medication per pt request  - had recurrent fall and worsening mobility. Difficulty with IADLs. - Limited mobility of RLE, 1/5 strength at hip, which pt states is chronic for her  - Given worsening of RLE weakness and increased pain with fall since previous imaging, will obtain repeat imaging  Plan:  - CT Pelvis and Lumbar Spine to eval for worsening RLE weakness and worsening pain: Multilevel spondylosis with high-grade spinal canal narrowing at L4-5 and L5-S1.  High-grade narrowing of the bilateral T11-T12, L1-L2, left L2-L3, L3-L4, right L4-5 and L5-S1 neural foramina  - PT/OT eval and treat  - WBAT per ortho recommendations   - Norco 10mg q4h PRN severe pain  - Tramodol 50mg q6h prn moderate pain  - Dispo pending PT eval   - Ortho consulted, plan for OP F/U in 2-3 weeks pending clinical improvement    At this time I believe her issues are the acute fractures plus her chronic back issues. At this time I am not sure that the spine surgeon will be warranted as the more acute issues of the pubic and sacral fractures need to be addressed patient needs some physical therapy before we can determine how much worse her issues are made by her spine. She does have issues in her lumbar and lower thoracic spine. We will discussed with her and get spine surgery involved as needed. Patient has no interest in surgery at this time. Continue to work with physical therapy and aim towards SNF    Pending SNF approval.  Referral sent. Pending SNF placement. Acute cystitis,   Abdominal pain, controlled  - UA with moderate blood and nitrites  - Pt endorses dysuria and dark colored urine  - fever resolved, no leukocytosis   Plan:  - CT Abdomen: negative  - Rocephin 1g q24h   - F/U urine culture:    STAPHYLOCOCCUS LUGDUNENSIS       Lumbar stenosis  Chronic pain  - PT/OT eval and treat  - Consider OP referral to primary spine provider  - Continue home baclofen 10mg daily; gabapentin 300mg TID  If patient continues to show difficulty with pain and working with PT then we will talk to spine surgery     HTN  - Continue home lisinopril and HCTZ     Seizure disorder  - Continue home carbamezapine and home phenytoin         Code status: FULL CODE  Prophylaxis: Lovenox  Care Plan discussed with: patient  Anticipated Disposition: D       Hospital Problems  Date Reviewed: 7/11/2022            Codes Class Noted POA    Sacral pain ICD-10-CM: M53.3  ICD-9-CM: 724.6  8/2/2022 Unknown       Review of Systems:   A comprehensive review of systems was negative except for that written in the HPI. Vital Signs:    Last 24hrs VS reviewed since prior progress note.  Most recent are:  Visit Vitals  BP 99/63   Pulse 91   Temp 99.7 °F (37.6 °C)   Resp 16   Wt 84.7 kg (186 lb 12.8 oz)   SpO2 94%   BMI 32.06 kg/m²         Intake/Output Summary (Last 24 hours) at 8/8/2022 1614  Last data filed at 8/8/2022 0241  Gross per 24 hour   Intake --   Output 1000 ml   Net -1000 ml          Physical Examination:     I had a face to face encounter with this patient and independently examined them on 8/8/2022 as outlined below:          Constitutional:  No acute distress, cooperative, pleasant    ENT:  Oral mucosa moist, oropharynx benign. Resp:  CTA bilaterally. No wheezing/rhonchi/rales. No accessory muscle use. CV:  Regular rhythm, normal rate, no murmurs, gallops, rubs    GI:  Soft, non distended, normoactive bowel sounds, no hepatosplenomegaly. Mildly tender to the RLQ and epigastric area. MSK:  No edema, warm, 2+ pulses throughout. 1/5 strength of RLE. 4/5 strength of LLE. 1+ DTRs of bilateral LEs, symmetrical. Normal babinski. Neurologic:  AAOx3, CN II-XII reviewed, unable to move RLE since 2015  + back pain with minimal movement            Data Review:    Review and/or order of clinical lab test  Review and/or order of tests in the radiology section of CPT  Review and/or order of tests in the medicine section of CPT      Labs:     Recent Labs     08/07/22  0142 08/06/22  0345   WBC 6.0 5.8   HGB 13.1 12.4   HCT 38.4 36.5   * 406*       Recent Labs     08/08/22  0136 08/07/22  0142 08/06/22  0345    138 139   K 4.2 5.0 4.0    104 103   CO2 31 28 31   BUN 26* 17 17   CREA 1.10* 0.70 0.72   GLU 89 86 95   CA 9.9 9.5 9.8       No results for input(s): ALT, AP, TBIL, TBILI, TP, ALB, GLOB, GGT, AML, LPSE in the last 72 hours. No lab exists for component: SGOT, GPT, AMYP, HLPSE    No results for input(s): INR, PTP, APTT, INREXT, INREXT in the last 72 hours. No results for input(s): FE, TIBC, PSAT, FERR in the last 72 hours.    No results found for: FOL, RBCF   No results for input(s): PH, PCO2, PO2 in the last 72 hours. No results for input(s): CPK, CKNDX, TROIQ in the last 72 hours.     No lab exists for component: CPKMB  Lab Results   Component Value Date/Time    Cholesterol, total 295 (H) 05/05/2022 02:20 PM    HDL Cholesterol 106 05/05/2022 02:20 PM    LDL, calculated 179 (H) 05/05/2022 02:20 PM    Triglyceride 50 05/05/2022 02:20 PM    CHOL/HDL Ratio 2.8 05/05/2022 02:20 PM     No results found for: [facility]  Lab Results   Component Value Date/Time    Color YELLOW/STRAW 08/02/2022 03:07 PM    Appearance CLEAR 08/02/2022 03:07 PM    Specific gravity 1.024 08/02/2022 03:07 PM    Specific gravity 1.020 08/10/2021 03:08 PM    pH (UA) 6.0 08/02/2022 03:07 PM    Protein 100 (A) 08/02/2022 03:07 PM    Glucose Negative 08/02/2022 03:07 PM    Ketone >80 (A) 08/02/2022 03:07 PM    Bilirubin Negative 08/02/2022 03:07 PM    Urobilinogen 1.0 08/02/2022 03:07 PM    Nitrites Positive (A) 08/02/2022 03:07 PM    Leukocyte Esterase Negative 08/02/2022 03:07 PM    Epithelial cells FEW 08/02/2022 03:07 PM    Bacteria 2+ (A) 08/02/2022 03:07 PM    WBC 0-4 08/02/2022 03:07 PM    RBC 0-5 08/02/2022 03:07 PM         Medications Reviewed:     Current Facility-Administered Medications   Medication Dose Route Frequency    diclofenac (VOLTAREN) 1 % topical gel 2 g  2 g Topical QID    baclofen (LIORESAL) tablet 10 mg  10 mg Oral TID    traMADoL (ULTRAM) tablet 50 mg  50 mg Oral Q6H PRN    atorvastatin (LIPITOR) tablet 10 mg  10 mg Oral QHS    lisinopriL (PRINIVIL, ZESTRIL) tablet 10 mg  10 mg Oral DAILY    HYDROcodone-acetaminophen (NORCO) 5-325 mg per tablet 2 Tablet  2 Tablet Oral Q4H PRN    gabapentin (NEURONTIN) capsule 300 mg  300 mg Oral TID    sodium chloride (NS) flush 5-40 mL  5-40 mL IntraVENous Q8H    sodium chloride (NS) flush 5-40 mL  5-40 mL IntraVENous PRN    acetaminophen (TYLENOL) tablet 650 mg  650 mg Oral Q6H PRN    Or    acetaminophen (TYLENOL) suppository 650 mg  650 mg Rectal Q6H PRN    polyethylene glycol (MIRALAX) packet 17 g  17 g Oral DAILY PRN    ondansetron (ZOFRAN ODT) tablet 4 mg  4 mg Oral Q8H PRN    Or    ondansetron (ZOFRAN) injection 4 mg  4 mg IntraVENous Q6H PRN    enoxaparin (LOVENOX) injection 40 mg  40 mg SubCUTAneous DAILY    calcium acetate (phosphate binder) (PHOSLO) tablet 667 mg  1 Tablet Oral DAILY WITH BREAKFAST    celecoxib (CELEBREX) capsule 200 mg  200 mg Oral DAILY    hydroCHLOROthiazide (HYDRODIURIL) tablet 12.5 mg  12.5 mg Oral DAILY    carBAMazepine (TEGretol) tablet 200 mg  200 mg Oral BID    phenytoin ER (DILANTIN ER) ER capsule 100 mg  100 mg Oral TID     ______________________________________________________________________  EXPECTED LENGTH OF STAY: 2d 21h  ACTUAL LENGTH OF STAY:          6                 Elizabeth Boucher MD

## 2022-08-08 NOTE — PROGRESS NOTES
Problem: Self Care Deficits Care Plan (Adult)  Goal: *Acute Goals and Plan of Care (Insert Text)  Description: FUNCTIONAL STATUS PRIOR TO ADMISSION: Pt reports she lives alone, with strong support for next door neighbor, as well as, paid caregiver assist from 9-3 M-F and 9-5 on Saturday. Reports Mod [place] with W/C level ADLs, using RW to ambulate short distance from bathroom doorway to commode. States she has tub-shower combo and transfer bench. Intermittent confusion noted during PLOF questioning. HOME SUPPORT: The patient lived with neighbor and paid caregiver assist for PRN ADL/IADL task completion. (Paid caregivers 9-3 M-F and 9-5 on Saturday). Occupational Therapy Goals  Initiated 8/4/2022  1. Patient will perform EOB grooming with contact guard assist within 7 day(s). 2.  Patient will perform EOB upper body dressing with supervision/set-up within 7 day(s). 3.  Patient will perform EOB UB bathing with minimal assistance within 7 day(s). 4.  Patient will roll on/off bedpan with minimal assistance within 7 day(s). Outcome: Progressing Towards Goal     OCCUPATIONAL THERAPY TREATMENT  Patient: Cele Rosen (85 y.o. female)  Date: 8/8/2022  Diagnosis: Sacral pain [M53.3] <principal problem not specified>      Precautions: WBAT, Fall  Chart, occupational therapy assessment, plan of care, and goals were reviewed. ASSESSMENT  Patient continues with skilled OT services and is progressing towards goals. Pt demonstrated improved LE ADLs and functional mobility this session. Pt remains limited by RLE pain and feeling like \"R hip isn't in place\" when standing and WBing, decreased strength, endurance, mobility, balance and safety. Pt requires increased time and trial and error for success. Continue to recommend SNF at discharge.      Current Level of Function Impacting Discharge (ADLs): LE ADLs mod A bed level, mod A bed mobility, mod A sit to stand and unable to wt shift side to side to step    Other factors to consider for discharge: see above         PLAN :  Patient continues to benefit from skilled intervention to address the above impairments. Continue treatment per established plan of care to address goals. Recommend with staff: OOB to chair with sit to stand lift    Recommend next OT session: LE ADLs, lateral transfer to drop arm BSC and drop arm chair    Recommendation for discharge: (in order for the patient to meet his/her long term goals)  Therapy up to 5 days/week in SNF setting    This discharge recommendation:  Has been made in collaboration with the attending provider and/or case management    IF patient discharges home will need the following DME: TBD       SUBJECTIVE:   Patient stated I feel a little better.     OBJECTIVE DATA SUMMARY:   Cognitive/Behavioral Status:  Neurologic State: Alert; Appropriate for age  Orientation Level: Oriented X4  Cognition: Appropriate for age attention/concentration; Follows commands  Perception: Cues to maintain midline in sitting;Cues to maintain midline in standing; Tactile;Verbal;Visual  Perseveration: No perseveration noted  Safety/Judgement: Awareness of environment; Fall prevention    Functional Mobility and Transfers for ADLs:  Bed Mobility:  Rolling: Moderate assistance; Additional time;Assist x1  Supine to Sit: Moderate assistance; Additional time;Assist x1  Sit to Supine: Moderate assistance; Additional time;Assist x1  Scooting: Stand-by assistance; Additional time;Assist x1 (seated EOB)    Transfers:  Sit to Stand: Additional time;Assist x1;Maximum assistance          Balance:  Sitting: Intact; With support  Sitting - Static: Good (unsupported)  Sitting - Dynamic: Good (unsupported)  Standing: Impaired;Pull to stand; With support  Standing - Static: Poor;Constant support  Standing - Dynamic : Poor;Constant support    ADL Intervention:  Patient instructed and indicated understanding energy conservation techniques to increase independence and safety during ADLs. Lower Body Dressing Assistance  Socks: (P) Minimum assistance (A to thread over R toes)  Leg Crossed Method Used: (P) Yes  Position Performed: (P) Long sitting on bed  Cues: (P) Don;Physical assistance; Tactile cues provided;Verbal cues provided;Visual cues provided    Cognitive Retraining  Safety/Judgement: Awareness of environment; Fall prevention    Pain:  7/10    Activity Tolerance:   Fair and requires frequent rest breaks    After treatment patient left in no apparent distress:   Supine in bed, Call bell within reach, and Bed / chair alarm activated    COMMUNICATION/COLLABORATION:   The patients plan of care was discussed with: Physical therapy assistant and Registered nurse.      Fercho Walton OT  Time Calculation: 38 mins

## 2022-08-08 NOTE — PROGRESS NOTES
Problem: Self Care Deficits Care Plan (Adult)  Goal: *Acute Goals and Plan of Care (Insert Text)  Description: FUNCTIONAL STATUS PRIOR TO ADMISSION: Pt reports she lives alone, with strong support for next door neighbor, as well as, paid caregiver assist from 9-3 M-F and 9-5 on Saturday. Reports Mod [place] with W/C level ADLs, using RW to ambulate short distance from bathroom doorway to commode. States she has tub-shower combo and transfer bench. Intermittent confusion noted during PLOF questioning. HOME SUPPORT: The patient lived with neighbor and paid caregiver assist for PRN ADL/IADL task completion. (Paid caregivers 9-3 M-F and 9-5 on Saturday). Occupational Therapy Goals  Initiated 8/4/2022  1. Patient will perform EOB grooming with contact guard assist within 7 day(s). 2.  Patient will perform EOB upper body dressing with supervision/set-up within 7 day(s). 3.  Patient will perform EOB UB bathing with minimal assistance within 7 day(s). 4.  Patient will roll on/off bedpan with minimal assistance within 7 day(s). Outcome: Progressing Towards Goal     OCCUPATIONAL THERAPY TREATMENT  Patient: Aye Medellin (48 y.o. female)  Date: 8/8/2022  Diagnosis: Sacral pain [M53.3] <principal problem not specified>      Precautions: WBAT, Fall  Chart, occupational therapy assessment, plan of care, and goals were reviewed. ASSESSMENT  Patient continues with skilled OT services and is progressing towards goals. Pt demonstrated improved LE ADLs and functional mobility this session. Pt remains limited by RLE pain and feeling like \"R hip isn't in place\" when standing and WBing, decreased strength, endurance, mobility, balance and safety. Pt requires increased time and trial and error for success. Continue to recommend SNF at discharge.      Current Level of Function Impacting Discharge (ADLs): LE ADLs mod A bed level, mod A bed mobility, mod A sit to stand and unable to wt shift side to side to step    Other factors to consider for discharge: see above         PLAN :  Patient continues to benefit from skilled intervention to address the above impairments. Continue treatment per established plan of care to address goals. Recommend with staff: OOB to chair with sit to stand lift    Recommend next OT session: LE ADLs, lateral transfer to drop arm BSC and drop arm chair    Recommendation for discharge: (in order for the patient to meet his/her long term goals)  Therapy up to 5 days/week in SNF setting    This discharge recommendation:  Has been made in collaboration with the attending provider and/or case management    IF patient discharges home will need the following DME: TBD       SUBJECTIVE:   Patient stated I feel a little better.     OBJECTIVE DATA SUMMARY:   Cognitive/Behavioral Status:  Neurologic State: Alert; Appropriate for age  Orientation Level: Oriented X4  Cognition: Appropriate for age attention/concentration; Follows commands  Perception: Cues to maintain midline in sitting;Cues to maintain midline in standing; Tactile;Verbal;Visual  Perseveration: No perseveration noted  Safety/Judgement: Awareness of environment; Fall prevention    Functional Mobility and Transfers for ADLs:  Bed Mobility:  Rolling: Moderate assistance; Additional time;Assist x1  Supine to Sit: Moderate assistance; Additional time;Assist x1  Sit to Supine: Moderate assistance; Additional time;Assist x1  Scooting: Stand-by assistance; Additional time;Assist x1 (seated EOB)    Transfers:  Sit to Stand: Additional time;Assist x1;Maximum assistance          Balance:  Sitting: Intact; With support  Sitting - Static: Good (unsupported)  Sitting - Dynamic: Good (unsupported)  Standing: Impaired;Pull to stand; With support  Standing - Static: Poor;Constant support  Standing - Dynamic : Poor;Constant support    ADL Intervention:  Patient instructed and indicated understanding energy conservation techniques to increase independence and safety during ADLs. Lower Body Dressing Assistance  Socks: (P) Minimum assistance (A to thread over R toes)  Leg Crossed Method Used: (P) Yes  Position Performed: (P) Long sitting on bed  Cues: (P) Don;Physical assistance; Tactile cues provided;Verbal cues provided;Visual cues provided    Cognitive Retraining  Safety/Judgement: Awareness of environment; Fall prevention    Pain:  7/10    Activity Tolerance:   Fair and requires frequent rest breaks    After treatment patient left in no apparent distress:   Supine in bed, Call bell within reach, and Bed / chair alarm activated    COMMUNICATION/COLLABORATION:   The patients plan of care was discussed with: Physical therapy assistant and Registered nurse.      Fozia Keene OT  Time Calculation: 38 mins

## 2022-08-08 NOTE — PROGRESS NOTES
RUR 10 %     Transition of Care- SNF placement. - referral sent to [facility] via [address] And accepted. Giovanna Davenport was started today 8/8. The patient will need BLS vs WC van for transport. [facility] referral pending- declined- no bed available  [facility]- declined -no bed available. [address] declined    CM to get additional SNF choices- list provided- referral sent to [facility]    Transition of Care Plan:     The Plan for Transition of Care is related to the following treatment goals: SNF    The Patient and/or patient representative  was provided with a choice of provider and agrees  with the discharge plan. Yes [x] No []    A Freedom of choice list was provided with basic dialogue that supports the patient's individualized plan of care/goals and shares the quality data associated with the providers. Yes [x] No []      2nd IMM needed prior to discharge.      Bhavani Vasquez, RICKW

## 2022-08-08 NOTE — PROGRESS NOTES
Problem: Mobility Impaired (Adult and Pediatric)  Goal: *Acute Goals and Plan of Care (Insert Text)  Description: FUNCTIONAL STATUS PRIOR TO ADMISSION: The patient was functional at the wheelchair level and was modified independent for transfers to the chair. Able to utilize a walker for a few steps into her bathroom and for transfers. Reports a history of spinal surgery in 2015 that she \"woke up paralyzed\" from. Has chronic LE weakness, L LE numbness, and R LE hypersensitivity. Recent falls leading to sacral fracture-- seen in the ED but refused PT eval or admission and d/c'ed home to her apartment. Does not drive. HOME SUPPORT PRIOR TO ADMISSION: The patient lived alone with a supportive neighbor to provide assistance. Also has caregivers Monday through Saturday from 9-3 M-F and 9-5 Saturday. Physical Therapy Goals  Initiated 8/4/2022  1. Patient will move from supine to sit and sit to supine , scoot up and down, and roll side to side in bed with moderate assistance x2 within 7 day(s). 2.  Patient will sit EOB with close stand by assist while participating in LE exercises within 7 days. 3.  Patient will perform sit to stand with maximal assistance within 7 day(s). 4.  Patient will tolerate bed in chair position 30 min TID for meals within 7 days. Outcome: Progressing Towards Goal  PHYSICAL THERAPY TREATMENT  Patient: Bella Garibay (19 y.o. female)  Date: 8/8/2022  Diagnosis: Sacral pain [M53.3] <principal problem not specified>      Precautions: WBAT, Fall  Chart, physical therapy assessment, plan of care and goals were reviewed. ASSESSMENT  Patient continues with skilled PT services and is slowly progressing towards goals. Reviewed WBAT status and use of BUE (as able ) to offload weight/support of RLE when stepping or standing on it. Unable to complete transfer to chair or short distance amb d/t  bucking of R knee w/ weight shifting and side step to [facility] w/ RW and Min-ModAx1.  Pt returned to sitting EOB and completed BLE exercises (AAROM of RLE). Pt reported buckling of RLE and occasional \"locking of knee\" present PTA. Would recommend Asst x2 next session for pre-gait activity for additional safety and support- especially of R knee. Pt reported pain of R knee and R groin. Pt returned to bed w/ all items in reach and met. Instructed to perform basic bed level exercises (on whiteboard) while in bed. Will continue to follow for progression of activity at able /appropriate. Current Level of Function Impacting Discharge (mobility/balance): Mod A x1 to complete bed mobility (RLE support). Pt    Other factors to consider for discharge: Lives alone         PLAN :  Patient continues to benefit from skilled intervention to address the above impairments. Continue treatment per established plan of care. to address goals. Recommendation for discharge: (in order for the patient to meet his/her long term goals)  Therapy up to 5 days/week in SNF setting    This discharge recommendation:  Has been made in collaboration with the attending provider and/or case management    IF patient discharges home will need the following DME: patient owns DME required for discharge       SUBJECTIVE:   Patient reports pain of RLE and knee w/ buckling. Pt reported R knee buckling and hyperextension PTA. OBJECTIVE DATA SUMMARY:   Critical Behavior:  Neurologic State: Alert, Appropriate for age  Orientation Level: Oriented X4  Cognition: Appropriate for age attention/concentration, Follows commands  Safety/Judgement: Awareness of environment, Fall prevention  Functional Mobility Training:  Bed Mobility:  Rolling: Moderate assistance; Additional time;Assist x1  Supine to Sit: Moderate assistance;Assist x1;Additional time;Bed Modified (HOB elevated; assist of RLE;)  Sit to Supine: Moderate assistance;Assist x1 (RLE into bed)  Scooting: Stand-by assistance; Additional time;Assist x1 (seated EOB)        Transfers:  Sit to Stand: Moderate assistance;Assist x1;Other (comment) (bed elevated to comfortable height for pt; ant rocking)  Stand to Sit: Minimum assistance;Assist x1                             Balance:  Sitting: Intact  Sitting - Static: Good (unsupported)  Sitting - Dynamic: Fair (occasional)  Standing: Impaired; With support  Standing - Static: Constant support;Poor  Standing - Dynamic : Poor;Constant support  Ambulation/Gait Training:    Deferred d/t R knee buckling w/ weight shifting and side step to [facility]    Therapeutic Exercises:   Seated EOB, x10 each: LAQ's (Active-Assist RLE), Hip flexion (AAROM-RLE),   Pain Rating: Moderate c/o pain in RLE. Activity Tolerance:   Fair    After treatment patient left in no apparent distress:   Supine in bed, Call bell within reach, Bed / chair alarm activated, and Side rails x 3    COMMUNICATION/COLLABORATION:   The patients plan of care was discussed with: Registered nurse.      Jordana Jaffe,PTA   Time Calculation: 36 mins

## 2022-08-08 NOTE — PROGRESS NOTES
Problem: Mobility Impaired (Adult and Pediatric)  Goal: *Acute Goals and Plan of Care (Insert Text)  Description: FUNCTIONAL STATUS PRIOR TO ADMISSION: The patient was functional at the wheelchair level and was modified independent for transfers to the chair. Able to utilize a walker for a few steps into her bathroom and for transfers. Reports a history of spinal surgery in 2015 that she \"woke up paralyzed\" from. Has chronic LE weakness, L LE numbness, and R LE hypersensitivity. Recent falls leading to sacral fracture-- seen in the ED but refused PT eval or admission and d/c'ed home to her apartment. Does not drive. HOME SUPPORT PRIOR TO ADMISSION: The patient lived alone with a supportive neighbor to provide assistance. Also has caregivers Monday through Saturday from 9-3 M-F and 9-5 Saturday. Physical Therapy Goals  Initiated 8/4/2022  1. Patient will move from supine to sit and sit to supine , scoot up and down, and roll side to side in bed with moderate assistance x2 within 7 day(s). 2.  Patient will sit EOB with close stand by assist while participating in LE exercises within 7 days. 3.  Patient will perform sit to stand with maximal assistance within 7 day(s). 4.  Patient will tolerate bed in chair position 30 min TID for meals within 7 days. Outcome: Progressing Towards Goal  PHYSICAL THERAPY TREATMENT  Patient: Marci Escobedo (46 y.o. female)  Date: 8/8/2022  Diagnosis: Sacral pain [M53.3] <principal problem not specified>      Precautions: WBAT, Fall  Chart, physical therapy assessment, plan of care and goals were reviewed. ASSESSMENT  Patient continues with skilled PT services and is slowly progressing towards goals. Reviewed WBAT status and use of BUE (as able ) to offload weight/support of RLE when stepping or standing on it. Unable to complete transfer to chair or short distance amb d/t  bucking of R knee w/ weight shifting and side step to [facility] w/ RW and Min-ModAx1.  Pt returned to sitting EOB and completed BLE exercises (AAROM of RLE). Pt reported buckling of RLE and occasional \"locking of knee\" present PTA. Would recommend Asst x2 next session for pre-gait activity for additional safety and support- especially of R knee. Pt reported pain of R knee and R groin. Pt returned to bed w/ all items in reach and met. Instructed to perform basic bed level exercises (on whiteboard) while in bed. Will continue to follow for progression of activity at able /appropriate. Current Level of Function Impacting Discharge (mobility/balance): Mod A x1 to complete bed mobility (RLE support). Pt    Other factors to consider for discharge: Lives alone         PLAN :  Patient continues to benefit from skilled intervention to address the above impairments. Continue treatment per established plan of care. to address goals. Recommendation for discharge: (in order for the patient to meet his/her long term goals)  Therapy up to 5 days/week in SNF setting    This discharge recommendation:  Has been made in collaboration with the attending provider and/or case management    IF patient discharges home will need the following DME: patient owns DME required for discharge       SUBJECTIVE:   Patient reports pain of RLE and knee w/ buckling. Pt reported R knee buckling and hyperextension PTA. OBJECTIVE DATA SUMMARY:   Critical Behavior:  Neurologic State: Alert, Appropriate for age  Orientation Level: Oriented X4  Cognition: Appropriate for age attention/concentration, Follows commands  Safety/Judgement: Awareness of environment, Fall prevention  Functional Mobility Training:  Bed Mobility:  Rolling: Moderate assistance; Additional time;Assist x1  Supine to Sit: Moderate assistance;Assist x1;Additional time;Bed Modified (HOB elevated; assist of RLE;)  Sit to Supine: Moderate assistance;Assist x1 (RLE into bed)  Scooting: Stand-by assistance; Additional time;Assist x1 (seated EOB)        Transfers:  Sit to Stand: Moderate assistance;Assist x1;Other (comment) (bed elevated to comfortable height for pt; ant rocking)  Stand to Sit: Minimum assistance;Assist x1                             Balance:  Sitting: Intact  Sitting - Static: Good (unsupported)  Sitting - Dynamic: Fair (occasional)  Standing: Impaired; With support  Standing - Static: Constant support;Poor  Standing - Dynamic : Poor;Constant support  Ambulation/Gait Training:    Deferred d/t R knee buckling w/ weight shifting and side step to [facility]    Therapeutic Exercises:   Seated EOB, x10 each: LAQ's (Active-Assist RLE), Hip flexion (AAROM-RLE),   Pain Rating: Moderate c/o pain in RLE. Activity Tolerance:   Fair    After treatment patient left in no apparent distress:   Supine in bed, Call bell within reach, Bed / chair alarm activated, and Side rails x 3    COMMUNICATION/COLLABORATION:   The patients plan of care was discussed with: Registered nurse.      Jordana Jaffe,PTA   Time Calculation: 36 mins

## 2022-08-08 NOTE — PROGRESS NOTES
Bedside shift change report given to Vlad Sunshine (oncoming nurse) by Tien Ortega RN (offgoing nurse). Report included the following information SBAR, Kardex, Intake/Output, MAR, and Recent Results.

## 2022-08-08 NOTE — PROGRESS NOTES
Bedside shift change report given to Vlad Sunshine (oncoming nurse) by Reji Magana RN (offgoing nurse). Report included the following information SBAR, Kardex, Intake/Output, MAR, and Recent Results.

## 2022-08-09 LAB
ANION GAP SERPL CALC-SCNC: 6 MMOL/L (ref 5–15)
BUN SERPL-MCNC: 33 MG/DL (ref 6–20)
BUN/CREAT SERPL: 31 (ref 12–20)
CALCIUM SERPL-MCNC: 9.6 MG/DL (ref 8.5–10.1)
CHLORIDE SERPL-SCNC: 103 MMOL/L (ref 97–108)
CO2 SERPL-SCNC: 29 MMOL/L (ref 21–32)
COVID-19 RAPID TEST, COVR: DETECTED
CREAT SERPL-MCNC: 1.07 MG/DL (ref 0.55–1.02)
GLUCOSE SERPL-MCNC: 96 MG/DL (ref 65–100)
POTASSIUM SERPL-SCNC: 4.2 MMOL/L (ref 3.5–5.1)
SODIUM SERPL-SCNC: 138 MMOL/L (ref 136–145)
SOURCE, COVRS: ABNORMAL

## 2022-08-09 PROCEDURE — 65270000029 HC RM PRIVATE

## 2022-08-09 PROCEDURE — 74011000250 HC RX REV CODE- 250: Performed by: STUDENT IN AN ORGANIZED HEALTH CARE EDUCATION/TRAINING PROGRAM

## 2022-08-09 PROCEDURE — 87635 SARS-COV-2 COVID-19 AMP PRB: CPT

## 2022-08-09 PROCEDURE — 74011250637 HC RX REV CODE- 250/637: Performed by: INTERNAL MEDICINE

## 2022-08-09 PROCEDURE — 74011250637 HC RX REV CODE- 250/637: Performed by: STUDENT IN AN ORGANIZED HEALTH CARE EDUCATION/TRAINING PROGRAM

## 2022-08-09 PROCEDURE — 80048 BASIC METABOLIC PNL TOTAL CA: CPT

## 2022-08-09 PROCEDURE — 74011250636 HC RX REV CODE- 250/636: Performed by: STUDENT IN AN ORGANIZED HEALTH CARE EDUCATION/TRAINING PROGRAM

## 2022-08-09 PROCEDURE — 36415 COLL VENOUS BLD VENIPUNCTURE: CPT

## 2022-08-09 RX ADMIN — BACLOFEN 10 MG: 10 TABLET ORAL at 10:15

## 2022-08-09 RX ADMIN — DICLOFENAC SODIUM 2 G: 10 GEL TOPICAL at 22:41

## 2022-08-09 RX ADMIN — CARBAMAZEPINE 200 MG: 200 TABLET ORAL at 10:16

## 2022-08-09 RX ADMIN — Medication 667 MG: at 10:15

## 2022-08-09 RX ADMIN — GABAPENTIN 300 MG: 300 CAPSULE ORAL at 10:16

## 2022-08-09 RX ADMIN — PHENYTOIN SODIUM 100 MG: 100 CAPSULE ORAL at 21:47

## 2022-08-09 RX ADMIN — ATORVASTATIN CALCIUM 10 MG: 10 TABLET, FILM COATED ORAL at 21:47

## 2022-08-09 RX ADMIN — PHENYTOIN SODIUM 100 MG: 100 CAPSULE ORAL at 10:15

## 2022-08-09 RX ADMIN — ENOXAPARIN SODIUM 40 MG: 100 INJECTION SUBCUTANEOUS at 10:15

## 2022-08-09 RX ADMIN — GABAPENTIN 300 MG: 300 CAPSULE ORAL at 21:48

## 2022-08-09 RX ADMIN — SODIUM CHLORIDE, PRESERVATIVE FREE 10 ML: 5 INJECTION INTRAVENOUS at 21:48

## 2022-08-09 RX ADMIN — HYDROCHLOROTHIAZIDE 12.5 MG: 25 TABLET ORAL at 10:15

## 2022-08-09 RX ADMIN — BACLOFEN 10 MG: 10 TABLET ORAL at 21:47

## 2022-08-09 RX ADMIN — CELECOXIB 200 MG: 200 CAPSULE ORAL at 10:15

## 2022-08-09 RX ADMIN — CARBAMAZEPINE 200 MG: 200 TABLET ORAL at 21:47

## 2022-08-09 RX ADMIN — LISINOPRIL 10 MG: 10 TABLET ORAL at 10:15

## 2022-08-09 RX ADMIN — DICLOFENAC SODIUM 2 G: 10 GEL TOPICAL at 10:16

## 2022-08-09 NOTE — PROGRESS NOTES
Occupational Therapy    Chart reviewed in prep for skilled OT treatment; however, chart indicates pt is now positive for Covid, with RN requesting defer as pt is being transferred to another unit. Will follow up tomorrow as able and appropriate.     Thank you,  Reuben Go, OT

## 2022-08-09 NOTE — PROGRESS NOTES
[facility] Adult  Hospitalist Group                                                                                          Hospitalist Progress Note  Felicia Andrews MD  Answering service: [phone] OR [phone] from in house phone        Date of Service:  2022  NAME:  Stanley Ruiz  :  1955  MRN:  [id number]      Admission Summary:   Stanley Ruiz is a 78 y.o. female with history of lumbar stenosis, chronic back pain, HTN, seizure disorder, recent right sacral fracture who presents with worsening pain and limited mobility in the setting of R sacrum fracture and R inferior pubic ramus fracture. She was evaluated by orthopedic surgery, who recommended WBAT. PT/OT were consulted. Patient also endorsed new dysuria and dark colored urine, found to have UTI and started on abx therapy. Interval history / Subjective:   Patient seen and examined earlier this morning by me for sacral and pubic ramus fractures. Patient complains of sore throat at the time of exam and asked to be tested for COVID. No other complaints. Assessment & Plan:     Sacral fracture, right, POA  Inferior pubic ramus fracture, right, POA  Limited mobility  Mechanical Fall  - Presented to ED approx 4 days ago, diagnosed with sacral frcture and pubic ramus fracture. Discharged to home with pain medication per pt request  - had recurrent fall and worsening mobility. Difficulty with IADLs. - Limited mobility of RLE, 1/5 strength at hip, which pt states is chronic for her  - Given worsening of RLE weakness and increased pain with fall since previous imaging, will obtain repeat imaging  Plan:  - CT Pelvis and Lumbar Spine to eval for worsening RLE weakness and worsening pain: Multilevel spondylosis with high-grade spinal canal narrowing at L4-5 and L5-S1.  High-grade narrowing of the bilateral T11-T12, L1-L2, left L2-L3, L3-L4, right L4-5 and L5-S1 neural foramina  - PT/OT eval and treat  - WBAT per ortho recommendations   - Norco 10mg q4h PRN severe pain  - Tramodol 50mg q6h prn moderate pain  - Dispo pending PT eval   - Ortho consulted, plan for OP F/U in 2-3 weeks pending clinical improvement    At this time I believe her issues are the acute fractures plus her chronic back issues. At this time I am not sure that the spine surgeon will be warranted as the more acute issues of the pubic and sacral fractures need to be addressed patient needs some physical therapy before we can determine how much worse her issues are made by her spine. She does have issues in her lumbar and lower thoracic spine. We will discussed with her and get spine surgery involved as needed. Patient has no interest in surgery at this time. Continue to work with physical therapy and aim towards SNF    Pending SNF approval.  Referral sent. Pending SNF placement.      Acute cystitis,   Abdominal pain, controlled  - UA with moderate blood and nitrites  - Pt endorses dysuria and dark colored urine  - fever resolved, no leukocytosis   Plan:  - CT Abdomen: negative  - Rocephin 1g q24h   - F/U urine culture:    STAPHYLOCOCCUS LUGDUNENSIS       Lumbar stenosis  Chronic pain  - PT/OT eval and treat  - Consider OP referral to primary spine provider  - Continue home baclofen 10mg daily; gabapentin 300mg TID  If patient continues to show difficulty with pain and working with PT then we will talk to spine surgery     HTN  - Continue home lisinopril and HCTZ     Seizure disorder  - Continue home carbamezapine and home phenytoin     COVID-positive  Mildly symptomatic with sore throat and feeling unwell  No hypoxia or shortness of breath  No cough        Code status: FULL CODE  Prophylaxis: Lovenox  Care Plan discussed with: patient  Anticipated Disposition: TBD       Hospital Problems  Date Reviewed: 7/11/2022            Codes Class Noted POA    Sacral pain ICD-10-CM: M53.3  ICD-9-CM: 724.6  8/2/2022 Unknown       Review of Systems:   A comprehensive review of systems was negative except for that written in the HPI. Vital Signs:    Last 24hrs VS reviewed since prior progress note. Most recent are:  Visit Vitals  BP (!) 93/56 (BP 1 Location: Left arm, BP Patient Position: At rest)   Pulse 64   Temp 98.4 °F (36.9 °C)   Resp 18   Wt 84.7 kg (186 lb 12.8 oz)   SpO2 94%   BMI 32.06 kg/m²         Intake/Output Summary (Last 24 hours) at 8/9/2022 1805  Last data filed at 8/8/2022 2347  Gross per 24 hour   Intake --   Output 600 ml   Net -600 ml          Physical Examination:     I had a face to face encounter with this patient and independently examined them on 8/9/2022 as outlined below:          Constitutional:  No acute distress, cooperative, pleasant    ENT:  Oral mucosa moist, oropharynx benign. Resp:  CTA bilaterally. No wheezing/rhonchi/rales. No accessory muscle use. CV:  Regular rhythm, normal rate, no murmurs, gallops, rubs    GI:  Soft, non distended, normoactive bowel sounds, no hepatosplenomegaly. Mildly tender to the RLQ and epigastric area. MSK:  No edema, warm, 2+ pulses throughout. 1/5 strength of RLE. 4/5 strength of LLE. 1+ DTRs of bilateral LEs, symmetrical. Normal babinski. Neurologic:  AAOx3, CN II-XII reviewed, unable to move RLE since 2015  + back pain with minimal movement            Data Review:    Review and/or order of clinical lab test  Review and/or order of tests in the radiology section of CPT  Review and/or order of tests in the medicine section of CPT      Labs:     Recent Labs     08/07/22  0142   WBC 6.0   HGB 13.1   HCT 38.4   *       Recent Labs     08/09/22  0341 08/08/22  0136 08/07/22  0142    139 138   K 4.2 4.2 5.0    104 104   CO2 29 31 28   BUN 33* 26* 17   CREA 1.07* 1.10* 0.70   GLU 96 89 86   CA 9.6 9.9 9.5       No results for input(s): ALT, AP, TBIL, TBILI, TP, ALB, GLOB, GGT, AML, LPSE in the last 72 hours.     No lab exists for component: SGOT, GPT, AMYP, HLPSE    No results for input(s): INR, PTP, APTT, INREXT, INREXT in the last 72 hours. No results for input(s): FE, TIBC, PSAT, FERR in the last 72 hours. No results found for: FOL, RBCF   No results for input(s): PH, PCO2, PO2 in the last 72 hours. No results for input(s): CPK, CKNDX, TROIQ in the last 72 hours.     No lab exists for component: CPKMB  Lab Results   Component Value Date/Time    Cholesterol, total 295 (H) 05/05/2022 02:20 PM    HDL Cholesterol 106 05/05/2022 02:20 PM    LDL, calculated 179 (H) 05/05/2022 02:20 PM    Triglyceride 50 05/05/2022 02:20 PM    CHOL/HDL Ratio 2.8 05/05/2022 02:20 PM     No results found for: [facility]  Lab Results   Component Value Date/Time    Color YELLOW/STRAW 08/02/2022 03:07 PM    Appearance CLEAR 08/02/2022 03:07 PM    Specific gravity 1.024 08/02/2022 03:07 PM    Specific gravity 1.020 08/10/2021 03:08 PM    pH (UA) 6.0 08/02/2022 03:07 PM    Protein 100 (A) 08/02/2022 03:07 PM    Glucose Negative 08/02/2022 03:07 PM    Ketone >80 (A) 08/02/2022 03:07 PM    Bilirubin Negative 08/02/2022 03:07 PM    Urobilinogen 1.0 08/02/2022 03:07 PM    Nitrites Positive (A) 08/02/2022 03:07 PM    Leukocyte Esterase Negative 08/02/2022 03:07 PM    Epithelial cells FEW 08/02/2022 03:07 PM    Bacteria 2+ (A) 08/02/2022 03:07 PM    WBC 0-4 08/02/2022 03:07 PM    RBC 0-5 08/02/2022 03:07 PM         Medications Reviewed:     Current Facility-Administered Medications   Medication Dose Route Frequency    diclofenac (VOLTAREN) 1 % topical gel 2 g  2 g Topical QID    baclofen (LIORESAL) tablet 10 mg  10 mg Oral TID    traMADoL (ULTRAM) tablet 50 mg  50 mg Oral Q6H PRN    atorvastatin (LIPITOR) tablet 10 mg  10 mg Oral QHS    lisinopriL (PRINIVIL, ZESTRIL) tablet 10 mg  10 mg Oral DAILY    HYDROcodone-acetaminophen (NORCO) 5-325 mg per tablet 2 Tablet  2 Tablet Oral Q4H PRN    gabapentin (NEURONTIN) capsule 300 mg  300 mg Oral TID    sodium chloride (NS) flush 5-40 mL  5-40 mL IntraVENous Q8H    sodium chloride (NS) flush 5-40 mL  5-40 mL IntraVENous PRN    acetaminophen (TYLENOL) tablet 650 mg  650 mg Oral Q6H PRN    Or    acetaminophen (TYLENOL) suppository 650 mg  650 mg Rectal Q6H PRN    polyethylene glycol (MIRALAX) packet 17 g  17 g Oral DAILY PRN    ondansetron (ZOFRAN ODT) tablet 4 mg  4 mg Oral Q8H PRN    Or    ondansetron (ZOFRAN) injection 4 mg  4 mg IntraVENous Q6H PRN    enoxaparin (LOVENOX) injection 40 mg  40 mg SubCUTAneous DAILY    calcium acetate (phosphate binder) (PHOSLO) tablet 667 mg  1 Tablet Oral DAILY WITH BREAKFAST    celecoxib (CELEBREX) capsule 200 mg  200 mg Oral DAILY    hydroCHLOROthiazide (HYDRODIURIL) tablet 12.5 mg  12.5 mg Oral DAILY    carBAMazepine (TEGretol) tablet 200 mg  200 mg Oral BID    phenytoin ER (DILANTIN ER) ER capsule 100 mg  100 mg Oral TID     ______________________________________________________________________  EXPECTED LENGTH OF STAY: 2d 21h  ACTUAL LENGTH OF STAY:          [address], [place]

## 2022-08-09 NOTE — PROGRESS NOTES
[facility] Adult  Hospitalist Group                                                                                          Hospitalist Progress Note  Leigh Ann Escudero MD  Answering service: [phone] from in house phone        Date of Service:  2022  NAME:  Pankaj Nogueira  :  1955  MRN:  [id number]      Admission Summary:   Pankaj Nogueira is a 78 y.o. female with history of lumbar stenosis, chronic back pain, HTN, seizure disorder, recent right sacral fracture who presents with worsening pain and limited mobility in the setting of R sacrum fracture and R inferior pubic ramus fracture. She was evaluated by orthopedic surgery, who recommended WBAT. PT/OT were consulted. Patient also endorsed new dysuria and dark colored urine, found to have UTI and started on abx therapy. Interval history / Subjective:   Patient seen and examined earlier this morning by me for sacral and pubic ramus fractures. Patient complains of sore throat at the time of exam and asked to be tested for COVID. No other complaints. Assessment & Plan:     Sacral fracture, right, POA  Inferior pubic ramus fracture, right, POA  Limited mobility  Mechanical Fall  - Presented to ED approx 4 days ago, diagnosed with sacral frcture and pubic ramus fracture. Discharged to home with pain medication per pt request  - had recurrent fall and worsening mobility. Difficulty with IADLs. - Limited mobility of RLE, 1/5 strength at hip, which pt states is chronic for her  - Given worsening of RLE weakness and increased pain with fall since previous imaging, will obtain repeat imaging  Plan:  - CT Pelvis and Lumbar Spine to eval for worsening RLE weakness and worsening pain: Multilevel spondylosis with high-grade spinal canal narrowing at L4-5 and L5-S1.  High-grade narrowing of the bilateral T11-T12, L1-L2, left L2-L3, L3-L4, right L4-5 and L5-S1 neural foramina  - PT/OT eval and treat  - WBAT per ortho recommendations   - Norco 10mg q4h PRN severe pain  - Tramodol 50mg q6h prn moderate pain  - Dispo pending PT eval   - Ortho consulted, plan for OP F/U in 2-3 weeks pending clinical improvement    At this time I believe her issues are the acute fractures plus her chronic back issues. At this time I am not sure that the spine surgeon will be warranted as the more acute issues of the pubic and sacral fractures need to be addressed patient needs some physical therapy before we can determine how much worse her issues are made by her spine. She does have issues in her lumbar and lower thoracic spine. We will discussed with her and get spine surgery involved as needed. Patient has no interest in surgery at this time. Continue to work with physical therapy and aim towards SNF    Pending SNF approval.  Referral sent. Pending SNF placement.      Acute cystitis,   Abdominal pain, controlled  - UA with moderate blood and nitrites  - Pt endorses dysuria and dark colored urine  - fever resolved, no leukocytosis   Plan:  - CT Abdomen: negative  - Rocephin 1g q24h   - F/U urine culture:    STAPHYLOCOCCUS LUGDUNENSIS       Lumbar stenosis  Chronic pain  - PT/OT eval and treat  - Consider OP referral to primary spine provider  - Continue home baclofen 10mg daily; gabapentin 300mg TID  If patient continues to show difficulty with pain and working with PT then we will talk to spine surgery     HTN  - Continue home lisinopril and HCTZ     Seizure disorder  - Continue home carbamezapine and home phenytoin     COVID-positive  Mildly symptomatic with sore throat and feeling unwell  No hypoxia or shortness of breath  No cough        Code status: FULL CODE  Prophylaxis: Lovenox  Care Plan discussed with: patient  Anticipated Disposition: TBD       Hospital Problems  Date Reviewed: 7/11/2022            Codes Class Noted POA    Sacral pain ICD-10-CM: M53.3  ICD-9-CM: 724.6  8/2/2022 Unknown       Review of Systems:   A comprehensive review of systems was negative except for that written in the HPI. Vital Signs:    Last 24hrs VS reviewed since prior progress note. Most recent are:  Visit Vitals  BP (!) 93/56 (BP 1 Location: Left arm, BP Patient Position: At rest)   Pulse 64   Temp 98.4 °F (36.9 °C)   Resp 18   Wt 84.7 kg (186 lb 12.8 oz)   SpO2 94%   BMI 32.06 kg/m²         Intake/Output Summary (Last 24 hours) at 8/9/2022 1805  Last data filed at 8/8/2022 2347  Gross per 24 hour   Intake --   Output 600 ml   Net -600 ml          Physical Examination:     I had a face to face encounter with this patient and independently examined them on 8/9/2022 as outlined below:          Constitutional:  No acute distress, cooperative, pleasant    ENT:  Oral mucosa moist, oropharynx benign. Resp:  CTA bilaterally. No wheezing/rhonchi/rales. No accessory muscle use. CV:  Regular rhythm, normal rate, no murmurs, gallops, rubs    GI:  Soft, non distended, normoactive bowel sounds, no hepatosplenomegaly. Mildly tender to the RLQ and epigastric area. MSK:  No edema, warm, 2+ pulses throughout. 1/5 strength of RLE. 4/5 strength of LLE. 1+ DTRs of bilateral LEs, symmetrical. Normal babinski. Neurologic:  AAOx3, CN II-XII reviewed, unable to move RLE since 2015  + back pain with minimal movement            Data Review:    Review and/or order of clinical lab test  Review and/or order of tests in the radiology section of CPT  Review and/or order of tests in the medicine section of CPT      Labs:     Recent Labs     08/07/22  0142   WBC 6.0   HGB 13.1   HCT 38.4   *       Recent Labs     08/09/22  0341 08/08/22  0136 08/07/22  0142    139 138   K 4.2 4.2 5.0    104 104   CO2 29 31 28   BUN 33* 26* 17   CREA 1.07* 1.10* 0.70   GLU 96 89 86   CA 9.6 9.9 9.5       No results for input(s): ALT, AP, TBIL, TBILI, TP, ALB, GLOB, GGT, AML, LPSE in the last 72 hours.     No lab exists for component: SGOT, GPT, AMYP, HLPSE    No results for input(s): INR, PTP, APTT, INREXT, INREXT in the last 72 hours. No results for input(s): FE, TIBC, PSAT, FERR in the last 72 hours. No results found for: FOL, RBCF   No results for input(s): PH, PCO2, PO2 in the last 72 hours. No results for input(s): CPK, CKNDX, TROIQ in the last 72 hours.     No lab exists for component: CPKMB  Lab Results   Component Value Date/Time    Cholesterol, total 295 (H) 05/05/2022 02:20 PM    HDL Cholesterol 106 05/05/2022 02:20 PM    LDL, calculated 179 (H) 05/05/2022 02:20 PM    Triglyceride 50 05/05/2022 02:20 PM    CHOL/HDL Ratio 2.8 05/05/2022 02:20 PM     No results found for: [facility]  Lab Results   Component Value Date/Time    Color YELLOW/STRAW 08/02/2022 03:07 PM    Appearance CLEAR 08/02/2022 03:07 PM    Specific gravity 1.024 08/02/2022 03:07 PM    Specific gravity 1.020 08/10/2021 03:08 PM    pH (UA) 6.0 08/02/2022 03:07 PM    Protein 100 (A) 08/02/2022 03:07 PM    Glucose Negative 08/02/2022 03:07 PM    Ketone >80 (A) 08/02/2022 03:07 PM    Bilirubin Negative 08/02/2022 03:07 PM    Urobilinogen 1.0 08/02/2022 03:07 PM    Nitrites Positive (A) 08/02/2022 03:07 PM    Leukocyte Esterase Negative 08/02/2022 03:07 PM    Epithelial cells FEW 08/02/2022 03:07 PM    Bacteria 2+ (A) 08/02/2022 03:07 PM    WBC 0-4 08/02/2022 03:07 PM    RBC 0-5 08/02/2022 03:07 PM         Medications Reviewed:     Current Facility-Administered Medications   Medication Dose Route Frequency    diclofenac (VOLTAREN) 1 % topical gel 2 g  2 g Topical QID    baclofen (LIORESAL) tablet 10 mg  10 mg Oral TID    traMADoL (ULTRAM) tablet 50 mg  50 mg Oral Q6H PRN    atorvastatin (LIPITOR) tablet 10 mg  10 mg Oral QHS    lisinopriL (PRINIVIL, ZESTRIL) tablet 10 mg  10 mg Oral DAILY    HYDROcodone-acetaminophen (NORCO) 5-325 mg per tablet 2 Tablet  2 Tablet Oral Q4H PRN    gabapentin (NEURONTIN) capsule 300 mg  300 mg Oral TID    sodium chloride (NS) flush 5-40 mL  5-40 mL IntraVENous Q8H    sodium chloride (NS) flush 5-40 mL  5-40 mL IntraVENous PRN    acetaminophen (TYLENOL) tablet 650 mg  650 mg Oral Q6H PRN    Or    acetaminophen (TYLENOL) suppository 650 mg  650 mg Rectal Q6H PRN    polyethylene glycol (MIRALAX) packet 17 g  17 g Oral DAILY PRN    ondansetron (ZOFRAN ODT) tablet 4 mg  4 mg Oral Q8H PRN    Or    ondansetron (ZOFRAN) injection 4 mg  4 mg IntraVENous Q6H PRN    enoxaparin (LOVENOX) injection 40 mg  40 mg SubCUTAneous DAILY    calcium acetate (phosphate binder) (PHOSLO) tablet 667 mg  1 Tablet Oral DAILY WITH BREAKFAST    celecoxib (CELEBREX) capsule 200 mg  200 mg Oral DAILY    hydroCHLOROthiazide (HYDRODIURIL) tablet 12.5 mg  12.5 mg Oral DAILY    carBAMazepine (TEGretol) tablet 200 mg  200 mg Oral BID    phenytoin ER (DILANTIN ER) ER capsule 100 mg  100 mg Oral TID     ______________________________________________________________________  EXPECTED LENGTH OF STAY: 2d 21h  ACTUAL LENGTH OF STAY:          [address] [facility] Raghu Bravo MD

## 2022-08-09 NOTE — PROGRESS NOTES
Occupational Therapy    Chart reviewed in prep for skilled OT treatment; however, chart indicates pt is now positive for Covid, with RN requesting defer as pt is being transferred to another unit. Will follow up tomorrow as able and appropriate.     Thank you,  Doris Harding, OT

## 2022-08-10 LAB
ANION GAP SERPL CALC-SCNC: 5 MMOL/L (ref 5–15)
BASOPHILS # BLD: 0.1 K/UL (ref 0–0.1)
BASOPHILS NFR BLD: 1 % (ref 0–1)
BUN SERPL-MCNC: 34 MG/DL (ref 6–20)
BUN/CREAT SERPL: 39 (ref 12–20)
CALCIUM SERPL-MCNC: 9.1 MG/DL (ref 8.5–10.1)
CHLORIDE SERPL-SCNC: 108 MMOL/L (ref 97–108)
CO2 SERPL-SCNC: 28 MMOL/L (ref 21–32)
CREAT SERPL-MCNC: 0.88 MG/DL (ref 0.55–1.02)
DIFFERENTIAL METHOD BLD: ABNORMAL
EOSINOPHIL # BLD: 0.2 K/UL (ref 0–0.4)
EOSINOPHIL NFR BLD: 5 % (ref 0–7)
ERYTHROCYTE [DISTWIDTH] IN BLOOD BY AUTOMATED COUNT: 13.8 % (ref 11.5–14.5)
GLUCOSE BLD STRIP.AUTO-MCNC: 89 MG/DL (ref 65–117)
GLUCOSE SERPL-MCNC: 91 MG/DL (ref 65–100)
HCT VFR BLD AUTO: 35.8 % (ref 35–47)
HGB BLD-MCNC: 12 G/DL (ref 11.5–16)
IMM GRANULOCYTES # BLD AUTO: 0 K/UL (ref 0–0.04)
IMM GRANULOCYTES NFR BLD AUTO: 1 % (ref 0–0.5)
LYMPHOCYTES # BLD: 1.1 K/UL (ref 0.8–3.5)
LYMPHOCYTES NFR BLD: 22 % (ref 12–49)
MCH RBC QN AUTO: 34.3 PG (ref 26–34)
MCHC RBC AUTO-ENTMCNC: 33.5 G/DL (ref 30–36.5)
MCV RBC AUTO: 102.3 FL (ref 80–99)
MONOCYTES # BLD: 0.5 K/UL (ref 0–1)
MONOCYTES NFR BLD: 10 % (ref 5–13)
NEUTS SEG # BLD: 3.1 K/UL (ref 1.8–8)
NEUTS SEG NFR BLD: 61 % (ref 32–75)
NRBC # BLD: 0 K/UL (ref 0–0.01)
NRBC BLD-RTO: 0 PER 100 WBC
PLATELET # BLD AUTO: 507 K/UL (ref 150–400)
PMV BLD AUTO: 8.9 FL (ref 8.9–12.9)
POTASSIUM SERPL-SCNC: 4.7 MMOL/L (ref 3.5–5.1)
RBC # BLD AUTO: 3.5 M/UL (ref 3.8–5.2)
SERVICE CMNT-IMP: NORMAL
SODIUM SERPL-SCNC: 141 MMOL/L (ref 136–145)
WBC # BLD AUTO: 5 K/UL (ref 3.6–11)

## 2022-08-10 PROCEDURE — 85025 COMPLETE CBC W/AUTO DIFF WBC: CPT

## 2022-08-10 PROCEDURE — 97530 THERAPEUTIC ACTIVITIES: CPT

## 2022-08-10 PROCEDURE — 74011000250 HC RX REV CODE- 250: Performed by: STUDENT IN AN ORGANIZED HEALTH CARE EDUCATION/TRAINING PROGRAM

## 2022-08-10 PROCEDURE — 82962 GLUCOSE BLOOD TEST: CPT

## 2022-08-10 PROCEDURE — 36415 COLL VENOUS BLD VENIPUNCTURE: CPT

## 2022-08-10 PROCEDURE — 65270000029 HC RM PRIVATE

## 2022-08-10 PROCEDURE — 80048 BASIC METABOLIC PNL TOTAL CA: CPT

## 2022-08-10 PROCEDURE — 74011250637 HC RX REV CODE- 250/637: Performed by: STUDENT IN AN ORGANIZED HEALTH CARE EDUCATION/TRAINING PROGRAM

## 2022-08-10 PROCEDURE — 74011250636 HC RX REV CODE- 250/636: Performed by: STUDENT IN AN ORGANIZED HEALTH CARE EDUCATION/TRAINING PROGRAM

## 2022-08-10 RX ADMIN — SODIUM CHLORIDE, PRESERVATIVE FREE 10 ML: 5 INJECTION INTRAVENOUS at 21:04

## 2022-08-10 RX ADMIN — GABAPENTIN 300 MG: 300 CAPSULE ORAL at 16:25

## 2022-08-10 RX ADMIN — GABAPENTIN 300 MG: 300 CAPSULE ORAL at 21:05

## 2022-08-10 RX ADMIN — HYDROCODONE BITARTRATE AND ACETAMINOPHEN 2 TABLET: 5; 325 TABLET ORAL at 02:48

## 2022-08-10 RX ADMIN — CARBAMAZEPINE 200 MG: 200 TABLET ORAL at 09:32

## 2022-08-10 RX ADMIN — HYDROCHLOROTHIAZIDE 12.5 MG: 25 TABLET ORAL at 09:32

## 2022-08-10 RX ADMIN — ATORVASTATIN CALCIUM 10 MG: 10 TABLET, FILM COATED ORAL at 21:05

## 2022-08-10 RX ADMIN — HYDROCODONE BITARTRATE AND ACETAMINOPHEN 2 TABLET: 5; 325 TABLET ORAL at 16:31

## 2022-08-10 RX ADMIN — ENOXAPARIN SODIUM 40 MG: 100 INJECTION SUBCUTANEOUS at 09:33

## 2022-08-10 RX ADMIN — DICLOFENAC SODIUM 2 G: 10 GEL TOPICAL at 21:06

## 2022-08-10 RX ADMIN — HYDROCODONE BITARTRATE AND ACETAMINOPHEN 2 TABLET: 5; 325 TABLET ORAL at 21:09

## 2022-08-10 RX ADMIN — GABAPENTIN 300 MG: 300 CAPSULE ORAL at 09:32

## 2022-08-10 RX ADMIN — PHENYTOIN SODIUM 100 MG: 100 CAPSULE ORAL at 21:05

## 2022-08-10 RX ADMIN — DICLOFENAC SODIUM 2 G: 10 GEL TOPICAL at 09:34

## 2022-08-10 RX ADMIN — BACLOFEN 10 MG: 10 TABLET ORAL at 09:32

## 2022-08-10 RX ADMIN — BACLOFEN 10 MG: 10 TABLET ORAL at 21:05

## 2022-08-10 RX ADMIN — SODIUM CHLORIDE, PRESERVATIVE FREE 10 ML: 5 INJECTION INTRAVENOUS at 08:09

## 2022-08-10 RX ADMIN — PHENYTOIN SODIUM 100 MG: 100 CAPSULE ORAL at 09:32

## 2022-08-10 RX ADMIN — CELECOXIB 200 MG: 200 CAPSULE ORAL at 09:32

## 2022-08-10 RX ADMIN — BACLOFEN 10 MG: 10 TABLET ORAL at 16:25

## 2022-08-10 RX ADMIN — CARBAMAZEPINE 200 MG: 200 TABLET ORAL at 21:05

## 2022-08-10 RX ADMIN — LISINOPRIL 10 MG: 10 TABLET ORAL at 09:32

## 2022-08-10 RX ADMIN — DICLOFENAC SODIUM 2 G: 10 GEL TOPICAL at 13:28

## 2022-08-10 RX ADMIN — PHENYTOIN SODIUM 100 MG: 100 CAPSULE ORAL at 16:25

## 2022-08-10 RX ADMIN — SODIUM CHLORIDE, PRESERVATIVE FREE 10 ML: 5 INJECTION INTRAVENOUS at 13:28

## 2022-08-10 RX ADMIN — Medication 667 MG: at 08:09

## 2022-08-10 NOTE — PROGRESS NOTES
[facility] [facility]  Hospitalist Group                                                                                          Hospitalist Progress Note  Anna Merino MD  Answering service: [phone] from in house phone        Date of Service:  8/10/2022  NAME:  Josh Nicole  :  1955  MRN:  [id number]      Admission Summary:   Josh Nicole is a 78 y.o. female with history of lumbar stenosis, chronic back pain, HTN, seizure disorder, recent right sacral fracture who presents with worsening pain and limited mobility in the setting of R sacrum fracture and R inferior pubic ramus fracture. She was evaluated by orthopedic surgery, who recommended WBAT. PT/OT were consulted. Patient also endorsed new dysuria and dark colored urine, found to have UTI and started on abx therapy. Interval history / Subjective:   Patient seen and examined earlier this morning by me for sacral and pubic ramus fractures. No acute complaints at the time my exam.  Patient working with physical therapy. Assessment & Plan:     Sacral fracture, right, POA  Inferior pubic ramus fracture, right, POA  Limited mobility  Mechanical Fall  - Presented to ED approx 4 days ago, diagnosed with sacral frcture and pubic ramus fracture. Discharged to home with pain medication per pt request  - had recurrent fall and worsening mobility. Difficulty with IADLs. - Limited mobility of RLE, 1/5 strength at hip, which pt states is chronic for her  - Given worsening of RLE weakness and increased pain with fall since previous imaging, will obtain repeat imaging  Plan:  - CT Pelvis and Lumbar Spine to eval for worsening RLE weakness and worsening pain: Multilevel spondylosis with high-grade spinal canal narrowing at L4-5 and L5-S1.  High-grade narrowing of the bilateral T11-T12, L1-L2, left L2-L3, L3-L4, right L4-5 and L5-S1 neural foramina  - PT/OT eval and treat  - WBAT per ortho recommendations   - Norco 10mg q4h PRN severe pain  - Tramodol 50mg q6h prn moderate pain  - Dispo pending PT eval   - Ortho consulted, plan for OP F/U in 2-3 weeks pending clinical improvement    At this time I believe her issues are the acute fractures plus her chronic back issues. At this time I am not sure that the spine surgeon will be warranted as the more acute issues of the pubic and sacral fractures need to be addressed patient needs some physical therapy before we can determine how much worse her issues are made by her spine. She does have issues in her lumbar and lower thoracic spine. We will discussed with her and get spine surgery involved as needed. Patient has no interest in surgery at this time. Continue to work with physical therapy and aim towards SNF    Pending SNF approval.  Referral sent. Pending SNF placement. Acute cystitis,   Abdominal pain, controlled  - UA with moderate blood and nitrites  - Pt endorses dysuria and dark colored urine  - fever resolved, no leukocytosis   Plan:  - CT Abdomen: negative  - Rocephin 1g q24h   - F/U urine culture:    STAPHYLOCOCCUS LUGDUNENSIS       Lumbar stenosis  Chronic pain  - PT/OT eval and treat  - Consider OP referral to primary spine provider  - Continue home baclofen 10mg daily; gabapentin 300mg TID  If patient continues to show difficulty with pain and working with PT then we will talk to spine surgery     HTN  - Continue home lisinopril and HCTZ     Seizure disorder  - Continue home carbamezapine and home phenytoin     COVID-positive  Mildly symptomatic with sore throat and feeling unwell  No hypoxia or shortness of breath  No cough        Code status: FULL CODE  Prophylaxis: Lovenox  Care Plan discussed with: patient  Anticipated Disposition: Stiff bed available likely tomorrow.        Hospital Problems  Date Reviewed: 7/11/2022            Codes Class Noted POA    Sacral pain ICD-10-CM: M53.3  ICD-9-CM: 724.6  8/2/2022 Unknown       Review of Systems:   A comprehensive review of systems was negative except for that written in the HPI. Vital Signs:    Last 24hrs VS reviewed since prior progress note. Most recent are:  Visit Vitals  /77 (BP 1 Location: Left arm, BP Patient Position: At rest)   Pulse 62   Temp 99.3 °F (37.4 °C)   Resp 18   Wt 84.7 kg (186 lb 12.8 oz)   SpO2 98%   BMI 32.06 kg/m²       No intake or output data in the 24 hours ending 08/10/22 7612       Physical Examination:     I had a face to face encounter with this patient and independently examined them on 8/10/2022 as outlined below:          Constitutional:  No acute distress, cooperative, pleasant    ENT:  Oral mucosa moist, oropharynx benign. Resp:  CTA bilaterally. No wheezing/rhonchi/rales. No accessory muscle use. CV:  Regular rhythm, normal rate, no murmurs, gallops, rubs    GI:  Soft, non distended, normoactive bowel sounds, no hepatosplenomegaly. Mildly tender to the RLQ and epigastric area. MSK:  No edema, warm, 2+ pulses throughout. 1/5 strength of RLE. 4/5 strength of LLE. 1+ DTRs of bilateral LEs, symmetrical. Normal babinski. Neurologic:  AAOx3, CN II-XII reviewed, unable to move RLE since 2015  + back pain with minimal movement            Data Review:    Review and/or order of clinical lab test  Review and/or order of tests in the radiology section of CPT  Review and/or order of tests in the medicine section of CPT      Labs:     Recent Labs     08/10/22  0253   WBC 5.0   HGB 12.0   HCT 35.8   *       Recent Labs     08/10/22  0253 08/09/22  0341 08/08/22  0136    138 139   K 4.7 4.2 4.2    103 104   CO2 28 29 31   BUN 34* 33* 26*   CREA 0.88 1.07* 1.10*   GLU 91 96 89   CA 9.1 9.6 9.9       No results for input(s): ALT, AP, TBIL, TBILI, TP, ALB, GLOB, GGT, AML, LPSE in the last 72 hours. No lab exists for component: SGOT, GPT, AMYP, HLPSE    No results for input(s): INR, PTP, APTT, INREXT, INREXT in the last 72 hours.    No results for input(s): FE, TIBC, PSAT, FERR in the last 72 hours. No results found for: FOL, RBCF   No results for input(s): PH, PCO2, PO2 in the last 72 hours. No results for input(s): CPK, CKNDX, TROIQ in the last 72 hours.     No lab exists for component: CPKMB  Lab Results   Component Value Date/Time    Cholesterol, total 295 (H) 05/05/2022 02:20 PM    HDL Cholesterol 106 05/05/2022 02:20 PM    LDL, calculated 179 (H) 05/05/2022 02:20 PM    Triglyceride 50 05/05/2022 02:20 PM    CHOL/HDL Ratio 2.8 05/05/2022 02:20 PM     Lab Results   Component Value Date/Time    Glucose (POC) 89 08/10/2022 11:58 AM     Lab Results   Component Value Date/Time    Color YELLOW/STRAW 08/02/2022 03:07 PM    Appearance CLEAR 08/02/2022 03:07 PM    Specific gravity 1.024 08/02/2022 03:07 PM    Specific gravity 1.020 08/10/2021 03:08 PM    pH (UA) 6.0 08/02/2022 03:07 PM    Protein 100 (A) 08/02/2022 03:07 PM    Glucose Negative 08/02/2022 03:07 PM    Ketone >80 (A) 08/02/2022 03:07 PM    Bilirubin Negative 08/02/2022 03:07 PM    Urobilinogen 1.0 08/02/2022 03:07 PM    Nitrites Positive (A) 08/02/2022 03:07 PM    Leukocyte Esterase Negative 08/02/2022 03:07 PM    Epithelial cells FEW 08/02/2022 03:07 PM    Bacteria 2+ (A) 08/02/2022 03:07 PM    WBC 0-4 08/02/2022 03:07 PM    RBC 0-5 08/02/2022 03:07 PM         Medications Reviewed:     Current Facility-Administered Medications   Medication Dose Route Frequency    diclofenac (VOLTAREN) 1 % topical gel 2 g  2 g Topical QID    baclofen (LIORESAL) tablet 10 mg  10 mg Oral TID    traMADoL (ULTRAM) tablet 50 mg  50 mg Oral Q6H PRN    atorvastatin (LIPITOR) tablet 10 mg  10 mg Oral QHS    lisinopriL (PRINIVIL, ZESTRIL) tablet 10 mg  10 mg Oral DAILY    HYDROcodone-acetaminophen (NORCO) 5-325 mg per tablet 2 Tablet  2 Tablet Oral Q4H PRN    gabapentin (NEURONTIN) capsule 300 mg  300 mg Oral TID    sodium chloride (NS) flush 5-40 mL  5-40 mL IntraVENous Q8H    sodium chloride (NS) flush 5-40 mL  5-40 mL IntraVENous PRN    acetaminophen (TYLENOL) tablet 650 mg  650 mg Oral Q6H PRN    Or    acetaminophen (TYLENOL) suppository 650 mg  650 mg Rectal Q6H PRN    polyethylene glycol (MIRALAX) packet 17 g  17 g Oral DAILY PRN    ondansetron (ZOFRAN ODT) tablet 4 mg  4 mg Oral Q8H PRN    Or    ondansetron (ZOFRAN) injection 4 mg  4 mg IntraVENous Q6H PRN    enoxaparin (LOVENOX) injection 40 mg  40 mg SubCUTAneous DAILY    calcium acetate (phosphate binder) (PHOSLO) tablet 667 mg  1 Tablet Oral DAILY WITH BREAKFAST    celecoxib (CELEBREX) capsule 200 mg  200 mg Oral DAILY    hydroCHLOROthiazide (HYDRODIURIL) tablet 12.5 mg  12.5 mg Oral DAILY    carBAMazepine (TEGretol) tablet 200 mg  200 mg Oral BID    phenytoin ER (DILANTIN ER) ER capsule 100 mg  100 mg Oral TID     ______________________________________________________________________  EXPECTED LENGTH OF STAY: 2d 21h  ACTUAL LENGTH OF STAY:          [address] [facility] [address] Steve Hyde MD
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
## 2022-08-10 NOTE — PROGRESS NOTES
Pt is laying in bed with even and unlabored respirations on room air. No complaints of pain at this time. No distress noted. Safety precautions are in place. Bed in low position and locked. Call bell within reach. Problem: Pressure Injury - Risk of  Goal: *Prevention of pressure injury  Description: Document Kike Scale and appropriate interventions in the flowsheet. Outcome: Progressing Towards Goal  Note: Pressure Injury Interventions:       Moisture Interventions: Absorbent underpads, Apply protective barrier, creams and emollients    Activity Interventions: PT/OT evaluation    Mobility Interventions: PT/OT evaluation    Nutrition Interventions: Offer support with meals,snacks and hydration    Friction and Shear Interventions: Lift sheet, Apply protective barrier, creams and emollients                Problem: Patient Education: Go to Patient Education Activity  Goal: Patient/Family Education  Outcome: Progressing Towards Goal     Problem: Falls - Risk of  Goal: *Absence of Falls  Description: Document Mahin Fall Risk and appropriate interventions in the flowsheet. Outcome: Progressing Towards Goal  Note: Fall Risk Interventions:  Mobility Interventions: PT Consult for mobility concerns         Medication Interventions: Assess postural VS orthostatic hypotension    Elimination Interventions: Call light in reach    History of Falls Interventions:  Investigate reason for fall, Door open when patient unattended         Problem: Patient Education: Go to Patient Education Activity  Goal: Patient/Family Education  Outcome: Progressing Towards Goal     Problem: Patient Education: Go to Patient Education Activity  Goal: Patient/Family Education  Outcome: Progressing Towards Goal     Problem: Patient Education: Go to Patient Education Activity  Goal: Patient/Family Education  Outcome: Progressing Towards Goal     Problem: Airway Clearance - Ineffective  Goal: Achieve or maintain patent airway  Outcome: Progressing Towards Goal     Problem: Gas Exchange - Impaired  Goal: Absence of hypoxia  Outcome: Progressing Towards Goal  Goal: Promote optimal lung function  Outcome: Progressing Towards Goal     Problem: Breathing Pattern - Ineffective  Goal: Ability to achieve and maintain a regular respiratory rate  Outcome: Progressing Towards Goal     Problem:  Body Temperature -  Risk of, Imbalanced  Goal: Ability to maintain a body temperature within defined limits  Outcome: Progressing Towards Goal  Goal: Will regain or maintain usual level of consciousness  Outcome: Progressing Towards Goal  Goal: Complications related to the disease process, condition or treatment will be avoided or minimized  Outcome: Progressing Towards Goal     Problem: Isolation Precautions - Risk of Spread of Infection  Goal: Prevent transmission of infectious organism to others  Outcome: Progressing Towards Goal     Problem: Nutrition Deficits  Goal: Optimize nutrtional status  Outcome: Progressing Towards Goal     Problem: Risk for Fluid Volume Deficit  Goal: Maintain normal heart rhythm  Outcome: Progressing Towards Goal  Goal: Maintain absence of muscle cramping  Outcome: Progressing Towards Goal  Goal: Maintain normal serum potassium, sodium, calcium, phosphorus, and pH  Outcome: Progressing Towards Goal     Problem: Loneliness or Risk for Loneliness  Goal: Demonstrate positive use of time alone when socialization is not possible  Outcome: Progressing Towards Goal     Problem: Fatigue  Goal: Verbalize increase energy and improved vitality  Outcome: Progressing Towards Goal     Problem: Patient Education: Go to Patient Education Activity  Goal: Patient/Family Education  Outcome: Progressing Towards Goal
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
## 2022-08-10 NOTE — PROGRESS NOTES
[facility] Adult  Hospitalist Group                                                                                          Hospitalist Progress Note  Ena Salgado MD  Answering service: [phone] from in house phone        Date of Service:  8/10/2022  NAME:  Alicia Reynolds  :  1955  MRN:  [id number]      Admission Summary:   Alicia Reynolds is a 78 y.o. female with history of lumbar stenosis, chronic back pain, HTN, seizure disorder, recent right sacral fracture who presents with worsening pain and limited mobility in the setting of R sacrum fracture and R inferior pubic ramus fracture. She was evaluated by orthopedic surgery, who recommended WBAT. PT/OT were consulted. Patient also endorsed new dysuria and dark colored urine, found to have UTI and started on abx therapy. Interval history / Subjective:   Patient seen and examined earlier this morning by me for sacral and pubic ramus fractures. No acute complaints at the time my exam.  Patient working with physical therapy. Assessment & Plan:     Sacral fracture, right, POA  Inferior pubic ramus fracture, right, POA  Limited mobility  Mechanical Fall  - Presented to ED approx 4 days ago, diagnosed with sacral frcture and pubic ramus fracture. Discharged to home with pain medication per pt request  - had recurrent fall and worsening mobility. Difficulty with IADLs. - Limited mobility of RLE, 1/5 strength at hip, which pt states is chronic for her  - Given worsening of RLE weakness and increased pain with fall since previous imaging, will obtain repeat imaging  Plan:  - CT Pelvis and Lumbar Spine to eval for worsening RLE weakness and worsening pain: Multilevel spondylosis with high-grade spinal canal narrowing at L4-5 and L5-S1.  High-grade narrowing of the bilateral T11-T12, L1-L2, left L2-L3, L3-L4, right L4-5 and L5-S1 neural foramina  - PT/OT eval and treat  - WBAT per ortho recommendations   - Norco 10mg q4h PRN severe pain  - Tramodol 50mg q6h prn moderate pain  - Dispo pending PT eval   - Ortho consulted, plan for OP F/U in 2-3 weeks pending clinical improvement    At this time I believe her issues are the acute fractures plus her chronic back issues. At this time I am not sure that the spine surgeon will be warranted as the more acute issues of the pubic and sacral fractures need to be addressed patient needs some physical therapy before we can determine how much worse her issues are made by her spine. She does have issues in her lumbar and lower thoracic spine. We will discussed with her and get spine surgery involved as needed. Patient has no interest in surgery at this time. Continue to work with physical therapy and aim towards SNF    Pending SNF approval.  Referral sent. Pending SNF placement. Acute cystitis,   Abdominal pain, controlled  - UA with moderate blood and nitrites  - Pt endorses dysuria and dark colored urine  - fever resolved, no leukocytosis   Plan:  - CT Abdomen: negative  - Rocephin 1g q24h   - F/U urine culture:    STAPHYLOCOCCUS LUGDUNENSIS       Lumbar stenosis  Chronic pain  - PT/OT eval and treat  - Consider OP referral to primary spine provider  - Continue home baclofen 10mg daily; gabapentin 300mg TID  If patient continues to show difficulty with pain and working with PT then we will talk to spine surgery     HTN  - Continue home lisinopril and HCTZ     Seizure disorder  - Continue home carbamezapine and home phenytoin     COVID-positive  Mildly symptomatic with sore throat and feeling unwell  No hypoxia or shortness of breath  No cough        Code status: FULL CODE  Prophylaxis: Lovenox  Care Plan discussed with: patient  Anticipated Disposition: Stiff bed available likely tomorrow.        Hospital Problems  Date Reviewed: 7/11/2022            Codes Class Noted POA    Sacral pain ICD-10-CM: M53.3  ICD-9-CM: 724.6  8/2/2022 Unknown       Review of Systems:   A comprehensive review of systems was negative except for that written in the HPI. Vital Signs:    Last 24hrs VS reviewed since prior progress note. Most recent are:  Visit Vitals  /77 (BP 1 Location: Left arm, BP Patient Position: At rest)   Pulse 62   Temp 99.3 °F (37.4 °C)   Resp 18   Wt 84.7 kg (186 lb 12.8 oz)   SpO2 98%   BMI 32.06 kg/m²       No intake or output data in the 24 hours ending 08/10/22 6902       Physical Examination:     I had a face to face encounter with this patient and independently examined them on 8/10/2022 as outlined below:          Constitutional:  No acute distress, cooperative, pleasant    ENT:  Oral mucosa moist, oropharynx benign. Resp:  CTA bilaterally. No wheezing/rhonchi/rales. No accessory muscle use. CV:  Regular rhythm, normal rate, no murmurs, gallops, rubs    GI:  Soft, non distended, normoactive bowel sounds, no hepatosplenomegaly. Mildly tender to the RLQ and epigastric area. MSK:  No edema, warm, 2+ pulses throughout. 1/5 strength of RLE. 4/5 strength of LLE. 1+ DTRs of bilateral LEs, symmetrical. Normal babinski. Neurologic:  AAOx3, CN II-XII reviewed, unable to move RLE since 2015  + back pain with minimal movement            Data Review:    Review and/or order of clinical lab test  Review and/or order of tests in the radiology section of CPT  Review and/or order of tests in the medicine section of CPT      Labs:     Recent Labs     08/10/22  0253   WBC 5.0   HGB 12.0   HCT 35.8   *       Recent Labs     08/10/22  0253 08/09/22  0341 08/08/22  0136    138 139   K 4.7 4.2 4.2    103 104   CO2 28 29 31   BUN 34* 33* 26*   CREA 0.88 1.07* 1.10*   GLU 91 96 89   CA 9.1 9.6 9.9       No results for input(s): ALT, AP, TBIL, TBILI, TP, ALB, GLOB, GGT, AML, LPSE in the last 72 hours. No lab exists for component: SGOT, GPT, AMYP, HLPSE    No results for input(s): INR, PTP, APTT, INREXT, INREXT in the last 72 hours.    No results for input(s): FE, TIBC, PSAT, FERR in the last 72 hours. No results found for: FOL, RBCF   No results for input(s): PH, PCO2, PO2 in the last 72 hours. No results for input(s): CPK, CKNDX, TROIQ in the last 72 hours.     No lab exists for component: CPKMB  Lab Results   Component Value Date/Time    Cholesterol, total 295 (H) 05/05/2022 02:20 PM    HDL Cholesterol 106 05/05/2022 02:20 PM    LDL, calculated 179 (H) 05/05/2022 02:20 PM    Triglyceride 50 05/05/2022 02:20 PM    CHOL/HDL Ratio 2.8 05/05/2022 02:20 PM     Lab Results   Component Value Date/Time    Glucose (POC) 89 08/10/2022 11:58 AM     Lab Results   Component Value Date/Time    Color YELLOW/STRAW 08/02/2022 03:07 PM    Appearance CLEAR 08/02/2022 03:07 PM    Specific gravity 1.024 08/02/2022 03:07 PM    Specific gravity 1.020 08/10/2021 03:08 PM    pH (UA) 6.0 08/02/2022 03:07 PM    Protein 100 (A) 08/02/2022 03:07 PM    Glucose Negative 08/02/2022 03:07 PM    Ketone >80 (A) 08/02/2022 03:07 PM    Bilirubin Negative 08/02/2022 03:07 PM    Urobilinogen 1.0 08/02/2022 03:07 PM    Nitrites Positive (A) 08/02/2022 03:07 PM    Leukocyte Esterase Negative 08/02/2022 03:07 PM    Epithelial cells FEW 08/02/2022 03:07 PM    Bacteria 2+ (A) 08/02/2022 03:07 PM    WBC 0-4 08/02/2022 03:07 PM    RBC 0-5 08/02/2022 03:07 PM         Medications Reviewed:     Current Facility-Administered Medications   Medication Dose Route Frequency    diclofenac (VOLTAREN) 1 % topical gel 2 g  2 g Topical QID    baclofen (LIORESAL) tablet 10 mg  10 mg Oral TID    traMADoL (ULTRAM) tablet 50 mg  50 mg Oral Q6H PRN    atorvastatin (LIPITOR) tablet 10 mg  10 mg Oral QHS    lisinopriL (PRINIVIL, ZESTRIL) tablet 10 mg  10 mg Oral DAILY    HYDROcodone-acetaminophen (NORCO) 5-325 mg per tablet 2 Tablet  2 Tablet Oral Q4H PRN    gabapentin (NEURONTIN) capsule 300 mg  300 mg Oral TID    sodium chloride (NS) flush 5-40 mL  5-40 mL IntraVENous Q8H    sodium chloride (NS) flush 5-40 mL  5-40 mL IntraVENous PRN    acetaminophen (TYLENOL) tablet 650 mg  650 mg Oral Q6H PRN    Or    acetaminophen (TYLENOL) suppository 650 mg  650 mg Rectal Q6H PRN    polyethylene glycol (MIRALAX) packet 17 g  17 g Oral DAILY PRN    ondansetron (ZOFRAN ODT) tablet 4 mg  4 mg Oral Q8H PRN    Or    ondansetron (ZOFRAN) injection 4 mg  4 mg IntraVENous Q6H PRN    enoxaparin (LOVENOX) injection 40 mg  40 mg SubCUTAneous DAILY    calcium acetate (phosphate binder) (PHOSLO) tablet 667 mg  1 Tablet Oral DAILY WITH BREAKFAST    celecoxib (CELEBREX) capsule 200 mg  200 mg Oral DAILY    hydroCHLOROthiazide (HYDRODIURIL) tablet 12.5 mg  12.5 mg Oral DAILY    carBAMazepine (TEGretol) tablet 200 mg  200 mg Oral BID    phenytoin ER (DILANTIN ER) ER capsule 100 mg  100 mg Oral TID     ______________________________________________________________________  EXPECTED LENGTH OF STAY: 2d 21h  ACTUAL LENGTH OF STAY:          [facility] MD
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
## 2022-08-10 NOTE — PROGRESS NOTES
Transitions of care:  accepted with authorization received 8/9 to [facility],  pt tested covid positive on 8/9/2022. [facility] can still accept pending bed availability accommodating new covid positive. No bed today 8/10 at [facility] but likely bed tomorrow 8/11. CM spoke with [facility] [place] and she is aware that pt tested covid positive. Batsheva will call this CM back to confirm if bed is available today at [facility]. Will follow.     Luz Maria Long 51   [phone]

## 2022-08-10 NOTE — PROGRESS NOTES
Transitions of care:  accepted with authorization received 8/9 to [facility],  pt tested covid positive on 8/9/2022. [facility] can still accept pending bed availability accommodating new covid positive. No bed today 8/10 at [facility] but likely bed tomorrow 8/11. CM spoke with [facility] [place] and she is aware that pt tested covid positive. Batsheva will call this CM back to confirm if bed is available today at [facility]. Will follow.     Luz Maria Guzman 51   [phone]

## 2022-08-11 VITALS
TEMPERATURE: 98.6 F | OXYGEN SATURATION: 97 % | HEART RATE: 62 BPM | DIASTOLIC BLOOD PRESSURE: 58 MMHG | BODY MASS INDEX: 32.06 KG/M2 | RESPIRATION RATE: 16 BRPM | SYSTOLIC BLOOD PRESSURE: 102 MMHG | WEIGHT: 186.8 LBS

## 2022-08-11 LAB
ANION GAP SERPL CALC-SCNC: 6 MMOL/L (ref 5–15)
BASOPHILS # BLD: 0 K/UL (ref 0–0.1)
BASOPHILS NFR BLD: 1 % (ref 0–1)
BUN SERPL-MCNC: 31 MG/DL (ref 6–20)
BUN/CREAT SERPL: 28 (ref 12–20)
CALCIUM SERPL-MCNC: 9.1 MG/DL (ref 8.5–10.1)
CHLORIDE SERPL-SCNC: 106 MMOL/L (ref 97–108)
CO2 SERPL-SCNC: 28 MMOL/L (ref 21–32)
CREAT SERPL-MCNC: 1.1 MG/DL (ref 0.55–1.02)
DIFFERENTIAL METHOD BLD: ABNORMAL
EOSINOPHIL # BLD: 0.2 K/UL (ref 0–0.4)
EOSINOPHIL NFR BLD: 5 % (ref 0–7)
ERYTHROCYTE [DISTWIDTH] IN BLOOD BY AUTOMATED COUNT: 13.4 % (ref 11.5–14.5)
GLUCOSE SERPL-MCNC: 121 MG/DL (ref 65–100)
HCT VFR BLD AUTO: 34.6 % (ref 35–47)
HGB BLD-MCNC: 11.6 G/DL (ref 11.5–16)
IMM GRANULOCYTES # BLD AUTO: 0 K/UL (ref 0–0.04)
IMM GRANULOCYTES NFR BLD AUTO: 1 % (ref 0–0.5)
LYMPHOCYTES # BLD: 1.3 K/UL (ref 0.8–3.5)
LYMPHOCYTES NFR BLD: 30 % (ref 12–49)
MCH RBC QN AUTO: 33.6 PG (ref 26–34)
MCHC RBC AUTO-ENTMCNC: 33.5 G/DL (ref 30–36.5)
MCV RBC AUTO: 100.3 FL (ref 80–99)
MONOCYTES # BLD: 0.4 K/UL (ref 0–1)
MONOCYTES NFR BLD: 9 % (ref 5–13)
NEUTS SEG # BLD: 2.4 K/UL (ref 1.8–8)
NEUTS SEG NFR BLD: 54 % (ref 32–75)
NRBC # BLD: 0 K/UL (ref 0–0.01)
NRBC BLD-RTO: 0 PER 100 WBC
PLATELET # BLD AUTO: 477 K/UL (ref 150–400)
PMV BLD AUTO: 8.8 FL (ref 8.9–12.9)
POTASSIUM SERPL-SCNC: 4.4 MMOL/L (ref 3.5–5.1)
RBC # BLD AUTO: 3.45 M/UL (ref 3.8–5.2)
SODIUM SERPL-SCNC: 140 MMOL/L (ref 136–145)
WBC # BLD AUTO: 4.4 K/UL (ref 3.6–11)

## 2022-08-11 PROCEDURE — 74011250637 HC RX REV CODE- 250/637: Performed by: STUDENT IN AN ORGANIZED HEALTH CARE EDUCATION/TRAINING PROGRAM

## 2022-08-11 PROCEDURE — 74011000250 HC RX REV CODE- 250: Performed by: STUDENT IN AN ORGANIZED HEALTH CARE EDUCATION/TRAINING PROGRAM

## 2022-08-11 PROCEDURE — 36415 COLL VENOUS BLD VENIPUNCTURE: CPT

## 2022-08-11 PROCEDURE — 85025 COMPLETE CBC W/AUTO DIFF WBC: CPT

## 2022-08-11 PROCEDURE — 80048 BASIC METABOLIC PNL TOTAL CA: CPT

## 2022-08-11 PROCEDURE — 74011250636 HC RX REV CODE- 250/636: Performed by: STUDENT IN AN ORGANIZED HEALTH CARE EDUCATION/TRAINING PROGRAM

## 2022-08-11 RX ORDER — CARBAMAZEPINE 100 MG/1
200 TABLET, CHEWABLE ORAL 2 TIMES DAILY
Qty: 120 TABLET | Refills: 0 | Status: SHIPPED
Start: 2022-08-11 | End: 2023-02-06 | Stop reason: SDUPTHER

## 2022-08-11 RX ORDER — PHENYTOIN SODIUM 100 MG/1
100 CAPSULE, EXTENDED RELEASE ORAL 3 TIMES DAILY
Qty: 90 CAPSULE | Refills: 0 | Status: SHIPPED
Start: 2022-08-11 | End: 2023-02-06 | Stop reason: SDUPTHER

## 2022-08-11 RX ORDER — HYDROCODONE BITARTRATE AND ACETAMINOPHEN 5; 325 MG/1; MG/1
1 TABLET ORAL
Qty: 18 TABLET | Refills: 0 | Status: SHIPPED | OUTPATIENT
Start: 2022-08-11 | End: 2022-08-14

## 2022-08-11 RX ORDER — TRAMADOL HYDROCHLORIDE 50 MG/1
50 TABLET ORAL
Qty: 28 TABLET | Refills: 0 | Status: SHIPPED | OUTPATIENT
Start: 2022-08-11 | End: 2022-08-18

## 2022-08-11 RX ADMIN — SODIUM CHLORIDE, PRESERVATIVE FREE 10 ML: 5 INJECTION INTRAVENOUS at 15:20

## 2022-08-11 RX ADMIN — DICLOFENAC SODIUM 2 G: 10 GEL TOPICAL at 09:38

## 2022-08-11 RX ADMIN — BACLOFEN 10 MG: 10 TABLET ORAL at 09:40

## 2022-08-11 RX ADMIN — CELECOXIB 200 MG: 200 CAPSULE ORAL at 09:40

## 2022-08-11 RX ADMIN — HYDROCODONE BITARTRATE AND ACETAMINOPHEN 2 TABLET: 5; 325 TABLET ORAL at 09:54

## 2022-08-11 RX ADMIN — GABAPENTIN 300 MG: 300 CAPSULE ORAL at 09:39

## 2022-08-11 RX ADMIN — DICLOFENAC SODIUM 2 G: 10 GEL TOPICAL at 15:20

## 2022-08-11 RX ADMIN — PHENYTOIN SODIUM 100 MG: 100 CAPSULE ORAL at 09:39

## 2022-08-11 RX ADMIN — Medication 667 MG: at 07:02

## 2022-08-11 RX ADMIN — CARBAMAZEPINE 200 MG: 200 TABLET ORAL at 09:39

## 2022-08-11 RX ADMIN — SODIUM CHLORIDE, PRESERVATIVE FREE 10 ML: 5 INJECTION INTRAVENOUS at 05:10

## 2022-08-11 RX ADMIN — ENOXAPARIN SODIUM 40 MG: 100 INJECTION SUBCUTANEOUS at 09:38

## 2022-08-11 NOTE — PROGRESS NOTES
TRANSFER - OUT REPORT:    Verbal report given to [address] [place] [address] on [place] Rosales  being transferred to [facility] for routine progression of care       Report consisted of patients Situation, Background, Assessment and   Recommendations(SBAR). Information from the following report(s) SBAR, Kardex, ED Summary, and MAR was reviewed with the receiving nurse. Lines: Line removed prior to discharge  Peripheral IV 08/08/22 Anterior;Proximal;Right Forearm (Active)   Site Assessment Clean, dry, & intact 08/11/22 0946   Phlebitis Assessment 0 08/11/22 0946   Infiltration Assessment 0 08/11/22 0946   Dressing Status Clean, dry, & intact 08/11/22 0946   Dressing Type Transparent 08/11/22 0946   Hub Color/Line Status Flushed;Capped; Patent 08/11/22 0946   Alcohol Cap Used Yes 08/11/22 0946        Opportunity for questions and clarification was provided. Patient transported with:   [facility] transport to facility.

## 2022-08-11 NOTE — PROGRESS NOTES
Transition of Care Plan to SNF/Rehab    SNF/Rehab Transition:  Patient has been accepted to [facility] SNF room 116B and meets criteria for admission. Patient will transported by [facility] and expected to leave at 3pm, phone [phone]  ([facility] updated from [facility] to [facility])    Communication to Patient/Family:  Called into pt's room and she is agreeable to the transition plan. Communication to SNF/Rehab:  Bedside RN  has been notified to update the transition plan to the facility and call report (phone number [phone] room 116B. Discharge information has been updated on the AVS.     Discharge instructions to be fax'd to facility at [facility]. Nursing Please include all hard scripts for controlled substances, med rec and dc summary, and AVS in packet. Reviewed and confirmed with facility,Batsheva Schmitz for [facility] can manage the patient care needs for the following:     SNF/Rehab Transition:        Bj Guzmán with (X) only those applicable:    Medication:  [x]  Medications will be available at the facility  []  IV Antibiotics   [x]  Controlled Substance - hard copy to be sent with patient   [x]  Weekly Labs   Documents:  [x] Hard RX  [x] MAR  [x] Kardex  [x] AVS  [x]Transfer Summary  [x]Discharge   Equipment:  []  CPAP/BiPAP  []  Wound Vacuum  []  Arizmendi or Urinary Device  []  PICC/Central Line  []  Nebulizer  []  Ventilator   Treatment:  []Isolation (for MRSA, VRE, etc.)  []Surgical Drain Management  []Tracheostomy Care  []Dressing Changes  []Dialysis with transportation and chair time   []PEG Care  []Oxygen  []Daily Weights for Heart Failure   Dietary:  []Any diet limitations  []Tube Feedings   []Total Parenteral Management (TPN)   Eligible for Medicaid Long Term Services and Supports  Yes:  [] Eligible for medical assistance or will become eligible within 180 days and UAI completed. [] Provider/Patient and/or support system has requested screening.   [] UAI copy provided to patient or responsible party,   [] UAI unavailable at discharge will send once processed to SNF provider. [] UAI unavailable at discharged mailed to patient  No:   [x] Private pay and is not financially eligible for Medicaid within the next 180 days. [x] Reside out-of-state. [x] A residents of a state owned/operated facility that is licensed  by [facility] and [facility] Services or [facility]  [x] Enrollment in [facility] hospice services  [x] Non [place] citizen  [x] Patient /Family declines to have screening completed or provide financial information for screening     Financial Resources:  Medicaid    [] Initiated and application pending   [x] Full coverage     Advanced Care Plan:  []Surrogate Decision Maker of Care  []POA  []Communicated Code Status    Other    Originally pt had been accepted by [facility] but due to covid isolation room needed, facility transferred auth to [facility] SNF to accommodate pt's acceptance. [facility] also explained this to pt.

## 2022-08-11 NOTE — DISCHARGE SUMMARY
Discharge Summary       PATIENT ID: Aye Medellin  MRN: [id number]   YOB: 1955    DATE OF ADMISSION: 8/2/2022 10:14 AM    DATE OF DISCHARGE: 08/11/22    PRIMARY CARE PROVIDER: Olivia Ramirez MD     ATTENDING PHYSICIAN: Sasha Jackson MD   DISCHARGING PROVIDER: Sasha Jakcson MD    To contact this individual call [phone] and ask the  to page. If unavailable ask to be transferred the Adult Hospitalist Department. CONSULTATIONS: IP CONSULT TO ORTHOPEDIC SURGERY  IP CONSULT TO ORTHOPEDIC SURGERY    PROCEDURES/SURGERIES: * No surgery found *    ADMITTING DIAGNOSES & HOSPITAL COURSE:   Sacral fracture  Inferior pubic ramus fracture   Limited mobility  Mechanical fall  Acute cystitis  Lumbar stenosis, chronic  HTN  Seizure Disorder    Aye Medellin is a 78 y.o. female with history of lumbar stenosis, chronic back pain, HTN, seizure disorder, recent right sacral fracture who presents with worsening pain and limited mobility in the setting of R sacrum fracture and R inferior pubic ramus fracture. She was evaluated by orthopedic surgery, who recommended WBAT. PT/OT were consulted who recommended SNF placement. Patient was prepared for discharge, but then tested positive for COVID. She was asymptomatic and did not require oxygen therapy for her COVID infection. Her pain was managed with norco 10mg q4h prn and tramodol 50mg q6h prn (home medication). Patient also endorsed new dysuria and dark colored urine, found to have UTI and completed a course of rocephin therapy. Patient was stable on day of discharge and suitable for discharge to SNF for further rehabilitation. DISCHARGE DIAGNOSES / PLAN:      Sacral fracture; Inferior pubic ramus fracture; Limited mobility; Mechanical fall: cleared by surgery, WBAT. PT/OT recommended SNF with rehab. Discharge to [facility] SNF. DC with tramadol for moderate pain and norco 5mg prn for severe pain.    Acute cystitis: resolved s/p rocephin course  COVID infection: tested positive for COVID, VSS, no hypoxia, patient asymptomatic, continue supportive care  Left buttock skin tear: continue wound care with hydrocolloid appication, repositioning. Offload heels while sitting. Lumbar stenosis, chronic: consider OP evaluation by primary spine provider   HTN: continue home HCTZ 12.5mg, lisinopril 10mg  Seizure Disorder: continue home tegretol 200mg BID and phenytoin 100mg TID       PENDING TEST RESULTS:   At the time of discharge the following test results are still pending: none    FOLLOW UP APPOINTMENTS:    Follow-up Information       Follow up With Specialties Details Why Contact Info    [facility]   [address] [facility] [address]    Olivia Ramirez MD Internal Medicine Physician   [address] [facility]  [address]  [address] [address]  57 [facility]  Follow up in 2 week(s)  [address]  [address] [place] [address]  [phone]             ADDITIONAL CARE RECOMMENDATIONS: recommend follow-up with orthopedic surgery in 2-3 weeks. Consider referral to Spine provider for chronic lumbar stenosis. DIET: Regular Diet      ACTIVITY: Activity as tolerated and PT/OT Eval and Treat    WOUND CARE: Left buttock- Please maintain Exuderm Hydrocolloid up to one week or change as needed with soiling or rolled edges. Please use adhesive remover wipes when changing. EQUIPMENT needed: none      DISCHARGE MEDICATIONS:  Current Discharge Medication List        CONTINUE these medications which have CHANGED    Details   carBAMazepine (TEGretol) 100 mg chewable tablet Take 2 Tablets by mouth two (2) times a day. Qty: 120 Tablet, Refills: 0  Start date: 8/11/2022      HYDROcodone-acetaminophen (Norco) 5-325 mg per tablet Take 1 Tablet by mouth every four (4) hours as needed for Pain for up to 3 days. Max Daily Amount: 6 Tablets.   Qty: 18 Tablet, Refills: 0  Start date: 8/11/2022, End date: 8/14/2022    Associated Diagnoses: Closed fracture of sacrum, unspecified portion of sacrum, initial encounter ([facility] [address] Spinal stenosis of thoracolumbar region      phenytoin ER (DILANTIN ER) 100 mg ER capsule Take 1 Capsule by mouth three (3) times daily. Qty: 90 Capsule, Refills: 0  Start date: 8/11/2022      traMADoL (ULTRAM) 50 mg tablet Take 1 Tablet by mouth every six (6) hours as needed for Pain for up to 7 days. Max Daily Amount: 200 mg.   Qty: 28 Tablet, Refills: 0  Start date: 8/11/2022, End date: 8/18/2022    Associated Diagnoses: Neuropathy; Pain management contract agreement           CONTINUE these medications which have NOT CHANGED    Details   calcium acetate,phosphat bind, (PHOSLO) 667 mg cap TAKE 1 CAPSULE BY MOUTH DAILY  Qty: 90 Capsule, Refills: 1  Start date: 8/3/2022    Associated Diagnoses: Osteopenia, unspecified location      baclofen (LIORESAL) 10 mg tablet TAKE 1 TABLET BY MOUTH THREE TIMES DAILY  Qty: 270 Tablet, Refills: 0  Start date: 8/3/2022      lisinopriL (PRINIVIL, ZESTRIL) 10 mg tablet TAKE 1 TABLET BY MOUTH DAILY  Qty: 30 Tablet, Refills: 3  Start date: 8/3/2022    Associated Diagnoses: Hypertension goal BP (blood pressure) < 130/80      hydroCHLOROthiazide (MICROZIDE) 12.5 mg capsule TAKE 1 CAPSULE BY MOUTH EVERY DAY AS NEEDED FOR SWELLING  Qty: 90 Capsule, Refills: 1    Associated Diagnoses: Lymphedema      celecoxib (CELEBREX) 200 mg capsule TAKE 1 CAPSULE BY MOUTH EVERY DAY  Qty: 30 Capsule, Refills: 1    Associated Diagnoses: Swelling of joint of pelvic region or thigh, right      lovastatin (MEVACOR) 20 mg tablet TAKE 1 TABLET BY MOUTH EVERY NIGHT  Qty: 30 Tablet, Refills: 3    Associated Diagnoses: Dyslipidemia (high LDL; low HDL)      Vitamin D3 50 mcg (2,000 unit) cap capsule TAKE 1 CAPSULE BY MOUTH EVERY DAY  Qty: 90 Capsule, Refills: 1    Associated Diagnoses: Hypovitaminosis D      gabapentin (NEURONTIN) 300 mg capsule TAKE 1 CAPSULE BY MOUTH THREE TIMES DAILY  Qty: 270 Capsule, Refills: 0    Associated Diagnoses: Neuropathy      compr.stocking,knee,long,large (Comp Stocking,Knee,Long,Large) misc Use as directed  Qty: 3 Each, Refills: 1    Associated Diagnoses: Lymphedema      Comp. Stocking,Thigh,Long,Large misc Use for support  Qty: 1 Each, Refills: 3    Associated Diagnoses: Bilateral leg edema      Omega-3 Fatty Acids (FISH OIL) 500 mg cap Take 1 Tab by mouth daily. multivitamin with iron (DAILY MULTI-VITAMINS/IRON) tablet Take 1 Tab by mouth daily. GLUC SINGLETON/CHONDRO SINGLETON A/VIT C/MN (GLUCOSAMINE 1500 COMPLEX PO) Take  by mouth. NOTIFY YOUR PHYSICIAN FOR ANY OF THE FOLLOWING:   Fever over 101 degrees for 24 hours. Chest pain, shortness of breath, fever, chills, nausea, vomiting, diarrhea, change in mentation, falling, weakness, bleeding. Severe pain or pain not relieved by medications. Or, any other signs or symptoms that you may have questions about. DISPOSITION:    Home With:   OT  PT  HH  RN      x Long term SNF/Inpatient Rehab    Independent/assisted living    Hospice    Other:       PATIENT CONDITION AT DISCHARGE:     Functional status    Poor    x Deconditioned     Independent      Cognition    x Lucid     Forgetful     Dementia      Catheters/lines (plus indication)    Arizmendi     PICC     PEG    x None      Code status    x Full code     DNR      PHYSICAL EXAMINATION AT DISCHARGE:  Visit Vitals  /66 (BP 1 Location: Left upper arm, BP Patient Position: At rest)   Pulse 62   Temp 98.1 °F (36.7 °C)   Resp 16   Wt 84.7 kg (186 lb 12.8 oz)   LMP 02/13/2005   SpO2 97%   BMI 32.06 kg/m²                                     Constitutional:  No acute distress, cooperative, pleasant    ENT:  Oral mucosa moist, oropharynx benign. Resp:  CTA bilaterally. No wheezing/rhonchi/rales. No accessory muscle use.    CV:  Regular rhythm, normal rate, no murmurs, gallops, rubs    GI:  Soft, non distended, normoactive bowel sounds, no hepatosplenomegaly. MSK:  No edema, warm, 2+ pulses throughout. 1/5 strength of RLE. 4/5 strength of LLE.      Neurologic:  AAOx3, CN II-XII reviewed, + back pain with minimal movement         CHRONIC MEDICAL DIAGNOSES:  Problem List as of 8/11/2022 Date Reviewed: 7/11/2022            Codes Class Noted - Resolved    Sacral pain ICD-10-CM: M53.3  ICD-9-CM: 724.6  8/2/2022 - Present        Hypertension goal BP (blood pressure) < 130/80 ICD-10-CM: I10  ICD-9-CM: 401.9  9/25/2017 - Present        Hypovitaminosis D ICD-10-CM: E55.9  ICD-9-CM: 268.9  9/25/2017 - Present        Dyslipidemia (high LDL; low HDL) ICD-10-CM: E78.5  ICD-9-CM: 272.4  9/25/2017 - Present        Neuropathy ICD-10-CM: G62.9  ICD-9-CM: 355.9  9/25/2017 - Present        RESOLVED: Essential hypertension ICD-10-CM: I10  ICD-9-CM: 401.9  2/13/2017 - 4/23/2018           Greater than 30 minutes were spent with the patient on counseling and coordination of care    Signed:   Art Tovar MD  8/11/2022  1:12 PM

## 2022-08-11 NOTE — PROGRESS NOTES
Transition of Care Plan to SNF/Rehab    SNF/Rehab Transition:  Patient has been accepted to [facility] SNF room 116B and meets criteria for admission. Patient will transported by [facility] and expected to leave at 3pm, phone [phone]  ([facility] updated from [place] to [facility])    Communication to Patient/Family:  Called into pt's room and she is agreeable to the transition plan. Communication to SNF/Rehab:  Bedside RN  has been notified to update the transition plan to the facility and call report (phone number [phone] room 116B. Discharge information has been updated on the AVS.     Discharge instructions to be fax'd to facility at [facility]. Nursing Please include all hard scripts for controlled substances, med rec and dc summary, and AVS in packet. Reviewed and confirmed with facility,Batsheva Baltazar for [facility], can manage the patient care needs for the following:     SNF/Rehab Transition:        Alyssa Johnson with (X) only those applicable:    Medication:  [x]  Medications will be available at the facility  []  IV Antibiotics   [x]  Controlled Substance - hard copy to be sent with patient   [x]  Weekly Labs   Documents:  [x] Hard RX  [x] MAR  [x] Kardex  [x] AVS  [x]Transfer Summary  [x]Discharge   Equipment:  []  CPAP/BiPAP  []  Wound Vacuum  []  Arizmendi or Urinary Device  []  PICC/Central Line  []  Nebulizer  []  Ventilator   Treatment:  []Isolation (for MRSA, VRE, etc.)  []Surgical Drain Management  []Tracheostomy Care  []Dressing Changes  []Dialysis with transportation and chair time   []PEG Care  []Oxygen  []Daily Weights for Heart Failure   Dietary:  []Any diet limitations  []Tube Feedings   []Total Parenteral Management (TPN)   Eligible for Medicaid Long Term Services and Supports  Yes:  [] Eligible for medical assistance or will become eligible within 180 days and UAI completed. [] Provider/Patient and/or support system has requested screening.   [] UAI copy provided to patient or responsible party,   [] UAI unavailable at discharge will send once processed to SNF provider. [] UAI unavailable at discharged mailed to patient  No:   [x] Private pay and is not financially eligible for Medicaid within the next 180 days. [x] Reside out-of-state. [x] A residents of a state owned/operated facility that is licensed  by [address] and Enabled Employment [facility] or [facility]  [x] Enrollment in [facility] hospice services  [x] Non [place] citizen  [x] Patient /Family declines to have screening completed or provide financial information for screening     Financial Resources:  Medicaid    [] Initiated and application pending   [x] Full coverage     Advanced Care Plan:  []Surrogate Decision Maker of Care  []POA  []Communicated Code Status    Other    Originally pt had been accepted by [facility] but due to covid isolation room needed, facility transferred auth to [facility] SNF to accommodate pt's acceptance. Alexander also explained this to pt.

## 2022-08-11 NOTE — DISCHARGE SUMMARY
Discharge Summary       PATIENT ID: Elva Castro  MRN: [id number]   YOB: 1955    DATE OF ADMISSION: 8/2/2022 10:14 AM    DATE OF DISCHARGE: 08/11/22    PRIMARY CARE PROVIDER: Angie Harmon MD     ATTENDING PHYSICIAN: Shayna Warren MD   DISCHARGING PROVIDER: Shayna Warren MD    To contact this individual call [phone] and ask the  to page. If unavailable ask to be transferred the Adult Hospitalist Department. CONSULTATIONS: IP CONSULT TO ORTHOPEDIC SURGERY  IP CONSULT TO ORTHOPEDIC SURGERY    PROCEDURES/SURGERIES: * No surgery found *    ADMITTING DIAGNOSES & HOSPITAL COURSE:   Sacral fracture  Inferior pubic ramus fracture   Limited mobility  Mechanical fall  Acute cystitis  Lumbar stenosis, chronic  HTN  Seizure Disorder    Elva Castro is a 78 y.o. female with history of lumbar stenosis, chronic back pain, HTN, seizure disorder, recent right sacral fracture who presents with worsening pain and limited mobility in the setting of R sacrum fracture and R inferior pubic ramus fracture. She was evaluated by orthopedic surgery, who recommended WBAT. PT/OT were consulted who recommended SNF placement. Patient was prepared for discharge, but then tested positive for COVID. She was asymptomatic and did not require oxygen therapy for her COVID infection. Her pain was managed with norco 10mg q4h prn and tramodol 50mg q6h prn (home medication). Patient also endorsed new dysuria and dark colored urine, found to have UTI and completed a course of rocephin therapy. Patient was stable on day of discharge and suitable for discharge to SNF for further rehabilitation. DISCHARGE DIAGNOSES / PLAN:      Sacral fracture; Inferior pubic ramus fracture; Limited mobility; Mechanical fall: cleared by surgery, WBAT. PT/OT recommended SNF with rehab. Discharge to [facility] SNF. DC with tramadol for moderate pain and norco 5mg prn for severe pain.    Acute cystitis: resolved s/p rocephin course  COVID infection: tested positive for COVID, VSS, no hypoxia, patient asymptomatic, continue supportive care  Left buttock skin tear: continue wound care with hydrocolloid appication, repositioning. Offload heels while sitting. Lumbar stenosis, chronic: consider OP evaluation by primary spine provider   HTN: continue home HCTZ 12.5mg, lisinopril 10mg  Seizure Disorder: continue home tegretol 200mg BID and phenytoin 100mg TID       PENDING TEST RESULTS:   At the time of discharge the following test results are still pending: none    FOLLOW UP APPOINTMENTS:    Follow-up Information       Follow up With Specialties Details Why Contact Info    [facility]   [address]    Sonia Osorio MD Internal Medicine Physician   [address] [facility]  [address]  [address] [address]  57 [facility]  Follow up in 2 week(s)  [address]  [address] [place] [address]  [phone]             ADDITIONAL CARE RECOMMENDATIONS: recommend follow-up with orthopedic surgery in 2-3 weeks. Consider referral to Spine provider for chronic lumbar stenosis. DIET: Regular Diet      ACTIVITY: Activity as tolerated and PT/OT Eval and Treat    WOUND CARE: Left buttock- Please maintain Exuderm Hydrocolloid up to one week or change as needed with soiling or rolled edges. Please use adhesive remover wipes when changing. EQUIPMENT needed: none      DISCHARGE MEDICATIONS:  Current Discharge Medication List        CONTINUE these medications which have CHANGED    Details   carBAMazepine (TEGretol) 100 mg chewable tablet Take 2 Tablets by mouth two (2) times a day. Qty: 120 Tablet, Refills: 0  Start date: 8/11/2022      HYDROcodone-acetaminophen (Norco) 5-325 mg per tablet Take 1 Tablet by mouth every four (4) hours as needed for Pain for up to 3 days. Max Daily Amount: 6 Tablets.   Qty: 18 Tablet, Refills: 0  Start date: 8/11/2022, End date: 8/14/2022    Associated Diagnoses: Closed fracture of sacrum, unspecified portion of sacrum, initial encounter ([facility] [address] Spinal stenosis of thoracolumbar region      phenytoin ER (DILANTIN ER) 100 mg ER capsule Take 1 Capsule by mouth three (3) times daily. Qty: 90 Capsule, Refills: 0  Start date: 8/11/2022      traMADoL (ULTRAM) 50 mg tablet Take 1 Tablet by mouth every six (6) hours as needed for Pain for up to 7 days. Max Daily Amount: 200 mg.   Qty: 28 Tablet, Refills: 0  Start date: 8/11/2022, End date: 8/18/2022    Associated Diagnoses: Neuropathy; Pain management contract agreement           CONTINUE these medications which have NOT CHANGED    Details   calcium acetate,phosphat bind, (PHOSLO) 667 mg cap TAKE 1 CAPSULE BY MOUTH DAILY  Qty: 90 Capsule, Refills: 1  Start date: 8/3/2022    Associated Diagnoses: Osteopenia, unspecified location      baclofen (LIORESAL) 10 mg tablet TAKE 1 TABLET BY MOUTH THREE TIMES DAILY  Qty: 270 Tablet, Refills: 0  Start date: 8/3/2022      lisinopriL (PRINIVIL, ZESTRIL) 10 mg tablet TAKE 1 TABLET BY MOUTH DAILY  Qty: 30 Tablet, Refills: 3  Start date: 8/3/2022    Associated Diagnoses: Hypertension goal BP (blood pressure) < 130/80      hydroCHLOROthiazide (MICROZIDE) 12.5 mg capsule TAKE 1 CAPSULE BY MOUTH EVERY DAY AS NEEDED FOR SWELLING  Qty: 90 Capsule, Refills: 1    Associated Diagnoses: Lymphedema      celecoxib (CELEBREX) 200 mg capsule TAKE 1 CAPSULE BY MOUTH EVERY DAY  Qty: 30 Capsule, Refills: 1    Associated Diagnoses: Swelling of joint of pelvic region or thigh, right      lovastatin (MEVACOR) 20 mg tablet TAKE 1 TABLET BY MOUTH EVERY NIGHT  Qty: 30 Tablet, Refills: 3    Associated Diagnoses: Dyslipidemia (high LDL; low HDL)      Vitamin D3 50 mcg (2,000 unit) cap capsule TAKE 1 CAPSULE BY MOUTH EVERY DAY  Qty: 90 Capsule, Refills: 1    Associated Diagnoses: Hypovitaminosis D      gabapentin (NEURONTIN) 300 mg capsule TAKE 1 CAPSULE BY MOUTH THREE TIMES DAILY  Qty: 270 Capsule, Refills: 0    Associated Diagnoses: Neuropathy      compr.stocking,knee,long,large (Comp Stocking,Knee,Long,Large) misc Use as directed  Qty: 3 Each, Refills: 1    Associated Diagnoses: Lymphedema      Comp. Stocking,Thigh,Long,Large misc Use for support  Qty: 1 Each, Refills: 3    Associated Diagnoses: Bilateral leg edema      Omega-3 Fatty Acids (FISH OIL) 500 mg cap Take 1 Tab by mouth daily. multivitamin with iron (DAILY MULTI-VITAMINS/IRON) tablet Take 1 Tab by mouth daily. GLUC SINGLETON/CHONDRO SINGLETON A/VIT C/MN (GLUCOSAMINE 1500 COMPLEX PO) Take  by mouth. NOTIFY YOUR PHYSICIAN FOR ANY OF THE FOLLOWING:   Fever over 101 degrees for 24 hours. Chest pain, shortness of breath, fever, chills, nausea, vomiting, diarrhea, change in mentation, falling, weakness, bleeding. Severe pain or pain not relieved by medications. Or, any other signs or symptoms that you may have questions about. DISPOSITION:    Home With:   OT  PT  HH  RN      x Long term SNF/Inpatient Rehab    Independent/assisted living    Hospice    Other:       PATIENT CONDITION AT DISCHARGE:     Functional status    Poor    x Deconditioned     Independent      Cognition    x Lucid     Forgetful     Dementia      Catheters/lines (plus indication)    Arizmendi     PICC     PEG    x None      Code status    x Full code     DNR      PHYSICAL EXAMINATION AT DISCHARGE:  Visit Vitals  /66 (BP 1 Location: Left upper arm, BP Patient Position: At rest)   Pulse 62   Temp 98.1 °F (36.7 °C)   Resp 16   Wt 84.7 kg (186 lb 12.8 oz)   LMP 02/13/2005   SpO2 97%   BMI 32.06 kg/m²                                     Constitutional:  No acute distress, cooperative, pleasant    ENT:  Oral mucosa moist, oropharynx benign. Resp:  CTA bilaterally. No wheezing/rhonchi/rales. No accessory muscle use.    CV:  Regular rhythm, normal rate, no murmurs, gallops, rubs    GI:  Soft, non distended, normoactive bowel sounds, no hepatosplenomegaly. MSK:  No edema, warm, 2+ pulses throughout. 1/5 strength of RLE. 4/5 strength of LLE.      Neurologic:  AAOx3, CN II-XII reviewed, + back pain with minimal movement         CHRONIC MEDICAL DIAGNOSES:  Problem List as of 8/11/2022 Date Reviewed: 7/11/2022            Codes Class Noted - Resolved    Sacral pain ICD-10-CM: M53.3  ICD-9-CM: 724.6  8/2/2022 - Present        Hypertension goal BP (blood pressure) < 130/80 ICD-10-CM: I10  ICD-9-CM: 401.9  9/25/2017 - Present        Hypovitaminosis D ICD-10-CM: E55.9  ICD-9-CM: 268.9  9/25/2017 - Present        Dyslipidemia (high LDL; low HDL) ICD-10-CM: E78.5  ICD-9-CM: 272.4  9/25/2017 - Present        Neuropathy ICD-10-CM: G62.9  ICD-9-CM: 355.9  9/25/2017 - Present        RESOLVED: Essential hypertension ICD-10-CM: I10  ICD-9-CM: 401.9  2/13/2017 - 4/23/2018           Greater than 30 minutes were spent with the patient on counseling and coordination of care    Signed:   Ramona Gillette MD  8/11/2022  1:12 PM

## 2022-08-11 NOTE — PROGRESS NOTES
Pt is laying in bed with even and unlabored respirations on room air. No complaints of pain at this time. No distress noted. IV removed with no bleeding noted. Noemi FLORES was given [employer] from [facility]. AVS was printed and prescriptions are in the folder with the paperwork. Belongings have been collected and returned to the pt via pt belongings bags. Safety precautions are in place. Bed in low position and locked. Call bell within reach.

## 2022-08-11 NOTE — PROGRESS NOTES
TRANSFER - OUT REPORT:    Verbal report given to Drea Loving on Al Bloodgood  being transferred to [facility] for routine progression of care       Report consisted of patients Situation, Background, Assessment and   Recommendations(SBAR). Information from the following report(s) SBAR, Kardex, ED Summary, and MAR was reviewed with the receiving nurse. Lines: Line removed prior to discharge  Peripheral IV 08/08/22 Anterior;Proximal;Right Forearm (Active)   Site Assessment Clean, dry, & intact 08/11/22 0946   Phlebitis Assessment 0 08/11/22 0946   Infiltration Assessment 0 08/11/22 0946   Dressing Status Clean, dry, & intact 08/11/22 0946   Dressing Type Transparent 08/11/22 0946   Hub Color/Line Status Flushed;Capped; Patent 08/11/22 0946   Alcohol Cap Used Yes 08/11/22 0946        Opportunity for questions and clarification was provided. Patient transported with:   [facility] transport to facility.

## 2022-08-11 NOTE — PROGRESS NOTES
Pt is laying in bed with even and unlabored respirations on room air. No complaints of pain at this time. No distress noted. IV removed with no bleeding noted. Noemi FLORES was given [employer] from [facility]. AVS was printed and prescriptions are in the folder with the paperwork. Belongings have been collected and returned to the pt via pt belongings bags. Safety precautions are in place. Bed in low position and locked. Call bell within reach.

## 2022-09-12 ENCOUNTER — OFFICE VISIT (OUTPATIENT)
Dept: ORTHOPEDIC SURGERY | Age: 67
End: 2022-09-12
Payer: MEDICARE

## 2022-09-12 VITALS — BODY MASS INDEX: 27.32 KG/M2 | WEIGHT: 170 LBS | HEIGHT: 66 IN

## 2022-09-12 DIAGNOSIS — M48.062 NEUROGENIC CLAUDICATION DUE TO LUMBAR SPINAL STENOSIS: Primary | ICD-10-CM

## 2022-09-12 DIAGNOSIS — R29.6 FALLS FREQUENTLY: ICD-10-CM

## 2022-09-12 DIAGNOSIS — M85.89 OSTEOPENIA OF MULTIPLE SITES: ICD-10-CM

## 2022-09-12 DIAGNOSIS — M43.16 SPONDYLOLISTHESIS OF LUMBAR REGION: ICD-10-CM

## 2022-09-12 DIAGNOSIS — M41.56 OTHER SECONDARY SCOLIOSIS, LUMBAR REGION: ICD-10-CM

## 2022-09-12 DIAGNOSIS — M51.36 DEGENERATIVE DISC DISEASE, LUMBAR: ICD-10-CM

## 2022-09-12 PROBLEM — M51.369 DEGENERATIVE DISC DISEASE, LUMBAR: Status: ACTIVE | Noted: 2022-09-12

## 2022-09-12 PROCEDURE — 1123F ACP DISCUSS/DSCN MKR DOCD: CPT | Performed by: STUDENT IN AN ORGANIZED HEALTH CARE EDUCATION/TRAINING PROGRAM

## 2022-09-12 PROCEDURE — 99204 OFFICE O/P NEW MOD 45 MIN: CPT | Performed by: STUDENT IN AN ORGANIZED HEALTH CARE EDUCATION/TRAINING PROGRAM

## 2022-09-12 NOTE — PROGRESS NOTES
1. Have you been to the ER, urgent care clinic since your last visit? Hospitalized since your last visit? No    2. Have you seen or consulted any other health care providers outside of the [address] [place] [address] since your last visit? Include any pap smears or colon screening.  No  Chief Complaint   Patient presents with    Back Pain
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
## 2022-09-14 ENCOUNTER — TELEPHONE (OUTPATIENT)
Dept: FAMILY MEDICINE CLINIC | Age: 67
End: 2022-09-14

## 2022-09-14 NOTE — TELEPHONE ENCOUNTER
----- Message from Carlin Goodpasture sent at 9/14/2022  4:34 PM EDT -----  Subject: Message to Provider    QUESTIONS  Information for Provider? Pt is following up about a signauture need for   incontanze (protective underwear)that is given to her monthly by home   health to continue receiving this. Company? [employer]. Please   contact as soon as possible.   ---------------------------------------------------------------------------  --------------  Marti CAO  [phone]; OK to leave message on voicemail  ---------------------------------------------------------------------------  --------------  SCRIPT ANSWERS  Relationship to Patient?  Self

## 2022-11-08 DIAGNOSIS — I89.0 LYMPHEDEMA: ICD-10-CM

## 2022-11-08 DIAGNOSIS — E78.5 DYSLIPIDEMIA (HIGH LDL; LOW HDL): ICD-10-CM

## 2022-11-08 RX ORDER — LOVASTATIN 20 MG/1
20 TABLET ORAL
Qty: 30 TABLET | Refills: 3 | Status: SHIPPED | OUTPATIENT
Start: 2022-11-08

## 2022-11-08 RX ORDER — BACLOFEN 10 MG/1
10 TABLET ORAL 3 TIMES DAILY
Qty: 270 TABLET | Refills: 0 | Status: SHIPPED | OUTPATIENT
Start: 2022-11-08

## 2022-12-07 DIAGNOSIS — M25.451 SWELLING OF JOINT OF PELVIC REGION OR THIGH, RIGHT: ICD-10-CM

## 2022-12-07 RX ORDER — CELECOXIB 200 MG/1
CAPSULE ORAL
Qty: 30 CAPSULE | Refills: 1 | Status: SHIPPED | OUTPATIENT
Start: 2022-12-07

## 2023-01-13 DIAGNOSIS — Z02.89 PAIN MANAGEMENT CONTRACT AGREEMENT: ICD-10-CM

## 2023-01-13 DIAGNOSIS — G62.9 NEUROPATHY: ICD-10-CM

## 2023-01-13 RX ORDER — TRAMADOL HYDROCHLORIDE 50 MG/1
50 TABLET ORAL
Qty: 30 TABLET | Refills: 0 | Status: SHIPPED | OUTPATIENT
Start: 2023-01-13 | End: 2023-02-12

## 2023-02-05 DIAGNOSIS — E78.5 DYSLIPIDEMIA (HIGH LDL; LOW HDL): ICD-10-CM

## 2023-02-05 DIAGNOSIS — M25.451 SWELLING OF JOINT OF PELVIC REGION OR THIGH, RIGHT: ICD-10-CM

## 2023-02-05 DIAGNOSIS — I10 HYPERTENSION GOAL BP (BLOOD PRESSURE) < 130/80: ICD-10-CM

## 2023-02-05 DIAGNOSIS — E55.9 HYPOVITAMINOSIS D: ICD-10-CM

## 2023-02-05 DIAGNOSIS — I89.0 LYMPHEDEMA: ICD-10-CM

## 2023-02-05 DIAGNOSIS — G62.9 NEUROPATHY: ICD-10-CM

## 2023-02-05 DIAGNOSIS — Z02.89 PAIN MANAGEMENT CONTRACT AGREEMENT: ICD-10-CM

## 2023-02-05 DIAGNOSIS — M85.80 OSTEOPENIA, UNSPECIFIED LOCATION: ICD-10-CM

## 2023-02-06 ENCOUNTER — OFFICE VISIT (OUTPATIENT)
Dept: FAMILY MEDICINE CLINIC | Age: 68
End: 2023-02-06
Payer: MEDICARE

## 2023-02-06 VITALS
TEMPERATURE: 98 F | RESPIRATION RATE: 20 BRPM | DIASTOLIC BLOOD PRESSURE: 67 MMHG | BODY MASS INDEX: 30.53 KG/M2 | HEIGHT: 66 IN | OXYGEN SATURATION: 98 % | HEART RATE: 61 BPM | SYSTOLIC BLOOD PRESSURE: 158 MMHG | WEIGHT: 190 LBS

## 2023-02-06 DIAGNOSIS — R56.9 SEIZURE (HCC): ICD-10-CM

## 2023-02-06 DIAGNOSIS — Z00.00 ENCOUNTER FOR MEDICARE ANNUAL WELLNESS EXAM: Primary | ICD-10-CM

## 2023-02-06 DIAGNOSIS — D64.9 NORMOCHROMIC NORMOCYTIC ANEMIA: ICD-10-CM

## 2023-02-06 DIAGNOSIS — I89.0 LYMPHEDEMA: ICD-10-CM

## 2023-02-06 DIAGNOSIS — M43.16 SPONDYLOLISTHESIS OF LUMBAR REGION: ICD-10-CM

## 2023-02-06 DIAGNOSIS — M41.56 OTHER SECONDARY SCOLIOSIS, LUMBAR REGION: ICD-10-CM

## 2023-02-06 DIAGNOSIS — M51.36 DEGENERATIVE DISC DISEASE, LUMBAR: ICD-10-CM

## 2023-02-06 DIAGNOSIS — R73.02 IGT (IMPAIRED GLUCOSE TOLERANCE): ICD-10-CM

## 2023-02-06 DIAGNOSIS — E55.9 VITAMIN D DEFICIENCY: ICD-10-CM

## 2023-02-06 DIAGNOSIS — I10 HYPERTENSION GOAL BP (BLOOD PRESSURE) < 130/80: ICD-10-CM

## 2023-02-06 DIAGNOSIS — E78.5 DYSLIPIDEMIA (HIGH LDL; LOW HDL): ICD-10-CM

## 2023-02-06 DIAGNOSIS — Z02.89 PAIN MANAGEMENT CONTRACT AGREEMENT: ICD-10-CM

## 2023-02-06 DIAGNOSIS — E03.9 ACQUIRED HYPOTHYROIDISM: ICD-10-CM

## 2023-02-06 DIAGNOSIS — R35.0 FREQUENCY OF URINATION: ICD-10-CM

## 2023-02-06 DIAGNOSIS — G62.9 NEUROPATHY: ICD-10-CM

## 2023-02-06 PROCEDURE — G8399 PT W/DXA RESULTS DOCUMENT: HCPCS | Performed by: INTERNAL MEDICINE

## 2023-02-06 PROCEDURE — G0439 PPPS, SUBSEQ VISIT: HCPCS | Performed by: INTERNAL MEDICINE

## 2023-02-06 PROCEDURE — 1123F ACP DISCUSS/DSCN MKR DOCD: CPT | Performed by: INTERNAL MEDICINE

## 2023-02-06 PROCEDURE — 3078F DIAST BP <80 MM HG: CPT | Performed by: INTERNAL MEDICINE

## 2023-02-06 PROCEDURE — 3074F SYST BP LT 130 MM HG: CPT | Performed by: INTERNAL MEDICINE

## 2023-02-06 PROCEDURE — G8536 NO DOC ELDER MAL SCRN: HCPCS | Performed by: INTERNAL MEDICINE

## 2023-02-06 PROCEDURE — 1101F PT FALLS ASSESS-DOCD LE1/YR: CPT | Performed by: INTERNAL MEDICINE

## 2023-02-06 PROCEDURE — G8427 DOCREV CUR MEDS BY ELIG CLIN: HCPCS | Performed by: INTERNAL MEDICINE

## 2023-02-06 PROCEDURE — 3017F COLORECTAL CA SCREEN DOC REV: CPT | Performed by: INTERNAL MEDICINE

## 2023-02-06 PROCEDURE — G9899 SCRN MAM PERF RSLTS DOC: HCPCS | Performed by: INTERNAL MEDICINE

## 2023-02-06 PROCEDURE — G8417 CALC BMI ABV UP PARAM F/U: HCPCS | Performed by: INTERNAL MEDICINE

## 2023-02-06 PROCEDURE — G8510 SCR DEP NEG, NO PLAN REQD: HCPCS | Performed by: INTERNAL MEDICINE

## 2023-02-06 RX ORDER — CARBAMAZEPINE 100 MG/1
200 TABLET, CHEWABLE ORAL 2 TIMES DAILY
Qty: 120 TABLET | Refills: 2 | Status: SHIPPED | OUTPATIENT
Start: 2023-02-06

## 2023-02-06 RX ORDER — PHENYTOIN SODIUM 100 MG/1
100 CAPSULE, EXTENDED RELEASE ORAL 3 TIMES DAILY
Qty: 90 CAPSULE | Refills: 0 | Status: CANCELLED | OUTPATIENT
Start: 2023-02-06

## 2023-02-06 RX ORDER — ACETAMINOPHEN 500 MG
TABLET ORAL
Qty: 90 CAPSULE | Refills: 0 | Status: SHIPPED | OUTPATIENT
Start: 2023-02-06

## 2023-02-06 RX ORDER — PHENYTOIN SODIUM 100 MG/1
100 CAPSULE, EXTENDED RELEASE ORAL 3 TIMES DAILY
Qty: 90 CAPSULE | Refills: 0 | Status: SHIPPED | OUTPATIENT
Start: 2023-02-06

## 2023-02-06 RX ORDER — CALCIUM ACETATE 667 MG/1
CAPSULE ORAL
Qty: 90 CAPSULE | Refills: 0 | Status: SHIPPED | OUTPATIENT
Start: 2023-02-06

## 2023-02-06 RX ORDER — CARBAMAZEPINE 100 MG/1
200 TABLET, CHEWABLE ORAL 2 TIMES DAILY
Qty: 120 TABLET | Refills: 0 | Status: CANCELLED | OUTPATIENT
Start: 2023-02-06

## 2023-02-06 NOTE — PROGRESS NOTES
1. \"Have you been to the ER, urgent care clinic since your last visit? Hospitalized since your last visit? \" No    2. \"Have you seen or consulted any other health care providers outside of the [address] [place] [address] since your last visit? \" No     3. For patients aged 39-70: Has the patient had a colonoscopy / FIT/ Cologuard? No      If the patient is female:    4. For patients aged 41-77: Has the patient had a mammogram within the past 2 years? Yes - no Care Gap present      5. For patients aged 21-65: Has the patient had a pap smear?  No
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
## 2023-02-07 ENCOUNTER — HOSPITAL ENCOUNTER (OUTPATIENT)
Dept: MAMMOGRAPHY | Age: 68
Discharge: HOME | End: 2023-02-07
Attending: INTERNAL MEDICINE
Payer: MEDICARE

## 2023-02-07 DIAGNOSIS — Z12.31 ENCOUNTER FOR SCREENING MAMMOGRAM FOR BREAST CANCER: ICD-10-CM

## 2023-02-07 LAB
COMMENT, HOLDF: NORMAL
SAMPLES BEING HELD,HOLD: NORMAL

## 2023-02-07 PROCEDURE — 77067 SCR MAMMO BI INCL CAD: CPT

## 2023-02-07 RX ORDER — CELECOXIB 200 MG/1
200 CAPSULE ORAL DAILY
Qty: 30 CAPSULE | Refills: 1 | Status: SHIPPED | OUTPATIENT
Start: 2023-02-07

## 2023-02-07 RX ORDER — BACLOFEN 10 MG/1
10 TABLET ORAL 3 TIMES DAILY
Qty: 270 TABLET | Refills: 1 | Status: SHIPPED | OUTPATIENT
Start: 2023-02-07

## 2023-02-07 RX ORDER — GABAPENTIN 300 MG/1
300 CAPSULE ORAL 3 TIMES DAILY
Qty: 270 CAPSULE | Refills: 0 | Status: SHIPPED | OUTPATIENT
Start: 2023-02-07

## 2023-02-07 RX ORDER — LOVASTATIN 20 MG/1
20 TABLET ORAL
Qty: 30 TABLET | Refills: 3 | Status: SHIPPED | OUTPATIENT
Start: 2023-02-07

## 2023-02-07 RX ORDER — MULTIVITAMIN WITH IRON
1 TABLET ORAL DAILY
Qty: 90 TABLET | Refills: 1 | Status: SHIPPED | OUTPATIENT
Start: 2023-02-07

## 2023-02-07 RX ORDER — HYDROCHLOROTHIAZIDE 12.5 MG/1
CAPSULE ORAL
Qty: 90 CAPSULE | Refills: 1 | Status: SHIPPED | OUTPATIENT
Start: 2023-02-07

## 2023-02-07 RX ORDER — TRAMADOL HYDROCHLORIDE 50 MG/1
50 TABLET ORAL
Qty: 30 TABLET | Refills: 0 | OUTPATIENT
Start: 2023-02-07 | End: 2023-03-09

## 2023-02-07 RX ORDER — LISINOPRIL 10 MG/1
10 TABLET ORAL DAILY
Qty: 30 TABLET | Refills: 3 | Status: SHIPPED | OUTPATIENT
Start: 2023-02-07

## 2023-02-09 ENCOUNTER — TELEPHONE (OUTPATIENT)
Dept: FAMILY MEDICINE CLINIC | Age: 68
End: 2023-02-09

## 2023-02-09 NOTE — TELEPHONE ENCOUNTER
----- Message from Howard Ny sent at 2/8/2023 12:52 PM EST -----  Subject: Message to Provider    QUESTIONS  Information for Provider? Patient would like to know if she needs an   appointment to come in to have labs done order are already placed. Please   call patient to advise.  ---------------------------------------------------------------------------  --------------  Stanley DACOSTA  [phone];  Do not leave any message, patient will call back for answer  ---------------------------------------------------------------------------  --------------  SCRIPT ANSWERS  undefined

## 2023-02-10 NOTE — TELEPHONE ENCOUNTER
Called patient to inform where to go for blood work.  [facility] at [address] [facility] # [phone]

## 2023-02-15 DIAGNOSIS — G62.9 NEUROPATHY: ICD-10-CM

## 2023-02-15 DIAGNOSIS — Z02.89 PAIN MANAGEMENT CONTRACT AGREEMENT: ICD-10-CM

## 2023-02-16 RX ORDER — TRAMADOL HYDROCHLORIDE 50 MG/1
50 TABLET ORAL
COMMUNITY
End: 2023-02-17 | Stop reason: SDUPTHER

## 2023-02-16 NOTE — TELEPHONE ENCOUNTER
----- Message from Grayson Richard sent at 2/16/2023 11:54 AM EST -----  Subject: Medication Problem    Medication: traMADoL (ULTRAM) 50 mg tablet  Dosage: 50mg, 1 Tab per day as needed  Ordering Provider: Dr. Ramu Basurto    Question/Problem: Patient states that there are no more refills for this   medication. Patient states that Monique needs new a authorization in   order to get this medication filled.       Pharmacy: [id number] - [place], [address] [place] [address]    ---------------------------------------------------------------------------  --------------  Yovani CABAN  [phone]; OK to leave message on voicemail  ---------------------------------------------------------------------------  --------------    SCRIPT ANSWERS  Relationship to Patient: Self

## 2023-02-17 ENCOUNTER — TELEPHONE (OUTPATIENT)
Dept: FAMILY MEDICINE CLINIC | Age: 68
End: 2023-02-17

## 2023-02-17 RX ORDER — TRAMADOL HYDROCHLORIDE 50 MG/1
TABLET ORAL
Qty: 30 TABLET | Refills: 0 | Status: SHIPPED | OUTPATIENT
Start: 2023-02-17 | End: 2023-03-19

## 2023-02-17 RX ORDER — TRAMADOL HYDROCHLORIDE 50 MG/1
50 TABLET ORAL
OUTPATIENT
Start: 2023-02-17

## 2023-02-17 NOTE — TELEPHONE ENCOUNTER
Patient called, checking on status of rx for tramadol     She states she has been waiting for a couple of days and would like a return call from the nurse today    She knows this is the weekend and would like it filled today    Please call  [phone]

## 2023-02-27 DIAGNOSIS — Z02.89 PAIN MANAGEMENT CONTRACT AGREEMENT: ICD-10-CM

## 2023-02-27 DIAGNOSIS — G62.9 NEUROPATHY: ICD-10-CM

## 2023-02-27 RX ORDER — TRAMADOL HYDROCHLORIDE 50 MG/1
TABLET ORAL
Qty: 30 TABLET | Refills: 0 | Status: SHIPPED | OUTPATIENT
Start: 2023-02-27 | End: 2023-03-29

## 2023-04-20 ENCOUNTER — OFFICE VISIT (OUTPATIENT)
Dept: FAMILY MEDICINE CLINIC | Age: 68
End: 2023-04-20

## 2023-04-20 VITALS
HEIGHT: 66 IN | WEIGHT: 194 LBS | OXYGEN SATURATION: 98 % | RESPIRATION RATE: 20 BRPM | DIASTOLIC BLOOD PRESSURE: 63 MMHG | SYSTOLIC BLOOD PRESSURE: 129 MMHG | BODY MASS INDEX: 31.18 KG/M2 | HEART RATE: 61 BPM | TEMPERATURE: 97.5 F

## 2023-04-20 DIAGNOSIS — Z02.89 ENCOUNTER FOR COMPLETION OF FORM WITH PATIENT: Primary | ICD-10-CM

## 2023-04-20 DIAGNOSIS — G62.9 NEUROPATHY: ICD-10-CM

## 2023-04-20 DIAGNOSIS — I89.0 LYMPHEDEMA: ICD-10-CM

## 2023-04-20 DIAGNOSIS — R56.9 SEIZURE (HCC): ICD-10-CM

## 2023-04-20 DIAGNOSIS — G89.4 CHRONIC PAIN DISORDER: ICD-10-CM

## 2023-04-20 DIAGNOSIS — M21.371 FOOT DROP, RIGHT: ICD-10-CM

## 2023-04-20 DIAGNOSIS — Z12.12 ENCOUNTER FOR COLORECTAL CANCER SCREENING: ICD-10-CM

## 2023-04-20 DIAGNOSIS — E78.5 DYSLIPIDEMIA (HIGH LDL; LOW HDL): ICD-10-CM

## 2023-04-20 DIAGNOSIS — Z12.11 ENCOUNTER FOR COLORECTAL CANCER SCREENING: ICD-10-CM

## 2023-04-20 RX ORDER — CARBAMAZEPINE 100 MG/1
200 TABLET, CHEWABLE ORAL 2 TIMES DAILY
Qty: 120 TABLET | Refills: 2 | Status: SHIPPED | OUTPATIENT
Start: 2023-04-20

## 2023-04-20 RX ORDER — LOVASTATIN 20 MG/1
20 TABLET ORAL
Qty: 30 TABLET | Refills: 3 | Status: SHIPPED | OUTPATIENT
Start: 2023-04-20

## 2023-04-20 NOTE — PROGRESS NOTES
Chief Complaint   Patient presents with    Form Completion     Transportation form  and wheelchair form      1. Have you been to the ER, urgent care clinic since your last visit? Hospitalized since your last visit? No    2. Have you seen or consulted any other health care providers outside of the [address] [place] [address] since your last visit? Include any pap smears or colon screening.   yes

## 2023-04-26 ENCOUNTER — DOCUMENTATION ONLY (OUTPATIENT)
Dept: FAMILY MEDICINE CLINIC | Age: 68
End: 2023-04-26

## 2023-05-02 NOTE — PROGRESS NOTES
SARITA: anticipate d/c to SNF; referrals pending to:  [facility]st choice: [facility]  2nd choice: [facility]  3rd choice: [facility]    Pt has been fully vaccinated and boosted for Covid 19  Vaccine dates in chart record    Pt will need insurance auth for SNF placement  Pt will need likely need a Rapid Covid test     [facility] Transport    RUR: 9%  -right Sacral fracture, inferior pubic ramus fracture, s/p fall  -UTI, continues IV ABX  -Orthopedic surgery consult, plan for OP follow up in 2-3 weeks pending clinical improvement  -PT/OT recs. SNF  -1200-CM reviewed pt chart & discussed pt's case in rounds. Pt/Ot recs. SNF. CM met with pt at bedside to provide SNF list. Pt stated she intends to speak with her sister and for CM to return at 3:30pm for her choices. CM to follow. -1530-CM met with pt and and received her SNF choices as noted above, [facility]. CM to follow. Loi Maloney RN BSN CCM  Transition of Care Plan:     The Plan for Transition of Care is related to the following treatment goals: SNF    The Patient and/or patient representative was provided with a choice of provider and agrees  with the discharge plan. Yes [x] No []    A Freedom of choice list was provided with basic dialogue that supports the patient's individualized plan of care/goals and shares the quality data associated with the providers.        Yes [x] No [] Home

## 2023-05-08 NOTE — TELEPHONE ENCOUNTER
Neda Loyd is requesting a refill. Previous Rx was sent to [facility].     Last appointment: 4/20/23 with MD Floy Klinefelter  Next appointment: 7/21/23 with MD Floy Klinefelter    Requested Prescriptions     Pending Prescriptions Disp Refills    carBAMazepine (TEGRETOL) 100 MG chewable tablet [Pharmacy Med Name: carBAMazepine 100MG TAB CHEW] 120 tablet 2     Sig: CHEW TWO TABLETS BY MOUTH TWICE A DAY

## 2023-05-09 RX ORDER — CARBAMAZEPINE 100 MG/1
TABLET, CHEWABLE ORAL
Qty: 120 TABLET | Refills: 2 | Status: SHIPPED | OUTPATIENT
Start: 2023-05-09

## 2023-05-11 DIAGNOSIS — Z02.89 PAIN MANAGEMENT CONTRACT AGREEMENT: ICD-10-CM

## 2023-05-11 DIAGNOSIS — G62.9 NEUROPATHY: Primary | ICD-10-CM

## 2023-05-11 RX ORDER — TRAMADOL HYDROCHLORIDE 50 MG/1
50 TABLET ORAL DAILY PRN
Qty: 30 TABLET | Refills: 0 | Status: SHIPPED | OUTPATIENT
Start: 2023-05-11 | End: 2023-06-10

## 2023-05-11 RX ORDER — ACETAMINOPHEN 160 MG
TABLET,DISINTEGRATING ORAL
Qty: 90 CAPSULE | Refills: 3 | Status: SHIPPED | OUTPATIENT
Start: 2023-05-11

## 2023-05-11 NOTE — TELEPHONE ENCOUNTER
Last appointment: 4/20/23  Next appointment: 7/21/23  Previous refill encounter(s): 4/11/23 Ultram #30, 2/6/23 Vitamin D #90    Requested Prescriptions     Pending Prescriptions Disp Refills    traMADol (ULTRAM) 50 MG tablet [Pharmacy Med Name: TRAMADOL 50MG TABLETS] 30 tablet 0     Sig: Take 1 tablet by mouth daily as needed for Pain for up to 30 days. Max Daily Amount: 50 mg    Cholecalciferol (VITAMIN D3) 50 MCG (2000 UT) CAPS [Pharmacy Med Name: VITAMIN D3 2,000UNIT CAPSULES] 90 capsule 3     Sig: TAKE 1 CAPSULE BY MOUTH EVERY DAY         For Pharmacy Admin Tracking Only    Program: Medication Refill  CPA in place:    Recommendation Provided To:    Intervention Detail: New Rx: 2, reason: Patient Preference  Intervention Accepted By:   Apoorva Huang Closed?:    Time Spent (min): 5

## 2023-05-12 DIAGNOSIS — M51.36 DEGENERATIVE DISC DISEASE, LUMBAR: Primary | ICD-10-CM

## 2023-05-12 DIAGNOSIS — M41.56 OTHER SECONDARY SCOLIOSIS, LUMBAR REGION: ICD-10-CM

## 2023-05-12 DIAGNOSIS — M43.16 SPONDYLOLISTHESIS OF LUMBAR REGION: ICD-10-CM

## 2023-05-16 RX ORDER — GABAPENTIN 300 MG/1
CAPSULE ORAL
Qty: 270 CAPSULE | Refills: 0 | Status: SHIPPED | OUTPATIENT
Start: 2023-05-16 | End: 2023-08-14

## 2023-05-17 RX ORDER — LISINOPRIL 10 MG/1
10 TABLET ORAL DAILY
COMMUNITY
Start: 2023-02-07

## 2023-05-17 RX ORDER — CALCIUM ACETATE 667 MG/1
1 CAPSULE ORAL DAILY
COMMUNITY
Start: 2023-02-06

## 2023-05-17 RX ORDER — CELECOXIB 200 MG/1
200 CAPSULE ORAL DAILY
COMMUNITY
Start: 2023-04-10 | End: 2023-06-07

## 2023-05-17 RX ORDER — PHENYTOIN SODIUM 100 MG/1
CAPSULE, EXTENDED RELEASE ORAL
COMMUNITY
Start: 2023-03-05 | End: 2023-07-12

## 2023-05-17 RX ORDER — LOVASTATIN 20 MG/1
1 TABLET ORAL NIGHTLY
COMMUNITY
Start: 2023-04-20

## 2023-05-17 RX ORDER — HYDROCHLOROTHIAZIDE 12.5 MG/1
CAPSULE, GELATIN COATED ORAL
COMMUNITY
Start: 2023-02-07

## 2023-05-17 RX ORDER — BACLOFEN 10 MG/1
10 TABLET ORAL 3 TIMES DAILY
COMMUNITY
Start: 2023-02-07

## 2023-05-17 RX ORDER — GABAPENTIN 300 MG/1
300 CAPSULE ORAL 3 TIMES DAILY
COMMUNITY
Start: 2023-02-07

## 2023-06-07 RX ORDER — CELECOXIB 200 MG/1
200 CAPSULE ORAL DAILY
Qty: 30 CAPSULE | Refills: 4 | Status: SHIPPED | OUTPATIENT
Start: 2023-06-07

## 2023-06-18 DIAGNOSIS — Z02.89 PAIN MANAGEMENT CONTRACT AGREEMENT: ICD-10-CM

## 2023-06-18 DIAGNOSIS — G62.9 NEUROPATHY: ICD-10-CM

## 2023-06-22 RX ORDER — TRAMADOL HYDROCHLORIDE 50 MG/1
TABLET ORAL
Qty: 30 TABLET | Refills: 0 | Status: SHIPPED | OUTPATIENT
Start: 2023-06-22 | End: 2023-07-22

## 2023-07-12 RX ORDER — PHENYTOIN SODIUM 100 MG/1
CAPSULE, EXTENDED RELEASE ORAL
Qty: 270 CAPSULE | Refills: 1 | Status: SHIPPED | OUTPATIENT
Start: 2023-07-12

## 2023-07-12 NOTE — TELEPHONE ENCOUNTER
Last appointment: 4/20/23  Next appointment: 7/21/23  Previous refill encounter(s): 3/5/23 #90 with 3 refills    Requested Prescriptions     Pending Prescriptions Disp Refills    phenytoin (DILANTIN) 100 MG ER capsule [Pharmacy Med Name: Cathy Madera  MG CAP] 270 capsule 1     Sig: TAKE ONE CAPSULE BY MOUTH THREE TIMES A DAY         For Pharmacy Admin Tracking Only    Program: Medication Refill  CPA in place:    Recommendation Provided To:    Intervention Detail: New Rx: 1, reason: Patient Preference  Intervention Accepted By:   Ernst Fenton Closed?:    Time Spent (min): 5

## 2023-07-17 ENCOUNTER — TELEPHONE (OUTPATIENT)
Age: 68
End: 2023-07-17

## 2023-07-17 NOTE — TELEPHONE ENCOUNTER
PSR called pt at both #'s twice but could not leave a message     7/21/23 at 2;20 pm appt needs to be RESCHEDULED   (MD is out of the office)

## 2023-07-18 DIAGNOSIS — G62.9 NEUROPATHY: ICD-10-CM

## 2023-07-18 DIAGNOSIS — Z02.89 PAIN MANAGEMENT CONTRACT AGREEMENT: ICD-10-CM

## 2023-07-19 RX ORDER — TRAMADOL HYDROCHLORIDE 50 MG/1
50 TABLET ORAL DAILY PRN
Qty: 30 TABLET | Refills: 0 | Status: SHIPPED | OUTPATIENT
Start: 2023-07-19 | End: 2023-08-18

## 2023-07-19 NOTE — TELEPHONE ENCOUNTER
Last appointment: 4/20/23  Next appointment: Orlando Collins to follow-up 7/20/23  Previous refill encounter(s): 6/22/23 #30    Requested Prescriptions     Pending Prescriptions Disp Refills    traMADol (ULTRAM) 50 MG tablet [Pharmacy Med Name: TRAMADOL 50MG TABLETS] 30 tablet 0     Sig: Take 1 tablet by mouth daily as needed for Pain for up to 30 days. Max Daily Amount: 50 mg         For Pharmacy Admin Tracking Only    Program: Medication Refill  CPA in place:    Recommendation Provided To:    Intervention Detail: New Rx: 1, reason: Patient Preference  Intervention Accepted By:   Breezy Nair Closed?:    Time Spent (min): 5

## 2023-07-20 ENCOUNTER — TELEPHONE (OUTPATIENT)
Age: 68
End: 2023-07-20

## 2023-07-20 NOTE — TELEPHONE ENCOUNTER
----- Message from Isaac Nobles sent at 7/19/2023  3:45 PM EDT -----  Subject: Message to Provider    QUESTIONS  Information for Provider? Patient had an appt on the 21st that got   cancelled i did reschedule she wants to know if she can go get her labs   done before her appt if so can the orders please be put in and she would   like a call back please.  ---------------------------------------------------------------------------  --------------  Melissa Mosca Sadia  [phone]; Do not leave any message, patient will call back for answer  ---------------------------------------------------------------------------  --------------  SCRIPT ANSWERS  Relationship to Patient?  Self

## 2023-08-08 RX ORDER — CARBAMAZEPINE 100 MG/1
TABLET, CHEWABLE ORAL
Qty: 120 TABLET | Refills: 2 | Status: SHIPPED | OUTPATIENT
Start: 2023-08-08

## 2023-08-11 ENCOUNTER — OFFICE VISIT (OUTPATIENT)
Age: 68
End: 2023-08-11
Payer: MEDICARE

## 2023-08-11 VITALS
OXYGEN SATURATION: 99 % | DIASTOLIC BLOOD PRESSURE: 63 MMHG | RESPIRATION RATE: 20 BRPM | BODY MASS INDEX: 28.93 KG/M2 | WEIGHT: 180 LBS | HEIGHT: 66 IN | TEMPERATURE: 97.7 F | SYSTOLIC BLOOD PRESSURE: 121 MMHG | HEART RATE: 61 BPM

## 2023-08-11 DIAGNOSIS — G40.909 SEIZURE DISORDER (HCC): ICD-10-CM

## 2023-08-11 DIAGNOSIS — R73.02 IGT (IMPAIRED GLUCOSE TOLERANCE): ICD-10-CM

## 2023-08-11 DIAGNOSIS — I10 HYPERTENSION GOAL BP (BLOOD PRESSURE) < 130/80: Primary | ICD-10-CM

## 2023-08-11 DIAGNOSIS — Z12.11 COLON CANCER SCREENING: ICD-10-CM

## 2023-08-11 DIAGNOSIS — R52 PAIN MANAGEMENT: ICD-10-CM

## 2023-08-11 DIAGNOSIS — E03.9 HYPOTHYROIDISM, UNSPECIFIED TYPE: ICD-10-CM

## 2023-08-11 DIAGNOSIS — E55.9 VITAMIN D DEFICIENCY, UNSPECIFIED: ICD-10-CM

## 2023-08-11 DIAGNOSIS — E78.5 DYSLIPIDEMIA (HIGH LDL; LOW HDL): ICD-10-CM

## 2023-08-11 DIAGNOSIS — Z79.899 ENCOUNTER FOR LONG-TERM (CURRENT) USE OF MEDICATIONS: ICD-10-CM

## 2023-08-11 DIAGNOSIS — N30.00 ACUTE CYSTITIS WITHOUT HEMATURIA: ICD-10-CM

## 2023-08-11 DIAGNOSIS — N39.0 URINARY TRACT INFECTION WITHOUT HEMATURIA, SITE UNSPECIFIED: ICD-10-CM

## 2023-08-11 LAB
BILIRUBIN, URINE, POC: NEGATIVE
BLOOD URINE, POC: NEGATIVE
GLUCOSE URINE, POC: NEGATIVE
KETONES, URINE, POC: NEGATIVE
LEUKOCYTE ESTERASE, URINE, POC: NORMAL
NITRITE, URINE, POC: NEGATIVE
PH, URINE, POC: 6 (ref 4.6–8)
PROTEIN,URINE, POC: NEGATIVE
SPECIFIC GRAVITY, URINE, POC: 1.01 (ref 1–1.03)
URINALYSIS CLARITY, POC: CLEAR
URINALYSIS COLOR, POC: YELLOW
UROBILINOGEN, POC: NORMAL

## 2023-08-11 PROCEDURE — 1123F ACP DISCUSS/DSCN MKR DOCD: CPT | Performed by: INTERNAL MEDICINE

## 2023-08-11 PROCEDURE — 3078F DIAST BP <80 MM HG: CPT | Performed by: INTERNAL MEDICINE

## 2023-08-11 PROCEDURE — 81001 URINALYSIS AUTO W/SCOPE: CPT | Performed by: INTERNAL MEDICINE

## 2023-08-11 PROCEDURE — 3074F SYST BP LT 130 MM HG: CPT | Performed by: INTERNAL MEDICINE

## 2023-08-11 PROCEDURE — 99214 OFFICE O/P EST MOD 30 MIN: CPT | Performed by: INTERNAL MEDICINE

## 2023-08-11 PROCEDURE — PBSHW AMB POC URINALYSIS DIP STICK AUTO W/ MICRO: Performed by: INTERNAL MEDICINE

## 2023-08-11 RX ORDER — NITROFURANTOIN 25; 75 MG/1; MG/1
100 CAPSULE ORAL 2 TIMES DAILY
Qty: 20 CAPSULE | Refills: 0 | Status: SHIPPED | OUTPATIENT
Start: 2023-08-11 | End: 2023-08-21

## 2023-08-11 ASSESSMENT — PATIENT HEALTH QUESTIONNAIRE - PHQ9
SUM OF ALL RESPONSES TO PHQ QUESTIONS 1-9: 0
1. LITTLE INTEREST OR PLEASURE IN DOING THINGS: 0
SUM OF ALL RESPONSES TO PHQ QUESTIONS 1-9: 0
2. FEELING DOWN, DEPRESSED OR HOPELESS: 0
SUM OF ALL RESPONSES TO PHQ QUESTIONS 1-9: 0
SUM OF ALL RESPONSES TO PHQ QUESTIONS 1-9: 0
SUM OF ALL RESPONSES TO PHQ9 QUESTIONS 1 & 2: 0

## 2023-08-11 ASSESSMENT — ANXIETY QUESTIONNAIRES
GAD7 TOTAL SCORE: 0
4. TROUBLE RELAXING: 0
IF YOU CHECKED OFF ANY PROBLEMS ON THIS QUESTIONNAIRE, HOW DIFFICULT HAVE THESE PROBLEMS MADE IT FOR YOU TO DO YOUR WORK, TAKE CARE OF THINGS AT HOME, OR GET ALONG WITH OTHER PEOPLE: NOT DIFFICULT AT ALL
6. BECOMING EASILY ANNOYED OR IRRITABLE: 0
5. BEING SO RESTLESS THAT IT IS HARD TO SIT STILL: 0
1. FEELING NERVOUS, ANXIOUS, OR ON EDGE: 0
3. WORRYING TOO MUCH ABOUT DIFFERENT THINGS: 0
2. NOT BEING ABLE TO STOP OR CONTROL WORRYING: 0
7. FEELING AFRAID AS IF SOMETHING AWFUL MIGHT HAPPEN: 0
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
## 2023-08-11 NOTE — PROGRESS NOTES
Chief Complaint   Patient presents with    Follow-up     4 month routine check up     Neck Pain     Feeling left neck pressure, ear and head     Frequent/Recurrent UTI     1. Have you been to the ER, urgent care clinic since your last visit? Hospitalized since your last visit? No    2. Have you seen or consulted any other health care providers outside of the [address] [place] [address] Avenue since your last visit? Include any pap smears or colon screening.   Yes Mammo

## 2023-08-20 DIAGNOSIS — M43.16 SPONDYLOLISTHESIS OF LUMBAR REGION: ICD-10-CM

## 2023-08-20 DIAGNOSIS — M51.36 DEGENERATIVE DISC DISEASE, LUMBAR: ICD-10-CM

## 2023-08-20 DIAGNOSIS — G62.9 NEUROPATHY: ICD-10-CM

## 2023-08-20 DIAGNOSIS — Z02.89 PAIN MANAGEMENT CONTRACT AGREEMENT: ICD-10-CM

## 2023-08-20 DIAGNOSIS — M41.56 OTHER SECONDARY SCOLIOSIS, LUMBAR REGION: ICD-10-CM

## 2023-08-23 ENCOUNTER — TELEPHONE (OUTPATIENT)
Age: 68
End: 2023-08-23

## 2023-08-23 RX ORDER — TRAMADOL HYDROCHLORIDE 50 MG/1
TABLET ORAL
Qty: 30 TABLET | OUTPATIENT
Start: 2023-08-23

## 2023-08-23 RX ORDER — GABAPENTIN 300 MG/1
CAPSULE ORAL
Qty: 270 CAPSULE | OUTPATIENT
Start: 2023-08-23 | End: 2023-11-21

## 2023-08-23 NOTE — TELEPHONE ENCOUNTER
Unable to speak to patient or LVM regarding which medication she want to take gabapentin or tramadol.

## 2023-08-24 NOTE — TELEPHONE ENCOUNTER
Patient called regarding having burning feet and will suggest to go to Urgent Care to be evaluated and treated and follow up with PCP

## 2023-08-31 ENCOUNTER — OFFICE VISIT (OUTPATIENT)
Age: 68
End: 2023-08-31
Payer: MEDICARE

## 2023-08-31 VITALS
SYSTOLIC BLOOD PRESSURE: 133 MMHG | DIASTOLIC BLOOD PRESSURE: 66 MMHG | BODY MASS INDEX: 29.63 KG/M2 | OXYGEN SATURATION: 100 % | HEIGHT: 66 IN | RESPIRATION RATE: 16 BRPM | HEART RATE: 58 BPM | WEIGHT: 184.4 LBS | TEMPERATURE: 97.5 F

## 2023-08-31 DIAGNOSIS — E55.9 VITAMIN D DEFICIENCY, UNSPECIFIED: Primary | ICD-10-CM

## 2023-08-31 DIAGNOSIS — E78.5 DYSLIPIDEMIA (HIGH LDL; LOW HDL): ICD-10-CM

## 2023-08-31 DIAGNOSIS — M41.56 OTHER SECONDARY SCOLIOSIS, LUMBAR REGION: ICD-10-CM

## 2023-08-31 DIAGNOSIS — M51.36 DEGENERATIVE DISC DISEASE, LUMBAR: ICD-10-CM

## 2023-08-31 DIAGNOSIS — E03.9 HYPOTHYROIDISM, UNSPECIFIED TYPE: ICD-10-CM

## 2023-08-31 DIAGNOSIS — M43.16 SPONDYLOLISTHESIS OF LUMBAR REGION: ICD-10-CM

## 2023-08-31 DIAGNOSIS — I10 HYPERTENSION GOAL BP (BLOOD PRESSURE) < 130/80: ICD-10-CM

## 2023-08-31 DIAGNOSIS — Z79.899 ENCOUNTER FOR LONG-TERM (CURRENT) USE OF MEDICATIONS: ICD-10-CM

## 2023-08-31 DIAGNOSIS — R73.02 IGT (IMPAIRED GLUCOSE TOLERANCE): ICD-10-CM

## 2023-08-31 DIAGNOSIS — E55.9 VITAMIN D DEFICIENCY, UNSPECIFIED: ICD-10-CM

## 2023-08-31 DIAGNOSIS — N39.0 URINARY TRACT INFECTION WITHOUT HEMATURIA, SITE UNSPECIFIED: ICD-10-CM

## 2023-08-31 DIAGNOSIS — G40.909 SEIZURE DISORDER (HCC): ICD-10-CM

## 2023-08-31 LAB — TSH SERPL DL<=0.05 MIU/L-ACNC: 1.05 UIU/ML (ref 0.36–3.74)

## 2023-08-31 PROCEDURE — 3074F SYST BP LT 130 MM HG: CPT | Performed by: INTERNAL MEDICINE

## 2023-08-31 PROCEDURE — 1123F ACP DISCUSS/DSCN MKR DOCD: CPT | Performed by: INTERNAL MEDICINE

## 2023-08-31 PROCEDURE — 3078F DIAST BP <80 MM HG: CPT | Performed by: INTERNAL MEDICINE

## 2023-08-31 PROCEDURE — 99214 OFFICE O/P EST MOD 30 MIN: CPT | Performed by: INTERNAL MEDICINE

## 2023-08-31 RX ORDER — CALCIUM ACETATE 667 MG/1
1 CAPSULE ORAL DAILY
Qty: 180 CAPSULE | Refills: 1 | Status: SHIPPED | OUTPATIENT
Start: 2023-08-31 | End: 2023-09-03 | Stop reason: SDUPTHER

## 2023-08-31 RX ORDER — GABAPENTIN 300 MG/1
300 CAPSULE ORAL 3 TIMES DAILY
Qty: 270 CAPSULE | Refills: 0 | Status: SHIPPED | OUTPATIENT
Start: 2023-08-31 | End: 2023-11-29

## 2023-08-31 SDOH — ECONOMIC STABILITY: FOOD INSECURITY: WITHIN THE PAST 12 MONTHS, THE FOOD YOU BOUGHT JUST DIDN'T LAST AND YOU DIDN'T HAVE MONEY TO GET MORE.: NEVER TRUE

## 2023-08-31 SDOH — ECONOMIC STABILITY: HOUSING INSECURITY
IN THE LAST 12 MONTHS, WAS THERE A TIME WHEN YOU DID NOT HAVE A STEADY PLACE TO SLEEP OR SLEPT IN A SHELTER (INCLUDING NOW)?: NO

## 2023-08-31 SDOH — ECONOMIC STABILITY: INCOME INSECURITY: HOW HARD IS IT FOR YOU TO PAY FOR THE VERY BASICS LIKE FOOD, HOUSING, MEDICAL CARE, AND HEATING?: NOT HARD AT ALL

## 2023-08-31 SDOH — ECONOMIC STABILITY: FOOD INSECURITY: WITHIN THE PAST 12 MONTHS, YOU WORRIED THAT YOUR FOOD WOULD RUN OUT BEFORE YOU GOT MONEY TO BUY MORE.: NEVER TRUE

## 2023-08-31 NOTE — PROGRESS NOTES
Chief Complaint   Patient presents with    Hypertension     Follow-up    1. Have you been to the ER, urgent care clinic since your last visit? Hospitalized since your last visit? No    2. Have you seen or consulted any other health care providers outside of the [address] [place] [address] since your last visit? Include any pap smears or colon screening.  No
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
## 2023-09-01 DIAGNOSIS — M51.36 DEGENERATIVE DISC DISEASE, LUMBAR: Primary | ICD-10-CM

## 2023-09-01 DIAGNOSIS — E55.9 VITAMIN D DEFICIENCY, UNSPECIFIED: ICD-10-CM

## 2023-09-01 DIAGNOSIS — G62.9 NEUROPATHY: ICD-10-CM

## 2023-09-01 DIAGNOSIS — M43.16 SPONDYLOLISTHESIS OF LUMBAR REGION: ICD-10-CM

## 2023-09-01 LAB
25(OH)D3 SERPL-MCNC: 44.6 NG/ML (ref 30–100)
ALBUMIN SERPL-MCNC: 3.8 G/DL (ref 3.5–5)
ALBUMIN/GLOB SERPL: 1 (ref 1.1–2.2)
ALP SERPL-CCNC: 100 U/L (ref 45–117)
ALT SERPL-CCNC: 33 U/L (ref 12–78)
ANION GAP SERPL CALC-SCNC: 3 MMOL/L (ref 5–15)
APPEARANCE UR: CLEAR
AST SERPL-CCNC: 23 U/L (ref 15–37)
BACTERIA URNS QL MICRO: ABNORMAL /HPF
BASOPHILS # BLD: 0 K/UL (ref 0–0.1)
BASOPHILS NFR BLD: 1 % (ref 0–1)
BILIRUB SERPL-MCNC: 0.3 MG/DL (ref 0.2–1)
BILIRUB UR QL: NEGATIVE
BUN SERPL-MCNC: 15 MG/DL (ref 6–20)
BUN/CREAT SERPL: 19 (ref 12–20)
CALCIUM SERPL-MCNC: 9.6 MG/DL (ref 8.5–10.1)
CARBAMAZEPINE SERPL-MCNC: 2.8 UG/ML (ref 4–12)
CHLORIDE SERPL-SCNC: 109 MMOL/L (ref 97–108)
CHOLEST SERPL-MCNC: 253 MG/DL
CO2 SERPL-SCNC: 28 MMOL/L (ref 21–32)
COLOR UR: ABNORMAL
CREAT SERPL-MCNC: 0.8 MG/DL (ref 0.55–1.02)
DIFFERENTIAL METHOD BLD: ABNORMAL
EOSINOPHIL # BLD: 0.1 K/UL (ref 0–0.4)
EOSINOPHIL NFR BLD: 3 % (ref 0–7)
EPITH CASTS URNS QL MICRO: ABNORMAL /LPF
ERYTHROCYTE [DISTWIDTH] IN BLOOD BY AUTOMATED COUNT: 13.8 % (ref 11.5–14.5)
EST. AVERAGE GLUCOSE BLD GHB EST-MCNC: 103 MG/DL
GLOBULIN SER CALC-MCNC: 3.7 G/DL (ref 2–4)
GLUCOSE SERPL-MCNC: 79 MG/DL (ref 65–100)
GLUCOSE UR STRIP.AUTO-MCNC: NEGATIVE MG/DL
HBA1C MFR BLD: 5.2 % (ref 4–5.6)
HCT VFR BLD AUTO: 39.4 % (ref 35–47)
HDLC SERPL-MCNC: 102 MG/DL
HDLC SERPL: 2.5 (ref 0–5)
HGB BLD-MCNC: 12.8 G/DL (ref 11.5–16)
HGB UR QL STRIP: NEGATIVE
HYALINE CASTS URNS QL MICRO: ABNORMAL /LPF (ref 0–5)
IMM GRANULOCYTES # BLD AUTO: 0 K/UL (ref 0–0.04)
IMM GRANULOCYTES NFR BLD AUTO: 0 % (ref 0–0.5)
KETONES UR QL STRIP.AUTO: NEGATIVE MG/DL
LDLC SERPL CALC-MCNC: 140 MG/DL (ref 0–100)
LEUKOCYTE ESTERASE UR QL STRIP.AUTO: ABNORMAL
LYMPHOCYTES # BLD: 2.4 K/UL (ref 0.8–3.5)
LYMPHOCYTES NFR BLD: 57 % (ref 12–49)
MCH RBC QN AUTO: 32.4 PG (ref 26–34)
MCHC RBC AUTO-ENTMCNC: 32.5 G/DL (ref 30–36.5)
MCV RBC AUTO: 99.7 FL (ref 80–99)
MONOCYTES # BLD: 0.4 K/UL (ref 0–1)
MONOCYTES NFR BLD: 8 % (ref 5–13)
NEUTS SEG # BLD: 1.3 K/UL (ref 1.8–8)
NEUTS SEG NFR BLD: 31 % (ref 32–75)
NITRITE UR QL STRIP.AUTO: NEGATIVE
NRBC # BLD: 0 K/UL (ref 0–0.01)
NRBC BLD-RTO: 0 PER 100 WBC
PH UR STRIP: 6 (ref 5–8)
PLATELET # BLD AUTO: 205 K/UL (ref 150–400)
PMV BLD AUTO: 10.2 FL (ref 8.9–12.9)
POTASSIUM SERPL-SCNC: 3.7 MMOL/L (ref 3.5–5.1)
PROT SERPL-MCNC: 7.5 G/DL (ref 6.4–8.2)
PROT UR STRIP-MCNC: NEGATIVE MG/DL
RBC # BLD AUTO: 3.95 M/UL (ref 3.8–5.2)
RBC #/AREA URNS HPF: ABNORMAL /HPF (ref 0–5)
SODIUM SERPL-SCNC: 140 MMOL/L (ref 136–145)
SP GR UR REFRACTOMETRY: 1.01 (ref 1–1.03)
TRIGL SERPL-MCNC: 55 MG/DL
URINE CULTURE IF INDICATED: ABNORMAL
UROBILINOGEN UR QL STRIP.AUTO: 0.2 EU/DL (ref 0.2–1)
VLDLC SERPL CALC-MCNC: 11 MG/DL
WBC # BLD AUTO: 4.2 K/UL (ref 3.6–11)
WBC URNS QL MICRO: ABNORMAL /HPF (ref 0–4)

## 2023-09-01 NOTE — TELEPHONE ENCOUNTER
Pt wants prescriptions sent to another pharmacy as the one it went to is temp closed     Re: calcium acetate (PHOSLO) 667 MG CAPS capsule     Gabapentin         Also, she wants traMADol (ULTRAM) 50 MG tablet       [facility] -  [address] [place] [address] number to reach her is [phone]

## 2023-09-03 LAB
BACTERIA SPEC CULT: ABNORMAL
CC UR VC: ABNORMAL
SERVICE CMNT-IMP: ABNORMAL

## 2023-09-03 RX ORDER — CALCIUM ACETATE 667 MG/1
1 CAPSULE ORAL DAILY
Qty: 180 CAPSULE | Refills: 0 | Status: SHIPPED | OUTPATIENT
Start: 2023-09-03

## 2023-09-03 RX ORDER — TRAMADOL HYDROCHLORIDE 50 MG/1
50 TABLET ORAL DAILY PRN
Qty: 30 TABLET | Refills: 0 | Status: SHIPPED | OUTPATIENT
Start: 2023-09-03 | End: 2023-10-03

## 2023-09-06 RX ORDER — HYDROCHLOROTHIAZIDE 12.5 MG/1
CAPSULE, GELATIN COATED ORAL
Qty: 90 CAPSULE | Refills: 1 | Status: SHIPPED | OUTPATIENT
Start: 2023-09-06

## 2023-09-11 LAB
PHENYTOIN FREE SERPL-MCNC: ABNORMAL UG/ML
PHENYTOIN SERPL-MCNC: 7.5 UG/ML (ref 10–20)

## 2023-09-22 NOTE — TELEPHONE ENCOUNTER
Last appointment: 8/31/23  Next appointment: Navi Jorgensen to follow-up 11/30/23  Previous refill encounter(s): 2/7/23 #270 with 1 refill    Requested Prescriptions     Pending Prescriptions Disp Refills    baclofen (LIORESAL) 10 MG tablet [Pharmacy Med Name: BACLOFEN 10MG TABLETS] 270 tablet 1     Sig: TAKE 1 TABLET BY MOUTH THREE TIMES DAILY         For Pharmacy Admin Tracking Only    Program: Medication Refill  CPA in place:    Recommendation Provided To:    Intervention Detail: New Rx: 1, reason: Patient Preference  Intervention Accepted By:   Jorge Mitchell Closed?:    Time Spent (min): 5

## 2023-09-26 DIAGNOSIS — N39.0 URINARY TRACT INFECTION WITHOUT HEMATURIA, SITE UNSPECIFIED: ICD-10-CM

## 2023-09-26 DIAGNOSIS — N30.00 ACUTE CYSTITIS WITHOUT HEMATURIA: ICD-10-CM

## 2023-09-26 RX ORDER — BACLOFEN 10 MG/1
10 TABLET ORAL 3 TIMES DAILY
Qty: 270 TABLET | Refills: 1 | Status: SHIPPED | OUTPATIENT
Start: 2023-09-26

## 2023-09-27 NOTE — TELEPHONE ENCOUNTER
Last appointment: 8/31/23  Next appointment: Kranthi Vogt to follow-up 11/30/23  Previous refill encounter(s): 8/11/23 #20    Requested Prescriptions     Pending Prescriptions Disp Refills    nitrofurantoin, macrocrystal-monohydrate, (MACROBID) 100 MG capsule [Pharmacy Med Name: NITROFURANTOIN MONO- MG] 20 capsule 0     Sig: TAKE 1 CAPSULE BY MOUTH TWICE A DAY FOR 10 DAYS         For Pharmacy Admin Tracking Only    Program: Medication Refill  CPA in place:    Recommendation Provided To:    Intervention Detail: New Rx: 1, reason: Patient Preference  Intervention Accepted By:   Cadence Wright Closed?:    Time Spent (min): 5

## 2023-09-28 RX ORDER — NITROFURANTOIN 25; 75 MG/1; MG/1
CAPSULE ORAL
Qty: 20 CAPSULE | Refills: 0 | Status: SHIPPED | OUTPATIENT
Start: 2023-09-28

## 2023-10-13 ENCOUNTER — TELEPHONE (OUTPATIENT)
Age: 68
End: 2023-10-13

## 2023-10-13 NOTE — TELEPHONE ENCOUNTER
Unable to leave a message on BOTH numbers. One has a voicemail box that has not been setup yet, and the other says the subscriber is not in service. We need to reschedule her appt for Domenicafabien Cardoso on 10/17 due to provider being out of office.

## 2023-10-13 NOTE — TELEPHONE ENCOUNTER
Called and no option to leave a voicemail. Tried to tell patient we need to cancel and reschedule her appt for 10/17 because provider will be out of office.

## 2023-10-18 DIAGNOSIS — Z02.89 PAIN MANAGEMENT CONTRACT AGREEMENT: ICD-10-CM

## 2023-10-18 DIAGNOSIS — G62.9 NEUROPATHY: ICD-10-CM

## 2023-10-20 RX ORDER — TRAMADOL HYDROCHLORIDE 50 MG/1
TABLET ORAL
Qty: 30 TABLET | Refills: 0 | Status: SHIPPED | OUTPATIENT
Start: 2023-10-20 | End: 2023-11-19

## 2023-10-25 RX ORDER — LISINOPRIL 10 MG/1
10 TABLET ORAL DAILY
Qty: 90 TABLET | Refills: 3 | Status: SHIPPED | OUTPATIENT
Start: 2023-10-25

## 2023-10-25 NOTE — TELEPHONE ENCOUNTER
Last appointment: 8/31/23  Next appointment: 11/24/23  Previous refill encounter(s): 2/7/23 #30 with 3 refills    Requested Prescriptions     Pending Prescriptions Disp Refills    lisinopril (PRINIVIL;ZESTRIL) 10 MG tablet [Pharmacy Med Name: LISINOPRIL 10MG TABLETS] 90 tablet 3     Sig: TAKE 1 TABLET BY MOUTH DAILY         For Pharmacy Admin Tracking Only    Program: Medication Refill  CPA in place:    Recommendation Provided To:    Intervention Detail: New Rx: 1, reason: Patient Preference  Intervention Accepted By:   Mancel Seats Closed?:    Time Spent (min): 5

## 2023-11-06 RX ORDER — CELECOXIB 200 MG/1
200 CAPSULE ORAL DAILY
Qty: 30 CAPSULE | Refills: 4 | Status: SHIPPED | OUTPATIENT
Start: 2023-11-06

## 2023-11-06 NOTE — TELEPHONE ENCOUNTER
Last appointment: 8/31/23  Next appointment: 11/24/23  Previous refill encounter(s): 6/7/23 #30 with 4 refills    Requested Prescriptions     Pending Prescriptions Disp Refills    celecoxib (CELEBREX) 200 MG capsule [Pharmacy Med Name: CELECOXIB 200MG CAPSULES] 30 capsule 4     Sig: TAKE 1 CAPSULE BY MOUTH DAILY         For Pharmacy Admin Tracking Only    Program: Medication Refill  CPA in place:    Recommendation Provided To:    Intervention Detail: New Rx: 1, reason: Patient Preference  Intervention Accepted By:   Mancel Seats Closed?:    Time Spent (min): 5

## 2023-11-16 ENCOUNTER — TELEPHONE (OUTPATIENT)
Age: 68
End: 2023-11-16

## 2023-11-16 NOTE — TELEPHONE ENCOUNTER
Pt would like a refill     nitrofurantoin, macrocrystal-monohydrate, (MACROBID) 100 MG capsule      Best number to reach her is

## 2023-11-27 DIAGNOSIS — G62.9 NEUROPATHY: Primary | ICD-10-CM

## 2023-11-27 DIAGNOSIS — Z02.89 PAIN MANAGEMENT CONTRACT AGREEMENT: ICD-10-CM

## 2023-11-27 RX ORDER — TRAMADOL HYDROCHLORIDE 50 MG/1
50 TABLET ORAL DAILY PRN
Qty: 30 TABLET | Refills: 0 | Status: SHIPPED | OUTPATIENT
Start: 2023-11-27 | End: 2023-12-27

## 2023-11-27 RX ORDER — CARBAMAZEPINE 100 MG/1
TABLET, CHEWABLE ORAL
Qty: 120 TABLET | Refills: 0 | Status: SHIPPED | OUTPATIENT
Start: 2023-11-27

## 2023-11-27 NOTE — TELEPHONE ENCOUNTER
Last appointment: 8/31/23  Next appointment: 12/20/23  Previous refill encounter(s): 8/8/23 #120 with 2 refills    Requested Prescriptions     Pending Prescriptions Disp Refills    carBAMazepine (TEGRETOL) 100 MG chewable tablet [Pharmacy Med Name: carBAMazepine 100MG TAB CHEW] 120 tablet 0     Sig: CHEW 2 TABLETS BY MOUTH TWICE A DAY         For Pharmacy Admin Tracking Only    Program: Medication Refill  CPA in place:    Recommendation Provided To:    Intervention Detail: New Rx: 1, reason: Patient Preference  Intervention Accepted By:   Meghann Randolph Closed?:    Time Spent (min): 5

## 2023-11-27 NOTE — TELEPHONE ENCOUNTER
----- Message from Marianna Panda sent at 11/27/2023  2:59 PM EST -----  Subject: Refill Request    QUESTIONS  Name of Medication? traMADol (ULTRAM) 50 MG tablet  Patient-reported dosage and instructions? 50 Mg tablet once per day as   needed   How many days do you have left? 0  [facility]? [id number]  Pharmacy phone number (if available)? [phone]  Additional Information for Provider? The patient stated that she is out   due to her appointment being rescheduled and not being able to have it   filled then.   ---------------------------------------------------------------------------  --------------  Melissa Marine Sadia  What is the best way for the office to contact you? Do not leave any   message, patient will call back for answer  Preferred Call Back Phone Number? [phone]  ---------------------------------------------------------------------------  --------------  SCRIPT ANSWERS  Relationship to Patient?  Self

## 2023-11-27 NOTE — TELEPHONE ENCOUNTER
Last appointment: 8/31/23  Next appointment: 12/20/23  Previous refill encounter(s): 10/20/23 #30    Requested Prescriptions     Pending Prescriptions Disp Refills    traMADol (ULTRAM) 50 MG tablet 30 tablet 0     Sig: Take 1 tablet by mouth daily as needed for Pain for up to 30 days. Max Daily Amount: 50 mg           For Pharmacy Admin Tracking Only    Program: Medication Refill  CPA in place:    Recommendation Provided To:    Intervention Detail: New Rx: 1, reason: Patient Preference  Intervention Accepted By:   Abbie Cohen Closed?:    Time Spent (min): 5

## 2023-11-30 NOTE — TELEPHONE ENCOUNTER
Last appointment: 8/31/23  Next appointment: 12/20/23  Previous refill encounter(s): 4/20/23 #30 with 3 refills    Requested Prescriptions     Pending Prescriptions Disp Refills    lovastatin (MEVACOR) 20 MG tablet [Pharmacy Med Name: LOVASTATIN 20MG TABLETS] 30 tablet 0     Sig: TAKE 1 TABLET BY MOUTH EVERY NIGHT         For Pharmacy Admin Tracking Only    Program: Medication Refill  CPA in place:    Recommendation Provided To:    Intervention Detail: New Rx: 1, reason: Patient Preference  Intervention Accepted By:   Alka Delaney Closed?:    Time Spent (min): 5

## 2023-12-01 RX ORDER — LOVASTATIN 20 MG/1
20 TABLET ORAL NIGHTLY
Qty: 30 TABLET | Refills: 0 | Status: SHIPPED | OUTPATIENT
Start: 2023-12-01

## 2023-12-08 DIAGNOSIS — M41.56 OTHER SECONDARY SCOLIOSIS, LUMBAR REGION: ICD-10-CM

## 2023-12-08 DIAGNOSIS — N30.00 ACUTE CYSTITIS WITHOUT HEMATURIA: ICD-10-CM

## 2023-12-08 DIAGNOSIS — M51.36 DEGENERATIVE DISC DISEASE, LUMBAR: ICD-10-CM

## 2023-12-08 DIAGNOSIS — N39.0 URINARY TRACT INFECTION WITHOUT HEMATURIA, SITE UNSPECIFIED: ICD-10-CM

## 2023-12-08 DIAGNOSIS — M43.16 SPONDYLOLISTHESIS OF LUMBAR REGION: ICD-10-CM

## 2023-12-08 NOTE — TELEPHONE ENCOUNTER
Patient called requesting med refills for:  Gabapentin  Nitrofurantoin  Lovastatin    Pt. Has apt on 12/20.       Best contact: [phone]

## 2023-12-11 NOTE — TELEPHONE ENCOUNTER
Lovastatin was sent on 12/1/23 for #30    Last appointment: 8/31/23  Next appointment: 12/20/23  Previous refill encounter(s): 9/28/23 Macrobid #20, 8/31/23 Neurontin #270    Requested Prescriptions     Pending Prescriptions Disp Refills    gabapentin (NEURONTIN) 300 MG capsule [Pharmacy Med Name: GABAPENTIN 300MG CAPSULES] 90 capsule 0     Sig: Take 1 capsule by mouth 3 times daily for 30 days. Max Daily Amount: 900 mg    nitrofurantoin, macrocrystal-monohydrate, (MACROBID) 100 MG capsule 20 capsule 0     Sig: TAKE 1 CAPSULE BY MOUTH TWICE A DAY FOR 10 DAYS         For Pharmacy Admin Tracking Only    Program: Medication Refill  CPA in place:    Recommendation Provided To:    Intervention Detail: New Rx: 3, reason: Patient Preference  Intervention Accepted By:   Sharita Banerjee Closed?:    Time Spent (min): 5

## 2023-12-12 RX ORDER — NITROFURANTOIN 25; 75 MG/1; MG/1
CAPSULE ORAL
Qty: 20 CAPSULE | Refills: 0 | Status: SHIPPED | OUTPATIENT
Start: 2023-12-12 | End: 2023-12-12 | Stop reason: ALTCHOICE

## 2023-12-12 RX ORDER — GABAPENTIN 300 MG/1
300 CAPSULE ORAL 3 TIMES DAILY
Qty: 90 CAPSULE | Refills: 0 | Status: SHIPPED | OUTPATIENT
Start: 2023-12-12 | End: 2024-01-11

## 2024-01-02 DIAGNOSIS — Z02.89 PAIN MANAGEMENT CONTRACT AGREEMENT: ICD-10-CM

## 2024-01-02 DIAGNOSIS — G62.9 NEUROPATHY: ICD-10-CM

## 2024-01-03 NOTE — TELEPHONE ENCOUNTER
Last appointment: 12/15/23  Next appointment: 3/15/24  Previous refill encounter(s): 11/27/23 #30    Requested Prescriptions     Pending Prescriptions Disp Refills    traMADol (ULTRAM) 50 MG tablet [Pharmacy Med Name: TRAMADOL 50MG TABLETS] 30 tablet 0     Sig: TAKE 1 TABLET BY MOUTH DAILY AS NEEDED FOR PAIN. MAX DAILY AMOUNT: 50 MG         For Pharmacy Admin Tracking Only    Program: Medication Refill  CPA in place:    Recommendation Provided To:   Intervention Detail: New Rx: 1, reason: Patient Preference  Intervention Accepted By:   Gap Closed?:    Time Spent (min): 5

## 2024-01-05 RX ORDER — TRAMADOL HYDROCHLORIDE 50 MG/1
50 TABLET ORAL DAILY
Qty: 30 TABLET | Refills: 0 | Status: SHIPPED | OUTPATIENT
Start: 2024-01-05 | End: 2024-02-04

## 2024-01-05 RX ORDER — PHENYTOIN SODIUM 100 MG/1
CAPSULE, EXTENDED RELEASE ORAL
Qty: 270 CAPSULE | Refills: 1 | Status: SHIPPED | OUTPATIENT
Start: 2024-01-05

## 2024-01-05 NOTE — TELEPHONE ENCOUNTER
Last appointment: 12/15/23  Next appointment: 3/15/24  Previous refill encounter(s): 7/12/23 #270 with 1 refill    Requested Prescriptions     Pending Prescriptions Disp Refills    phenytoin (DILANTIN) 100 MG ER capsule [Pharmacy Med Name: PHENYTOIN SOD  MG CAP] 270 capsule 1     Sig: TAKE ONE CAPSULE BY MOUTH THREE TIMES A DAY         For Pharmacy Admin Tracking Only    Program: Medication Refill  CPA in place:    Recommendation Provided To:   Intervention Detail: New Rx: 1, reason: Patient Preference  Intervention Accepted By:   Gap Closed?:    Time Spent (min): 5

## 2024-01-08 RX ORDER — LOVASTATIN 20 MG/1
20 TABLET ORAL NIGHTLY
Qty: 90 TABLET | Refills: 1 | Status: SHIPPED | OUTPATIENT
Start: 2024-01-08

## 2024-01-08 RX ORDER — HYDROCHLOROTHIAZIDE 12.5 MG/1
CAPSULE, GELATIN COATED ORAL
Qty: 90 CAPSULE | Refills: 1 | Status: SHIPPED | OUTPATIENT
Start: 2024-01-08

## 2024-01-08 NOTE — TELEPHONE ENCOUNTER
Last appointment: 12/15/23  Next appointment: 3/15/24  Previous refill encounter(s): 12/1/23 #30    Requested Prescriptions     Pending Prescriptions Disp Refills    lovastatin (MEVACOR) 20 MG tablet [Pharmacy Med Name: LOVASTATIN 20MG TABLETS] 90 tablet 1     Sig: TAKE 1 TABLET BY MOUTH EVERY NIGHT         For Pharmacy Admin Tracking Only    Program: Medication Refill  CPA in place:    Recommendation Provided To:   Intervention Detail: New Rx: 1, reason: Patient Preference  Intervention Accepted By:   Gap Closed?:    Time Spent (min): 5

## 2024-01-08 NOTE — TELEPHONE ENCOUNTER
Last appointment: 12/15/23  Next appointment: 3/15/24  Previous refill encounter(s): 9/6/23 #90 with 1 refill    Requested Prescriptions     Pending Prescriptions Disp Refills    hydroCHLOROthiazide (MICROZIDE) 12.5 MG capsule [Pharmacy Med Name: HYDROCHLOROTHIAZIDE 12.5MG CAPSULES] 90 capsule 1     Sig: TAKE 1 CAPSULE BY MOUTH EVERY DAY AS NEEDED FOR SWELLING         For Pharmacy Admin Tracking Only    Program: Medication Refill  CPA in place:    Recommendation Provided To:   Intervention Detail: New Rx: 1, reason: Patient Preference  Intervention Accepted By:   Gap Closed?:    Time Spent (min): 5

## 2024-01-11 DIAGNOSIS — M51.36 DEGENERATIVE DISC DISEASE, LUMBAR: ICD-10-CM

## 2024-01-11 DIAGNOSIS — M41.56 OTHER SECONDARY SCOLIOSIS, LUMBAR REGION: ICD-10-CM

## 2024-01-11 DIAGNOSIS — M43.16 SPONDYLOLISTHESIS OF LUMBAR REGION: ICD-10-CM

## 2024-01-11 NOTE — TELEPHONE ENCOUNTER
Last appointment: 12/15/23  Next appointment: 3/15/24  Previous refill encounter(s): 12/12/23 #90    Requested Prescriptions     Pending Prescriptions Disp Refills    gabapentin (NEURONTIN) 300 MG capsule [Pharmacy Med Name: GABAPENTIN 300MG CAPSULES] 270 capsule 0     Sig: TAKE 1 CAPSULE BY MOUTH THREE TIMES DAILY. MAX DAILY AMOUNT: 900 MG         For Pharmacy Admin Tracking Only    Program: Medication Refill  CPA in place:    Recommendation Provided To:   Intervention Detail: New Rx: 1, reason: Patient Preference  Intervention Accepted By:   Gap Closed?:    Time Spent (min): 5

## 2024-01-11 NOTE — TELEPHONE ENCOUNTER
Last appointment: 12/15/23  Next appointment: 3/15/24  Previous refill encounter(s): 11/27/23 #120    Requested Prescriptions     Pending Prescriptions Disp Refills    carBAMazepine (TEGRETOL) 100 MG chewable tablet 120 tablet 2     Sig: CHEW 2 TABLETS BY MOUTH TWICE A DAY         For Pharmacy Admin Tracking Only    Program: Medication Refill  CPA in place:    Recommendation Provided To:   Intervention Detail: New Rx: 1, reason: Patient Preference  Intervention Accepted By:   Gap Closed?:    Time Spent (min): 5

## 2024-01-14 RX ORDER — GABAPENTIN 300 MG/1
CAPSULE ORAL
Qty: 270 CAPSULE | Refills: 0 | Status: SHIPPED | OUTPATIENT
Start: 2024-01-14 | End: 2024-04-13

## 2024-01-14 RX ORDER — CARBAMAZEPINE 100 MG/1
TABLET, CHEWABLE ORAL
Qty: 120 TABLET | Refills: 2 | Status: SHIPPED | OUTPATIENT
Start: 2024-01-14

## 2024-02-13 DIAGNOSIS — Z02.89 PAIN MANAGEMENT CONTRACT AGREEMENT: ICD-10-CM

## 2024-02-13 DIAGNOSIS — G62.9 NEUROPATHY: ICD-10-CM

## 2024-02-14 RX ORDER — TRAMADOL HYDROCHLORIDE 50 MG/1
50 TABLET ORAL EVERY 8 HOURS PRN
Qty: 30 TABLET | Refills: 0 | Status: SHIPPED | OUTPATIENT
Start: 2024-02-14 | End: 2024-03-15

## 2024-02-14 NOTE — TELEPHONE ENCOUNTER
Last appointment: 12/15/23  Next appointment: 3/15/24  Previous refill encounter(s): 1/5/24 #30    Requested Prescriptions     Pending Prescriptions Disp Refills    traMADol (ULTRAM) 50 MG tablet [Pharmacy Med Name: TRAMADOL 50MG TABLETS] 30 tablet 0     Sig: TAKE 1 TABLET BY MOUTH DAILY. MAX DAILY AMOUNT: 50 MG         For Pharmacy Admin Tracking Only    Program: Medication Refill  CPA in place:    Recommendation Provided To:   Intervention Detail: New Rx: 1, reason: Patient Preference  Intervention Accepted By:   Gap Closed?:    Time Spent (min): 5

## 2024-03-12 DIAGNOSIS — G62.9 NEUROPATHY: ICD-10-CM

## 2024-03-12 DIAGNOSIS — Z02.89 PAIN MANAGEMENT CONTRACT AGREEMENT: ICD-10-CM

## 2024-03-13 RX ORDER — TRAMADOL HYDROCHLORIDE 50 MG/1
50 TABLET ORAL DAILY
Qty: 30 TABLET | Refills: 0 | Status: SHIPPED | OUTPATIENT
Start: 2024-03-13 | End: 2024-04-12

## 2024-03-15 ENCOUNTER — OFFICE VISIT (OUTPATIENT)
Age: 69
End: 2024-03-15
Payer: MEDICARE

## 2024-03-15 VITALS
SYSTOLIC BLOOD PRESSURE: 135 MMHG | WEIGHT: 175 LBS | BODY MASS INDEX: 28.12 KG/M2 | TEMPERATURE: 98.6 F | OXYGEN SATURATION: 97 % | RESPIRATION RATE: 20 BRPM | DIASTOLIC BLOOD PRESSURE: 65 MMHG | HEIGHT: 66 IN | HEART RATE: 68 BPM

## 2024-03-15 DIAGNOSIS — N30.00 ACUTE CYSTITIS WITHOUT HEMATURIA: ICD-10-CM

## 2024-03-15 DIAGNOSIS — M54.2 NECK PAIN ON LEFT SIDE: ICD-10-CM

## 2024-03-15 DIAGNOSIS — I10 HYPERTENSION GOAL BP (BLOOD PRESSURE) < 130/80: ICD-10-CM

## 2024-03-15 DIAGNOSIS — R20.3 HYPERESTHESIA: Primary | ICD-10-CM

## 2024-03-15 PROCEDURE — 3075F SYST BP GE 130 - 139MM HG: CPT | Performed by: INTERNAL MEDICINE

## 2024-03-15 PROCEDURE — 1123F ACP DISCUSS/DSCN MKR DOCD: CPT | Performed by: INTERNAL MEDICINE

## 2024-03-15 PROCEDURE — 99214 OFFICE O/P EST MOD 30 MIN: CPT | Performed by: INTERNAL MEDICINE

## 2024-03-15 PROCEDURE — 3078F DIAST BP <80 MM HG: CPT | Performed by: INTERNAL MEDICINE

## 2024-03-15 RX ORDER — HYDROCHLOROTHIAZIDE 12.5 MG/1
CAPSULE, GELATIN COATED ORAL
Qty: 90 CAPSULE | Refills: 1 | Status: SHIPPED | OUTPATIENT
Start: 2024-03-15

## 2024-03-15 SDOH — ECONOMIC STABILITY: INCOME INSECURITY: HOW HARD IS IT FOR YOU TO PAY FOR THE VERY BASICS LIKE FOOD, HOUSING, MEDICAL CARE, AND HEATING?: NOT HARD AT ALL

## 2024-03-15 SDOH — ECONOMIC STABILITY: FOOD INSECURITY: WITHIN THE PAST 12 MONTHS, YOU WORRIED THAT YOUR FOOD WOULD RUN OUT BEFORE YOU GOT MONEY TO BUY MORE.: NEVER TRUE

## 2024-03-15 SDOH — ECONOMIC STABILITY: FOOD INSECURITY: WITHIN THE PAST 12 MONTHS, THE FOOD YOU BOUGHT JUST DIDN'T LAST AND YOU DIDN'T HAVE MONEY TO GET MORE.: NEVER TRUE

## 2024-03-15 ASSESSMENT — PATIENT HEALTH QUESTIONNAIRE - PHQ9
SUM OF ALL RESPONSES TO PHQ QUESTIONS 1-9: 0
2. FEELING DOWN, DEPRESSED OR HOPELESS: NOT AT ALL
SUM OF ALL RESPONSES TO PHQ QUESTIONS 1-9: 0
SUM OF ALL RESPONSES TO PHQ9 QUESTIONS 1 & 2: 0
1. LITTLE INTEREST OR PLEASURE IN DOING THINGS: NOT AT ALL
SUM OF ALL RESPONSES TO PHQ QUESTIONS 1-9: 0
SUM OF ALL RESPONSES TO PHQ QUESTIONS 1-9: 0

## 2024-03-15 ASSESSMENT — ANXIETY QUESTIONNAIRES
IF YOU CHECKED OFF ANY PROBLEMS ON THIS QUESTIONNAIRE, HOW DIFFICULT HAVE THESE PROBLEMS MADE IT FOR YOU TO DO YOUR WORK, TAKE CARE OF THINGS AT HOME, OR GET ALONG WITH OTHER PEOPLE: NOT DIFFICULT AT ALL
1. FEELING NERVOUS, ANXIOUS, OR ON EDGE: NOT AT ALL
5. BEING SO RESTLESS THAT IT IS HARD TO SIT STILL: NOT AT ALL
7. FEELING AFRAID AS IF SOMETHING AWFUL MIGHT HAPPEN: NOT AT ALL
4. TROUBLE RELAXING: NOT AT ALL
3. WORRYING TOO MUCH ABOUT DIFFERENT THINGS: SEVERAL DAYS
2. NOT BEING ABLE TO STOP OR CONTROL WORRYING: NOT AT ALL
GAD7 TOTAL SCORE: 2
6. BECOMING EASILY ANNOYED OR IRRITABLE: SEVERAL DAYS
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
## 2024-03-15 NOTE — PROGRESS NOTES
1. Have you been to the ER, urgent care clinic since your last visit?  Hospitalized since your last visit?No    2. Have you seen or consulted any other health care providers outside of the [facility] since your last visit?  Include any pap smears or colon screening. No

## 2024-03-29 DIAGNOSIS — I10 HYPERTENSION GOAL BP (BLOOD PRESSURE) < 130/80: ICD-10-CM

## 2024-03-29 DIAGNOSIS — N30.00 ACUTE CYSTITIS WITHOUT HEMATURIA: ICD-10-CM

## 2024-03-29 LAB
ALBUMIN SERPL-MCNC: 3.7 G/DL (ref 3.5–5)
ALBUMIN/GLOB SERPL: 1 (ref 1.1–2.2)
ALP SERPL-CCNC: 105 U/L (ref 45–117)
ALT SERPL-CCNC: 40 U/L (ref 12–78)
ANION GAP SERPL CALC-SCNC: 5 MMOL/L (ref 5–15)
APPEARANCE UR: CLEAR
AST SERPL-CCNC: 24 U/L (ref 15–37)
BACTERIA URNS QL MICRO: NEGATIVE /HPF
BILIRUB SERPL-MCNC: 0.3 MG/DL (ref 0.2–1)
BILIRUB UR QL: NEGATIVE
BUN SERPL-MCNC: 16 MG/DL (ref 6–20)
BUN/CREAT SERPL: 19 (ref 12–20)
CALCIUM SERPL-MCNC: 9.6 MG/DL (ref 8.5–10.1)
CHLORIDE SERPL-SCNC: 106 MMOL/L (ref 97–108)
CO2 SERPL-SCNC: 31 MMOL/L (ref 21–32)
COLOR UR: ABNORMAL
CREAT SERPL-MCNC: 0.84 MG/DL (ref 0.55–1.02)
EPITH CASTS URNS QL MICRO: ABNORMAL /LPF
GLOBULIN SER CALC-MCNC: 3.7 G/DL (ref 2–4)
GLUCOSE SERPL-MCNC: 89 MG/DL (ref 65–100)
GLUCOSE UR STRIP.AUTO-MCNC: NEGATIVE MG/DL
HGB UR QL STRIP: NEGATIVE
HYALINE CASTS URNS QL MICRO: ABNORMAL /LPF (ref 0–5)
KETONES UR QL STRIP.AUTO: NEGATIVE MG/DL
LEUKOCYTE ESTERASE UR QL STRIP.AUTO: ABNORMAL
NITRITE UR QL STRIP.AUTO: NEGATIVE
PH UR STRIP: 7 (ref 5–8)
POTASSIUM SERPL-SCNC: 4.1 MMOL/L (ref 3.5–5.1)
PROT SERPL-MCNC: 7.4 G/DL (ref 6.4–8.2)
PROT UR STRIP-MCNC: NEGATIVE MG/DL
RBC #/AREA URNS HPF: ABNORMAL /HPF (ref 0–5)
SODIUM SERPL-SCNC: 142 MMOL/L (ref 136–145)
SP GR UR REFRACTOMETRY: 1 (ref 1–1.03)
URINE CULTURE IF INDICATED: ABNORMAL
UROBILINOGEN UR QL STRIP.AUTO: 0.2 EU/DL (ref 0.2–1)
WBC URNS QL MICRO: ABNORMAL /HPF (ref 0–4)

## 2024-03-30 LAB
BACTERIA SPEC CULT: NORMAL
SERVICE CMNT-IMP: NORMAL

## 2024-04-12 ENCOUNTER — HOSPITAL ENCOUNTER (OUTPATIENT)
Facility: HOSPITAL | Age: 69
End: 2024-04-12
Payer: MEDICARE

## 2024-04-12 DIAGNOSIS — M54.2 NECK PAIN ON LEFT SIDE: ICD-10-CM

## 2024-04-12 PROCEDURE — 72050 X-RAY EXAM NECK SPINE 4/5VWS: CPT

## 2024-04-16 DIAGNOSIS — M43.16 SPONDYLOLISTHESIS OF LUMBAR REGION: ICD-10-CM

## 2024-04-16 DIAGNOSIS — M51.36 DEGENERATIVE DISC DISEASE, LUMBAR: ICD-10-CM

## 2024-04-16 DIAGNOSIS — G62.9 NEUROPATHY: Primary | ICD-10-CM

## 2024-04-16 RX ORDER — TRAMADOL HYDROCHLORIDE 50 MG/1
50 TABLET ORAL EVERY 4 HOURS PRN
Qty: 30 TABLET | Refills: 0 | Status: SHIPPED | OUTPATIENT
Start: 2024-04-16 | End: 2024-04-21

## 2024-04-17 ENCOUNTER — TELEMEDICINE (OUTPATIENT)
Age: 69
End: 2024-04-17
Payer: MEDICARE

## 2024-04-17 DIAGNOSIS — R93.89 ABNORMAL X-RAY OF NECK: ICD-10-CM

## 2024-04-17 DIAGNOSIS — M43.16 SPONDYLOLISTHESIS OF LUMBAR REGION: ICD-10-CM

## 2024-04-17 DIAGNOSIS — M41.56 OTHER SECONDARY SCOLIOSIS, LUMBAR REGION: ICD-10-CM

## 2024-04-17 DIAGNOSIS — M51.36 DEGENERATIVE DISC DISEASE, LUMBAR: ICD-10-CM

## 2024-04-17 DIAGNOSIS — M47.22 CERVICAL RADICULOPATHY DUE TO DEGENERATIVE JOINT DISEASE OF SPINE: Primary | ICD-10-CM

## 2024-04-17 DIAGNOSIS — N30.00 ACUTE CYSTITIS WITHOUT HEMATURIA: ICD-10-CM

## 2024-04-17 DIAGNOSIS — M48.00 MULTILEVEL NEURAL FORAMINAL STENOSIS: ICD-10-CM

## 2024-04-17 PROCEDURE — 1123F ACP DISCUSS/DSCN MKR DOCD: CPT | Performed by: INTERNAL MEDICINE

## 2024-04-17 PROCEDURE — 99214 OFFICE O/P EST MOD 30 MIN: CPT | Performed by: INTERNAL MEDICINE

## 2024-04-17 RX ORDER — NITROFURANTOIN 25; 75 MG/1; MG/1
100 CAPSULE ORAL 2 TIMES DAILY
Qty: 14 CAPSULE | Refills: 0 | Status: SHIPPED | OUTPATIENT
Start: 2024-04-17 | End: 2024-04-24

## 2024-04-17 RX ORDER — GABAPENTIN 300 MG/1
CAPSULE ORAL
Qty: 270 CAPSULE | Refills: 0 | Status: SHIPPED | OUTPATIENT
Start: 2024-04-17 | End: 2024-07-17

## 2024-04-17 RX ORDER — CELECOXIB 200 MG/1
200 CAPSULE ORAL DAILY
Qty: 30 CAPSULE | Refills: 4 | Status: SHIPPED | OUTPATIENT
Start: 2024-04-17

## 2024-04-17 NOTE — PROGRESS NOTES
Yesy Lyle, was evaluated through a synchronous (real-time) audio-video encounter. The patient (or guardian if applicable) is aware that this is a billable service, which includes applicable co-pays. This Virtual Visit was conducted with patient's (and/or legal guardian's) consent. Patient identification was verified, and a caregiver was present when appropriate.   The patient was located at Home: [address] [place] [address] [address]  [facility] [address]  Provider was located at Facility (Appt Dept): [address] [place] [address] [address]  [place] [address]  Confirm you are appropriately licensed, registered, or certified to deliver care in the state where the patient is located as indicated above. If you are not or unsure, please re-schedule the visit: Yes, I confirm.     Yesy Lyle (:  1955) is a Established patient, presenting virtually for evaluation of the following:    Assessment & Plan   Below is the assessment and plan developed based on review of pertinent history, physical exam, labs, studies, and medications.  1. Cervical radiculopathy due to degenerative joint disease of spine  -     [facility] - Hernandez Lal MD, Orthopedic Surgery (back, neck, spine), [place]  2. Degenerative disc disease, lumbar  -     gabapentin (NEURONTIN) 300 MG capsule; TAKE 1 CAPSULE BY MOUTH THREE TIMES DAILY. MAX DAILY AMOUNT: 900 MG, Disp-270 capsule, R-0Normal  -     celecoxib (CELEBREX) 200 MG capsule; Take 1 capsule by mouth daily, Disp-30 capsule, R-4Normal  3. Other secondary scoliosis, lumbar region  -     gabapentin (NEURONTIN) 300 MG capsule; TAKE 1 CAPSULE BY MOUTH THREE TIMES DAILY. MAX DAILY AMOUNT: 900 MG, Disp-270 capsule, R-0Normal  -     celecoxib (CELEBREX) 200 MG capsule; Take 1 capsule by mouth daily, Disp-30 capsule, R-4Normal  4. Spondylolisthesis of lumbar region  -     gabapentin (NEURONTIN) 300 MG capsule; TAKE 1 CAPSULE BY MOUTH THREE TIMES DAILY. MAX DAILY AMOUNT: 900 MG, Disp-270 capsule,

## 2024-05-20 DIAGNOSIS — M41.56 OTHER SECONDARY SCOLIOSIS, LUMBAR REGION: ICD-10-CM

## 2024-05-20 DIAGNOSIS — M51.36 DEGENERATIVE DISC DISEASE, LUMBAR: ICD-10-CM

## 2024-05-20 DIAGNOSIS — M43.16 SPONDYLOLISTHESIS OF LUMBAR REGION: ICD-10-CM

## 2024-05-20 NOTE — TELEPHONE ENCOUNTER
Pt needs med refills as soon as possible     RE: tremadol   Celebres  Baclofen     States pharmacy has been trying for a while     Pls call her as soon as possible [phone]

## 2024-05-21 RX ORDER — BACLOFEN 10 MG/1
10 TABLET ORAL 3 TIMES DAILY
Qty: 270 TABLET | Refills: 1 | Status: SHIPPED | OUTPATIENT
Start: 2024-05-21

## 2024-05-21 RX ORDER — CELECOXIB 200 MG/1
200 CAPSULE ORAL DAILY
Qty: 30 CAPSULE | Refills: 4 | Status: SHIPPED | OUTPATIENT
Start: 2024-05-21

## 2024-05-23 ENCOUNTER — TELEPHONE (OUTPATIENT)
Age: 69
End: 2024-05-23
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
## 2024-05-29 DIAGNOSIS — M51.36 DEGENERATIVE DISC DISEASE, LUMBAR: ICD-10-CM

## 2024-05-29 DIAGNOSIS — M43.16 SPONDYLOLISTHESIS OF LUMBAR REGION: ICD-10-CM

## 2024-05-29 DIAGNOSIS — G62.9 NEUROPATHY: ICD-10-CM

## 2024-05-30 RX ORDER — TRAMADOL HYDROCHLORIDE 50 MG/1
50 TABLET ORAL DAILY
Qty: 30 TABLET | Refills: 0 | Status: SHIPPED | OUTPATIENT
Start: 2024-05-30 | End: 2024-06-29

## 2024-07-06 ENCOUNTER — APPOINTMENT (OUTPATIENT)
Facility: HOSPITAL | Age: 69
DRG: 519 | End: 2024-07-06
Payer: MEDICARE

## 2024-07-06 ENCOUNTER — HOSPITAL ENCOUNTER (INPATIENT)
Facility: HOSPITAL | Age: 69
LOS: 29 days | Discharge: NURSING, REHAB OR LONG-TERM CARE | DRG: 519 | End: 2024-08-05
Attending: EMERGENCY MEDICINE | Admitting: FAMILY MEDICINE
Payer: MEDICARE

## 2024-07-06 DIAGNOSIS — R60.0 EDEMA OF BOTH LOWER EXTREMITIES: ICD-10-CM

## 2024-07-06 DIAGNOSIS — M79.89 SWELLING OF LEFT EXTREMITY: ICD-10-CM

## 2024-07-06 DIAGNOSIS — R56.9 SEIZURE SECONDARY TO SUBTHERAPEUTIC ANTICONVULSANT MEDICATION (HCC): Primary | ICD-10-CM

## 2024-07-06 DIAGNOSIS — Z79.899 SEIZURE SECONDARY TO SUBTHERAPEUTIC ANTICONVULSANT MEDICATION (HCC): Primary | ICD-10-CM

## 2024-07-06 DIAGNOSIS — K56.41 FECAL IMPACTION IN RECTUM (HCC): ICD-10-CM

## 2024-07-06 DIAGNOSIS — R60.0 BILATERAL LEG EDEMA: ICD-10-CM

## 2024-07-06 DIAGNOSIS — E87.6 HYPOKALEMIA: ICD-10-CM

## 2024-07-06 DIAGNOSIS — R50.82 POSTOPERATIVE FEVER: ICD-10-CM

## 2024-07-06 DIAGNOSIS — G72.9 CERVICAL MYOPATHY: ICD-10-CM

## 2024-07-06 LAB
ANION GAP SERPL CALC-SCNC: 3 MMOL/L (ref 5–15)
BASOPHILS # BLD: 0 K/UL (ref 0–0.1)
BASOPHILS NFR BLD: 0 % (ref 0–1)
BUN SERPL-MCNC: 18 MG/DL (ref 6–20)
BUN/CREAT SERPL: 30 (ref 12–20)
CALCIUM SERPL-MCNC: 10 MG/DL (ref 8.5–10.1)
CARBAMAZEPINE SERPL-MCNC: <0.5 UG/ML (ref 4–12)
CHLORIDE SERPL-SCNC: 106 MMOL/L (ref 97–108)
CO2 SERPL-SCNC: 33 MMOL/L (ref 21–32)
COMMENT:: NORMAL
CREAT SERPL-MCNC: 0.6 MG/DL (ref 0.55–1.02)
DIFFERENTIAL METHOD BLD: ABNORMAL
EOSINOPHIL # BLD: 0 K/UL (ref 0–0.4)
EOSINOPHIL NFR BLD: 1 % (ref 0–7)
ERYTHROCYTE [DISTWIDTH] IN BLOOD BY AUTOMATED COUNT: 13.8 % (ref 11.5–14.5)
GLUCOSE SERPL-MCNC: 83 MG/DL (ref 65–100)
HCT VFR BLD AUTO: 35.3 % (ref 35–47)
HGB BLD-MCNC: 12 G/DL (ref 11.5–16)
IMM GRANULOCYTES # BLD AUTO: 0 K/UL (ref 0–0.04)
IMM GRANULOCYTES NFR BLD AUTO: 0 % (ref 0–0.5)
LYMPHOCYTES # BLD: 0.8 K/UL (ref 0.8–3.5)
LYMPHOCYTES NFR BLD: 17 % (ref 12–49)
MAGNESIUM SERPL-MCNC: 1.9 MG/DL (ref 1.6–2.4)
MCH RBC QN AUTO: 33.7 PG (ref 26–34)
MCHC RBC AUTO-ENTMCNC: 34 G/DL (ref 30–36.5)
MCV RBC AUTO: 99.2 FL (ref 80–99)
MONOCYTES # BLD: 0.4 K/UL (ref 0–1)
MONOCYTES NFR BLD: 8 % (ref 5–13)
NEUTS SEG # BLD: 3.6 K/UL (ref 1.8–8)
NEUTS SEG NFR BLD: 74 % (ref 32–75)
NRBC # BLD: 0 K/UL (ref 0–0.01)
NRBC BLD-RTO: 0 PER 100 WBC
NT PRO BNP: 19 PG/ML
PHENYTOIN FREE SERPL-MCNC: NORMAL UG/ML (ref 1–2)
PLATELET # BLD AUTO: 267 K/UL (ref 150–400)
PMV BLD AUTO: 9.6 FL (ref 8.9–12.9)
POTASSIUM SERPL-SCNC: 3 MMOL/L (ref 3.5–5.1)
RBC # BLD AUTO: 3.56 M/UL (ref 3.8–5.2)
RBC MORPH BLD: ABNORMAL
SODIUM SERPL-SCNC: 142 MMOL/L (ref 136–145)
SPECIMEN HOLD: NORMAL
WBC # BLD AUTO: 4.8 K/UL (ref 3.6–11)

## 2024-07-06 PROCEDURE — 80186 ASSAY OF PHENYTOIN FREE: CPT

## 2024-07-06 PROCEDURE — 36415 COLL VENOUS BLD VENIPUNCTURE: CPT

## 2024-07-06 PROCEDURE — 85025 COMPLETE CBC W/AUTO DIFF WBC: CPT

## 2024-07-06 PROCEDURE — 2580000003 HC RX 258: Performed by: EMERGENCY MEDICINE

## 2024-07-06 PROCEDURE — 99285 EMERGENCY DEPT VISIT HI MDM: CPT

## 2024-07-06 PROCEDURE — 82077 ASSAY SPEC XCP UR&BREATH IA: CPT

## 2024-07-06 PROCEDURE — 80048 BASIC METABOLIC PNL TOTAL CA: CPT

## 2024-07-06 PROCEDURE — 96365 THER/PROPH/DIAG IV INF INIT: CPT

## 2024-07-06 PROCEDURE — 83735 ASSAY OF MAGNESIUM: CPT

## 2024-07-06 PROCEDURE — 74176 CT ABD & PELVIS W/O CONTRAST: CPT

## 2024-07-06 PROCEDURE — 6370000000 HC RX 637 (ALT 250 FOR IP): Performed by: EMERGENCY MEDICINE

## 2024-07-06 PROCEDURE — 80156 ASSAY CARBAMAZEPINE TOTAL: CPT

## 2024-07-06 PROCEDURE — 6360000002 HC RX W HCPCS: Performed by: EMERGENCY MEDICINE

## 2024-07-06 PROCEDURE — 70450 CT HEAD/BRAIN W/O DYE: CPT

## 2024-07-06 PROCEDURE — 80185 ASSAY OF PHENYTOIN TOTAL: CPT

## 2024-07-06 PROCEDURE — 83880 ASSAY OF NATRIURETIC PEPTIDE: CPT

## 2024-07-06 PROCEDURE — 93005 ELECTROCARDIOGRAM TRACING: CPT | Performed by: EMERGENCY MEDICINE

## 2024-07-06 RX ORDER — CARBAMAZEPINE 100 MG/1
200 TABLET, CHEWABLE ORAL 2 TIMES DAILY
Status: DISCONTINUED | OUTPATIENT
Start: 2024-07-06 | End: 2024-07-09

## 2024-07-06 RX ORDER — BISACODYL 10 MG
20 SUPPOSITORY, RECTAL RECTAL
Status: COMPLETED | OUTPATIENT
Start: 2024-07-06 | End: 2024-07-06

## 2024-07-06 RX ADMIN — BISACODYL 20 MG: 10 SUPPOSITORY RECTAL at 22:30

## 2024-07-06 RX ADMIN — CARBAMAZEPINE 200 MG: 100 TABLET, CHEWABLE ORAL at 23:47

## 2024-07-06 RX ADMIN — POTASSIUM BICARBONATE 40 MEQ: 782 TABLET, EFFERVESCENT ORAL at 22:46

## 2024-07-06 RX ADMIN — MAGNESIUM HYDROXIDE 30 ML: 400 SUSPENSION ORAL at 20:53

## 2024-07-06 RX ADMIN — POTASSIUM BICARBONATE 40 MEQ: 782 TABLET, EFFERVESCENT ORAL at 20:53

## 2024-07-06 RX ADMIN — FOSPHENYTOIN SODIUM 1000 MG PE: 50 INJECTION, SOLUTION INTRAMUSCULAR; INTRAVENOUS at 22:11

## 2024-07-06 ASSESSMENT — PAIN DESCRIPTION - LOCATION
LOCATION: NECK
LOCATION: BUTTOCKS;FOOT

## 2024-07-06 ASSESSMENT — PAIN DESCRIPTION - ORIENTATION
ORIENTATION: RIGHT
ORIENTATION: POSTERIOR

## 2024-07-06 ASSESSMENT — PAIN DESCRIPTION - DESCRIPTORS
DESCRIPTORS: PRESSURE
DESCRIPTORS: ACHING

## 2024-07-06 ASSESSMENT — PAIN SCALES - GENERAL: PAINLEVEL_OUTOF10: 7

## 2024-07-06 ASSESSMENT — PAIN - FUNCTIONAL ASSESSMENT: PAIN_FUNCTIONAL_ASSESSMENT: 0-10

## 2024-07-06 ASSESSMENT — PAIN DESCRIPTION - FREQUENCY: FREQUENCY: CONTINUOUS

## 2024-07-06 ASSESSMENT — PAIN DESCRIPTION - PAIN TYPE: TYPE: ACUTE PAIN

## 2024-07-06 NOTE — ED NOTES
Bedside and Verbal shift change report given to RUDDY Howe (oncoming nurse) by RUDDY Wade (offgoing nurse). Report included the following information Nurse Handoff Report and Index.

## 2024-07-06 NOTE — ED TRIAGE NOTES
Fell down early in day (slide down in wheelchair)  at home.  8/10 pain in buttock and right foot pain from swelling. Also numbness and tingling in hands. Also  complains of pinched nerve behind neck on left side. Claims to fall often.     Hx neuropathy

## 2024-07-07 ENCOUNTER — APPOINTMENT (OUTPATIENT)
Facility: HOSPITAL | Age: 69
DRG: 519 | End: 2024-07-07
Payer: MEDICARE

## 2024-07-07 ENCOUNTER — APPOINTMENT (OUTPATIENT)
Facility: HOSPITAL | Age: 69
DRG: 519 | End: 2024-07-07
Attending: FAMILY MEDICINE
Payer: MEDICARE

## 2024-07-07 PROBLEM — R56.9 SEIZURE SECONDARY TO SUBTHERAPEUTIC ANTICONVULSANT MEDICATION (HCC): Status: ACTIVE | Noted: 2024-07-07

## 2024-07-07 PROBLEM — Z79.899 SEIZURE SECONDARY TO SUBTHERAPEUTIC ANTICONVULSANT MEDICATION (HCC): Status: ACTIVE | Noted: 2024-07-07

## 2024-07-07 LAB
ALBUMIN SERPL-MCNC: 2.7 G/DL (ref 3.5–5)
ALBUMIN/GLOB SERPL: 0.8 (ref 1.1–2.2)
ALP SERPL-CCNC: 78 U/L (ref 45–117)
ALT SERPL-CCNC: 35 U/L (ref 12–78)
AMMONIA PLAS-SCNC: <10 UMOL/L
AMPHET UR QL SCN: NEGATIVE
ANION GAP BLD CALC-SCNC: 5 (ref 10–20)
ANION GAP SERPL CALC-SCNC: 2 MMOL/L (ref 5–15)
ANION GAP SERPL CALC-SCNC: 3 MMOL/L (ref 5–15)
APPEARANCE UR: ABNORMAL
APTT PPP: 23.1 SEC (ref 22.1–31)
AST SERPL-CCNC: 28 U/L (ref 15–37)
BACTERIA URNS QL MICRO: ABNORMAL /HPF
BARBITURATES UR QL SCN: NEGATIVE
BASE EXCESS BLD CALC-SCNC: 7.7 MMOL/L
BASOPHILS # BLD: 0 K/UL (ref 0–0.1)
BASOPHILS # BLD: 0 K/UL (ref 0–0.1)
BASOPHILS NFR BLD: 0 % (ref 0–1)
BASOPHILS NFR BLD: 0 % (ref 0–1)
BDY SITE: ABNORMAL
BENZODIAZ UR QL: NEGATIVE
BILIRUB SERPL-MCNC: 0.5 MG/DL (ref 0.2–1)
BILIRUB UR QL: NEGATIVE
BUN SERPL-MCNC: 11 MG/DL (ref 6–20)
BUN SERPL-MCNC: 12 MG/DL (ref 6–20)
BUN/CREAT SERPL: 20 (ref 12–20)
BUN/CREAT SERPL: 22 (ref 12–20)
CA-I BLD-MCNC: 1.3 MMOL/L (ref 1.12–1.32)
CALCIUM SERPL-MCNC: 9.2 MG/DL (ref 8.5–10.1)
CALCIUM SERPL-MCNC: 9.5 MG/DL (ref 8.5–10.1)
CANNABINOIDS UR QL SCN: NEGATIVE
CHLORIDE BLD-SCNC: 103 MMOL/L (ref 100–108)
CHLORIDE SERPL-SCNC: 106 MMOL/L (ref 97–108)
CHLORIDE SERPL-SCNC: 107 MMOL/L (ref 97–108)
CO2 BLD-SCNC: 33 MMOL/L (ref 19–24)
CO2 SERPL-SCNC: 34 MMOL/L (ref 21–32)
CO2 SERPL-SCNC: 35 MMOL/L (ref 21–32)
COCAINE UR QL SCN: NEGATIVE
COLOR UR: ABNORMAL
COMMENT:: NORMAL
CREAT SERPL-MCNC: 0.55 MG/DL (ref 0.55–1.02)
CREAT SERPL-MCNC: 0.56 MG/DL (ref 0.55–1.02)
CREAT UR-MCNC: 0.6 MG/DL (ref 0.6–1.3)
D DIMER PPP FEU-MCNC: 1.15 MG/L FEU (ref 0–0.65)
DIFFERENTIAL METHOD BLD: ABNORMAL
DIFFERENTIAL METHOD BLD: ABNORMAL
ECHO AO ROOT DIAM: 3.1 CM
ECHO AO ROOT INDEX: 1.7 CM/M2
ECHO AV AREA PEAK VELOCITY: 2.6 CM2
ECHO AV AREA VTI: 2.1 CM2
ECHO AV AREA/BSA PEAK VELOCITY: 1.4 CM2/M2
ECHO AV AREA/BSA VTI: 1.2 CM2/M2
ECHO AV MEAN GRADIENT: 4 MMHG
ECHO AV MEAN VELOCITY: 1 M/S
ECHO AV PEAK GRADIENT: 8 MMHG
ECHO AV PEAK VELOCITY: 1.4 M/S
ECHO AV VELOCITY RATIO: 0.86
ECHO AV VTI: 32.8 CM
ECHO BSA: 1.85 M2
ECHO LA DIAMETER INDEX: 1.54 CM/M2
ECHO LA DIAMETER: 2.8 CM
ECHO LA TO AORTIC ROOT RATIO: 0.9
ECHO LV E' LATERAL VELOCITY: 12 CM/S
ECHO LV E' SEPTAL VELOCITY: 10 CM/S
ECHO LV FRACTIONAL SHORTENING: 28 % (ref 28–44)
ECHO LV INTERNAL DIMENSION DIASTOLE INDEX: 1.98 CM/M2
ECHO LV INTERNAL DIMENSION DIASTOLIC: 3.6 CM (ref 3.9–5.3)
ECHO LV INTERNAL DIMENSION SYSTOLIC INDEX: 1.43 CM/M2
ECHO LV INTERNAL DIMENSION SYSTOLIC: 2.6 CM
ECHO LV IVSD: 0.8 CM (ref 0.6–0.9)
ECHO LV MASS 2D: 92.8 G (ref 67–162)
ECHO LV MASS INDEX 2D: 51 G/M2 (ref 43–95)
ECHO LV POSTERIOR WALL DIASTOLIC: 1 CM (ref 0.6–0.9)
ECHO LV RELATIVE WALL THICKNESS RATIO: 0.56
ECHO LVOT AREA: 3.1 CM2
ECHO LVOT AV VTI INDEX: 0.66
ECHO LVOT DIAM: 2 CM
ECHO LVOT MEAN GRADIENT: 2 MMHG
ECHO LVOT PEAK GRADIENT: 5 MMHG
ECHO LVOT PEAK VELOCITY: 1.2 M/S
ECHO LVOT STROKE VOLUME INDEX: 37.3 ML/M2
ECHO LVOT SV: 67.8 ML
ECHO LVOT VTI: 21.6 CM
ECHO MV A VELOCITY: 0.89 M/S
ECHO MV AREA PHT: 3.5 CM2
ECHO MV AREA VTI: 2.1 CM2
ECHO MV E DECELERATION TIME (DT): 214.7 MS
ECHO MV E VELOCITY: 1.02 M/S
ECHO MV E/A RATIO: 1.15
ECHO MV E/E' LATERAL: 8.5
ECHO MV E/E' RATIO (AVERAGED): 9.35
ECHO MV E/E' SEPTAL: 10.2
ECHO MV LVOT VTI INDEX: 1.49
ECHO MV MAX VELOCITY: 1 M/S
ECHO MV MEAN GRADIENT: 1 MMHG
ECHO MV MEAN VELOCITY: 0.5 M/S
ECHO MV PEAK GRADIENT: 4 MMHG
ECHO MV PRESSURE HALF TIME (PHT): 62.3 MS
ECHO MV VTI: 32.2 CM
ECHO PV MAX VELOCITY: 1.1 M/S
ECHO PV PEAK GRADIENT: 5 MMHG
ECHO RV FREE WALL PEAK S': 9 CM/S
ECHO RV TAPSE: 1.9 CM (ref 1.7–?)
ECHO TV REGURGITANT MAX VELOCITY: 2.43 M/S
ECHO TV REGURGITANT PEAK GRADIENT: 24 MMHG
EKG ATRIAL RATE: 65 BPM
EKG DIAGNOSIS: NORMAL
EKG DIAGNOSIS: NORMAL
EKG P AXIS: 62 DEGREES
EKG P-R INTERVAL: 168 MS
EKG Q-T INTERVAL: 258 MS
EKG Q-T INTERVAL: 412 MS
EKG QRS DURATION: 102 MS
EKG QRS DURATION: 80 MS
EKG QTC CALCULATION (BAZETT): 270 MS
EKG QTC CALCULATION (BAZETT): 428 MS
EKG R AXIS: 68 DEGREES
EKG R AXIS: 68 DEGREES
EKG T AXIS: -2 DEGREES
EKG T AXIS: 41 DEGREES
EKG VENTRICULAR RATE: 65 BPM
EKG VENTRICULAR RATE: 66 BPM
EOSINOPHIL # BLD: 0.1 K/UL (ref 0–0.4)
EOSINOPHIL # BLD: 0.1 K/UL (ref 0–0.4)
EOSINOPHIL NFR BLD: 1 % (ref 0–7)
EOSINOPHIL NFR BLD: 1 % (ref 0–7)
EPITH CASTS URNS QL MICRO: ABNORMAL /LPF
ERYTHROCYTE [DISTWIDTH] IN BLOOD BY AUTOMATED COUNT: 14 % (ref 11.5–14.5)
ERYTHROCYTE [DISTWIDTH] IN BLOOD BY AUTOMATED COUNT: 14.3 % (ref 11.5–14.5)
ETHANOL SERPL-MCNC: <10 MG/DL (ref 0–0.08)
FOLATE SERPL-MCNC: 17.2 NG/ML (ref 5–21)
GLOBULIN SER CALC-MCNC: 3.4 G/DL (ref 2–4)
GLUCOSE BLD STRIP.AUTO-MCNC: 114 MG/DL (ref 74–99)
GLUCOSE SERPL-MCNC: 103 MG/DL (ref 65–100)
GLUCOSE SERPL-MCNC: 113 MG/DL (ref 65–100)
GLUCOSE UR STRIP.AUTO-MCNC: NEGATIVE MG/DL
HCO3 BLDA-SCNC: 33 MMOL/L
HCT VFR BLD AUTO: 29.6 % (ref 35–47)
HCT VFR BLD AUTO: 34.2 % (ref 35–47)
HGB BLD-MCNC: 10 G/DL (ref 11.5–16)
HGB BLD-MCNC: 11.4 G/DL (ref 11.5–16)
HGB UR QL STRIP: ABNORMAL
IMM GRANULOCYTES # BLD AUTO: 0 K/UL (ref 0–0.04)
IMM GRANULOCYTES # BLD AUTO: 0.1 K/UL (ref 0–0.04)
IMM GRANULOCYTES NFR BLD AUTO: 0 % (ref 0–0.5)
IMM GRANULOCYTES NFR BLD AUTO: 1 % (ref 0–0.5)
INR PPP: 1 (ref 0.9–1.1)
KETONES UR QL STRIP.AUTO: ABNORMAL MG/DL
LACTATE BLD-SCNC: 0.83 MMOL/L (ref 0.4–2)
LACTATE SERPL-SCNC: 1.1 MMOL/L (ref 0.4–2)
LEUKOCYTE ESTERASE UR QL STRIP.AUTO: ABNORMAL
LYMPHOCYTES # BLD: 0.6 K/UL (ref 0.8–3.5)
LYMPHOCYTES # BLD: 0.8 K/UL (ref 0.8–3.5)
LYMPHOCYTES NFR BLD: 10 % (ref 12–49)
LYMPHOCYTES NFR BLD: 12 % (ref 12–49)
Lab: ABNORMAL
MCH RBC QN AUTO: 32.8 PG (ref 26–34)
MCH RBC QN AUTO: 33.2 PG (ref 26–34)
MCHC RBC AUTO-ENTMCNC: 33.3 G/DL (ref 30–36.5)
MCHC RBC AUTO-ENTMCNC: 33.8 G/DL (ref 30–36.5)
MCV RBC AUTO: 98.3 FL (ref 80–99)
MCV RBC AUTO: 98.3 FL (ref 80–99)
METHADONE UR QL: NEGATIVE
MONOCYTES # BLD: 0.3 K/UL (ref 0–1)
MONOCYTES # BLD: 0.5 K/UL (ref 0–1)
MONOCYTES NFR BLD: 5 % (ref 5–13)
MONOCYTES NFR BLD: 6 % (ref 5–13)
NEUTS SEG # BLD: 4.3 K/UL (ref 1.8–8)
NEUTS SEG # BLD: 6.4 K/UL (ref 1.8–8)
NEUTS SEG NFR BLD: 82 % (ref 32–75)
NEUTS SEG NFR BLD: 82 % (ref 32–75)
NITRITE UR QL STRIP.AUTO: NEGATIVE
NRBC # BLD: 0 K/UL (ref 0–0.01)
NRBC # BLD: 0 K/UL (ref 0–0.01)
NRBC BLD-RTO: 0 PER 100 WBC
NRBC BLD-RTO: 0 PER 100 WBC
NT PRO BNP: 240 PG/ML
OPIATES UR QL: POSITIVE
PCO2 BLD: 49.7 MMHG (ref 35–45)
PCP UR QL: NEGATIVE
PH BLD: 7.43 (ref 7.35–7.45)
PH UR STRIP: 7 (ref 5–8)
PHOSPHATE SERPL-MCNC: 2.3 MG/DL (ref 2.6–4.7)
PLATELET # BLD AUTO: 221 K/UL (ref 150–400)
PLATELET # BLD AUTO: 242 K/UL (ref 150–400)
PMV BLD AUTO: 9.6 FL (ref 8.9–12.9)
PMV BLD AUTO: 9.7 FL (ref 8.9–12.9)
PO2 BLD: 75 MMHG (ref 80–100)
POTASSIUM BLD-SCNC: 3.3 MMOL/L (ref 3.5–5.5)
POTASSIUM SERPL-SCNC: 3.6 MMOL/L (ref 3.5–5.1)
POTASSIUM SERPL-SCNC: 3.7 MMOL/L (ref 3.5–5.1)
PROT SERPL-MCNC: 6.1 G/DL (ref 6.4–8.2)
PROT UR STRIP-MCNC: ABNORMAL MG/DL
PROTHROMBIN TIME: 10.7 SEC (ref 9–11.1)
RBC # BLD AUTO: 3.01 M/UL (ref 3.8–5.2)
RBC # BLD AUTO: 3.48 M/UL (ref 3.8–5.2)
RBC #/AREA URNS HPF: ABNORMAL /HPF (ref 0–5)
RBC MORPH BLD: ABNORMAL
RBC MORPH BLD: ABNORMAL
SAO2 % BLD: 95 %
SODIUM BLD-SCNC: 141 MMOL/L (ref 136–145)
SODIUM SERPL-SCNC: 143 MMOL/L (ref 136–145)
SODIUM SERPL-SCNC: 144 MMOL/L (ref 136–145)
SP GR UR REFRACTOMETRY: 1.02 (ref 1–1.03)
SPECIMEN HOLD: NORMAL
SPECIMEN SITE: ABNORMAL
T4 FREE SERPL-MCNC: 1.3 NG/DL (ref 0.8–1.5)
THERAPEUTIC RANGE: NORMAL SECS (ref 58–77)
TROPONIN I SERPL HS-MCNC: 15 NG/L (ref 0–51)
TSH SERPL DL<=0.05 MIU/L-ACNC: 0.91 UIU/ML (ref 0.36–3.74)
URINE CULTURE IF INDICATED: ABNORMAL
UROBILINOGEN UR QL STRIP.AUTO: 1 EU/DL (ref 0.2–1)
VIT B12 SERPL-MCNC: 1502 PG/ML (ref 193–986)
WBC # BLD AUTO: 5.3 K/UL (ref 3.6–11)
WBC # BLD AUTO: 7.9 K/UL (ref 3.6–11)
WBC URNS QL MICRO: ABNORMAL /HPF (ref 0–4)

## 2024-07-07 PROCEDURE — 82140 ASSAY OF AMMONIA: CPT

## 2024-07-07 PROCEDURE — 36600 WITHDRAWAL OF ARTERIAL BLOOD: CPT

## 2024-07-07 PROCEDURE — 6370000000 HC RX 637 (ALT 250 FOR IP): Performed by: FAMILY MEDICINE

## 2024-07-07 PROCEDURE — 84100 ASSAY OF PHOSPHORUS: CPT

## 2024-07-07 PROCEDURE — 83880 ASSAY OF NATRIURETIC PEPTIDE: CPT

## 2024-07-07 PROCEDURE — 6370000000 HC RX 637 (ALT 250 FOR IP): Performed by: EMERGENCY MEDICINE

## 2024-07-07 PROCEDURE — 2580000003 HC RX 258: Performed by: FAMILY MEDICINE

## 2024-07-07 PROCEDURE — 82746 ASSAY OF FOLIC ACID SERUM: CPT

## 2024-07-07 PROCEDURE — 82947 ASSAY GLUCOSE BLOOD QUANT: CPT

## 2024-07-07 PROCEDURE — 96375 TX/PRO/DX INJ NEW DRUG ADDON: CPT

## 2024-07-07 PROCEDURE — 80053 COMPREHEN METABOLIC PANEL: CPT

## 2024-07-07 PROCEDURE — 2580000003 HC RX 258: Performed by: INTERNAL MEDICINE

## 2024-07-07 PROCEDURE — 2580000003 HC RX 258

## 2024-07-07 PROCEDURE — 85610 PROTHROMBIN TIME: CPT

## 2024-07-07 PROCEDURE — 84443 ASSAY THYROID STIM HORMONE: CPT

## 2024-07-07 PROCEDURE — P9047 ALBUMIN (HUMAN), 25%, 50ML: HCPCS | Performed by: PHYSICIAN ASSISTANT

## 2024-07-07 PROCEDURE — 6360000002 HC RX W HCPCS: Performed by: FAMILY MEDICINE

## 2024-07-07 PROCEDURE — 81001 URINALYSIS AUTO W/SCOPE: CPT

## 2024-07-07 PROCEDURE — 2500000003 HC RX 250 WO HCPCS

## 2024-07-07 PROCEDURE — 2000000000 HC ICU R&B

## 2024-07-07 PROCEDURE — 82330 ASSAY OF CALCIUM: CPT

## 2024-07-07 PROCEDURE — 93306 TTE W/DOPPLER COMPLETE: CPT

## 2024-07-07 PROCEDURE — 2500000003 HC RX 250 WO HCPCS: Performed by: INTERNAL MEDICINE

## 2024-07-07 PROCEDURE — 84295 ASSAY OF SERUM SODIUM: CPT

## 2024-07-07 PROCEDURE — 71045 X-RAY EXAM CHEST 1 VIEW: CPT

## 2024-07-07 PROCEDURE — 6360000002 HC RX W HCPCS: Performed by: STUDENT IN AN ORGANIZED HEALTH CARE EDUCATION/TRAINING PROGRAM

## 2024-07-07 PROCEDURE — 93970 EXTREMITY STUDY: CPT

## 2024-07-07 PROCEDURE — 85379 FIBRIN DEGRADATION QUANT: CPT

## 2024-07-07 PROCEDURE — 80307 DRUG TEST PRSMV CHEM ANLYZR: CPT

## 2024-07-07 PROCEDURE — 84132 ASSAY OF SERUM POTASSIUM: CPT

## 2024-07-07 PROCEDURE — 99223 1ST HOSP IP/OBS HIGH 75: CPT | Performed by: STUDENT IN AN ORGANIZED HEALTH CARE EDUCATION/TRAINING PROGRAM

## 2024-07-07 PROCEDURE — 93005 ELECTROCARDIOGRAM TRACING: CPT | Performed by: PHYSICIAN ASSISTANT

## 2024-07-07 PROCEDURE — 82803 BLOOD GASES ANY COMBINATION: CPT

## 2024-07-07 PROCEDURE — 84439 ASSAY OF FREE THYROXINE: CPT

## 2024-07-07 PROCEDURE — 82607 VITAMIN B-12: CPT

## 2024-07-07 PROCEDURE — 95717 EEG PHYS/QHP 2-12 HR W/O VID: CPT | Performed by: PSYCHIATRY & NEUROLOGY

## 2024-07-07 PROCEDURE — 85025 COMPLETE CBC W/AUTO DIFF WBC: CPT

## 2024-07-07 PROCEDURE — 84484 ASSAY OF TROPONIN QUANT: CPT

## 2024-07-07 PROCEDURE — 6360000002 HC RX W HCPCS: Performed by: PHYSICIAN ASSISTANT

## 2024-07-07 PROCEDURE — 36415 COLL VENOUS BLD VENIPUNCTURE: CPT

## 2024-07-07 PROCEDURE — 85730 THROMBOPLASTIN TIME PARTIAL: CPT

## 2024-07-07 PROCEDURE — 83605 ASSAY OF LACTIC ACID: CPT

## 2024-07-07 RX ORDER — ONDANSETRON 2 MG/ML
4 INJECTION INTRAMUSCULAR; INTRAVENOUS EVERY 6 HOURS PRN
Status: DISCONTINUED | OUTPATIENT
Start: 2024-07-07 | End: 2024-08-05 | Stop reason: HOSPADM

## 2024-07-07 RX ORDER — KETOROLAC TROMETHAMINE 30 MG/ML
15 INJECTION, SOLUTION INTRAMUSCULAR; INTRAVENOUS EVERY 6 HOURS PRN
Status: DISPENSED | OUTPATIENT
Start: 2024-07-07 | End: 2024-07-12

## 2024-07-07 RX ORDER — ATORVASTATIN CALCIUM 10 MG/1
10 TABLET, FILM COATED ORAL
Status: DISCONTINUED | OUTPATIENT
Start: 2024-07-07 | End: 2024-08-05 | Stop reason: HOSPADM

## 2024-07-07 RX ORDER — ACETAMINOPHEN 325 MG/1
650 TABLET ORAL EVERY 6 HOURS PRN
Status: DISCONTINUED | OUTPATIENT
Start: 2024-07-07 | End: 2024-08-05 | Stop reason: HOSPADM

## 2024-07-07 RX ORDER — CALCIUM ACETATE 667 MG/1
667 TABLET ORAL DAILY
Status: DISCONTINUED | OUTPATIENT
Start: 2024-07-07 | End: 2024-08-05 | Stop reason: HOSPADM

## 2024-07-07 RX ORDER — VITAMIN B COMPLEX
2 TABLET ORAL DAILY
Status: DISCONTINUED | OUTPATIENT
Start: 2024-07-07 | End: 2024-08-05 | Stop reason: HOSPADM

## 2024-07-07 RX ORDER — LISINOPRIL 5 MG/1
10 TABLET ORAL DAILY
Status: DISCONTINUED | OUTPATIENT
Start: 2024-07-07 | End: 2024-07-07

## 2024-07-07 RX ORDER — NOREPINEPHRINE BITARTRATE 0.06 MG/ML
INJECTION, SOLUTION INTRAVENOUS
Status: COMPLETED
Start: 2024-07-07 | End: 2024-07-07

## 2024-07-07 RX ORDER — POLYETHYLENE GLYCOL 3350 17 G/17G
17 POWDER, FOR SOLUTION ORAL DAILY PRN
Status: DISCONTINUED | OUTPATIENT
Start: 2024-07-07 | End: 2024-08-05 | Stop reason: HOSPADM

## 2024-07-07 RX ORDER — PHENYTOIN SODIUM 100 MG/1
100 CAPSULE, EXTENDED RELEASE ORAL 3 TIMES DAILY
Status: DISCONTINUED | OUTPATIENT
Start: 2024-07-07 | End: 2024-08-05 | Stop reason: HOSPADM

## 2024-07-07 RX ORDER — SODIUM CHLORIDE 9 MG/ML
INJECTION, SOLUTION INTRAVENOUS PRN
Status: DISCONTINUED | OUTPATIENT
Start: 2024-07-07 | End: 2024-08-05 | Stop reason: HOSPADM

## 2024-07-07 RX ORDER — SODIUM CHLORIDE 0.9 % (FLUSH) 0.9 %
5-40 SYRINGE (ML) INJECTION EVERY 12 HOURS SCHEDULED
Status: DISCONTINUED | OUTPATIENT
Start: 2024-07-07 | End: 2024-08-05 | Stop reason: HOSPADM

## 2024-07-07 RX ORDER — MORPHINE SULFATE 2 MG/ML
2 INJECTION, SOLUTION INTRAMUSCULAR; INTRAVENOUS
Status: COMPLETED | OUTPATIENT
Start: 2024-07-07 | End: 2024-07-07

## 2024-07-07 RX ORDER — NOREPINEPHRINE BITARTRATE 0.06 MG/ML
.5-3 INJECTION, SOLUTION INTRAVENOUS CONTINUOUS
Status: DISCONTINUED | OUTPATIENT
Start: 2024-07-07 | End: 2024-07-09

## 2024-07-07 RX ORDER — ACETAMINOPHEN 650 MG/1
650 SUPPOSITORY RECTAL EVERY 6 HOURS PRN
Status: DISCONTINUED | OUTPATIENT
Start: 2024-07-07 | End: 2024-08-05 | Stop reason: HOSPADM

## 2024-07-07 RX ORDER — ONDANSETRON 4 MG/1
4 TABLET, ORALLY DISINTEGRATING ORAL EVERY 8 HOURS PRN
Status: DISCONTINUED | OUTPATIENT
Start: 2024-07-07 | End: 2024-08-05 | Stop reason: HOSPADM

## 2024-07-07 RX ORDER — ENEMA 19; 7 G/133ML; G/133ML
1 ENEMA RECTAL
Status: DISPENSED | OUTPATIENT
Start: 2024-07-07 | End: 2024-07-08

## 2024-07-07 RX ORDER — 0.9 % SODIUM CHLORIDE 0.9 %
500 INTRAVENOUS SOLUTION INTRAVENOUS ONCE
Status: DISCONTINUED | OUTPATIENT
Start: 2024-07-07 | End: 2024-07-07

## 2024-07-07 RX ORDER — 0.9 % SODIUM CHLORIDE 0.9 %
500 INTRAVENOUS SOLUTION INTRAVENOUS ONCE
Status: COMPLETED | OUTPATIENT
Start: 2024-07-07 | End: 2024-07-07

## 2024-07-07 RX ORDER — GABAPENTIN 300 MG/1
300 CAPSULE ORAL 3 TIMES DAILY
Status: DISCONTINUED | OUTPATIENT
Start: 2024-07-07 | End: 2024-07-09

## 2024-07-07 RX ORDER — ALBUMIN (HUMAN) 12.5 G/50ML
25 SOLUTION INTRAVENOUS ONCE
Status: COMPLETED | OUTPATIENT
Start: 2024-07-07 | End: 2024-07-07

## 2024-07-07 RX ORDER — SODIUM CHLORIDE 9 MG/ML
INJECTION, SOLUTION INTRAVENOUS CONTINUOUS
Status: DISPENSED | OUTPATIENT
Start: 2024-07-07 | End: 2024-07-07

## 2024-07-07 RX ORDER — ENOXAPARIN SODIUM 100 MG/ML
40 INJECTION SUBCUTANEOUS DAILY
Status: DISCONTINUED | OUTPATIENT
Start: 2024-07-07 | End: 2024-08-05 | Stop reason: HOSPADM

## 2024-07-07 RX ORDER — SODIUM CHLORIDE 0.9 % (FLUSH) 0.9 %
5-40 SYRINGE (ML) INJECTION PRN
Status: DISCONTINUED | OUTPATIENT
Start: 2024-07-07 | End: 2024-08-05 | Stop reason: HOSPADM

## 2024-07-07 RX ORDER — LORAZEPAM 2 MG/ML
4 INJECTION INTRAMUSCULAR EVERY 5 MIN PRN
Status: DISCONTINUED | OUTPATIENT
Start: 2024-07-07 | End: 2024-07-07

## 2024-07-07 RX ADMIN — Medication 2000 UNITS: at 08:44

## 2024-07-07 RX ADMIN — SODIUM CHLORIDE 5 MCG/MIN: 9 INJECTION, SOLUTION INTRAVENOUS at 11:51

## 2024-07-07 RX ADMIN — KETOROLAC TROMETHAMINE 15 MG: 30 INJECTION, SOLUTION INTRAMUSCULAR at 03:02

## 2024-07-07 RX ADMIN — SODIUM CHLORIDE: 9 INJECTION, SOLUTION INTRAVENOUS at 05:12

## 2024-07-07 RX ADMIN — Medication 20 ML: at 20:00

## 2024-07-07 RX ADMIN — MORPHINE SULFATE 2 MG: 2 INJECTION, SOLUTION INTRAMUSCULAR; INTRAVENOUS at 00:36

## 2024-07-07 RX ADMIN — ACETAMINOPHEN 650 MG: 325 TABLET ORAL at 08:44

## 2024-07-07 RX ADMIN — SODIUM CHLORIDE 500 ML: 9 INJECTION, SOLUTION INTRAVENOUS at 06:15

## 2024-07-07 RX ADMIN — SODIUM CHLORIDE 9.5 MCG/MIN: 9 INJECTION, SOLUTION INTRAVENOUS at 12:11

## 2024-07-07 RX ADMIN — ALBUMIN (HUMAN) 25 G: 0.25 INJECTION, SOLUTION INTRAVENOUS at 11:12

## 2024-07-07 RX ADMIN — ENOXAPARIN SODIUM 40 MG: 100 INJECTION SUBCUTANEOUS at 08:44

## 2024-07-07 RX ADMIN — KETOROLAC TROMETHAMINE 15 MG: 30 INJECTION, SOLUTION INTRAMUSCULAR at 08:45

## 2024-07-07 RX ADMIN — ATORVASTATIN CALCIUM 10 MG: 10 TABLET, FILM COATED ORAL at 20:50

## 2024-07-07 RX ADMIN — Medication 10 ML: at 09:49

## 2024-07-07 RX ADMIN — PHENYTOIN SODIUM 100 MG: 100 CAPSULE, EXTENDED RELEASE ORAL at 18:25

## 2024-07-07 RX ADMIN — CARBAMAZEPINE 200 MG: 100 TABLET, CHEWABLE ORAL at 20:50

## 2024-07-07 RX ADMIN — PHENYTOIN SODIUM 100 MG: 100 CAPSULE, EXTENDED RELEASE ORAL at 23:04

## 2024-07-07 RX ADMIN — Medication 667 MG: at 08:45

## 2024-07-07 RX ADMIN — GABAPENTIN 300 MG: 300 CAPSULE ORAL at 08:44

## 2024-07-07 RX ADMIN — GABAPENTIN 300 MG: 300 CAPSULE ORAL at 20:50

## 2024-07-07 ASSESSMENT — PAIN SCALES - GENERAL
PAINLEVEL_OUTOF10: 0
PAINLEVEL_OUTOF10: 7
PAINLEVEL_OUTOF10: 7
PAINLEVEL_OUTOF10: 5
PAINLEVEL_OUTOF10: 5
PAINLEVEL_OUTOF10: 0

## 2024-07-07 ASSESSMENT — PAIN SCALES - WONG BAKER
WONGBAKER_NUMERICALRESPONSE: HURTS A LITTLE BIT
WONGBAKER_NUMERICALRESPONSE: NO HURT
WONGBAKER_NUMERICALRESPONSE: NO HURT

## 2024-07-07 ASSESSMENT — PAIN DESCRIPTION - LOCATION
LOCATION: BACK;LEG
LOCATION: BACK
LOCATION: BACK;LEG;NECK
LOCATION: BACK
LOCATION: BACK;LEG;NECK

## 2024-07-07 ASSESSMENT — PAIN DESCRIPTION - ORIENTATION
ORIENTATION: MID
ORIENTATION: RIGHT;LEFT

## 2024-07-07 ASSESSMENT — PAIN DESCRIPTION - PAIN TYPE
TYPE: CHRONIC PAIN
TYPE: CHRONIC PAIN

## 2024-07-07 ASSESSMENT — PAIN DESCRIPTION - DESCRIPTORS
DESCRIPTORS: ACHING
DESCRIPTORS: ACHING;PRESSURE;DISCOMFORT
DESCRIPTORS: ACHING;DULL;PRESSURE
DESCRIPTORS: ACHING;PRESSURE;DISCOMFORT

## 2024-07-07 ASSESSMENT — PAIN - FUNCTIONAL ASSESSMENT
PAIN_FUNCTIONAL_ASSESSMENT: ACTIVITIES ARE NOT PREVENTED
PAIN_FUNCTIONAL_ASSESSMENT: INTOLERABLE, UNABLE TO DO ANY ACTIVE OR PASSIVE ACTIVITIES

## 2024-07-07 ASSESSMENT — PAIN DESCRIPTION - FREQUENCY
FREQUENCY: CONTINUOUS
FREQUENCY: CONTINUOUS

## 2024-07-07 NOTE — ED PROVIDER NOTES
[facility] EMERGENCY DEP  EMERGENCY DEPARTMENT ENCOUNTER      Pt Name: Yesy Lyle  MRN: [id number]  Birthdate 1955  Date of evaluation: 7/6/2024  Provider: Efrain Denson MD    CHIEF COMPLAINT       Chief Complaint   Patient presents with    Fall         HISTORY OF PRESENT ILLNESS   (Location/Symptom, Timing/Onset, Context/Setting, Quality, Duration, Modifying Factors, Severity)  Note limiting factors.   HPI    68 y.o. female presents via EMS with a chief complaint of leg swelling, which began upon waking up this morning. She noted difficulty in putting on their shoe due to the swelling, which is ongoing despite diuretic therapy. The patient has a history of neuropathy, attributed to nerve damage, and not related to diabetes. They report experiencing worsening tingling in their hands. She also notes falls. Regarding recent traumas, the patient experienced a fall yesterday but denies any injuries resulting from it. They recall a previous fall two weeks ago, during which they hit their head in the laundry room and required stitches, seen at OSH. She has undergone imaging. The patient reports suffering from arthritis in their neck, leading to compression and shooting pain on the left side of their head and neck. They are currently in the process of consulting with a spine specialist.    Additionally, the patient mentions experiencing possible constipation, noting a lack of satisfactory bowel movements recently.    Review of External Medical Records:   PCP notes  Note from ER visit at [facility] 6/6      Nursing Notes were reviewed.    REVIEW OF SYSTEMS    (2-9 systems for level 4, 10 or more for level 5)     Review of Systems    Except as noted above the remainder of the review of systems was reviewed and negative.       PAST MEDICAL HISTORY     Past Medical History:   Diagnosis Date    Arthritis     Chronic pain     Hypercholesterolemia     Hypertension     Lumbar herniated disc     Menopause     LMP-2005

## 2024-07-07 NOTE — ED NOTES
Patient become less responsive to stimuli and snoring. Chest equal bilateral and rise. HR increase to 140, then returned to 90s. MD Janiya at bedside. Provider orders fosphenytoin. Hx seizures.

## 2024-07-07 NOTE — ED NOTES
Bedside and Verbal shift change report given to RUDDY Valdes (oncoming nurse) by RUDDY Pope (offgoing nurse). Report included the following information Nurse Handoff Report, ED SBAR, Adult Overview, Intake/Output, MAR, Recent Results, Cardiac Rhythm SR, and Neuro Assessment.

## 2024-07-07 NOTE — SIGNIFICANT EVENT
Rapid Response Note     07/07/24 at 1044    A rapid response was called on this patient for Hypotension.    Response    Rapid Response Team at the bedside for evaluation.    David Milligram PA, Phyllis LOCO responding at the bedside.    Niko FOX is the primary nurse during this episode.    Situation    1044- The patient was noted to have hypotension.  She is placed in a trendelenburg position. Fluid bolus started. Albumin given.  ABG drawn. Lactic acid level is normal.   The provider will call the intensivist for a consult.    The patient was left in the care of her primary nursing team.    Rapid Response end time approximately 1110        Sepsis Screening  Are two or more SIRS criteria present? No    Is the patient's history suggestive of a new infection? No    Communication with provider:    Was a Code Sepsis called at this encounter? No

## 2024-07-07 NOTE — ED NOTES
At 04:52, Dr. Johnson aware of patient BP 97/63 and MAP 74. Non-symptomatic. Provider ordered 75 mL/hr continuous normal saline.     At 06:06, Dr. Johnson aware of patient BP 79/53 and MAP 62. Non-symptomatic. Provider ordered 500 mL normal saline bolus.    At 06:15, Dr. Johnson at bedside.

## 2024-07-07 NOTE — DISCHARGE INSTRUCTIONS
Cognition     Lucid    x Forgetful     Dementia      Catheters/lines (plus indication)    Chou     PICC     PEG    x None      Code status    x Full code     DNR      PHYSICAL EXAMINATION AT DISCHARGE:    General : alert x 3, awake, no acute distress,   HEENT: PEERL, EOMI, moist mucus membrane  Neck: supple, no JVD, no meningeal signs  Chest: Clear to auscultation bilaterally   CVS: S1 S2 heard, Capillary refill less than 2 seconds  Abd: soft/ Non tender, non distended, BS physiological,   Ext: no clubbing, no cyanosis, no edema, brisk 2+ DP pulses  Neuro: A&Ox3. Follows commands. Speech clear. Affect normal.  COE spontaneously but with some LUE neglect. Strength: 3/5 right deltoid, 4-/5 right bicep and tricep and , 4+/5 wrist flexion,extension, very weak intrinsics, LUE anti-gravity but no strength against resistance. No able to lift LUE above head. LLE 4/5 strength throughout, RLE 4-/5 proximally, 3+/5 distally  Sensation intact.  Ataxic    CHRONIC MEDICAL DIAGNOSES:  Principal Problem:    Seizure secondary to subtherapeutic anticonvulsant medication (HCC)  Resolved Problems:    * No resolved hospital problems. *        Greater than 31 minutes were spent with the patient on counseling and coordination of care    Signed:   SHERIN Diego NP  8/5/2024  2:45 PM            After Hospital Care Plan:  Discharge Instructions Cervical (Neck) Spine Surgery Dr. Tan    Patient Name: Yesy Lyle      Follow up appointments  -follow up with Dr. Tan in 2 weeks.  [phone] to make an appointment as soon as you get home from the hospital.    When to call your Spine Surgeon:  -Difficulty swallowing that is worse than when you left the hospital.  -Signs of infection-if your incision is red; continues to have drainage; drainage has a foul odor or if you have a persistent fever over 101 degrees for 24 hours  -nausea or vomiting, severe headache  -changes in sensation in your arms or legs (numbness,

## 2024-07-07 NOTE — ED NOTES
Bedside and Verbal shift change report given to RUDDY Pope (oncoming nurse) by RUDDY Howe (offgoing nurse). Report included the following information Nurse Handoff Report, Index, ED Encounter Summary, ED SBAR, Adult Overview, MAR, Recent Results, Neuro Assessment, and Event Log.
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
## 2024-07-07 NOTE — ED NOTES
Headband: removed  Date/Time: 7/7/2024 02:42  Recorder: recording stopped  Skin: intact  Highest Seizure [place] Percentage past hour: 0%      General info regarding Seizure [place] %:  Minimum duration of study is 2 hours. If Seizure [place] has remained 0% throughout the entire 2-hour duration, communicate with provider to stop the recording.     Seizure [place] 0-10% - Continue to monitor and complete 2-hour study.  Seizure [place] 11-89% - Epileptiform activity present. Notify provider for next steps.  Seizure [place] >/= 90% - Epileptiform activity consistent with Status Epilepticus. Immediately notify provider.     *Patients with Seizure [place] above 10% that persists may require a study longer than 2 hours. Maximum recording duration is 24 hours. Please update provider with a persistent increase in Seizure [place] above 10%.

## 2024-07-07 NOTE — ED NOTES
Headband: applied  Date/Time: 7/7/2024 12:21 AM  Recorder: recording started  Skin: intact  Highest Seizure [place] Percentage past hour: 0%      General info regarding Seizure [place] %:  Minimum duration of study is 2 hours. If Seizure [place] has remained 0% throughout the entire 2-hour duration, communicate with provider to stop the recording.     Seizure [place] 0-10% - Continue to monitor and complete 2-hour study.  Seizure [place] 11-89% - Epileptiform activity present. Notify provider for next steps.  Seizure [place] >/= 90% - Epileptiform activity consistent with Status Epilepticus. Immediately notify provider.     *Patients with Seizure [place] above 10% that persists may require a study longer than 2 hours. Maximum recording duration is 24 hours. Please update provider with a persistent increase in Seizure [place] above 10%.

## 2024-07-08 ENCOUNTER — TELEPHONE (OUTPATIENT)
Facility: HOSPITAL | Age: 69
End: 2024-07-08

## 2024-07-08 LAB
ALBUMIN SERPL-MCNC: 2.5 G/DL (ref 3.5–5)
ALBUMIN/GLOB SERPL: 0.8 (ref 1.1–2.2)
ALP SERPL-CCNC: 66 U/L (ref 45–117)
ALT SERPL-CCNC: 28 U/L (ref 12–78)
ANION GAP SERPL CALC-SCNC: 3 MMOL/L (ref 5–15)
AST SERPL-CCNC: 24 U/L (ref 15–37)
BASOPHILS # BLD: 0 K/UL (ref 0–0.1)
BASOPHILS NFR BLD: 0 % (ref 0–1)
BILIRUB SERPL-MCNC: 0.5 MG/DL (ref 0.2–1)
BUN SERPL-MCNC: 12 MG/DL (ref 6–20)
BUN/CREAT SERPL: 21 (ref 12–20)
CALCIUM SERPL-MCNC: 8.3 MG/DL (ref 8.5–10.1)
CHLORIDE SERPL-SCNC: 108 MMOL/L (ref 97–108)
CO2 SERPL-SCNC: 32 MMOL/L (ref 21–32)
CREAT SERPL-MCNC: 0.58 MG/DL (ref 0.55–1.02)
DIFFERENTIAL METHOD BLD: ABNORMAL
ECHO BSA: 1.85 M2
EOSINOPHIL # BLD: 0.3 K/UL (ref 0–0.4)
EOSINOPHIL NFR BLD: 2 % (ref 0–7)
ERYTHROCYTE [DISTWIDTH] IN BLOOD BY AUTOMATED COUNT: 14.2 % (ref 11.5–14.5)
GLOBULIN SER CALC-MCNC: 3.1 G/DL (ref 2–4)
GLUCOSE SERPL-MCNC: 108 MG/DL (ref 65–100)
HCT VFR BLD AUTO: 31.3 % (ref 35–47)
HGB BLD-MCNC: 10.3 G/DL (ref 11.5–16)
IMM GRANULOCYTES # BLD AUTO: 0 K/UL (ref 0–0.04)
IMM GRANULOCYTES NFR BLD AUTO: 0 % (ref 0–0.5)
LYMPHOCYTES # BLD: 1.5 K/UL (ref 0.8–3.5)
LYMPHOCYTES NFR BLD: 14 % (ref 12–49)
MAGNESIUM SERPL-MCNC: 2 MG/DL (ref 1.6–2.4)
MCH RBC QN AUTO: 33.1 PG (ref 26–34)
MCHC RBC AUTO-ENTMCNC: 32.9 G/DL (ref 30–36.5)
MCV RBC AUTO: 100.6 FL (ref 80–99)
MONOCYTES # BLD: 0.6 K/UL (ref 0–1)
MONOCYTES NFR BLD: 6 % (ref 5–13)
NEUTS SEG # BLD: 8.3 K/UL (ref 1.8–8)
NEUTS SEG NFR BLD: 78 % (ref 32–75)
NRBC # BLD: 0 K/UL (ref 0–0.01)
NRBC BLD-RTO: 0 PER 100 WBC
NT PRO BNP: 418 PG/ML
PHOSPHATE SERPL-MCNC: 1.8 MG/DL (ref 2.6–4.7)
PLATELET # BLD AUTO: 226 K/UL (ref 150–400)
PMV BLD AUTO: 10 FL (ref 8.9–12.9)
POTASSIUM SERPL-SCNC: 4.1 MMOL/L (ref 3.5–5.1)
PROCALCITONIN SERPL-MCNC: <0.05 NG/ML
PROT SERPL-MCNC: 5.6 G/DL (ref 6.4–8.2)
RBC # BLD AUTO: 3.11 M/UL (ref 3.8–5.2)
SODIUM SERPL-SCNC: 143 MMOL/L (ref 136–145)
WBC # BLD AUTO: 10.8 K/UL (ref 3.6–11)

## 2024-07-08 PROCEDURE — 6370000000 HC RX 637 (ALT 250 FOR IP): Performed by: FAMILY MEDICINE

## 2024-07-08 PROCEDURE — 97165 OT EVAL LOW COMPLEX 30 MIN: CPT

## 2024-07-08 PROCEDURE — 97161 PT EVAL LOW COMPLEX 20 MIN: CPT

## 2024-07-08 PROCEDURE — 6360000002 HC RX W HCPCS: Performed by: FAMILY MEDICINE

## 2024-07-08 PROCEDURE — 2000000000 HC ICU R&B

## 2024-07-08 PROCEDURE — 85025 COMPLETE CBC W/AUTO DIFF WBC: CPT

## 2024-07-08 PROCEDURE — 6370000000 HC RX 637 (ALT 250 FOR IP): Performed by: EMERGENCY MEDICINE

## 2024-07-08 PROCEDURE — 97530 THERAPEUTIC ACTIVITIES: CPT

## 2024-07-08 PROCEDURE — 84145 PROCALCITONIN (PCT): CPT

## 2024-07-08 PROCEDURE — 83735 ASSAY OF MAGNESIUM: CPT

## 2024-07-08 PROCEDURE — 83880 ASSAY OF NATRIURETIC PEPTIDE: CPT

## 2024-07-08 PROCEDURE — 84100 ASSAY OF PHOSPHORUS: CPT

## 2024-07-08 PROCEDURE — 2580000003 HC RX 258: Performed by: FAMILY MEDICINE

## 2024-07-08 PROCEDURE — 36415 COLL VENOUS BLD VENIPUNCTURE: CPT

## 2024-07-08 PROCEDURE — 95706 EEG WO VID 2-12HR INTMT MNTR: CPT

## 2024-07-08 PROCEDURE — 87040 BLOOD CULTURE FOR BACTERIA: CPT

## 2024-07-08 PROCEDURE — 6370000000 HC RX 637 (ALT 250 FOR IP): Performed by: INTERNAL MEDICINE

## 2024-07-08 PROCEDURE — 99232 SBSQ HOSP IP/OBS MODERATE 35: CPT | Performed by: STUDENT IN AN ORGANIZED HEALTH CARE EDUCATION/TRAINING PROGRAM

## 2024-07-08 PROCEDURE — 80053 COMPREHEN METABOLIC PANEL: CPT

## 2024-07-08 RX ORDER — BACLOFEN 10 MG/1
10 TABLET ORAL 3 TIMES DAILY PRN
Status: DISCONTINUED | OUTPATIENT
Start: 2024-07-08 | End: 2024-07-13

## 2024-07-08 RX ORDER — BISACODYL 10 MG
10 SUPPOSITORY, RECTAL RECTAL DAILY
Status: DISCONTINUED | OUTPATIENT
Start: 2024-07-08 | End: 2024-08-05 | Stop reason: HOSPADM

## 2024-07-08 RX ORDER — MIDODRINE HYDROCHLORIDE 5 MG/1
10 TABLET ORAL EVERY 6 HOURS
Status: DISCONTINUED | OUTPATIENT
Start: 2024-07-08 | End: 2024-07-09

## 2024-07-08 RX ORDER — POLYETHYLENE GLYCOL 3350 17 G/17G
17 POWDER, FOR SOLUTION ORAL 2 TIMES DAILY
Status: DISCONTINUED | OUTPATIENT
Start: 2024-07-08 | End: 2024-08-05 | Stop reason: HOSPADM

## 2024-07-08 RX ADMIN — POTASSIUM & SODIUM PHOSPHATES POWDER PACK 280-160-250 MG 250 MG: 280-160-250 PACK at 13:28

## 2024-07-08 RX ADMIN — ENOXAPARIN SODIUM 40 MG: 100 INJECTION SUBCUTANEOUS at 08:42

## 2024-07-08 RX ADMIN — Medication 2000 UNITS: at 08:43

## 2024-07-08 RX ADMIN — MIDODRINE HYDROCHLORIDE 10 MG: 5 TABLET ORAL at 14:52

## 2024-07-08 RX ADMIN — POLYETHYLENE GLYCOL 3350 17 G: 17 POWDER, FOR SOLUTION ORAL at 20:13

## 2024-07-08 RX ADMIN — KETOROLAC TROMETHAMINE 15 MG: 30 INJECTION, SOLUTION INTRAMUSCULAR at 20:14

## 2024-07-08 RX ADMIN — POLYETHYLENE GLYCOL 3350 17 G: 17 POWDER, FOR SOLUTION ORAL at 13:28

## 2024-07-08 RX ADMIN — POTASSIUM & SODIUM PHOSPHATES POWDER PACK 280-160-250 MG 250 MG: 280-160-250 PACK at 20:13

## 2024-07-08 RX ADMIN — GABAPENTIN 300 MG: 300 CAPSULE ORAL at 20:13

## 2024-07-08 RX ADMIN — MIDODRINE HYDROCHLORIDE 10 MG: 5 TABLET ORAL at 22:17

## 2024-07-08 RX ADMIN — GABAPENTIN 300 MG: 300 CAPSULE ORAL at 08:42

## 2024-07-08 RX ADMIN — CARBAMAZEPINE 200 MG: 100 TABLET, CHEWABLE ORAL at 20:13

## 2024-07-08 RX ADMIN — PHENYTOIN SODIUM 100 MG: 100 CAPSULE, EXTENDED RELEASE ORAL at 22:17

## 2024-07-08 RX ADMIN — PHENYTOIN SODIUM 100 MG: 100 CAPSULE, EXTENDED RELEASE ORAL at 08:42

## 2024-07-08 RX ADMIN — Medication 20 ML: at 20:14

## 2024-07-08 RX ADMIN — CARBAMAZEPINE 200 MG: 100 TABLET, CHEWABLE ORAL at 08:42

## 2024-07-08 RX ADMIN — POTASSIUM & SODIUM PHOSPHATES POWDER PACK 280-160-250 MG 250 MG: 280-160-250 PACK at 17:38

## 2024-07-08 RX ADMIN — ATORVASTATIN CALCIUM 10 MG: 10 TABLET, FILM COATED ORAL at 20:12

## 2024-07-08 RX ADMIN — Medication 10 ML: at 08:48

## 2024-07-08 RX ADMIN — PHENYTOIN SODIUM 100 MG: 100 CAPSULE, EXTENDED RELEASE ORAL at 14:52

## 2024-07-08 RX ADMIN — MIDODRINE HYDROCHLORIDE 10 MG: 5 TABLET ORAL at 10:59

## 2024-07-08 RX ADMIN — BACLOFEN 10 MG: 10 TABLET ORAL at 20:14

## 2024-07-08 RX ADMIN — Medication 667 MG: at 08:43

## 2024-07-08 RX ADMIN — GABAPENTIN 300 MG: 300 CAPSULE ORAL at 13:35

## 2024-07-08 RX ADMIN — POTASSIUM & SODIUM PHOSPHATES POWDER PACK 280-160-250 MG 250 MG: 280-160-250 PACK at 08:41

## 2024-07-08 RX ADMIN — BISACODYL 10 MG: 10 SUPPOSITORY RECTAL at 13:35

## 2024-07-08 ASSESSMENT — PAIN SCALES - GENERAL
PAINLEVEL_OUTOF10: 7
PAINLEVEL_OUTOF10: 2

## 2024-07-08 ASSESSMENT — PAIN DESCRIPTION - ORIENTATION: ORIENTATION: RIGHT;LEFT
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
## 2024-07-08 NOTE — CARE COORDINATION
Care Management Initial Assessment       RUR: 14 %   Readmission? No  1st IM letter given? Yes - 7/8/24 07/08/24 0921   Service Assessment   Patient Orientation Alert and Oriented;Person;Place;Situation;Self   Cognition Alert   History Provided By Patient   Primary Caregiver Self   Support Systems Family Members  (Sister in Law Janell Mcgarry [phone])   Patient's Healthcare Decision Maker is: Legal Next of Kin  (Daughter, patient did not provide her name or contact information)   PCP Verified by CM Yes  (Dr Castellon)   Last Visit to PCP Within last 3 months   Prior Functional Level Independent in ADLs/IADLs   Current Functional Level Assistance with the following:;Bathing;Dressing;Toileting;Feeding;Mobility   Can patient return to prior living arrangement Unknown at present   Ability to make needs known: Good   Family able to assist with home care needs: No  (Patient has a caregiver)   Would you like for me to discuss the discharge plan with any other family members/significant others, and if so, who? No   Financial Resources Medicare  ([employer] Medicare)   Community Resources None   Social/Functional History   Lives With Alone   Type of Home Apartment   Home Layout One level   Home Access Level entry   Bathroom Shower/Tub Tub/Shower unit   Bathroom Toilet Standard   Bathroom Equipment Grab bars in shower;Tub transfer bench   Home Equipment Cane;Walker - Standard;Wheelchair - Manual   Receives Help From Family   ADL Assistance Independent   Homemaking Assistance Independent   Ambulation Assistance Non-ambulatory  (WC bound)   Transfer Assistance Independent   Active  No   Patient's  Info Uber or family   Mode of Transportation Car   Discharge Planning   Living Arrangements Alone   Current Services Prior To Admission Other (Comment)  (Personal care aide)   Potential Assistance Purchasing Medications No   Type of Home Care Services Aide Services   One/Two Story Residence One story   History of falls? 1

## 2024-07-08 NOTE — TELEPHONE ENCOUNTER
Pt needs a hospital follow up appointment  Provider: any  Location: any  In person or virtual: in person  When: 4-6 weeks  Diagnosis/reason for follow up:  seizures  Additional information: if no response, please call alternative contact in Epic

## 2024-07-09 ENCOUNTER — APPOINTMENT (OUTPATIENT)
Facility: HOSPITAL | Age: 69
DRG: 519 | End: 2024-07-09
Payer: MEDICARE

## 2024-07-09 LAB
ALBUMIN SERPL-MCNC: 2.2 G/DL (ref 3.5–5)
ALBUMIN/GLOB SERPL: 0.8 (ref 1.1–2.2)
ALP SERPL-CCNC: 64 U/L (ref 45–117)
ALT SERPL-CCNC: 37 U/L (ref 12–78)
ANION GAP SERPL CALC-SCNC: 3 MMOL/L (ref 5–15)
AST SERPL-CCNC: 34 U/L (ref 15–37)
BASOPHILS # BLD: 0 K/UL (ref 0–0.1)
BASOPHILS NFR BLD: 0 % (ref 0–1)
BILIRUB SERPL-MCNC: 0.4 MG/DL (ref 0.2–1)
BUN SERPL-MCNC: 13 MG/DL (ref 6–20)
BUN/CREAT SERPL: 21 (ref 12–20)
CALCIUM SERPL-MCNC: 8.1 MG/DL (ref 8.5–10.1)
CHLORIDE SERPL-SCNC: 108 MMOL/L (ref 97–108)
CO2 SERPL-SCNC: 33 MMOL/L (ref 21–32)
CREAT SERPL-MCNC: 0.63 MG/DL (ref 0.55–1.02)
DIFFERENTIAL METHOD BLD: ABNORMAL
EOSINOPHIL # BLD: 0.3 K/UL (ref 0–0.4)
EOSINOPHIL NFR BLD: 4 % (ref 0–7)
ERYTHROCYTE [DISTWIDTH] IN BLOOD BY AUTOMATED COUNT: 14.2 % (ref 11.5–14.5)
GLOBULIN SER CALC-MCNC: 2.7 G/DL (ref 2–4)
GLUCOSE SERPL-MCNC: 92 MG/DL (ref 65–100)
HCT VFR BLD AUTO: 29.8 % (ref 35–47)
HGB BLD-MCNC: 9.8 G/DL (ref 11.5–16)
IMM GRANULOCYTES # BLD AUTO: 0 K/UL (ref 0–0.04)
IMM GRANULOCYTES NFR BLD AUTO: 0 % (ref 0–0.5)
LYMPHOCYTES # BLD: 2 K/UL (ref 0.8–3.5)
LYMPHOCYTES NFR BLD: 26 % (ref 12–49)
MCH RBC QN AUTO: 32.8 PG (ref 26–34)
MCHC RBC AUTO-ENTMCNC: 32.9 G/DL (ref 30–36.5)
MCV RBC AUTO: 99.7 FL (ref 80–99)
MONOCYTES # BLD: 0.5 K/UL (ref 0–1)
MONOCYTES NFR BLD: 6 % (ref 5–13)
NEUTS SEG # BLD: 4.8 K/UL (ref 1.8–8)
NEUTS SEG NFR BLD: 64 % (ref 32–75)
NRBC # BLD: 0 K/UL (ref 0–0.01)
NRBC BLD-RTO: 0 PER 100 WBC
PHENYTOIN FREE SERPL-MCNC: ABNORMAL UG/ML (ref 1–2)
PHENYTOIN FREE SERPL-MCNC: ABNORMAL UG/ML (ref 1–2)
PHENYTOIN SERPL-MCNC: <0.8 UG/ML (ref 10–20)
PHENYTOIN SERPL-MCNC: <0.8 UG/ML (ref 10–20)
PHOSPHATE SERPL-MCNC: 2.8 MG/DL (ref 2.6–4.7)
PLATELET # BLD AUTO: 203 K/UL (ref 150–400)
PMV BLD AUTO: 9.9 FL (ref 8.9–12.9)
POTASSIUM SERPL-SCNC: 3.7 MMOL/L (ref 3.5–5.1)
PROT SERPL-MCNC: 4.9 G/DL (ref 6.4–8.2)
RBC # BLD AUTO: 2.99 M/UL (ref 3.8–5.2)
SODIUM SERPL-SCNC: 144 MMOL/L (ref 136–145)
WBC # BLD AUTO: 7.6 K/UL (ref 3.6–11)

## 2024-07-09 PROCEDURE — 6370000000 HC RX 637 (ALT 250 FOR IP): Performed by: INTERNAL MEDICINE

## 2024-07-09 PROCEDURE — 36415 COLL VENOUS BLD VENIPUNCTURE: CPT

## 2024-07-09 PROCEDURE — 2580000003 HC RX 258: Performed by: FAMILY MEDICINE

## 2024-07-09 PROCEDURE — 2580000003 HC RX 258: Performed by: INTERNAL MEDICINE

## 2024-07-09 PROCEDURE — 97530 THERAPEUTIC ACTIVITIES: CPT

## 2024-07-09 PROCEDURE — 85025 COMPLETE CBC W/AUTO DIFF WBC: CPT

## 2024-07-09 PROCEDURE — 84100 ASSAY OF PHOSPHORUS: CPT

## 2024-07-09 PROCEDURE — 6370000000 HC RX 637 (ALT 250 FOR IP): Performed by: FAMILY MEDICINE

## 2024-07-09 PROCEDURE — 6360000002 HC RX W HCPCS: Performed by: FAMILY MEDICINE

## 2024-07-09 PROCEDURE — 72141 MRI NECK SPINE W/O DYE: CPT

## 2024-07-09 PROCEDURE — 72146 MRI CHEST SPINE W/O DYE: CPT

## 2024-07-09 PROCEDURE — 2500000003 HC RX 250 WO HCPCS: Performed by: INTERNAL MEDICINE

## 2024-07-09 PROCEDURE — 80053 COMPREHEN METABOLIC PANEL: CPT

## 2024-07-09 PROCEDURE — 2000000000 HC ICU R&B

## 2024-07-09 PROCEDURE — 6370000000 HC RX 637 (ALT 250 FOR IP): Performed by: EMERGENCY MEDICINE

## 2024-07-09 RX ORDER — MODAFINIL 100 MG/1
100 TABLET ORAL DAILY
Status: DISCONTINUED | OUTPATIENT
Start: 2024-07-09 | End: 2024-08-05 | Stop reason: HOSPADM

## 2024-07-09 RX ORDER — MIDODRINE HYDROCHLORIDE 5 MG/1
15 TABLET ORAL EVERY 6 HOURS
Status: DISCONTINUED | OUTPATIENT
Start: 2024-07-09 | End: 2024-07-11

## 2024-07-09 RX ORDER — NOREPINEPHRINE BITARTRATE 0.06 MG/ML
.5-3 INJECTION, SOLUTION INTRAVENOUS CONTINUOUS
Status: ACTIVE | OUTPATIENT
Start: 2024-07-09 | End: 2024-07-09

## 2024-07-09 RX ORDER — GABAPENTIN 100 MG/1
100 CAPSULE ORAL 3 TIMES DAILY
Status: DISCONTINUED | OUTPATIENT
Start: 2024-07-09 | End: 2024-07-14

## 2024-07-09 RX ADMIN — CARBAMAZEPINE 200 MG: 100 TABLET, CHEWABLE ORAL at 08:07

## 2024-07-09 RX ADMIN — POTASSIUM & SODIUM PHOSPHATES POWDER PACK 280-160-250 MG 250 MG: 280-160-250 PACK at 14:38

## 2024-07-09 RX ADMIN — SODIUM CHLORIDE 3 MCG/MIN: 9 INJECTION, SOLUTION INTRAVENOUS at 03:51

## 2024-07-09 RX ADMIN — GABAPENTIN 100 MG: 100 CAPSULE ORAL at 20:20

## 2024-07-09 RX ADMIN — PHENYTOIN SODIUM 100 MG: 100 CAPSULE, EXTENDED RELEASE ORAL at 22:44

## 2024-07-09 RX ADMIN — BISACODYL 10 MG: 10 SUPPOSITORY RECTAL at 08:07

## 2024-07-09 RX ADMIN — MODAFINIL 100 MG: 100 TABLET ORAL at 11:54

## 2024-07-09 RX ADMIN — POTASSIUM & SODIUM PHOSPHATES POWDER PACK 280-160-250 MG 250 MG: 280-160-250 PACK at 18:05

## 2024-07-09 RX ADMIN — PHENYTOIN SODIUM 100 MG: 100 CAPSULE, EXTENDED RELEASE ORAL at 14:39

## 2024-07-09 RX ADMIN — ENOXAPARIN SODIUM 40 MG: 100 INJECTION SUBCUTANEOUS at 08:07

## 2024-07-09 RX ADMIN — POLYETHYLENE GLYCOL 3350 17 G: 17 POWDER, FOR SOLUTION ORAL at 08:07

## 2024-07-09 RX ADMIN — MIDODRINE HYDROCHLORIDE 15 MG: 5 TABLET ORAL at 14:39

## 2024-07-09 RX ADMIN — POTASSIUM & SODIUM PHOSPHATES POWDER PACK 280-160-250 MG 250 MG: 280-160-250 PACK at 20:21

## 2024-07-09 RX ADMIN — POTASSIUM & SODIUM PHOSPHATES POWDER PACK 280-160-250 MG 250 MG: 280-160-250 PACK at 08:07

## 2024-07-09 RX ADMIN — Medication 667 MG: at 08:07

## 2024-07-09 RX ADMIN — GABAPENTIN 300 MG: 300 CAPSULE ORAL at 08:07

## 2024-07-09 RX ADMIN — ATORVASTATIN CALCIUM 10 MG: 10 TABLET, FILM COATED ORAL at 20:20

## 2024-07-09 RX ADMIN — MIDODRINE HYDROCHLORIDE 15 MG: 5 TABLET ORAL at 20:20

## 2024-07-09 RX ADMIN — PHENYTOIN SODIUM 100 MG: 100 CAPSULE, EXTENDED RELEASE ORAL at 05:34

## 2024-07-09 RX ADMIN — Medication 2000 UNITS: at 08:07

## 2024-07-09 RX ADMIN — Medication 10 ML: at 08:08

## 2024-07-09 RX ADMIN — Medication 10 ML: at 20:23

## 2024-07-09 RX ADMIN — MIDODRINE HYDROCHLORIDE 10 MG: 5 TABLET ORAL at 05:34

## 2024-07-09 RX ADMIN — GABAPENTIN 100 MG: 100 CAPSULE ORAL at 14:39

## 2024-07-09 RX ADMIN — MIDODRINE HYDROCHLORIDE 10 MG: 5 TABLET ORAL at 08:07

## 2024-07-09 ASSESSMENT — PAIN SCALES - GENERAL: PAINLEVEL_OUTOF10: 0

## 2024-07-09 ASSESSMENT — PAIN SCALES - WONG BAKER: WONGBAKER_NUMERICALRESPONSE: NO HURT

## 2024-07-09 NOTE — PROCEDURES
EEG Session Report   Patient Name: ELOINA GUTIERREZ   Medical ID: [id number]   YOB: 1955 Age: 68   Session Duration: Jul 7, 2024 12:20 AM - Jul 7, 2024 2:40 AM   Recording Total Time: 02:20:40   Ordering Physician: DANIELLA RIVERA   Primary Indication: Prior Seizure   Location: ED     Description of procedure: This EEG was obtained using a 10 lead, 8 channel system positioned circumferentially without any parasagittal coverage (rapid EEG). Computer selected EEG is reviewed as well as background features and all clinically significant events. Clarity algorithm utilized and implemented to provide analysis of underlying activity and seizure detection used to facilitate reading.    Description of recording:  Activity consists of mixed slow frequencies in the theta range and faster beta frequencies throughout the recording.  No asymmetry or sharp wave activity seen.    Impressions: Mixed frequencies indicating mild generalized encephalopathy, nonspecific type.  No electrographic seizures or epileptiform disturbance seen.    Comments: Consider routine 18 channel EEG for further clarification as it provides better spatial resolution and parasagittal coverage.  Report prepared by: N/A Report generated on: Jul 9, 2024 1:28 PM [facility]-

## 2024-07-10 LAB
ALBUMIN SERPL-MCNC: 2.9 G/DL (ref 3.5–5)
ALBUMIN/GLOB SERPL: 1 (ref 1.1–2.2)
ALP SERPL-CCNC: 67 U/L (ref 45–117)
ALT SERPL-CCNC: 33 U/L (ref 12–78)
ANION GAP SERPL CALC-SCNC: 3 MMOL/L (ref 5–15)
AST SERPL-CCNC: 23 U/L (ref 15–37)
BASOPHILS # BLD: 0.1 K/UL (ref 0–0.1)
BASOPHILS NFR BLD: 1 % (ref 0–1)
BILIRUB SERPL-MCNC: 0.4 MG/DL (ref 0.2–1)
BUN SERPL-MCNC: 8 MG/DL (ref 6–20)
BUN/CREAT SERPL: 14 (ref 12–20)
CALCIUM SERPL-MCNC: 9.1 MG/DL (ref 8.5–10.1)
CHLORIDE SERPL-SCNC: 109 MMOL/L (ref 97–108)
CO2 SERPL-SCNC: 31 MMOL/L (ref 21–32)
CREAT SERPL-MCNC: 0.58 MG/DL (ref 0.55–1.02)
DIFFERENTIAL METHOD BLD: ABNORMAL
EOSINOPHIL # BLD: 0.2 K/UL (ref 0–0.4)
EOSINOPHIL NFR BLD: 3 % (ref 0–7)
ERYTHROCYTE [DISTWIDTH] IN BLOOD BY AUTOMATED COUNT: 14.2 % (ref 11.5–14.5)
GLOBULIN SER CALC-MCNC: 2.8 G/DL (ref 2–4)
GLUCOSE SERPL-MCNC: 78 MG/DL (ref 65–100)
HCT VFR BLD AUTO: 31.7 % (ref 35–47)
HGB BLD-MCNC: 10.6 G/DL (ref 11.5–16)
IMM GRANULOCYTES # BLD AUTO: 0 K/UL
IMM GRANULOCYTES NFR BLD AUTO: 0 %
LYMPHOCYTES # BLD: 2 K/UL (ref 0.8–3.5)
LYMPHOCYTES NFR BLD: 40 % (ref 12–49)
MCH RBC QN AUTO: 33 PG (ref 26–34)
MCHC RBC AUTO-ENTMCNC: 33.4 G/DL (ref 30–36.5)
MCV RBC AUTO: 98.8 FL (ref 80–99)
MONOCYTES # BLD: 0.4 K/UL (ref 0–1)
MONOCYTES NFR BLD: 7 % (ref 5–13)
NEUTS SEG # BLD: 2.4 K/UL (ref 1.8–8)
NEUTS SEG NFR BLD: 49 % (ref 32–75)
NRBC # BLD: 0 K/UL (ref 0–0.01)
NRBC BLD-RTO: 0 PER 100 WBC
PLATELET # BLD AUTO: 264 K/UL (ref 150–400)
PMV BLD AUTO: 10.3 FL (ref 8.9–12.9)
POTASSIUM SERPL-SCNC: 3.9 MMOL/L (ref 3.5–5.1)
PROT SERPL-MCNC: 5.7 G/DL (ref 6.4–8.2)
RBC # BLD AUTO: 3.21 M/UL (ref 3.8–5.2)
RBC MORPH BLD: ABNORMAL
SODIUM SERPL-SCNC: 143 MMOL/L (ref 136–145)
WBC # BLD AUTO: 5.1 K/UL (ref 3.6–11)

## 2024-07-10 PROCEDURE — P9047 ALBUMIN (HUMAN), 25%, 50ML: HCPCS | Performed by: NURSE PRACTITIONER

## 2024-07-10 PROCEDURE — 97530 THERAPEUTIC ACTIVITIES: CPT

## 2024-07-10 PROCEDURE — 80053 COMPREHEN METABOLIC PANEL: CPT

## 2024-07-10 PROCEDURE — 6360000002 HC RX W HCPCS: Performed by: NURSE PRACTITIONER

## 2024-07-10 PROCEDURE — 6370000000 HC RX 637 (ALT 250 FOR IP): Performed by: FAMILY MEDICINE

## 2024-07-10 PROCEDURE — 36415 COLL VENOUS BLD VENIPUNCTURE: CPT

## 2024-07-10 PROCEDURE — 6370000000 HC RX 637 (ALT 250 FOR IP): Performed by: INTERNAL MEDICINE

## 2024-07-10 PROCEDURE — 2580000003 HC RX 258: Performed by: FAMILY MEDICINE

## 2024-07-10 PROCEDURE — 2060000000 HC ICU INTERMEDIATE R&B

## 2024-07-10 PROCEDURE — 85025 COMPLETE CBC W/AUTO DIFF WBC: CPT

## 2024-07-10 RX ORDER — LANOLIN ALCOHOL/MO/W.PET/CERES
200 CREAM (GRAM) TOPICAL DAILY
Status: DISCONTINUED | OUTPATIENT
Start: 2024-07-10 | End: 2024-08-05 | Stop reason: HOSPADM

## 2024-07-10 RX ORDER — LOVASTATIN 20 MG/1
20 TABLET ORAL NIGHTLY
Qty: 90 TABLET | Refills: 1 | Status: SHIPPED | OUTPATIENT
Start: 2024-07-10

## 2024-07-10 RX ORDER — ALBUMIN (HUMAN) 12.5 G/50ML
25 SOLUTION INTRAVENOUS EVERY 6 HOURS
Status: DISCONTINUED | OUTPATIENT
Start: 2024-07-10 | End: 2024-07-10

## 2024-07-10 RX ADMIN — PHENYTOIN SODIUM 100 MG: 100 CAPSULE, EXTENDED RELEASE ORAL at 21:59

## 2024-07-10 RX ADMIN — PHENYTOIN SODIUM 100 MG: 100 CAPSULE, EXTENDED RELEASE ORAL at 07:24

## 2024-07-10 RX ADMIN — POLYETHYLENE GLYCOL 3350 17 G: 17 POWDER, FOR SOLUTION ORAL at 09:30

## 2024-07-10 RX ADMIN — GABAPENTIN 100 MG: 100 CAPSULE ORAL at 09:29

## 2024-07-10 RX ADMIN — Medication 200 MG: at 12:31

## 2024-07-10 RX ADMIN — SODIUM CHLORIDE: 9 INJECTION, SOLUTION INTRAVENOUS at 01:42

## 2024-07-10 RX ADMIN — MIDODRINE HYDROCHLORIDE 15 MG: 5 TABLET ORAL at 12:30

## 2024-07-10 RX ADMIN — Medication 667 MG: at 09:29

## 2024-07-10 RX ADMIN — PHENYTOIN SODIUM 100 MG: 100 CAPSULE, EXTENDED RELEASE ORAL at 13:42

## 2024-07-10 RX ADMIN — BISACODYL 10 MG: 10 SUPPOSITORY RECTAL at 09:29

## 2024-07-10 RX ADMIN — MIDODRINE HYDROCHLORIDE 15 MG: 5 TABLET ORAL at 01:36

## 2024-07-10 RX ADMIN — MODAFINIL 100 MG: 100 TABLET ORAL at 09:30

## 2024-07-10 RX ADMIN — GABAPENTIN 100 MG: 100 CAPSULE ORAL at 21:59

## 2024-07-10 RX ADMIN — Medication 10 ML: at 21:59

## 2024-07-10 RX ADMIN — GABAPENTIN 100 MG: 100 CAPSULE ORAL at 13:42

## 2024-07-10 RX ADMIN — Medication 2000 UNITS: at 09:30

## 2024-07-10 RX ADMIN — MIDODRINE HYDROCHLORIDE 15 MG: 5 TABLET ORAL at 21:58

## 2024-07-10 RX ADMIN — ATORVASTATIN CALCIUM 10 MG: 10 TABLET, FILM COATED ORAL at 21:59

## 2024-07-10 RX ADMIN — MIDODRINE HYDROCHLORIDE 15 MG: 5 TABLET ORAL at 09:29

## 2024-07-10 RX ADMIN — Medication 10 ML: at 09:30

## 2024-07-10 RX ADMIN — ALBUMIN (HUMAN) 25 G: 0.25 INJECTION, SOLUTION INTRAVENOUS at 07:24

## 2024-07-10 RX ADMIN — ALBUMIN (HUMAN) 25 G: 0.25 INJECTION, SOLUTION INTRAVENOUS at 01:49

## 2024-07-10 ASSESSMENT — PAIN SCALES - WONG BAKER
WONGBAKER_NUMERICALRESPONSE: NO HURT

## 2024-07-10 ASSESSMENT — PAIN SCALES - GENERAL
PAINLEVEL_OUTOF10: 0

## 2024-07-10 NOTE — CARE COORDINATION
1545-CM met with pt in room in follow up to SNF choice and she advised she intends to review the SNF list with her Caregiver before making choices. CM to follow.  Nelda Arias RN BSN CCM

## 2024-07-10 NOTE — CARE COORDINATION
Transition of Care Plan:    RUR: 13% Low  Prior Level of Functioning: Per patient she was independent with ADL's   Disposition: SNF for rehab   If SNF or IPR: Date FOC offered: 7/10/24  Date FOC received:   Accepting facility:   Date authorization started with reference number:   Date authorization received and expires:   Follow up appointments: PCP, specialists   DME needed: TBD  Transportation at discharge: BLS   IM/[facility] Medicare letter given: 7/8/24  Is patient a [place] and connected with VA? No  Caregiver Contact: MURTAZA Mcgarry [phone]  Discharge Caregiver contacted prior to discharge? No  Care Conference needed? No  Barriers to discharge:    Therapy is recommending SNF for rehab. CM met with patient and provided her with a list of facilities. Will follow up for choice.   Debbie Barbosa RN,Care Management

## 2024-07-11 ENCOUNTER — APPOINTMENT (OUTPATIENT)
Facility: HOSPITAL | Age: 69
DRG: 519 | End: 2024-07-11
Payer: MEDICARE

## 2024-07-11 LAB
ALBUMIN SERPL-MCNC: 2.8 G/DL (ref 3.5–5)
ALBUMIN/GLOB SERPL: 1.1 (ref 1.1–2.2)
ALP SERPL-CCNC: 63 U/L (ref 45–117)
ALT SERPL-CCNC: 32 U/L (ref 12–78)
ANION GAP SERPL CALC-SCNC: 3 MMOL/L (ref 5–15)
AST SERPL-CCNC: 25 U/L (ref 15–37)
BASOPHILS # BLD: 0 K/UL (ref 0–0.1)
BASOPHILS NFR BLD: 1 % (ref 0–1)
BILIRUB SERPL-MCNC: 0.3 MG/DL (ref 0.2–1)
BUN SERPL-MCNC: 8 MG/DL (ref 6–20)
BUN/CREAT SERPL: 13 (ref 12–20)
CALCIUM SERPL-MCNC: 9.1 MG/DL (ref 8.5–10.1)
CHLORIDE SERPL-SCNC: 108 MMOL/L (ref 97–108)
CO2 SERPL-SCNC: 31 MMOL/L (ref 21–32)
CREAT SERPL-MCNC: 0.61 MG/DL (ref 0.55–1.02)
DIFFERENTIAL METHOD BLD: ABNORMAL
EOSINOPHIL # BLD: 0.1 K/UL (ref 0–0.4)
EOSINOPHIL NFR BLD: 3 % (ref 0–7)
ERYTHROCYTE [DISTWIDTH] IN BLOOD BY AUTOMATED COUNT: 14.5 % (ref 11.5–14.5)
GLOBULIN SER CALC-MCNC: 2.6 G/DL (ref 2–4)
GLUCOSE SERPL-MCNC: 79 MG/DL (ref 65–100)
HCT VFR BLD AUTO: 32.2 % (ref 35–47)
IMM GRANULOCYTES # BLD AUTO: 0 K/UL (ref 0–0.04)
IMM GRANULOCYTES NFR BLD AUTO: 0 % (ref 0–0.5)
LYMPHOCYTES # BLD: 1.9 K/UL (ref 0.8–3.5)
LYMPHOCYTES NFR BLD: 44 % (ref 12–49)
MCH RBC QN AUTO: 32.3 PG (ref 26–34)
MCHC RBC AUTO-ENTMCNC: 32 G/DL (ref 30–36.5)
MCV RBC AUTO: 100.9 FL (ref 80–99)
MONOCYTES # BLD: 0.4 K/UL (ref 0–1)
MONOCYTES NFR BLD: 9 % (ref 5–13)
NEUTS SEG # BLD: 1.9 K/UL (ref 1.8–8)
NEUTS SEG NFR BLD: 43 % (ref 32–75)
NRBC # BLD: 0 K/UL (ref 0–0.01)
NRBC BLD-RTO: 0 PER 100 WBC
PLATELET # BLD AUTO: 265 K/UL (ref 150–400)
PMV BLD AUTO: 10.2 FL (ref 8.9–12.9)
POTASSIUM SERPL-SCNC: 4.5 MMOL/L (ref 3.5–5.1)
PROT SERPL-MCNC: 5.4 G/DL (ref 6.4–8.2)
RBC # BLD AUTO: 3.19 M/UL (ref 3.8–5.2)
SODIUM SERPL-SCNC: 142 MMOL/L (ref 136–145)
WBC # BLD AUTO: 4.4 K/UL (ref 3.6–11)

## 2024-07-11 PROCEDURE — 80053 COMPREHEN METABOLIC PANEL: CPT

## 2024-07-11 PROCEDURE — 6370000000 HC RX 637 (ALT 250 FOR IP): Performed by: FAMILY MEDICINE

## 2024-07-11 PROCEDURE — 97535 SELF CARE MNGMENT TRAINING: CPT

## 2024-07-11 PROCEDURE — 72125 CT NECK SPINE W/O DYE: CPT

## 2024-07-11 PROCEDURE — 6360000002 HC RX W HCPCS: Performed by: FAMILY MEDICINE

## 2024-07-11 PROCEDURE — 85025 COMPLETE CBC W/AUTO DIFF WBC: CPT

## 2024-07-11 PROCEDURE — 97530 THERAPEUTIC ACTIVITIES: CPT

## 2024-07-11 PROCEDURE — 6370000000 HC RX 637 (ALT 250 FOR IP): Performed by: INTERNAL MEDICINE

## 2024-07-11 PROCEDURE — 97530 THERAPEUTIC ACTIVITIES: CPT | Performed by: PHYSICAL THERAPIST

## 2024-07-11 PROCEDURE — 2580000003 HC RX 258: Performed by: FAMILY MEDICINE

## 2024-07-11 PROCEDURE — 36415 COLL VENOUS BLD VENIPUNCTURE: CPT

## 2024-07-11 PROCEDURE — 2060000000 HC ICU INTERMEDIATE R&B

## 2024-07-11 RX ORDER — MIDODRINE HYDROCHLORIDE 5 MG/1
15 TABLET ORAL
Status: DISCONTINUED | OUTPATIENT
Start: 2024-07-11 | End: 2024-07-18

## 2024-07-11 RX ORDER — MIDODRINE HYDROCHLORIDE 5 MG/1
20 TABLET ORAL
Status: DISCONTINUED | OUTPATIENT
Start: 2024-07-11 | End: 2024-07-11

## 2024-07-11 RX ADMIN — Medication 200 MG: at 09:47

## 2024-07-11 RX ADMIN — PHENYTOIN SODIUM 100 MG: 100 CAPSULE, EXTENDED RELEASE ORAL at 14:52

## 2024-07-11 RX ADMIN — GABAPENTIN 100 MG: 100 CAPSULE ORAL at 14:52

## 2024-07-11 RX ADMIN — Medication 2000 UNITS: at 09:47

## 2024-07-11 RX ADMIN — MODAFINIL 100 MG: 100 TABLET ORAL at 09:47

## 2024-07-11 RX ADMIN — GABAPENTIN 100 MG: 100 CAPSULE ORAL at 20:40

## 2024-07-11 RX ADMIN — Medication 10 ML: at 20:40

## 2024-07-11 RX ADMIN — PHENYTOIN SODIUM 100 MG: 100 CAPSULE, EXTENDED RELEASE ORAL at 06:09

## 2024-07-11 RX ADMIN — Medication 10 ML: at 09:54

## 2024-07-11 RX ADMIN — PHENYTOIN SODIUM 100 MG: 100 CAPSULE, EXTENDED RELEASE ORAL at 21:04

## 2024-07-11 RX ADMIN — MIDODRINE HYDROCHLORIDE 15 MG: 5 TABLET ORAL at 12:36

## 2024-07-11 RX ADMIN — GABAPENTIN 100 MG: 100 CAPSULE ORAL at 09:47

## 2024-07-11 RX ADMIN — ATORVASTATIN CALCIUM 10 MG: 10 TABLET, FILM COATED ORAL at 20:40

## 2024-07-11 RX ADMIN — Medication 667 MG: at 09:47

## 2024-07-11 RX ADMIN — MIDODRINE HYDROCHLORIDE 15 MG: 5 TABLET ORAL at 18:01

## 2024-07-11 RX ADMIN — MIDODRINE HYDROCHLORIDE 15 MG: 5 TABLET ORAL at 02:23

## 2024-07-11 RX ADMIN — ENOXAPARIN SODIUM 40 MG: 100 INJECTION SUBCUTANEOUS at 09:47

## 2024-07-11 ASSESSMENT — PAIN SCALES - WONG BAKER: WONGBAKER_NUMERICALRESPONSE: NO HURT

## 2024-07-11 NOTE — CARE COORDINATION
Transition of Care Plan:    RUR: 14%  Prior Level of Functioning: Needs assistance  Disposition: SNF  If SNF or IPR: Date FOC offered: 7/10  Date FOC received: 7/11  Accepting facility: [facility]  Follow up appointments: Defer to SNF  DME needed: Defer to SNF  Transportation at discharge: Medical transport  IM/IMM Medicare/ letter given: 1st IM given on 7/8. Patient will need 2nd IM upon discharge.  Is patient a  and connected with VA? N/A   If yes, was  transfer form completed and VA notified? N/A  Caregiver Contact: Janell Mcgarry, [phone], Sister-In-Law  Discharge Caregiver contacted prior to discharge? Yes, CM contacted on 7/11  Care Conference needed? Not at this time  Barriers to discharge: Medical stability, placement     CM met with pt at bedside to discuss FOC for SNF referrals. Pt agreeable to discharging to a SNF for rehab services. Pt provided CM with three SNF choices:    1) [facility]  2) [facility]  3) [facility]     CM sent referrals to [facility] and [facility] via [employer]. CM sent referral to [facility] via [employer]. CM received a response that [facility] is not able to accept pt due to being out of network. CM awaiting responses from other referrals.    Pt told CM that she is feeling overwhelmed regarding how many things that she has to do (such as pay bills, make decisions about her medical care, contact her personal care aid, etc). Pt said that she recently started receiving personal care aid services in the home, and she is concerned that they do not know that she is in the hospital. Pt is afraid that her services will be discontinued if they believe that she is not answering the door. Pt does not know the name of the agency that provides her personal care giving services. Pt requested that CM contact her  about the name of the agency so that they can be informed that pt is

## 2024-07-12 LAB
ALBUMIN SERPL-MCNC: 2.7 G/DL (ref 3.5–5)
ALBUMIN/GLOB SERPL: 0.9 (ref 1.1–2.2)
ALP SERPL-CCNC: 66 U/L (ref 45–117)
ALT SERPL-CCNC: 36 U/L (ref 12–78)
ANION GAP SERPL CALC-SCNC: 5 MMOL/L (ref 5–15)
AST SERPL-CCNC: 33 U/L (ref 15–37)
BASOPHILS # BLD: 0 K/UL (ref 0–0.1)
BASOPHILS NFR BLD: 0 % (ref 0–1)
BILIRUB SERPL-MCNC: 0.3 MG/DL (ref 0.2–1)
BUN SERPL-MCNC: 11 MG/DL (ref 6–20)
BUN/CREAT SERPL: 17 (ref 12–20)
CALCIUM SERPL-MCNC: 9.6 MG/DL (ref 8.5–10.1)
CHLORIDE SERPL-SCNC: 109 MMOL/L (ref 97–108)
CO2 SERPL-SCNC: 28 MMOL/L (ref 21–32)
CREAT SERPL-MCNC: 0.66 MG/DL (ref 0.55–1.02)
DIFFERENTIAL METHOD BLD: ABNORMAL
EOSINOPHIL # BLD: 0.2 K/UL (ref 0–0.4)
EOSINOPHIL NFR BLD: 3 % (ref 0–7)
ERYTHROCYTE [DISTWIDTH] IN BLOOD BY AUTOMATED COUNT: 14.6 % (ref 11.5–14.5)
GLOBULIN SER CALC-MCNC: 3.1 G/DL (ref 2–4)
GLUCOSE SERPL-MCNC: 75 MG/DL (ref 65–100)
HCT VFR BLD AUTO: 34.5 % (ref 35–47)
HGB BLD-MCNC: 11.3 G/DL (ref 11.5–16)
IMM GRANULOCYTES # BLD AUTO: 0 K/UL (ref 0–0.04)
IMM GRANULOCYTES NFR BLD AUTO: 0 % (ref 0–0.5)
LYMPHOCYTES # BLD: 2.1 K/UL (ref 0.8–3.5)
LYMPHOCYTES NFR BLD: 37 % (ref 12–49)
MCH RBC QN AUTO: 32.5 PG (ref 26–34)
MCHC RBC AUTO-ENTMCNC: 32.8 G/DL (ref 30–36.5)
MCV RBC AUTO: 99.1 FL (ref 80–99)
MONOCYTES # BLD: 0.5 K/UL (ref 0–1)
MONOCYTES NFR BLD: 9 % (ref 5–13)
NEUTS SEG # BLD: 2.9 K/UL (ref 1.8–8)
NEUTS SEG NFR BLD: 51 % (ref 32–75)
NRBC # BLD: 0 K/UL (ref 0–0.01)
NRBC BLD-RTO: 0 PER 100 WBC
NT PRO BNP: 437 PG/ML
PLATELET # BLD AUTO: 254 K/UL (ref 150–400)
PMV BLD AUTO: 9.5 FL (ref 8.9–12.9)
POTASSIUM SERPL-SCNC: 4.6 MMOL/L (ref 3.5–5.1)
PROT SERPL-MCNC: 5.8 G/DL (ref 6.4–8.2)
RBC # BLD AUTO: 3.48 M/UL (ref 3.8–5.2)
SODIUM SERPL-SCNC: 142 MMOL/L (ref 136–145)
WBC # BLD AUTO: 5.8 K/UL (ref 3.6–11)

## 2024-07-12 PROCEDURE — 83880 ASSAY OF NATRIURETIC PEPTIDE: CPT

## 2024-07-12 PROCEDURE — 36415 COLL VENOUS BLD VENIPUNCTURE: CPT

## 2024-07-12 PROCEDURE — 6370000000 HC RX 637 (ALT 250 FOR IP): Performed by: FAMILY MEDICINE

## 2024-07-12 PROCEDURE — 80053 COMPREHEN METABOLIC PANEL: CPT

## 2024-07-12 PROCEDURE — 6360000002 HC RX W HCPCS: Performed by: FAMILY MEDICINE

## 2024-07-12 PROCEDURE — 6370000000 HC RX 637 (ALT 250 FOR IP): Performed by: INTERNAL MEDICINE

## 2024-07-12 PROCEDURE — 2060000000 HC ICU INTERMEDIATE R&B

## 2024-07-12 PROCEDURE — 2580000003 HC RX 258: Performed by: FAMILY MEDICINE

## 2024-07-12 PROCEDURE — 85025 COMPLETE CBC W/AUTO DIFF WBC: CPT

## 2024-07-12 RX ORDER — DEXAMETHASONE SODIUM PHOSPHATE 4 MG/ML
4 INJECTION, SOLUTION INTRA-ARTICULAR; INTRALESIONAL; INTRAMUSCULAR; INTRAVENOUS; SOFT TISSUE EVERY 8 HOURS
Status: DISCONTINUED | OUTPATIENT
Start: 2024-07-12 | End: 2024-07-12

## 2024-07-12 RX ORDER — DEXAMETHASONE SODIUM PHOSPHATE 4 MG/ML
4 INJECTION, SOLUTION INTRA-ARTICULAR; INTRALESIONAL; INTRAMUSCULAR; INTRAVENOUS; SOFT TISSUE EVERY 8 HOURS
Status: DISCONTINUED | OUTPATIENT
Start: 2024-07-12 | End: 2024-07-16

## 2024-07-12 RX ADMIN — MIDODRINE HYDROCHLORIDE 15 MG: 5 TABLET ORAL at 17:49

## 2024-07-12 RX ADMIN — PHENYTOIN SODIUM 100 MG: 100 CAPSULE, EXTENDED RELEASE ORAL at 12:52

## 2024-07-12 RX ADMIN — PHENYTOIN SODIUM 100 MG: 100 CAPSULE, EXTENDED RELEASE ORAL at 20:51

## 2024-07-12 RX ADMIN — MIDODRINE HYDROCHLORIDE 15 MG: 5 TABLET ORAL at 10:52

## 2024-07-12 RX ADMIN — GABAPENTIN 100 MG: 100 CAPSULE ORAL at 14:26

## 2024-07-12 RX ADMIN — MODAFINIL 100 MG: 100 TABLET ORAL at 10:53

## 2024-07-12 RX ADMIN — Medication 667 MG: at 10:52

## 2024-07-12 RX ADMIN — MIDODRINE HYDROCHLORIDE 15 MG: 5 TABLET ORAL at 14:26

## 2024-07-12 RX ADMIN — ATORVASTATIN CALCIUM 10 MG: 10 TABLET, FILM COATED ORAL at 20:51

## 2024-07-12 RX ADMIN — Medication 2000 UNITS: at 10:52

## 2024-07-12 RX ADMIN — GABAPENTIN 100 MG: 100 CAPSULE ORAL at 10:52

## 2024-07-12 RX ADMIN — Medication 10 ML: at 10:52

## 2024-07-12 RX ADMIN — Medication 200 MG: at 10:52

## 2024-07-12 RX ADMIN — Medication 10 ML: at 20:51

## 2024-07-12 RX ADMIN — ENOXAPARIN SODIUM 40 MG: 100 INJECTION SUBCUTANEOUS at 10:52

## 2024-07-12 RX ADMIN — PHENYTOIN SODIUM 100 MG: 100 CAPSULE, EXTENDED RELEASE ORAL at 06:00

## 2024-07-12 RX ADMIN — GABAPENTIN 100 MG: 100 CAPSULE ORAL at 20:51

## 2024-07-12 RX ADMIN — DEXAMETHASONE SODIUM PHOSPHATE 4 MG: 4 INJECTION, SOLUTION INTRAMUSCULAR; INTRAVENOUS at 17:49

## 2024-07-12 ASSESSMENT — PAIN SCALES - WONG BAKER: WONGBAKER_NUMERICALRESPONSE: NO HURT

## 2024-07-12 ASSESSMENT — PAIN SCALES - GENERAL: PAINLEVEL_OUTOF10: 0

## 2024-07-12 NOTE — CARE COORDINATION
Transition of Care Plan:     RUR: 14%  Prior Level of Functioning: Needs assistance  Disposition: SNF  If SNF or IPR: Date FOC offered: 7/10  Date FOC received: 7/11  Accepting facility: [facility]  Follow up appointments: Defer to SNF  DME needed: Defer to SNF  Transportation at discharge: Medical transport  IM/IMM Medicare/ letter given: 1st IM given on 7/8. Patient will need 2nd IM upon discharge.  Is patient a [place] and connected with VA? N/A              If yes, was [place] transfer form completed and VA notified? N/A  Caregiver Contact: Janell Mcgarry, [phone], Sister-In-Law  Discharge Caregiver contacted prior to discharge? Yes, CM contacted on 7/11  Care Conference needed? Not at this time  Barriers to discharge: Medical stability, placement     CM met with pt to inform her that [facility] has accepted her for SNF rehab. MARIE and Janessa Acevedo denied this pt. Pt is in agreement with going to [facility]. Cm called and messaged Marybeth ([phone]) in admissions at [facility] asking her to start the auth process.They confirmed that the auth will be started today.The reference number for this auth is #[id number].    CM attempted to call this pt's sister, Janell Mcgarry ([phone]) to update her. She did not answer and her voice mail is full, so no message could be left. Will follow. Smita HOWARD,ACM-SW

## 2024-07-13 LAB
ALBUMIN SERPL-MCNC: 2.6 G/DL (ref 3.5–5)
ALBUMIN/GLOB SERPL: 0.9 (ref 1.1–2.2)
ALP SERPL-CCNC: 69 U/L (ref 45–117)
ALT SERPL-CCNC: 34 U/L (ref 12–78)
ANION GAP SERPL CALC-SCNC: 2 MMOL/L (ref 5–15)
AST SERPL-CCNC: 25 U/L (ref 15–37)
BACTERIA SPEC CULT: NORMAL
BACTERIA SPEC CULT: NORMAL
BASOPHILS # BLD: 0 K/UL (ref 0–0.1)
BASOPHILS NFR BLD: 1 % (ref 0–1)
BILIRUB SERPL-MCNC: 0.3 MG/DL (ref 0.2–1)
BUN SERPL-MCNC: 12 MG/DL (ref 6–20)
BUN/CREAT SERPL: 23 (ref 12–20)
CALCIUM SERPL-MCNC: 9.4 MG/DL (ref 8.5–10.1)
CHLORIDE SERPL-SCNC: 107 MMOL/L (ref 97–108)
CO2 SERPL-SCNC: 30 MMOL/L (ref 21–32)
CREAT SERPL-MCNC: 0.53 MG/DL (ref 0.55–1.02)
DIFFERENTIAL METHOD BLD: ABNORMAL
EOSINOPHIL # BLD: 0 K/UL (ref 0–0.4)
EOSINOPHIL NFR BLD: 0 % (ref 0–7)
ERYTHROCYTE [DISTWIDTH] IN BLOOD BY AUTOMATED COUNT: 14.5 % (ref 11.5–14.5)
GLOBULIN SER CALC-MCNC: 3 G/DL (ref 2–4)
GLUCOSE SERPL-MCNC: 100 MG/DL (ref 65–100)
HCT VFR BLD AUTO: 32.6 % (ref 35–47)
HGB BLD-MCNC: 10.9 G/DL (ref 11.5–16)
IMM GRANULOCYTES # BLD AUTO: 0 K/UL
IMM GRANULOCYTES NFR BLD AUTO: 0 %
LYMPHOCYTES # BLD: 0.4 K/UL (ref 0.8–3.5)
LYMPHOCYTES NFR BLD: 9 % (ref 12–49)
MCH RBC QN AUTO: 33.1 PG (ref 26–34)
MCHC RBC AUTO-ENTMCNC: 33.4 G/DL (ref 30–36.5)
MCV RBC AUTO: 99.1 FL (ref 80–99)
MONOCYTES # BLD: 0.1 K/UL (ref 0–1)
MONOCYTES NFR BLD: 3 % (ref 5–13)
NEUTS SEG # BLD: 4.2 K/UL (ref 1.8–8)
NEUTS SEG NFR BLD: 87 % (ref 32–75)
NRBC # BLD: 0 K/UL (ref 0–0.01)
NRBC BLD-RTO: 0 PER 100 WBC
PLATELET # BLD AUTO: 286 K/UL (ref 150–400)
PMV BLD AUTO: 9.9 FL (ref 8.9–12.9)
POTASSIUM SERPL-SCNC: 4.6 MMOL/L (ref 3.5–5.1)
RBC # BLD AUTO: 3.29 M/UL (ref 3.8–5.2)
RBC MORPH BLD: ABNORMAL
SERVICE CMNT-IMP: NORMAL
SERVICE CMNT-IMP: NORMAL
SODIUM SERPL-SCNC: 139 MMOL/L (ref 136–145)
WBC # BLD AUTO: 4.7 K/UL (ref 3.6–11)
WBC MORPH BLD: ABNORMAL

## 2024-07-13 PROCEDURE — 36415 COLL VENOUS BLD VENIPUNCTURE: CPT

## 2024-07-13 PROCEDURE — 6360000002 HC RX W HCPCS: Performed by: FAMILY MEDICINE

## 2024-07-13 PROCEDURE — 6370000000 HC RX 637 (ALT 250 FOR IP): Performed by: FAMILY MEDICINE

## 2024-07-13 PROCEDURE — 80053 COMPREHEN METABOLIC PANEL: CPT

## 2024-07-13 PROCEDURE — 6370000000 HC RX 637 (ALT 250 FOR IP): Performed by: INTERNAL MEDICINE

## 2024-07-13 PROCEDURE — 2580000003 HC RX 258: Performed by: FAMILY MEDICINE

## 2024-07-13 PROCEDURE — 2060000000 HC ICU INTERMEDIATE R&B

## 2024-07-13 PROCEDURE — 85025 COMPLETE CBC W/AUTO DIFF WBC: CPT

## 2024-07-13 PROCEDURE — 2709999900 HC NON-CHARGEABLE SUPPLY

## 2024-07-13 PROCEDURE — 76937 US GUIDE VASCULAR ACCESS: CPT

## 2024-07-13 RX ORDER — BACLOFEN 10 MG/1
2.5 TABLET ORAL 3 TIMES DAILY
Status: DISCONTINUED | OUTPATIENT
Start: 2024-07-13 | End: 2024-07-19

## 2024-07-13 RX ADMIN — GABAPENTIN 100 MG: 100 CAPSULE ORAL at 09:03

## 2024-07-13 RX ADMIN — Medication 10 ML: at 22:32

## 2024-07-13 RX ADMIN — MIDODRINE HYDROCHLORIDE 15 MG: 5 TABLET ORAL at 12:24

## 2024-07-13 RX ADMIN — Medication 10 ML: at 09:04

## 2024-07-13 RX ADMIN — PHENYTOIN SODIUM 100 MG: 100 CAPSULE, EXTENDED RELEASE ORAL at 05:59

## 2024-07-13 RX ADMIN — MIDODRINE HYDROCHLORIDE 15 MG: 5 TABLET ORAL at 09:03

## 2024-07-13 RX ADMIN — PHENYTOIN SODIUM 100 MG: 100 CAPSULE, EXTENDED RELEASE ORAL at 14:02

## 2024-07-13 RX ADMIN — Medication 667 MG: at 09:03

## 2024-07-13 RX ADMIN — ATORVASTATIN CALCIUM 10 MG: 10 TABLET, FILM COATED ORAL at 22:32

## 2024-07-13 RX ADMIN — BACLOFEN 2.5 MG: 10 TABLET ORAL at 22:31

## 2024-07-13 RX ADMIN — DEXAMETHASONE SODIUM PHOSPHATE 4 MG: 4 INJECTION, SOLUTION INTRAMUSCULAR; INTRAVENOUS at 18:09

## 2024-07-13 RX ADMIN — Medication 200 MG: at 09:03

## 2024-07-13 RX ADMIN — Medication 2000 UNITS: at 09:03

## 2024-07-13 RX ADMIN — DEXAMETHASONE SODIUM PHOSPHATE 4 MG: 4 INJECTION, SOLUTION INTRAMUSCULAR; INTRAVENOUS at 03:40

## 2024-07-13 RX ADMIN — DEXAMETHASONE SODIUM PHOSPHATE 4 MG: 4 INJECTION, SOLUTION INTRAMUSCULAR; INTRAVENOUS at 09:04

## 2024-07-13 RX ADMIN — GABAPENTIN 100 MG: 100 CAPSULE ORAL at 14:02

## 2024-07-13 RX ADMIN — ENOXAPARIN SODIUM 40 MG: 100 INJECTION SUBCUTANEOUS at 09:04

## 2024-07-13 RX ADMIN — MODAFINIL 100 MG: 100 TABLET ORAL at 09:03

## 2024-07-13 RX ADMIN — MIDODRINE HYDROCHLORIDE 15 MG: 5 TABLET ORAL at 18:09

## 2024-07-13 RX ADMIN — BACLOFEN 2.5 MG: 10 TABLET ORAL at 14:03

## 2024-07-13 RX ADMIN — PHENYTOIN SODIUM 100 MG: 100 CAPSULE, EXTENDED RELEASE ORAL at 22:32

## 2024-07-13 RX ADMIN — GABAPENTIN 100 MG: 100 CAPSULE ORAL at 22:32

## 2024-07-13 NOTE — PROCEDURES
PROCEDURE NOTE  Date: 7/13/2024   Name: Yesy Lyle  YOB: 1955    Procedures  Ultrasound guided peripheral IV placed on left mid forearm. RN made aware.  See LDA.    Terri London RN  VAT

## 2024-07-13 NOTE — CARE COORDINATION
Transition of Care Plan:     RUR: 14%  Prior Level of Functioning: Needs assistance  Disposition: SNF  If SNF or IPR: Date FOC offered: 7/10  Date FOC received: 7/11  Accepting facility[facility]  Follow up appointments: Defer to SNF  DME needed: Defer to SNF  Transportation at discharge: Medical transport  IM/IMM Medicare/ letter given: 1st IM given on 7/8. Patient will need 2nd IM upon discharge.  Is patient a  and connected with VA? N/A              If yes, was [place] transfer form completed and VA notified? N/A  Caregiver Contact: Timbojean-pierre Malgorzata, [phone], Sister-In-Law  Discharge Caregiver contacted prior to discharge? Yes, CM contacted on 7/11  Care Conference needed? Not at this time  Barriers to discharge: Medical stability, placement      CM spoke with Loki Rueda  SNF Admissions Director cell [phone], who said that insurance authorization is still pending.  CM gave update to patient.  CM gave update to staff nurse.

## 2024-07-14 LAB
ALBUMIN SERPL-MCNC: 2.7 G/DL (ref 3.5–5)
ALBUMIN/GLOB SERPL: 0.8 (ref 1.1–2.2)
ALP SERPL-CCNC: 65 U/L (ref 45–117)
ALT SERPL-CCNC: 44 U/L (ref 12–78)
ANION GAP SERPL CALC-SCNC: 4 MMOL/L (ref 5–15)
AST SERPL-CCNC: 45 U/L (ref 15–37)
BASOPHILS # BLD: 0 K/UL (ref 0–0.1)
BASOPHILS NFR BLD: 0 % (ref 0–1)
BILIRUB SERPL-MCNC: 0.2 MG/DL (ref 0.2–1)
BUN SERPL-MCNC: 25 MG/DL (ref 6–20)
BUN/CREAT SERPL: 27 (ref 12–20)
CALCIUM SERPL-MCNC: 9.3 MG/DL (ref 8.5–10.1)
CHLORIDE SERPL-SCNC: 107 MMOL/L (ref 97–108)
CO2 SERPL-SCNC: 30 MMOL/L (ref 21–32)
CREAT SERPL-MCNC: 0.92 MG/DL (ref 0.55–1.02)
DIFFERENTIAL METHOD BLD: ABNORMAL
EOSINOPHIL # BLD: 0 K/UL (ref 0–0.4)
EOSINOPHIL NFR BLD: 0 % (ref 0–7)
ERYTHROCYTE [DISTWIDTH] IN BLOOD BY AUTOMATED COUNT: 14.5 % (ref 11.5–14.5)
GLOBULIN SER CALC-MCNC: 3.2 G/DL (ref 2–4)
GLUCOSE SERPL-MCNC: 106 MG/DL (ref 65–100)
HCT VFR BLD AUTO: 30.9 % (ref 35–47)
HGB BLD-MCNC: 10.6 G/DL (ref 11.5–16)
IMM GRANULOCYTES # BLD AUTO: 0.1 K/UL (ref 0–0.04)
IMM GRANULOCYTES NFR BLD AUTO: 1 % (ref 0–0.5)
LYMPHOCYTES # BLD: 1 K/UL (ref 0.8–3.5)
LYMPHOCYTES NFR BLD: 15 % (ref 12–49)
MCH RBC QN AUTO: 33.8 PG (ref 26–34)
MCHC RBC AUTO-ENTMCNC: 34.3 G/DL (ref 30–36.5)
MCV RBC AUTO: 98.4 FL (ref 80–99)
MONOCYTES # BLD: 0.4 K/UL (ref 0–1)
MONOCYTES NFR BLD: 6 % (ref 5–13)
NEUTS SEG # BLD: 5.5 K/UL (ref 1.8–8)
NEUTS SEG NFR BLD: 78 % (ref 32–75)
NRBC # BLD: 0.02 K/UL (ref 0–0.01)
NRBC BLD-RTO: 0.3 PER 100 WBC
PLATELET # BLD AUTO: 315 K/UL (ref 150–400)
PMV BLD AUTO: 9.5 FL (ref 8.9–12.9)
POTASSIUM SERPL-SCNC: 4.4 MMOL/L (ref 3.5–5.1)
PROT SERPL-MCNC: 5.9 G/DL (ref 6.4–8.2)
RBC # BLD AUTO: 3.14 M/UL (ref 3.8–5.2)
SODIUM SERPL-SCNC: 141 MMOL/L (ref 136–145)
WBC # BLD AUTO: 7 K/UL (ref 3.6–11)

## 2024-07-14 PROCEDURE — 2060000000 HC ICU INTERMEDIATE R&B

## 2024-07-14 PROCEDURE — 2580000003 HC RX 258: Performed by: FAMILY MEDICINE

## 2024-07-14 PROCEDURE — 6360000002 HC RX W HCPCS: Performed by: FAMILY MEDICINE

## 2024-07-14 PROCEDURE — 6370000000 HC RX 637 (ALT 250 FOR IP): Performed by: FAMILY MEDICINE

## 2024-07-14 PROCEDURE — 6370000000 HC RX 637 (ALT 250 FOR IP): Performed by: INTERNAL MEDICINE

## 2024-07-14 PROCEDURE — 36415 COLL VENOUS BLD VENIPUNCTURE: CPT

## 2024-07-14 PROCEDURE — 85025 COMPLETE CBC W/AUTO DIFF WBC: CPT

## 2024-07-14 PROCEDURE — 80053 COMPREHEN METABOLIC PANEL: CPT

## 2024-07-14 RX ORDER — GABAPENTIN 300 MG/1
300 CAPSULE ORAL 3 TIMES DAILY
Status: DISCONTINUED | OUTPATIENT
Start: 2024-07-14 | End: 2024-08-05 | Stop reason: HOSPADM

## 2024-07-14 RX ORDER — OXYCODONE HYDROCHLORIDE 5 MG/1
2.5 TABLET ORAL EVERY 4 HOURS PRN
Status: DISCONTINUED | OUTPATIENT
Start: 2024-07-14 | End: 2024-08-05 | Stop reason: HOSPADM

## 2024-07-14 RX ORDER — OXYCODONE HYDROCHLORIDE 5 MG/1
5 TABLET ORAL EVERY 4 HOURS PRN
Status: DISCONTINUED | OUTPATIENT
Start: 2024-07-14 | End: 2024-08-05 | Stop reason: HOSPADM

## 2024-07-14 RX ADMIN — BACLOFEN 2.5 MG: 10 TABLET ORAL at 14:48

## 2024-07-14 RX ADMIN — BACLOFEN 2.5 MG: 10 TABLET ORAL at 22:13

## 2024-07-14 RX ADMIN — PHENYTOIN SODIUM 100 MG: 100 CAPSULE, EXTENDED RELEASE ORAL at 22:13

## 2024-07-14 RX ADMIN — MODAFINIL 100 MG: 100 TABLET ORAL at 10:25

## 2024-07-14 RX ADMIN — PHENYTOIN SODIUM 100 MG: 100 CAPSULE, EXTENDED RELEASE ORAL at 06:37

## 2024-07-14 RX ADMIN — ATORVASTATIN CALCIUM 10 MG: 10 TABLET, FILM COATED ORAL at 22:13

## 2024-07-14 RX ADMIN — GABAPENTIN 100 MG: 100 CAPSULE ORAL at 10:05

## 2024-07-14 RX ADMIN — GABAPENTIN 300 MG: 300 CAPSULE ORAL at 22:13

## 2024-07-14 RX ADMIN — DEXAMETHASONE SODIUM PHOSPHATE 4 MG: 4 INJECTION, SOLUTION INTRAMUSCULAR; INTRAVENOUS at 10:06

## 2024-07-14 RX ADMIN — Medication 200 MG: at 10:05

## 2024-07-14 RX ADMIN — PHENYTOIN SODIUM 100 MG: 100 CAPSULE, EXTENDED RELEASE ORAL at 14:48

## 2024-07-14 RX ADMIN — DEXAMETHASONE SODIUM PHOSPHATE 4 MG: 4 INJECTION, SOLUTION INTRAMUSCULAR; INTRAVENOUS at 02:37

## 2024-07-14 RX ADMIN — ENOXAPARIN SODIUM 40 MG: 100 INJECTION SUBCUTANEOUS at 10:06

## 2024-07-14 RX ADMIN — DEXAMETHASONE SODIUM PHOSPHATE 4 MG: 4 INJECTION, SOLUTION INTRAMUSCULAR; INTRAVENOUS at 18:40

## 2024-07-14 RX ADMIN — Medication 2000 UNITS: at 10:05

## 2024-07-14 RX ADMIN — BACLOFEN 2.5 MG: 10 TABLET ORAL at 10:05

## 2024-07-14 RX ADMIN — Medication 10 ML: at 22:27

## 2024-07-14 RX ADMIN — MIDODRINE HYDROCHLORIDE 15 MG: 5 TABLET ORAL at 06:37

## 2024-07-14 RX ADMIN — POLYETHYLENE GLYCOL 3350 17 G: 17 POWDER, FOR SOLUTION ORAL at 22:13

## 2024-07-14 RX ADMIN — MIDODRINE HYDROCHLORIDE 15 MG: 5 TABLET ORAL at 12:22

## 2024-07-14 RX ADMIN — Medication 10 ML: at 10:10

## 2024-07-14 RX ADMIN — GABAPENTIN 300 MG: 300 CAPSULE ORAL at 14:48

## 2024-07-14 RX ADMIN — MIDODRINE HYDROCHLORIDE 15 MG: 5 TABLET ORAL at 18:40

## 2024-07-14 RX ADMIN — ACETAMINOPHEN 650 MG: 325 TABLET ORAL at 10:36

## 2024-07-14 RX ADMIN — Medication 667 MG: at 10:05

## 2024-07-14 ASSESSMENT — PAIN DESCRIPTION - LOCATION: LOCATION: BUTTOCKS

## 2024-07-14 ASSESSMENT — PAIN SCALES - GENERAL: PAINLEVEL_OUTOF10: 8

## 2024-07-15 PROCEDURE — 97530 THERAPEUTIC ACTIVITIES: CPT

## 2024-07-15 PROCEDURE — 6370000000 HC RX 637 (ALT 250 FOR IP): Performed by: FAMILY MEDICINE

## 2024-07-15 PROCEDURE — 2060000000 HC ICU INTERMEDIATE R&B

## 2024-07-15 PROCEDURE — 6360000002 HC RX W HCPCS: Performed by: FAMILY MEDICINE

## 2024-07-15 PROCEDURE — 2580000003 HC RX 258: Performed by: FAMILY MEDICINE

## 2024-07-15 PROCEDURE — 6370000000 HC RX 637 (ALT 250 FOR IP): Performed by: INTERNAL MEDICINE

## 2024-07-15 RX ADMIN — Medication 667 MG: at 09:54

## 2024-07-15 RX ADMIN — PHENYTOIN SODIUM 100 MG: 100 CAPSULE, EXTENDED RELEASE ORAL at 20:21

## 2024-07-15 RX ADMIN — Medication 200 MG: at 09:54

## 2024-07-15 RX ADMIN — Medication 10 ML: at 09:55

## 2024-07-15 RX ADMIN — GABAPENTIN 300 MG: 300 CAPSULE ORAL at 09:54

## 2024-07-15 RX ADMIN — DEXAMETHASONE SODIUM PHOSPHATE 4 MG: 4 INJECTION, SOLUTION INTRAMUSCULAR; INTRAVENOUS at 09:55

## 2024-07-15 RX ADMIN — ENOXAPARIN SODIUM 40 MG: 100 INJECTION SUBCUTANEOUS at 09:55

## 2024-07-15 RX ADMIN — BACLOFEN 2.5 MG: 10 TABLET ORAL at 20:21

## 2024-07-15 RX ADMIN — BACLOFEN 2.5 MG: 10 TABLET ORAL at 09:54

## 2024-07-15 RX ADMIN — MIDODRINE HYDROCHLORIDE 15 MG: 5 TABLET ORAL at 06:49

## 2024-07-15 RX ADMIN — DEXAMETHASONE SODIUM PHOSPHATE 4 MG: 4 INJECTION, SOLUTION INTRAMUSCULAR; INTRAVENOUS at 02:22

## 2024-07-15 RX ADMIN — Medication 2000 UNITS: at 09:54

## 2024-07-15 RX ADMIN — PHENYTOIN SODIUM 100 MG: 100 CAPSULE, EXTENDED RELEASE ORAL at 14:56

## 2024-07-15 RX ADMIN — GABAPENTIN 300 MG: 300 CAPSULE ORAL at 20:21

## 2024-07-15 RX ADMIN — MIDODRINE HYDROCHLORIDE 15 MG: 5 TABLET ORAL at 12:58

## 2024-07-15 RX ADMIN — BACLOFEN 2.5 MG: 10 TABLET ORAL at 14:56

## 2024-07-15 RX ADMIN — Medication 10 ML: at 20:23

## 2024-07-15 RX ADMIN — MIDODRINE HYDROCHLORIDE 15 MG: 5 TABLET ORAL at 18:48

## 2024-07-15 RX ADMIN — PHENYTOIN SODIUM 100 MG: 100 CAPSULE, EXTENDED RELEASE ORAL at 06:49

## 2024-07-15 RX ADMIN — GABAPENTIN 300 MG: 300 CAPSULE ORAL at 14:56

## 2024-07-15 RX ADMIN — DEXAMETHASONE SODIUM PHOSPHATE 4 MG: 4 INJECTION, SOLUTION INTRAMUSCULAR; INTRAVENOUS at 18:48

## 2024-07-15 RX ADMIN — MODAFINIL 100 MG: 100 TABLET ORAL at 09:54

## 2024-07-15 RX ADMIN — ATORVASTATIN CALCIUM 10 MG: 10 TABLET, FILM COATED ORAL at 20:21

## 2024-07-16 PROCEDURE — 6360000002 HC RX W HCPCS: Performed by: FAMILY MEDICINE

## 2024-07-16 PROCEDURE — 6370000000 HC RX 637 (ALT 250 FOR IP): Performed by: FAMILY MEDICINE

## 2024-07-16 PROCEDURE — 2580000003 HC RX 258: Performed by: FAMILY MEDICINE

## 2024-07-16 PROCEDURE — 6370000000 HC RX 637 (ALT 250 FOR IP): Performed by: INTERNAL MEDICINE

## 2024-07-16 PROCEDURE — 97530 THERAPEUTIC ACTIVITIES: CPT

## 2024-07-16 PROCEDURE — 1100000000 HC RM PRIVATE

## 2024-07-16 RX ORDER — DEXAMETHASONE SODIUM PHOSPHATE 4 MG/ML
4 INJECTION, SOLUTION INTRA-ARTICULAR; INTRALESIONAL; INTRAMUSCULAR; INTRAVENOUS; SOFT TISSUE EVERY 12 HOURS
Status: DISCONTINUED | OUTPATIENT
Start: 2024-07-16 | End: 2024-07-19

## 2024-07-16 RX ADMIN — MIDODRINE HYDROCHLORIDE 15 MG: 5 TABLET ORAL at 15:20

## 2024-07-16 RX ADMIN — GABAPENTIN 300 MG: 300 CAPSULE ORAL at 15:19

## 2024-07-16 RX ADMIN — PHENYTOIN SODIUM 100 MG: 100 CAPSULE, EXTENDED RELEASE ORAL at 15:20

## 2024-07-16 RX ADMIN — MIDODRINE HYDROCHLORIDE 15 MG: 5 TABLET ORAL at 09:13

## 2024-07-16 RX ADMIN — Medication 200 MG: at 09:14

## 2024-07-16 RX ADMIN — BACLOFEN 2.5 MG: 10 TABLET ORAL at 21:27

## 2024-07-16 RX ADMIN — ATORVASTATIN CALCIUM 10 MG: 10 TABLET, FILM COATED ORAL at 21:27

## 2024-07-16 RX ADMIN — GABAPENTIN 300 MG: 300 CAPSULE ORAL at 21:27

## 2024-07-16 RX ADMIN — Medication 10 ML: at 09:14

## 2024-07-16 RX ADMIN — DEXAMETHASONE SODIUM PHOSPHATE 4 MG: 4 INJECTION, SOLUTION INTRAMUSCULAR; INTRAVENOUS at 09:14

## 2024-07-16 RX ADMIN — MIDODRINE HYDROCHLORIDE 15 MG: 5 TABLET ORAL at 17:56

## 2024-07-16 RX ADMIN — BACLOFEN 2.5 MG: 10 TABLET ORAL at 09:14

## 2024-07-16 RX ADMIN — DEXAMETHASONE SODIUM PHOSPHATE 4 MG: 4 INJECTION, SOLUTION INTRAMUSCULAR; INTRAVENOUS at 01:47

## 2024-07-16 RX ADMIN — BACLOFEN 2.5 MG: 10 TABLET ORAL at 15:20

## 2024-07-16 RX ADMIN — Medication 2000 UNITS: at 09:14

## 2024-07-16 RX ADMIN — GABAPENTIN 300 MG: 300 CAPSULE ORAL at 09:14

## 2024-07-16 RX ADMIN — Medication 10 ML: at 21:29

## 2024-07-16 RX ADMIN — MODAFINIL 100 MG: 100 TABLET ORAL at 09:13

## 2024-07-16 RX ADMIN — Medication 667 MG: at 09:13

## 2024-07-16 RX ADMIN — ENOXAPARIN SODIUM 40 MG: 100 INJECTION SUBCUTANEOUS at 09:13

## 2024-07-16 RX ADMIN — PHENYTOIN SODIUM 100 MG: 100 CAPSULE, EXTENDED RELEASE ORAL at 05:33

## 2024-07-16 RX ADMIN — PHENYTOIN SODIUM 100 MG: 100 CAPSULE, EXTENDED RELEASE ORAL at 21:38

## 2024-07-16 RX ADMIN — DEXAMETHASONE SODIUM PHOSPHATE 4 MG: 4 INJECTION INTRA-ARTICULAR; INTRALESIONAL; INTRAMUSCULAR; INTRAVENOUS; SOFT TISSUE at 21:29

## 2024-07-16 ASSESSMENT — PAIN DESCRIPTION - ORIENTATION: ORIENTATION: RIGHT;LEFT;ANTERIOR;POSTERIOR

## 2024-07-16 ASSESSMENT — PAIN - FUNCTIONAL ASSESSMENT: PAIN_FUNCTIONAL_ASSESSMENT: ACTIVITIES ARE NOT PREVENTED

## 2024-07-16 ASSESSMENT — PAIN DESCRIPTION - LOCATION: LOCATION: HAND

## 2024-07-16 ASSESSMENT — PAIN DESCRIPTION - DESCRIPTORS: DESCRIPTORS: ACHING;TINGLING

## 2024-07-16 ASSESSMENT — PAIN SCALES - GENERAL
PAINLEVEL_OUTOF10: 7
PAINLEVEL_OUTOF10: 0

## 2024-07-16 NOTE — CARE COORDINATION
YOLANDA- D/c to [facility] pending insurance auth.    CM checked in with Liliam Gan ([phone]), in admissions at [facility]. She is still waiting on insurance auth for this pt. She stated that more clinicals were sent in for this pt this morning. Will follow. ANITA Reynoso,ACM-SW
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
## 2024-07-17 PROCEDURE — 6370000000 HC RX 637 (ALT 250 FOR IP): Performed by: FAMILY MEDICINE

## 2024-07-17 PROCEDURE — 97535 SELF CARE MNGMENT TRAINING: CPT

## 2024-07-17 PROCEDURE — 99024 POSTOP FOLLOW-UP VISIT: CPT | Performed by: PHYSICIAN ASSISTANT

## 2024-07-17 PROCEDURE — 6370000000 HC RX 637 (ALT 250 FOR IP): Performed by: INTERNAL MEDICINE

## 2024-07-17 PROCEDURE — 1100000000 HC RM PRIVATE

## 2024-07-17 PROCEDURE — 6360000002 HC RX W HCPCS: Performed by: FAMILY MEDICINE

## 2024-07-17 PROCEDURE — 97530 THERAPEUTIC ACTIVITIES: CPT

## 2024-07-17 PROCEDURE — 2580000003 HC RX 258: Performed by: FAMILY MEDICINE

## 2024-07-17 RX ADMIN — GABAPENTIN 300 MG: 300 CAPSULE ORAL at 12:28

## 2024-07-17 RX ADMIN — MIDODRINE HYDROCHLORIDE 15 MG: 5 TABLET ORAL at 12:29

## 2024-07-17 RX ADMIN — OXYCODONE 5 MG: 5 TABLET ORAL at 09:52

## 2024-07-17 RX ADMIN — Medication 10 ML: at 09:55

## 2024-07-17 RX ADMIN — MODAFINIL 100 MG: 100 TABLET ORAL at 09:34

## 2024-07-17 RX ADMIN — MIDODRINE HYDROCHLORIDE 15 MG: 5 TABLET ORAL at 06:46

## 2024-07-17 RX ADMIN — BACLOFEN 2.5 MG: 10 TABLET ORAL at 20:59

## 2024-07-17 RX ADMIN — DEXAMETHASONE SODIUM PHOSPHATE 4 MG: 4 INJECTION INTRA-ARTICULAR; INTRALESIONAL; INTRAMUSCULAR; INTRAVENOUS; SOFT TISSUE at 09:36

## 2024-07-17 RX ADMIN — PHENYTOIN SODIUM 100 MG: 100 CAPSULE, EXTENDED RELEASE ORAL at 12:27

## 2024-07-17 RX ADMIN — ENOXAPARIN SODIUM 40 MG: 100 INJECTION SUBCUTANEOUS at 09:30

## 2024-07-17 RX ADMIN — ACETAMINOPHEN 650 MG: 325 TABLET ORAL at 17:10

## 2024-07-17 RX ADMIN — OXYCODONE 5 MG: 5 TABLET ORAL at 17:09

## 2024-07-17 RX ADMIN — POLYETHYLENE GLYCOL 3350 17 G: 17 POWDER, FOR SOLUTION ORAL at 09:30

## 2024-07-17 RX ADMIN — Medication 667 MG: at 09:30

## 2024-07-17 RX ADMIN — BACLOFEN 2.5 MG: 10 TABLET ORAL at 12:28

## 2024-07-17 RX ADMIN — Medication 10 ML: at 21:00

## 2024-07-17 RX ADMIN — DEXAMETHASONE SODIUM PHOSPHATE 4 MG: 4 INJECTION INTRA-ARTICULAR; INTRALESIONAL; INTRAMUSCULAR; INTRAVENOUS; SOFT TISSUE at 20:59

## 2024-07-17 RX ADMIN — GABAPENTIN 300 MG: 300 CAPSULE ORAL at 09:34

## 2024-07-17 RX ADMIN — MIDODRINE HYDROCHLORIDE 15 MG: 5 TABLET ORAL at 17:09

## 2024-07-17 RX ADMIN — GABAPENTIN 300 MG: 300 CAPSULE ORAL at 20:59

## 2024-07-17 RX ADMIN — Medication 2000 UNITS: at 09:33

## 2024-07-17 RX ADMIN — BACLOFEN 2.5 MG: 10 TABLET ORAL at 09:35

## 2024-07-17 RX ADMIN — PHENYTOIN SODIUM 100 MG: 100 CAPSULE, EXTENDED RELEASE ORAL at 20:59

## 2024-07-17 RX ADMIN — ATORVASTATIN CALCIUM 10 MG: 10 TABLET, FILM COATED ORAL at 20:59

## 2024-07-17 RX ADMIN — Medication 200 MG: at 09:33

## 2024-07-17 RX ADMIN — PHENYTOIN SODIUM 100 MG: 100 CAPSULE, EXTENDED RELEASE ORAL at 06:46

## 2024-07-17 ASSESSMENT — PAIN DESCRIPTION - DESCRIPTORS
DESCRIPTORS: ACHING
DESCRIPTORS: ACHING;SORE;THROBBING;SHARP
DESCRIPTORS: ACHING

## 2024-07-17 ASSESSMENT — PAIN DESCRIPTION - ORIENTATION
ORIENTATION: LEFT;RIGHT
ORIENTATION: RIGHT;LEFT

## 2024-07-17 ASSESSMENT — PAIN SCALES - GENERAL
PAINLEVEL_OUTOF10: 7
PAINLEVEL_OUTOF10: 7
PAINLEVEL_OUTOF10: 8
PAINLEVEL_OUTOF10: 7
PAINLEVEL_OUTOF10: 6

## 2024-07-17 ASSESSMENT — PAIN DESCRIPTION - LOCATION
LOCATION: HAND
LOCATION: NECK
LOCATION: HIP;NECK
LOCATION: HAND

## 2024-07-17 ASSESSMENT — PAIN DESCRIPTION - PAIN TYPE: TYPE: ACUTE PAIN

## 2024-07-17 ASSESSMENT — PAIN - FUNCTIONAL ASSESSMENT: PAIN_FUNCTIONAL_ASSESSMENT: ACTIVITIES ARE NOT PREVENTED

## 2024-07-18 PROCEDURE — 6370000000 HC RX 637 (ALT 250 FOR IP): Performed by: FAMILY MEDICINE

## 2024-07-18 PROCEDURE — APPNB30 APP NON BILLABLE TIME 0-30 MINS: Performed by: NURSE PRACTITIONER

## 2024-07-18 PROCEDURE — 97530 THERAPEUTIC ACTIVITIES: CPT

## 2024-07-18 PROCEDURE — 6360000002 HC RX W HCPCS: Performed by: FAMILY MEDICINE

## 2024-07-18 PROCEDURE — 2580000003 HC RX 258: Performed by: FAMILY MEDICINE

## 2024-07-18 PROCEDURE — 97110 THERAPEUTIC EXERCISES: CPT

## 2024-07-18 PROCEDURE — 97535 SELF CARE MNGMENT TRAINING: CPT

## 2024-07-18 PROCEDURE — 6370000000 HC RX 637 (ALT 250 FOR IP): Performed by: INTERNAL MEDICINE

## 2024-07-18 PROCEDURE — 1100000000 HC RM PRIVATE

## 2024-07-18 RX ORDER — MIDODRINE HYDROCHLORIDE 5 MG/1
10 TABLET ORAL
Status: DISCONTINUED | OUTPATIENT
Start: 2024-07-18 | End: 2024-07-20

## 2024-07-18 RX ORDER — PHENYTOIN SODIUM 100 MG/1
CAPSULE, EXTENDED RELEASE ORAL
Qty: 270 CAPSULE | Refills: 1 | Status: SHIPPED | OUTPATIENT
Start: 2024-07-18

## 2024-07-18 RX ADMIN — MODAFINIL 100 MG: 100 TABLET ORAL at 09:57

## 2024-07-18 RX ADMIN — DEXAMETHASONE SODIUM PHOSPHATE 4 MG: 4 INJECTION INTRA-ARTICULAR; INTRALESIONAL; INTRAMUSCULAR; INTRAVENOUS; SOFT TISSUE at 09:57

## 2024-07-18 RX ADMIN — BACLOFEN 2.5 MG: 10 TABLET ORAL at 09:54

## 2024-07-18 RX ADMIN — ACETAMINOPHEN 650 MG: 325 TABLET ORAL at 20:04

## 2024-07-18 RX ADMIN — MIDODRINE HYDROCHLORIDE 15 MG: 5 TABLET ORAL at 14:05

## 2024-07-18 RX ADMIN — DEXAMETHASONE SODIUM PHOSPHATE 4 MG: 4 INJECTION INTRA-ARTICULAR; INTRALESIONAL; INTRAMUSCULAR; INTRAVENOUS; SOFT TISSUE at 22:41

## 2024-07-18 RX ADMIN — Medication 667 MG: at 09:57

## 2024-07-18 RX ADMIN — Medication 10 ML: at 22:42

## 2024-07-18 RX ADMIN — GABAPENTIN 300 MG: 300 CAPSULE ORAL at 22:40

## 2024-07-18 RX ADMIN — GABAPENTIN 300 MG: 300 CAPSULE ORAL at 14:06

## 2024-07-18 RX ADMIN — PHENYTOIN SODIUM 100 MG: 100 CAPSULE, EXTENDED RELEASE ORAL at 14:06

## 2024-07-18 RX ADMIN — PHENYTOIN SODIUM 100 MG: 100 CAPSULE, EXTENDED RELEASE ORAL at 22:41

## 2024-07-18 RX ADMIN — Medication 10 ML: at 10:01

## 2024-07-18 RX ADMIN — PHENYTOIN SODIUM 100 MG: 100 CAPSULE, EXTENDED RELEASE ORAL at 07:25

## 2024-07-18 RX ADMIN — MIDODRINE HYDROCHLORIDE 15 MG: 5 TABLET ORAL at 07:25

## 2024-07-18 RX ADMIN — Medication 200 MG: at 09:56

## 2024-07-18 RX ADMIN — OXYCODONE 5 MG: 5 TABLET ORAL at 10:35

## 2024-07-18 RX ADMIN — ENOXAPARIN SODIUM 40 MG: 100 INJECTION SUBCUTANEOUS at 09:56

## 2024-07-18 RX ADMIN — BACLOFEN 2.5 MG: 10 TABLET ORAL at 22:40

## 2024-07-18 RX ADMIN — BACLOFEN 2.5 MG: 10 TABLET ORAL at 14:05

## 2024-07-18 RX ADMIN — GABAPENTIN 300 MG: 300 CAPSULE ORAL at 09:56

## 2024-07-18 RX ADMIN — ATORVASTATIN CALCIUM 10 MG: 10 TABLET, FILM COATED ORAL at 22:40

## 2024-07-18 RX ADMIN — Medication 2000 UNITS: at 09:54

## 2024-07-18 ASSESSMENT — PAIN - FUNCTIONAL ASSESSMENT: PAIN_FUNCTIONAL_ASSESSMENT: ACTIVITIES ARE NOT PREVENTED

## 2024-07-18 ASSESSMENT — PAIN DESCRIPTION - ORIENTATION
ORIENTATION: LEFT;RIGHT
ORIENTATION: POSTERIOR

## 2024-07-18 ASSESSMENT — PAIN SCALES - GENERAL
PAINLEVEL_OUTOF10: 6
PAINLEVEL_OUTOF10: 4
PAINLEVEL_OUTOF10: 7
PAINLEVEL_OUTOF10: 7

## 2024-07-18 ASSESSMENT — PAIN DESCRIPTION - DESCRIPTORS
DESCRIPTORS: BURNING
DESCRIPTORS: BURNING;ACHING

## 2024-07-18 ASSESSMENT — PAIN DESCRIPTION - LOCATION
LOCATION: HAND;SACRUM
LOCATION: PERINEUM
LOCATION: NECK;HAND

## 2024-07-18 NOTE — CARE COORDINATION
Transition of Care Plan:     RUR: 12%  Prior Level of Functioning: Needs assistance  Disposition: SNF  If SNF or IPR: Date FOC offered: 7/10  Date FOC received: 7/11  Accepting facility: [facility]  Follow up appointments: Defer to SNF  DME needed: Defer to SNF  Transportation at discharge: BLS   IM/IMM Medicare/[place] letter given: Received   Caregiver Contact: Janell Phamper, [phone], Sister-In-Law  Discharge Caregiver contacted prior to discharge? N/A   Care Conference needed? N/A   Barriers to discharge: Medical stability, AUTH, Plans for posterior cervical decompression surgery Wed 07/24 at 2:30   Following: Neuro, PT/OT,

## 2024-07-19 PROCEDURE — 6370000000 HC RX 637 (ALT 250 FOR IP): Performed by: FAMILY MEDICINE

## 2024-07-19 PROCEDURE — 6370000000 HC RX 637 (ALT 250 FOR IP): Performed by: PHYSICIAN ASSISTANT

## 2024-07-19 PROCEDURE — 97110 THERAPEUTIC EXERCISES: CPT

## 2024-07-19 PROCEDURE — 1100000000 HC RM PRIVATE

## 2024-07-19 PROCEDURE — 6360000002 HC RX W HCPCS: Performed by: FAMILY MEDICINE

## 2024-07-19 PROCEDURE — 6370000000 HC RX 637 (ALT 250 FOR IP): Performed by: INTERNAL MEDICINE

## 2024-07-19 RX ORDER — BACLOFEN 10 MG/1
2.5 TABLET ORAL 3 TIMES DAILY
Status: DISCONTINUED | OUTPATIENT
Start: 2024-07-19 | End: 2024-07-25

## 2024-07-19 RX ORDER — DEXAMETHASONE 4 MG/1
4 TABLET ORAL EVERY 12 HOURS SCHEDULED
Status: DISCONTINUED | OUTPATIENT
Start: 2024-07-19 | End: 2024-07-25

## 2024-07-19 RX ADMIN — PHENYTOIN SODIUM 100 MG: 100 CAPSULE, EXTENDED RELEASE ORAL at 06:32

## 2024-07-19 RX ADMIN — Medication 200 MG: at 11:23

## 2024-07-19 RX ADMIN — DEXAMETHASONE SODIUM PHOSPHATE 4 MG: 4 INJECTION INTRA-ARTICULAR; INTRALESIONAL; INTRAMUSCULAR; INTRAVENOUS; SOFT TISSUE at 11:23

## 2024-07-19 RX ADMIN — MIDODRINE HYDROCHLORIDE 10 MG: 5 TABLET ORAL at 17:42

## 2024-07-19 RX ADMIN — Medication 667 MG: at 11:23

## 2024-07-19 RX ADMIN — PHENYTOIN SODIUM 100 MG: 100 CAPSULE, EXTENDED RELEASE ORAL at 15:37

## 2024-07-19 RX ADMIN — PHENYTOIN SODIUM 100 MG: 100 CAPSULE, EXTENDED RELEASE ORAL at 22:14

## 2024-07-19 RX ADMIN — GABAPENTIN 300 MG: 300 CAPSULE ORAL at 15:37

## 2024-07-19 RX ADMIN — BACLOFEN 2.5 MG: 10 TABLET ORAL at 22:12

## 2024-07-19 RX ADMIN — MODAFINIL 100 MG: 100 TABLET ORAL at 11:23

## 2024-07-19 RX ADMIN — BACLOFEN 2.5 MG: 10 TABLET ORAL at 11:23

## 2024-07-19 RX ADMIN — ATORVASTATIN CALCIUM 10 MG: 10 TABLET, FILM COATED ORAL at 22:14

## 2024-07-19 RX ADMIN — GABAPENTIN 300 MG: 300 CAPSULE ORAL at 22:14

## 2024-07-19 RX ADMIN — ENOXAPARIN SODIUM 40 MG: 100 INJECTION SUBCUTANEOUS at 11:24

## 2024-07-19 RX ADMIN — MIDODRINE HYDROCHLORIDE 10 MG: 5 TABLET ORAL at 11:23

## 2024-07-19 RX ADMIN — GABAPENTIN 300 MG: 300 CAPSULE ORAL at 11:23

## 2024-07-19 RX ADMIN — Medication 2000 UNITS: at 11:23

## 2024-07-19 RX ADMIN — OXYCODONE 5 MG: 5 TABLET ORAL at 22:10

## 2024-07-19 ASSESSMENT — PAIN SCALES - GENERAL
PAINLEVEL_OUTOF10: 0
PAINLEVEL_OUTOF10: 6
PAINLEVEL_OUTOF10: 6
PAINLEVEL_OUTOF10: 7

## 2024-07-19 ASSESSMENT — PAIN SCALES - WONG BAKER
WONGBAKER_NUMERICALRESPONSE: NO HURT
WONGBAKER_NUMERICALRESPONSE: NO HURT

## 2024-07-19 ASSESSMENT — PAIN DESCRIPTION - DESCRIPTORS
DESCRIPTORS: ACHING;THROBBING
DESCRIPTORS: ACHING;THROBBING

## 2024-07-19 ASSESSMENT — PAIN - FUNCTIONAL ASSESSMENT
PAIN_FUNCTIONAL_ASSESSMENT: PREVENTS OR INTERFERES SOME ACTIVE ACTIVITIES AND ADLS
PAIN_FUNCTIONAL_ASSESSMENT: PREVENTS OR INTERFERES SOME ACTIVE ACTIVITIES AND ADLS

## 2024-07-19 ASSESSMENT — PAIN DESCRIPTION - ORIENTATION: ORIENTATION: MID;LOWER

## 2024-07-19 ASSESSMENT — PAIN DESCRIPTION - LOCATION
LOCATION: BACK
LOCATION: BACK

## 2024-07-20 LAB
ANION GAP SERPL CALC-SCNC: 4 MMOL/L (ref 5–15)
BASOPHILS # BLD: 0 K/UL (ref 0–0.1)
BASOPHILS NFR BLD: 0 % (ref 0–1)
BUN SERPL-MCNC: 20 MG/DL (ref 6–20)
BUN/CREAT SERPL: 39 (ref 12–20)
CALCIUM SERPL-MCNC: 9 MG/DL (ref 8.5–10.1)
CHLORIDE SERPL-SCNC: 110 MMOL/L (ref 97–108)
CO2 SERPL-SCNC: 28 MMOL/L (ref 21–32)
CREAT SERPL-MCNC: 0.51 MG/DL (ref 0.55–1.02)
DIFFERENTIAL METHOD BLD: ABNORMAL
EOSINOPHIL # BLD: 0.1 K/UL (ref 0–0.4)
EOSINOPHIL NFR BLD: 1 % (ref 0–7)
ERYTHROCYTE [DISTWIDTH] IN BLOOD BY AUTOMATED COUNT: 16.4 % (ref 11.5–14.5)
GLUCOSE SERPL-MCNC: 97 MG/DL (ref 65–100)
HGB BLD-MCNC: 10.3 G/DL (ref 11.5–16)
IMM GRANULOCYTES # BLD AUTO: 0.1 K/UL (ref 0–0.04)
IMM GRANULOCYTES NFR BLD AUTO: 1 % (ref 0–0.5)
LYMPHOCYTES # BLD: 1.4 K/UL (ref 0.8–3.5)
LYMPHOCYTES NFR BLD: 20 % (ref 12–49)
MCH RBC QN AUTO: 33.1 PG (ref 26–34)
MCHC RBC AUTO-ENTMCNC: 32.1 G/DL (ref 30–36.5)
MCV RBC AUTO: 103.2 FL (ref 80–99)
MONOCYTES # BLD: 0.4 K/UL (ref 0–1)
MONOCYTES NFR BLD: 5 % (ref 5–13)
NEUTS SEG # BLD: 4.8 K/UL (ref 1.8–8)
NEUTS SEG NFR BLD: 72 % (ref 32–75)
NRBC # BLD: 0 K/UL (ref 0–0.01)
NRBC BLD-RTO: 0 PER 100 WBC
PLATELET # BLD AUTO: 334 K/UL (ref 150–400)
PMV BLD AUTO: 9 FL (ref 8.9–12.9)
POTASSIUM SERPL-SCNC: 4.4 MMOL/L (ref 3.5–5.1)
RBC # BLD AUTO: 3.11 M/UL (ref 3.8–5.2)
SODIUM SERPL-SCNC: 142 MMOL/L (ref 136–145)
WBC # BLD AUTO: 6.7 K/UL (ref 3.6–11)

## 2024-07-20 PROCEDURE — 6360000002 HC RX W HCPCS: Performed by: FAMILY MEDICINE

## 2024-07-20 PROCEDURE — 36415 COLL VENOUS BLD VENIPUNCTURE: CPT

## 2024-07-20 PROCEDURE — 85025 COMPLETE CBC W/AUTO DIFF WBC: CPT

## 2024-07-20 PROCEDURE — 6370000000 HC RX 637 (ALT 250 FOR IP): Performed by: INTERNAL MEDICINE

## 2024-07-20 PROCEDURE — 6370000000 HC RX 637 (ALT 250 FOR IP): Performed by: FAMILY MEDICINE

## 2024-07-20 PROCEDURE — 80048 BASIC METABOLIC PNL TOTAL CA: CPT

## 2024-07-20 PROCEDURE — 1100000000 HC RM PRIVATE

## 2024-07-20 PROCEDURE — 2580000003 HC RX 258: Performed by: FAMILY MEDICINE

## 2024-07-20 PROCEDURE — 6370000000 HC RX 637 (ALT 250 FOR IP): Performed by: PHYSICIAN ASSISTANT

## 2024-07-20 RX ORDER — MIDODRINE HYDROCHLORIDE 5 MG/1
5 TABLET ORAL 3 TIMES DAILY PRN
Status: DISCONTINUED | OUTPATIENT
Start: 2024-07-20 | End: 2024-08-05 | Stop reason: HOSPADM

## 2024-07-20 RX ORDER — MIDODRINE HYDROCHLORIDE 5 MG/1
5 TABLET ORAL
Status: DISCONTINUED | OUTPATIENT
Start: 2024-07-20 | End: 2024-07-20

## 2024-07-20 RX ADMIN — Medication 667 MG: at 08:52

## 2024-07-20 RX ADMIN — GABAPENTIN 300 MG: 300 CAPSULE ORAL at 14:24

## 2024-07-20 RX ADMIN — PHENYTOIN SODIUM 100 MG: 100 CAPSULE, EXTENDED RELEASE ORAL at 20:41

## 2024-07-20 RX ADMIN — ENOXAPARIN SODIUM 40 MG: 100 INJECTION SUBCUTANEOUS at 08:53

## 2024-07-20 RX ADMIN — DEXAMETHASONE 4 MG: 4 TABLET ORAL at 08:52

## 2024-07-20 RX ADMIN — GABAPENTIN 300 MG: 300 CAPSULE ORAL at 20:40

## 2024-07-20 RX ADMIN — DEXAMETHASONE 4 MG: 4 TABLET ORAL at 20:41

## 2024-07-20 RX ADMIN — GABAPENTIN 300 MG: 300 CAPSULE ORAL at 08:53

## 2024-07-20 RX ADMIN — ATORVASTATIN CALCIUM 10 MG: 10 TABLET, FILM COATED ORAL at 20:40

## 2024-07-20 RX ADMIN — BACLOFEN 2.5 MG: 10 TABLET ORAL at 08:53

## 2024-07-20 RX ADMIN — DEXAMETHASONE 4 MG: 4 TABLET ORAL at 00:43

## 2024-07-20 RX ADMIN — MIDODRINE HYDROCHLORIDE 10 MG: 5 TABLET ORAL at 07:29

## 2024-07-20 RX ADMIN — PHENYTOIN SODIUM 100 MG: 100 CAPSULE, EXTENDED RELEASE ORAL at 14:23

## 2024-07-20 RX ADMIN — BACLOFEN 2.5 MG: 10 TABLET ORAL at 14:24

## 2024-07-20 RX ADMIN — OXYCODONE 5 MG: 5 TABLET ORAL at 20:44

## 2024-07-20 RX ADMIN — Medication 2000 UNITS: at 08:53

## 2024-07-20 RX ADMIN — PHENYTOIN SODIUM 100 MG: 100 CAPSULE, EXTENDED RELEASE ORAL at 06:15

## 2024-07-20 RX ADMIN — BACLOFEN 2.5 MG: 10 TABLET ORAL at 20:40

## 2024-07-20 RX ADMIN — MODAFINIL 100 MG: 100 TABLET ORAL at 08:53

## 2024-07-20 RX ADMIN — Medication 200 MG: at 08:52

## 2024-07-20 RX ADMIN — Medication 10 ML: at 20:53

## 2024-07-20 ASSESSMENT — PAIN DESCRIPTION - LOCATION
LOCATION: BUTTOCKS
LOCATION: BACK

## 2024-07-20 ASSESSMENT — PAIN DESCRIPTION - DESCRIPTORS: DESCRIPTORS: SORE;BURNING

## 2024-07-20 ASSESSMENT — PAIN DESCRIPTION - ORIENTATION: ORIENTATION: RIGHT

## 2024-07-20 ASSESSMENT — PAIN SCALES - GENERAL: PAINLEVEL_OUTOF10: 7

## 2024-07-21 LAB
ANION GAP SERPL CALC-SCNC: 4 MMOL/L (ref 5–15)
BASOPHILS # BLD: 0 K/UL (ref 0–0.1)
BASOPHILS NFR BLD: 0 % (ref 0–1)
BUN SERPL-MCNC: 21 MG/DL (ref 6–20)
BUN/CREAT SERPL: 40 (ref 12–20)
CALCIUM SERPL-MCNC: 9 MG/DL (ref 8.5–10.1)
CHLORIDE SERPL-SCNC: 109 MMOL/L (ref 97–108)
CO2 SERPL-SCNC: 28 MMOL/L (ref 21–32)
CREAT SERPL-MCNC: 0.53 MG/DL (ref 0.55–1.02)
DIFFERENTIAL METHOD BLD: ABNORMAL
EOSINOPHIL # BLD: 0.1 K/UL (ref 0–0.4)
EOSINOPHIL NFR BLD: 2 % (ref 0–7)
ERYTHROCYTE [DISTWIDTH] IN BLOOD BY AUTOMATED COUNT: 16.5 % (ref 11.5–14.5)
GLUCOSE SERPL-MCNC: 110 MG/DL (ref 65–100)
HCT VFR BLD AUTO: 32.1 % (ref 35–47)
HGB BLD-MCNC: 10.5 G/DL (ref 11.5–16)
IMM GRANULOCYTES # BLD AUTO: 0 K/UL (ref 0–0.04)
IMM GRANULOCYTES NFR BLD AUTO: 0 % (ref 0–0.5)
LYMPHOCYTES # BLD: 1.2 K/UL (ref 0.8–3.5)
LYMPHOCYTES NFR BLD: 18 % (ref 12–49)
MCH RBC QN AUTO: 33.4 PG (ref 26–34)
MCHC RBC AUTO-ENTMCNC: 32.7 G/DL (ref 30–36.5)
MCV RBC AUTO: 102.2 FL (ref 80–99)
MONOCYTES # BLD: 0.4 K/UL (ref 0–1)
MONOCYTES NFR BLD: 5 % (ref 5–13)
NEUTS SEG # BLD: 5.2 K/UL (ref 1.8–8)
NEUTS SEG NFR BLD: 75 % (ref 32–75)
NRBC # BLD: 0 K/UL (ref 0–0.01)
NRBC BLD-RTO: 0 PER 100 WBC
PLATELET # BLD AUTO: 317 K/UL (ref 150–400)
PMV BLD AUTO: 9.2 FL (ref 8.9–12.9)
POTASSIUM SERPL-SCNC: 4.4 MMOL/L (ref 3.5–5.1)
RBC # BLD AUTO: 3.14 M/UL (ref 3.8–5.2)
SODIUM SERPL-SCNC: 141 MMOL/L (ref 136–145)
WBC # BLD AUTO: 6.9 K/UL (ref 3.6–11)

## 2024-07-21 PROCEDURE — 6370000000 HC RX 637 (ALT 250 FOR IP): Performed by: INTERNAL MEDICINE

## 2024-07-21 PROCEDURE — 6360000002 HC RX W HCPCS: Performed by: FAMILY MEDICINE

## 2024-07-21 PROCEDURE — 6370000000 HC RX 637 (ALT 250 FOR IP): Performed by: FAMILY MEDICINE

## 2024-07-21 PROCEDURE — 85025 COMPLETE CBC W/AUTO DIFF WBC: CPT

## 2024-07-21 PROCEDURE — 36415 COLL VENOUS BLD VENIPUNCTURE: CPT

## 2024-07-21 PROCEDURE — 1100000000 HC RM PRIVATE

## 2024-07-21 PROCEDURE — 80048 BASIC METABOLIC PNL TOTAL CA: CPT

## 2024-07-21 PROCEDURE — 2580000003 HC RX 258: Performed by: FAMILY MEDICINE

## 2024-07-21 RX ADMIN — PHENYTOIN SODIUM 100 MG: 100 CAPSULE, EXTENDED RELEASE ORAL at 14:11

## 2024-07-21 RX ADMIN — OXYCODONE 5 MG: 5 TABLET ORAL at 07:10

## 2024-07-21 RX ADMIN — BACLOFEN 2.5 MG: 10 TABLET ORAL at 20:28

## 2024-07-21 RX ADMIN — PHENYTOIN SODIUM 100 MG: 100 CAPSULE, EXTENDED RELEASE ORAL at 07:10

## 2024-07-21 RX ADMIN — ATORVASTATIN CALCIUM 10 MG: 10 TABLET, FILM COATED ORAL at 20:28

## 2024-07-21 RX ADMIN — DEXAMETHASONE 4 MG: 4 TABLET ORAL at 20:28

## 2024-07-21 RX ADMIN — Medication 200 MG: at 09:18

## 2024-07-21 RX ADMIN — Medication 10 ML: at 09:21

## 2024-07-21 RX ADMIN — Medication 10 ML: at 20:40

## 2024-07-21 RX ADMIN — PHENYTOIN SODIUM 100 MG: 100 CAPSULE, EXTENDED RELEASE ORAL at 20:28

## 2024-07-21 RX ADMIN — DEXAMETHASONE 4 MG: 4 TABLET ORAL at 09:21

## 2024-07-21 RX ADMIN — BACLOFEN 2.5 MG: 10 TABLET ORAL at 09:19

## 2024-07-21 RX ADMIN — MODAFINIL 100 MG: 100 TABLET ORAL at 09:21

## 2024-07-21 RX ADMIN — Medication 2000 UNITS: at 09:19

## 2024-07-21 RX ADMIN — GABAPENTIN 300 MG: 300 CAPSULE ORAL at 09:21

## 2024-07-21 RX ADMIN — GABAPENTIN 300 MG: 300 CAPSULE ORAL at 20:28

## 2024-07-21 RX ADMIN — OXYCODONE 5 MG: 5 TABLET ORAL at 20:39

## 2024-07-21 RX ADMIN — Medication 667 MG: at 09:19

## 2024-07-21 RX ADMIN — ENOXAPARIN SODIUM 40 MG: 100 INJECTION SUBCUTANEOUS at 09:18

## 2024-07-21 RX ADMIN — GABAPENTIN 300 MG: 300 CAPSULE ORAL at 14:11

## 2024-07-21 RX ADMIN — POLYETHYLENE GLYCOL 3350 17 G: 17 POWDER, FOR SOLUTION ORAL at 09:19

## 2024-07-21 RX ADMIN — BACLOFEN 2.5 MG: 10 TABLET ORAL at 14:11

## 2024-07-21 ASSESSMENT — PAIN SCALES - GENERAL
PAINLEVEL_OUTOF10: 8
PAINLEVEL_OUTOF10: 7
PAINLEVEL_OUTOF10: 8
PAINLEVEL_OUTOF10: 4
PAINLEVEL_OUTOF10: 6

## 2024-07-21 ASSESSMENT — PAIN DESCRIPTION - LOCATION
LOCATION: BACK;NECK;BUTTOCKS
LOCATION: BACK
LOCATION: NECK;BACK
LOCATION: BUTTOCKS;BACK

## 2024-07-21 ASSESSMENT — PAIN DESCRIPTION - DESCRIPTORS
DESCRIPTORS: ACHING
DESCRIPTORS: ACHING
DESCRIPTORS: SPASM;PINS AND NEEDLES
DESCRIPTORS: ACHING

## 2024-07-21 ASSESSMENT — PAIN DESCRIPTION - ORIENTATION: ORIENTATION: RIGHT;POSTERIOR

## 2024-07-22 LAB
ANION GAP SERPL CALC-SCNC: 4 MMOL/L (ref 5–15)
BUN SERPL-MCNC: 21 MG/DL (ref 6–20)
BUN/CREAT SERPL: 45 (ref 12–20)
CALCIUM SERPL-MCNC: 9.3 MG/DL (ref 8.5–10.1)
CHLORIDE SERPL-SCNC: 111 MMOL/L (ref 97–108)
CO2 SERPL-SCNC: 27 MMOL/L (ref 21–32)
CREAT SERPL-MCNC: 0.47 MG/DL (ref 0.55–1.02)
GLUCOSE SERPL-MCNC: 86 MG/DL (ref 65–100)
POTASSIUM SERPL-SCNC: 4.3 MMOL/L (ref 3.5–5.1)
SODIUM SERPL-SCNC: 142 MMOL/L (ref 136–145)

## 2024-07-22 PROCEDURE — 6370000000 HC RX 637 (ALT 250 FOR IP): Performed by: FAMILY MEDICINE

## 2024-07-22 PROCEDURE — 97112 NEUROMUSCULAR REEDUCATION: CPT

## 2024-07-22 PROCEDURE — 2580000003 HC RX 258: Performed by: FAMILY MEDICINE

## 2024-07-22 PROCEDURE — 6360000002 HC RX W HCPCS: Performed by: FAMILY MEDICINE

## 2024-07-22 PROCEDURE — 1100000000 HC RM PRIVATE

## 2024-07-22 PROCEDURE — 97530 THERAPEUTIC ACTIVITIES: CPT

## 2024-07-22 PROCEDURE — 36415 COLL VENOUS BLD VENIPUNCTURE: CPT

## 2024-07-22 PROCEDURE — 97535 SELF CARE MNGMENT TRAINING: CPT

## 2024-07-22 PROCEDURE — 80048 BASIC METABOLIC PNL TOTAL CA: CPT

## 2024-07-22 PROCEDURE — 6370000000 HC RX 637 (ALT 250 FOR IP): Performed by: INTERNAL MEDICINE

## 2024-07-22 RX ADMIN — PHENYTOIN SODIUM 100 MG: 100 CAPSULE, EXTENDED RELEASE ORAL at 05:01

## 2024-07-22 RX ADMIN — ATORVASTATIN CALCIUM 10 MG: 10 TABLET, FILM COATED ORAL at 20:55

## 2024-07-22 RX ADMIN — OXYCODONE 5 MG: 5 TABLET ORAL at 05:01

## 2024-07-22 RX ADMIN — POLYETHYLENE GLYCOL 3350 17 G: 17 POWDER, FOR SOLUTION ORAL at 20:56

## 2024-07-22 RX ADMIN — OXYCODONE 5 MG: 5 TABLET ORAL at 20:55

## 2024-07-22 RX ADMIN — Medication 667 MG: at 08:46

## 2024-07-22 RX ADMIN — GABAPENTIN 300 MG: 300 CAPSULE ORAL at 15:04

## 2024-07-22 RX ADMIN — BACLOFEN 2.5 MG: 10 TABLET ORAL at 20:55

## 2024-07-22 RX ADMIN — GABAPENTIN 300 MG: 300 CAPSULE ORAL at 20:55

## 2024-07-22 RX ADMIN — OXYCODONE 5 MG: 5 TABLET ORAL at 15:06

## 2024-07-22 RX ADMIN — PHENYTOIN SODIUM 100 MG: 100 CAPSULE, EXTENDED RELEASE ORAL at 22:17

## 2024-07-22 RX ADMIN — DEXAMETHASONE 4 MG: 4 TABLET ORAL at 08:47

## 2024-07-22 RX ADMIN — DEXAMETHASONE 4 MG: 4 TABLET ORAL at 20:55

## 2024-07-22 RX ADMIN — GABAPENTIN 300 MG: 300 CAPSULE ORAL at 08:47

## 2024-07-22 RX ADMIN — Medication 200 MG: at 08:48

## 2024-07-22 RX ADMIN — MODAFINIL 100 MG: 100 TABLET ORAL at 08:46

## 2024-07-22 RX ADMIN — Medication 2000 UNITS: at 08:47

## 2024-07-22 RX ADMIN — Medication 10 ML: at 08:48

## 2024-07-22 RX ADMIN — BACLOFEN 2.5 MG: 10 TABLET ORAL at 08:47

## 2024-07-22 RX ADMIN — ENOXAPARIN SODIUM 40 MG: 100 INJECTION SUBCUTANEOUS at 08:46

## 2024-07-22 RX ADMIN — PHENYTOIN SODIUM 100 MG: 100 CAPSULE, EXTENDED RELEASE ORAL at 15:04

## 2024-07-22 RX ADMIN — BACLOFEN 2.5 MG: 10 TABLET ORAL at 15:04

## 2024-07-22 ASSESSMENT — PAIN DESCRIPTION - LOCATION
LOCATION: NECK;BACK
LOCATION: BACK
LOCATION: BACK
LOCATION: BACK;NECK

## 2024-07-22 ASSESSMENT — PAIN SCALES - GENERAL
PAINLEVEL_OUTOF10: 7
PAINLEVEL_OUTOF10: 8
PAINLEVEL_OUTOF10: 7
PAINLEVEL_OUTOF10: 6

## 2024-07-22 ASSESSMENT — PAIN DESCRIPTION - DESCRIPTORS
DESCRIPTORS: ACHING

## 2024-07-23 ENCOUNTER — ANESTHESIA EVENT (OUTPATIENT)
Facility: HOSPITAL | Age: 69
End: 2024-07-23
Payer: MEDICARE

## 2024-07-23 PROCEDURE — 6370000000 HC RX 637 (ALT 250 FOR IP): Performed by: FAMILY MEDICINE

## 2024-07-23 PROCEDURE — 97535 SELF CARE MNGMENT TRAINING: CPT

## 2024-07-23 PROCEDURE — 2580000003 HC RX 258: Performed by: FAMILY MEDICINE

## 2024-07-23 PROCEDURE — 6360000002 HC RX W HCPCS: Performed by: FAMILY MEDICINE

## 2024-07-23 PROCEDURE — 99024 POSTOP FOLLOW-UP VISIT: CPT | Performed by: NURSE PRACTITIONER

## 2024-07-23 PROCEDURE — 1100000000 HC RM PRIVATE

## 2024-07-23 PROCEDURE — 6370000000 HC RX 637 (ALT 250 FOR IP): Performed by: INTERNAL MEDICINE

## 2024-07-23 RX ADMIN — GABAPENTIN 300 MG: 300 CAPSULE ORAL at 12:46

## 2024-07-23 RX ADMIN — DEXAMETHASONE 4 MG: 4 TABLET ORAL at 21:50

## 2024-07-23 RX ADMIN — PHENYTOIN SODIUM 100 MG: 100 CAPSULE, EXTENDED RELEASE ORAL at 21:49

## 2024-07-23 RX ADMIN — MODAFINIL 100 MG: 100 TABLET ORAL at 10:27

## 2024-07-23 RX ADMIN — POLYETHYLENE GLYCOL 3350 17 G: 17 POWDER, FOR SOLUTION ORAL at 10:26

## 2024-07-23 RX ADMIN — POLYETHYLENE GLYCOL 3350 17 G: 17 POWDER, FOR SOLUTION ORAL at 21:50

## 2024-07-23 RX ADMIN — Medication 200 MG: at 10:26

## 2024-07-23 RX ADMIN — BACLOFEN 2.5 MG: 10 TABLET ORAL at 21:49

## 2024-07-23 RX ADMIN — OXYCODONE 5 MG: 5 TABLET ORAL at 13:01

## 2024-07-23 RX ADMIN — GABAPENTIN 300 MG: 300 CAPSULE ORAL at 10:27

## 2024-07-23 RX ADMIN — ATORVASTATIN CALCIUM 10 MG: 10 TABLET, FILM COATED ORAL at 21:50

## 2024-07-23 RX ADMIN — OXYCODONE 5 MG: 5 TABLET ORAL at 06:16

## 2024-07-23 RX ADMIN — BACLOFEN 2.5 MG: 10 TABLET ORAL at 12:45

## 2024-07-23 RX ADMIN — Medication 2000 UNITS: at 10:27

## 2024-07-23 RX ADMIN — PHENYTOIN SODIUM 100 MG: 100 CAPSULE, EXTENDED RELEASE ORAL at 12:44

## 2024-07-23 RX ADMIN — Medication 667 MG: at 10:27

## 2024-07-23 RX ADMIN — DEXAMETHASONE 4 MG: 4 TABLET ORAL at 10:27

## 2024-07-23 RX ADMIN — PHENYTOIN SODIUM 100 MG: 100 CAPSULE, EXTENDED RELEASE ORAL at 06:17

## 2024-07-23 RX ADMIN — ACETAMINOPHEN 650 MG: 325 TABLET ORAL at 13:01

## 2024-07-23 RX ADMIN — BACLOFEN 2.5 MG: 10 TABLET ORAL at 10:26

## 2024-07-23 RX ADMIN — ENOXAPARIN SODIUM 40 MG: 100 INJECTION SUBCUTANEOUS at 10:28

## 2024-07-23 RX ADMIN — Medication 10 ML: at 10:27

## 2024-07-23 RX ADMIN — GABAPENTIN 300 MG: 300 CAPSULE ORAL at 21:50

## 2024-07-23 ASSESSMENT — PAIN DESCRIPTION - DESCRIPTORS: DESCRIPTORS: DISCOMFORT;SORE

## 2024-07-23 ASSESSMENT — PAIN SCALES - GENERAL
PAINLEVEL_OUTOF10: 6

## 2024-07-23 ASSESSMENT — PAIN DESCRIPTION - LOCATION
LOCATION: BACK
LOCATION: NECK;BUTTOCKS

## 2024-07-24 ENCOUNTER — ANESTHESIA (OUTPATIENT)
Facility: HOSPITAL | Age: 69
End: 2024-07-24
Payer: MEDICARE

## 2024-07-24 ENCOUNTER — APPOINTMENT (OUTPATIENT)
Facility: HOSPITAL | Age: 69
DRG: 519 | End: 2024-07-24
Payer: MEDICARE

## 2024-07-24 LAB
ABO + RH BLD: NORMAL
BLOOD GROUP ANTIBODIES SERPL: NORMAL
SPECIMEN EXP DATE BLD: NORMAL

## 2024-07-24 PROCEDURE — 6360000002 HC RX W HCPCS: Performed by: NEUROLOGICAL SURGERY

## 2024-07-24 PROCEDURE — 6370000000 HC RX 637 (ALT 250 FOR IP): Performed by: NEUROLOGICAL SURGERY

## 2024-07-24 PROCEDURE — 2500000003 HC RX 250 WO HCPCS

## 2024-07-24 PROCEDURE — 7100000001 HC PACU RECOVERY - ADDTL 15 MIN: Performed by: NEUROLOGICAL SURGERY

## 2024-07-24 PROCEDURE — 2500000003 HC RX 250 WO HCPCS: Performed by: NURSE ANESTHETIST, CERTIFIED REGISTERED

## 2024-07-24 PROCEDURE — 2580000003 HC RX 258: Performed by: NURSE ANESTHETIST, CERTIFIED REGISTERED

## 2024-07-24 PROCEDURE — 2709999900 HC NON-CHARGEABLE SUPPLY: Performed by: NEUROLOGICAL SURGERY

## 2024-07-24 PROCEDURE — 86900 BLOOD TYPING SEROLOGIC ABO: CPT

## 2024-07-24 PROCEDURE — 7100000000 HC PACU RECOVERY - FIRST 15 MIN: Performed by: NEUROLOGICAL SURGERY

## 2024-07-24 PROCEDURE — 36415 COLL VENOUS BLD VENIPUNCTURE: CPT

## 2024-07-24 PROCEDURE — 6360000002 HC RX W HCPCS: Performed by: NURSE ANESTHETIST, CERTIFIED REGISTERED

## 2024-07-24 PROCEDURE — 3600000004 HC SURGERY LEVEL 4 BASE: Performed by: NEUROLOGICAL SURGERY

## 2024-07-24 PROCEDURE — 6360000002 HC RX W HCPCS: Performed by: FAMILY MEDICINE

## 2024-07-24 PROCEDURE — 3700000000 HC ANESTHESIA ATTENDED CARE: Performed by: NEUROLOGICAL SURGERY

## 2024-07-24 PROCEDURE — 6370000000 HC RX 637 (ALT 250 FOR IP): Performed by: FAMILY MEDICINE

## 2024-07-24 PROCEDURE — 6370000000 HC RX 637 (ALT 250 FOR IP): Performed by: INTERNAL MEDICINE

## 2024-07-24 PROCEDURE — 86901 BLOOD TYPING SEROLOGIC RH(D): CPT

## 2024-07-24 PROCEDURE — 6360000002 HC RX W HCPCS: Performed by: ANESTHESIOLOGY

## 2024-07-24 PROCEDURE — 2500000003 HC RX 250 WO HCPCS: Performed by: ANESTHESIOLOGY

## 2024-07-24 PROCEDURE — 1100000000 HC RM PRIVATE

## 2024-07-24 PROCEDURE — 3700000001 HC ADD 15 MINUTES (ANESTHESIA): Performed by: NEUROLOGICAL SURGERY

## 2024-07-24 PROCEDURE — L0180 CER POST COL OCC/MAN SUP ADJ: HCPCS | Performed by: NEUROLOGICAL SURGERY

## 2024-07-24 PROCEDURE — 2500000003 HC RX 250 WO HCPCS: Performed by: NEUROLOGICAL SURGERY

## 2024-07-24 PROCEDURE — 3600000014 HC SURGERY LEVEL 4 ADDTL 15MIN: Performed by: NEUROLOGICAL SURGERY

## 2024-07-24 PROCEDURE — 00NW0ZZ RELEASE CERVICAL SPINAL CORD, OPEN APPROACH: ICD-10-PCS | Performed by: NEUROLOGICAL SURGERY

## 2024-07-24 PROCEDURE — 86850 RBC ANTIBODY SCREEN: CPT

## 2024-07-24 RX ORDER — ONDANSETRON 2 MG/ML
4 INJECTION INTRAMUSCULAR; INTRAVENOUS
Status: DISCONTINUED | OUTPATIENT
Start: 2024-07-24 | End: 2024-07-24 | Stop reason: HOSPADM

## 2024-07-24 RX ORDER — CEFAZOLIN SODIUM 1 G/3ML
INJECTION, POWDER, FOR SOLUTION INTRAMUSCULAR; INTRAVENOUS PRN
Status: DISCONTINUED | OUTPATIENT
Start: 2024-07-24 | End: 2024-07-24 | Stop reason: SDUPTHER

## 2024-07-24 RX ORDER — SODIUM CHLORIDE 9 MG/ML
INJECTION, SOLUTION INTRAVENOUS CONTINUOUS PRN
Status: DISCONTINUED | OUTPATIENT
Start: 2024-07-24 | End: 2024-07-24 | Stop reason: SDUPTHER

## 2024-07-24 RX ORDER — SODIUM CHLORIDE, SODIUM LACTATE, POTASSIUM CHLORIDE, CALCIUM CHLORIDE 600; 310; 30; 20 MG/100ML; MG/100ML; MG/100ML; MG/100ML
INJECTION, SOLUTION INTRAVENOUS CONTINUOUS
Status: DISCONTINUED | OUTPATIENT
Start: 2024-07-24 | End: 2024-07-24 | Stop reason: HOSPADM

## 2024-07-24 RX ORDER — LIDOCAINE HYDROCHLORIDE 10 MG/ML
1 INJECTION, SOLUTION EPIDURAL; INFILTRATION; INTRACAUDAL; PERINEURAL
Status: DISCONTINUED | OUTPATIENT
Start: 2024-07-24 | End: 2024-07-24 | Stop reason: HOSPADM

## 2024-07-24 RX ORDER — BUPIVACAINE HYDROCHLORIDE AND EPINEPHRINE 5; 5 MG/ML; UG/ML
INJECTION, SOLUTION EPIDURAL; INTRACAUDAL; PERINEURAL PRN
Status: DISCONTINUED | OUTPATIENT
Start: 2024-07-24 | End: 2024-07-24 | Stop reason: HOSPADM

## 2024-07-24 RX ORDER — SODIUM CHLORIDE 9 MG/ML
INJECTION, SOLUTION INTRAVENOUS CONTINUOUS
Status: DISCONTINUED | OUTPATIENT
Start: 2024-07-24 | End: 2024-07-24 | Stop reason: HOSPADM

## 2024-07-24 RX ORDER — FENTANYL CITRATE 50 UG/ML
50 INJECTION, SOLUTION INTRAMUSCULAR; INTRAVENOUS
Status: DISCONTINUED | OUTPATIENT
Start: 2024-07-24 | End: 2024-07-24 | Stop reason: HOSPADM

## 2024-07-24 RX ORDER — ACETAMINOPHEN 325 MG/1
650 TABLET ORAL ONCE
Status: DISCONTINUED | OUTPATIENT
Start: 2024-07-24 | End: 2024-07-24 | Stop reason: HOSPADM

## 2024-07-24 RX ORDER — SODIUM CHLORIDE 0.9 % (FLUSH) 0.9 %
5-40 SYRINGE (ML) INJECTION PRN
Status: DISCONTINUED | OUTPATIENT
Start: 2024-07-24 | End: 2024-07-24 | Stop reason: HOSPADM

## 2024-07-24 RX ORDER — HYDROMORPHONE HYDROCHLORIDE 1 MG/ML
0.5 INJECTION, SOLUTION INTRAMUSCULAR; INTRAVENOUS; SUBCUTANEOUS EVERY 5 MIN PRN
Status: DISCONTINUED | OUTPATIENT
Start: 2024-07-24 | End: 2024-07-24 | Stop reason: HOSPADM

## 2024-07-24 RX ORDER — FENTANYL CITRATE 50 UG/ML
INJECTION, SOLUTION INTRAMUSCULAR; INTRAVENOUS PRN
Status: DISCONTINUED | OUTPATIENT
Start: 2024-07-24 | End: 2024-07-24 | Stop reason: SDUPTHER

## 2024-07-24 RX ORDER — SODIUM CHLORIDE 0.9 % (FLUSH) 0.9 %
5-40 SYRINGE (ML) INJECTION EVERY 12 HOURS SCHEDULED
Status: DISCONTINUED | OUTPATIENT
Start: 2024-07-24 | End: 2024-07-24 | Stop reason: HOSPADM

## 2024-07-24 RX ORDER — LIDOCAINE HYDROCHLORIDE 20 MG/ML
INJECTION, SOLUTION EPIDURAL; INFILTRATION; INTRACAUDAL; PERINEURAL PRN
Status: DISCONTINUED | OUTPATIENT
Start: 2024-07-24 | End: 2024-07-24 | Stop reason: SDUPTHER

## 2024-07-24 RX ORDER — ROCURONIUM BROMIDE 10 MG/ML
INJECTION, SOLUTION INTRAVENOUS PRN
Status: DISCONTINUED | OUTPATIENT
Start: 2024-07-24 | End: 2024-07-24 | Stop reason: SDUPTHER

## 2024-07-24 RX ORDER — MIDAZOLAM HYDROCHLORIDE 2 MG/2ML
2 INJECTION, SOLUTION INTRAMUSCULAR; INTRAVENOUS
Status: DISCONTINUED | OUTPATIENT
Start: 2024-07-24 | End: 2024-07-24 | Stop reason: HOSPADM

## 2024-07-24 RX ORDER — OXYCODONE HYDROCHLORIDE 5 MG/1
5 TABLET ORAL
Status: DISCONTINUED | OUTPATIENT
Start: 2024-07-24 | End: 2024-07-24 | Stop reason: HOSPADM

## 2024-07-24 RX ORDER — SODIUM CHLORIDE, SODIUM LACTATE, POTASSIUM CHLORIDE, CALCIUM CHLORIDE 600; 310; 30; 20 MG/100ML; MG/100ML; MG/100ML; MG/100ML
INJECTION, SOLUTION INTRAVENOUS CONTINUOUS PRN
Status: DISCONTINUED | OUTPATIENT
Start: 2024-07-24 | End: 2024-07-24 | Stop reason: SDUPTHER

## 2024-07-24 RX ORDER — PROPOFOL 10 MG/ML
INJECTION, EMULSION INTRAVENOUS PRN
Status: DISCONTINUED | OUTPATIENT
Start: 2024-07-24 | End: 2024-07-24 | Stop reason: SDUPTHER

## 2024-07-24 RX ORDER — MEPERIDINE HYDROCHLORIDE 25 MG/ML
12.5 INJECTION INTRAMUSCULAR; INTRAVENOUS; SUBCUTANEOUS EVERY 5 MIN PRN
Status: DISCONTINUED | OUTPATIENT
Start: 2024-07-24 | End: 2024-07-24 | Stop reason: HOSPADM

## 2024-07-24 RX ORDER — LABETALOL HYDROCHLORIDE 5 MG/ML
10 INJECTION, SOLUTION INTRAVENOUS
Status: DISCONTINUED | OUTPATIENT
Start: 2024-07-24 | End: 2024-07-24 | Stop reason: HOSPADM

## 2024-07-24 RX ORDER — HYDROMORPHONE HYDROCHLORIDE 1 MG/ML
0.5 INJECTION, SOLUTION INTRAMUSCULAR; INTRAVENOUS; SUBCUTANEOUS EVERY 4 HOURS PRN
Status: COMPLETED | OUTPATIENT
Start: 2024-07-24 | End: 2024-07-25

## 2024-07-24 RX ORDER — FENTANYL CITRATE 50 UG/ML
25 INJECTION, SOLUTION INTRAMUSCULAR; INTRAVENOUS
Status: DISCONTINUED | OUTPATIENT
Start: 2024-07-24 | End: 2024-07-24 | Stop reason: HOSPADM

## 2024-07-24 RX ORDER — ONDANSETRON 2 MG/ML
INJECTION INTRAMUSCULAR; INTRAVENOUS PRN
Status: DISCONTINUED | OUTPATIENT
Start: 2024-07-24 | End: 2024-07-24 | Stop reason: SDUPTHER

## 2024-07-24 RX ORDER — GLYCOPYRROLATE 0.2 MG/ML
INJECTION, SOLUTION INTRAMUSCULAR; INTRAVENOUS PRN
Status: DISCONTINUED | OUTPATIENT
Start: 2024-07-24 | End: 2024-07-24 | Stop reason: SDUPTHER

## 2024-07-24 RX ORDER — NALOXONE HYDROCHLORIDE 0.4 MG/ML
INJECTION, SOLUTION INTRAMUSCULAR; INTRAVENOUS; SUBCUTANEOUS PRN
Status: DISCONTINUED | OUTPATIENT
Start: 2024-07-24 | End: 2024-07-24 | Stop reason: HOSPADM

## 2024-07-24 RX ORDER — DIPHENHYDRAMINE HYDROCHLORIDE 50 MG/ML
12.5 INJECTION INTRAMUSCULAR; INTRAVENOUS
Status: DISCONTINUED | OUTPATIENT
Start: 2024-07-24 | End: 2024-07-24 | Stop reason: HOSPADM

## 2024-07-24 RX ORDER — FENTANYL CITRATE 50 UG/ML
25 INJECTION, SOLUTION INTRAMUSCULAR; INTRAVENOUS EVERY 5 MIN PRN
Status: DISCONTINUED | OUTPATIENT
Start: 2024-07-24 | End: 2024-07-24 | Stop reason: HOSPADM

## 2024-07-24 RX ORDER — SODIUM CHLORIDE 9 MG/ML
INJECTION, SOLUTION INTRAVENOUS PRN
Status: DISCONTINUED | OUTPATIENT
Start: 2024-07-24 | End: 2024-07-24 | Stop reason: HOSPADM

## 2024-07-24 RX ORDER — PROCHLORPERAZINE EDISYLATE 5 MG/ML
5 INJECTION INTRAMUSCULAR; INTRAVENOUS
Status: DISCONTINUED | OUTPATIENT
Start: 2024-07-24 | End: 2024-07-24 | Stop reason: HOSPADM

## 2024-07-24 RX ORDER — DEXAMETHASONE SODIUM PHOSPHATE 4 MG/ML
INJECTION, SOLUTION INTRA-ARTICULAR; INTRALESIONAL; INTRAMUSCULAR; INTRAVENOUS; SOFT TISSUE PRN
Status: DISCONTINUED | OUTPATIENT
Start: 2024-07-24 | End: 2024-07-24 | Stop reason: SDUPTHER

## 2024-07-24 RX ADMIN — SODIUM CHLORIDE, POTASSIUM CHLORIDE, SODIUM LACTATE AND CALCIUM CHLORIDE: 600; 310; 30; 20 INJECTION, SOLUTION INTRAVENOUS at 16:18

## 2024-07-24 RX ADMIN — PROPOFOL 100 MG: 10 INJECTION, EMULSION INTRAVENOUS at 16:25

## 2024-07-24 RX ADMIN — ONDANSETRON 4 MG: 2 INJECTION INTRAMUSCULAR; INTRAVENOUS at 18:17

## 2024-07-24 RX ADMIN — HYDROMORPHONE HYDROCHLORIDE 0.5 MG: 1 INJECTION, SOLUTION INTRAMUSCULAR; INTRAVENOUS; SUBCUTANEOUS at 23:36

## 2024-07-24 RX ADMIN — PROPOFOL 50 MG: 10 INJECTION, EMULSION INTRAVENOUS at 16:26

## 2024-07-24 RX ADMIN — FENTANYL CITRATE 25 MCG: 50 INJECTION INTRAMUSCULAR; INTRAVENOUS at 20:03

## 2024-07-24 RX ADMIN — DEXAMETHASONE 4 MG: 4 TABLET ORAL at 20:47

## 2024-07-24 RX ADMIN — FENTANYL CITRATE 25 MCG: 50 INJECTION INTRAMUSCULAR; INTRAVENOUS at 19:15

## 2024-07-24 RX ADMIN — SODIUM CHLORIDE: 9 INJECTION, SOLUTION INTRAVENOUS at 16:18

## 2024-07-24 RX ADMIN — Medication 667 MG: at 09:13

## 2024-07-24 RX ADMIN — MODAFINIL 100 MG: 100 TABLET ORAL at 09:14

## 2024-07-24 RX ADMIN — FENTANYL CITRATE 50 MCG: 0.05 INJECTION, SOLUTION INTRAMUSCULAR; INTRAVENOUS at 16:25

## 2024-07-24 RX ADMIN — LIDOCAINE HYDROCHLORIDE 100 MG: 20 INJECTION, SOLUTION EPIDURAL; INFILTRATION; INTRACAUDAL; PERINEURAL at 16:25

## 2024-07-24 RX ADMIN — Medication 2000 UNITS: at 09:13

## 2024-07-24 RX ADMIN — GABAPENTIN 300 MG: 300 CAPSULE ORAL at 13:39

## 2024-07-24 RX ADMIN — DEXTROSE MONOHYDRATE 50 MCG/MIN: 50 INJECTION, SOLUTION INTRAVENOUS at 16:25

## 2024-07-24 RX ADMIN — Medication 200 MG: at 09:13

## 2024-07-24 RX ADMIN — PHENYTOIN SODIUM 100 MG: 100 CAPSULE, EXTENDED RELEASE ORAL at 13:39

## 2024-07-24 RX ADMIN — OXYCODONE 5 MG: 5 TABLET ORAL at 22:04

## 2024-07-24 RX ADMIN — BACLOFEN 2.5 MG: 10 TABLET ORAL at 20:47

## 2024-07-24 RX ADMIN — PHENYTOIN SODIUM 100 MG: 100 CAPSULE, EXTENDED RELEASE ORAL at 20:47

## 2024-07-24 RX ADMIN — LIDOCAINE HYDROCHLORIDE 30 MG: 20 INJECTION, SOLUTION EPIDURAL; INFILTRATION; INTRACAUDAL; PERINEURAL at 17:56

## 2024-07-24 RX ADMIN — FENTANYL CITRATE 50 MCG: 0.05 INJECTION, SOLUTION INTRAMUSCULAR; INTRAVENOUS at 16:53

## 2024-07-24 RX ADMIN — ROCURONIUM BROMIDE 50 MG: 10 INJECTION, SOLUTION INTRAVENOUS at 17:36

## 2024-07-24 RX ADMIN — GABAPENTIN 300 MG: 300 CAPSULE ORAL at 09:14

## 2024-07-24 RX ADMIN — ROCURONIUM BROMIDE 70 MG: 10 INJECTION, SOLUTION INTRAVENOUS at 16:25

## 2024-07-24 RX ADMIN — HYDROMORPHONE HYDROCHLORIDE 0.5 MG: 1 INJECTION, SOLUTION INTRAMUSCULAR; INTRAVENOUS; SUBCUTANEOUS at 20:01

## 2024-07-24 RX ADMIN — ATORVASTATIN CALCIUM 10 MG: 10 TABLET, FILM COATED ORAL at 20:47

## 2024-07-24 RX ADMIN — PHENYTOIN SODIUM 100 MG: 100 CAPSULE, EXTENDED RELEASE ORAL at 06:24

## 2024-07-24 RX ADMIN — BACLOFEN 2.5 MG: 10 TABLET ORAL at 13:39

## 2024-07-24 RX ADMIN — SUGAMMADEX 200 MG: 100 INJECTION, SOLUTION INTRAVENOUS at 18:38

## 2024-07-24 RX ADMIN — DEXAMETHASONE SODIUM PHOSPHATE 10 MG: 4 INJECTION INTRA-ARTICULAR; INTRALESIONAL; INTRAMUSCULAR; INTRAVENOUS; SOFT TISSUE at 16:48

## 2024-07-24 RX ADMIN — BACLOFEN 2.5 MG: 10 TABLET ORAL at 09:13

## 2024-07-24 RX ADMIN — FENTANYL CITRATE 25 MCG: 50 INJECTION INTRAMUSCULAR; INTRAVENOUS at 19:20

## 2024-07-24 RX ADMIN — CEFAZOLIN 2 G: 330 INJECTION, POWDER, FOR SOLUTION INTRAMUSCULAR; INTRAVENOUS at 17:23

## 2024-07-24 RX ADMIN — DEXAMETHASONE 4 MG: 4 TABLET ORAL at 09:13

## 2024-07-24 RX ADMIN — GABAPENTIN 300 MG: 300 CAPSULE ORAL at 20:47

## 2024-07-24 RX ADMIN — ROCURONIUM BROMIDE 30 MG: 10 INJECTION, SOLUTION INTRAVENOUS at 16:53

## 2024-07-24 RX ADMIN — GLYCOPYRROLATE 0.2 MG: 0.2 INJECTION, SOLUTION INTRAMUSCULAR; INTRAVENOUS at 16:30

## 2024-07-24 RX ADMIN — OXYCODONE 5 MG: 5 TABLET ORAL at 10:54

## 2024-07-24 RX ADMIN — FENTANYL CITRATE 50 MCG: 0.05 INJECTION, SOLUTION INTRAMUSCULAR; INTRAVENOUS at 17:29

## 2024-07-24 ASSESSMENT — PAIN SCALES - GENERAL
PAINLEVEL_OUTOF10: 6
PAINLEVEL_OUTOF10: 6
PAINLEVEL_OUTOF10: 7
PAINLEVEL_OUTOF10: 6
PAINLEVEL_OUTOF10: 7
PAINLEVEL_OUTOF10: 7
PAINLEVEL_OUTOF10: 5
PAINLEVEL_OUTOF10: 7
PAINLEVEL_OUTOF10: 7

## 2024-07-24 ASSESSMENT — PAIN DESCRIPTION - LOCATION
LOCATION: NECK

## 2024-07-24 ASSESSMENT — PAIN DESCRIPTION - ORIENTATION
ORIENTATION: POSTERIOR

## 2024-07-24 ASSESSMENT — PAIN DESCRIPTION - ONSET: ONSET: ON-GOING

## 2024-07-24 ASSESSMENT — PAIN DESCRIPTION - FREQUENCY: FREQUENCY: INTERMITTENT

## 2024-07-24 ASSESSMENT — PAIN DESCRIPTION - DESCRIPTORS
DESCRIPTORS: ACHING
DESCRIPTORS: TIGHTNESS
DESCRIPTORS: SQUEEZING;BURNING

## 2024-07-24 ASSESSMENT — PAIN - FUNCTIONAL ASSESSMENT: PAIN_FUNCTIONAL_ASSESSMENT: PREVENTS OR INTERFERES SOME ACTIVE ACTIVITIES AND ADLS

## 2024-07-24 ASSESSMENT — PAIN DESCRIPTION - PAIN TYPE: TYPE: SURGICAL PAIN
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
## 2024-07-24 NOTE — FLOWSHEET NOTE
07/24/24 1734   Family Communication    Relationship to Patient Other relative    Phone Number Janell [phone] and [phone]   Family/Significant Other Update Called   Delivery Origin Nurse   Message Disposition Other (comment)   Family Communication   Family Update Message Procedure started     No answer on either phone number provided. Unable to leave message.

## 2024-07-24 NOTE — PERIOP NOTE
FLOSEAL 10ML  GIVEN TO STERILE FIELD TO BE USED PRN DURING PROCEDURE FOR HEMOSTASIS    REF: [id number]  LOT: [id number]  EXP: 04-    SURGIFOAM WAS GIVEN TO THE STERILE FIELD TO BE USED BY MD DURING PROCEDURE  REF: [id number]  LOT: [id number]  EXP: 08-       Med sent to pharmacy. Pt notified. States had vertigo for many years. To call back if not improving.

## 2024-07-24 NOTE — ANESTHESIA PRE PROCEDURE
Oral QHS Michael Johnson MD   10 mg at 07/23/24 2150    phenytoin (DILANTIN) ER capsule 100 mg  100 mg Oral TID Michael Johnson MD   100 mg at 07/24/24 1339    calcium acetate (PHOSLO) tablet 667 mg  667 mg Oral Daily Michael Johnson MD   667 mg at 07/24/24 0913    sodium chloride flush 0.9 % injection 5-40 mL  5-40 mL IntraVENous 2 times per day Michael Johnson MD   10 mL at 07/23/24 1027    sodium chloride flush 0.9 % injection 5-40 mL  5-40 mL IntraVENous PRN Michael Johnson MD        0.9 % sodium chloride infusion   IntraVENous PRN Michael Johnson MD   Stopped at 07/10/24 0800    [Held by provider] enoxaparin (LOVENOX) injection 40 mg  40 mg SubCUTAneous Daily Michael Johnson MD   40 mg at 07/23/24 1028    ondansetron (ZOFRAN-ODT) disintegrating tablet 4 mg  4 mg Oral Q8H PRN Michael Johnson MD        Or    ondansetron (ZOFRAN) injection 4 mg  4 mg IntraVENous Q6H PRN Michael Johnson MD        polyethylene glycol (GLYCOLAX) packet 17 g  17 g Oral Daily PRN Michael Johnson MD        acetaminophen (TYLENOL) tablet 650 mg  650 mg Oral Q6H PRN Michael Johnson MD   650 mg at 07/23/24 1301    Or    acetaminophen (TYLENOL) suppository 650 mg  650 mg Rectal Q6H PRN Michael Johnson MD           Allergies:  No Known Allergies    Problem List:    Patient Active Problem List   Diagnosis Code    Hypertension goal BP (blood pressure) < 130/80 I10    Neuropathy G62.9    Hypovitaminosis D E55.9    Dyslipidemia (high LDL; low HDL) E78.5    Sacral pain M53.3    Degenerative disc disease, lumbar M51.36    Other secondary scoliosis, 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

## 2024-07-24 NOTE — OR NURSING
TRANSFER - IN REPORT:    Verbal report received from Lorena harvey Yesy Lyle  being received from 544 for ordered procedure      Report consisted of patient's Situation, Background, Assessment and   Recommendations(SBAR).     Information from the following report(s) Nurse Handoff Report, Intake/Output, MAR, and Pre Procedure Checklist was reviewed with the receiving nurse.    Opportunity for questions and clarification was provided.      Assessment completed upon patient's arrival to unit and care assumed.     SHAINA WARD RN

## 2024-07-25 LAB
ANION GAP SERPL CALC-SCNC: 6 MMOL/L (ref 5–15)
BUN SERPL-MCNC: 17 MG/DL (ref 6–20)
BUN/CREAT SERPL: 29 (ref 12–20)
CALCIUM SERPL-MCNC: 9.7 MG/DL (ref 8.5–10.1)
CHLORIDE SERPL-SCNC: 106 MMOL/L (ref 97–108)
CO2 SERPL-SCNC: 28 MMOL/L (ref 21–32)
CREAT SERPL-MCNC: 0.58 MG/DL (ref 0.55–1.02)
ERYTHROCYTE [DISTWIDTH] IN BLOOD BY AUTOMATED COUNT: 16.8 % (ref 11.5–14.5)
GLUCOSE SERPL-MCNC: 100 MG/DL (ref 65–100)
HCT VFR BLD AUTO: 37 % (ref 35–47)
HGB BLD-MCNC: 11.9 G/DL (ref 11.5–16)
MCH RBC QN AUTO: 33.5 PG (ref 26–34)
MCHC RBC AUTO-ENTMCNC: 32.2 G/DL (ref 30–36.5)
MCV RBC AUTO: 104.2 FL (ref 80–99)
NRBC # BLD: 0 K/UL (ref 0–0.01)
NRBC BLD-RTO: 0 PER 100 WBC
PLATELET # BLD AUTO: 234 K/UL (ref 150–400)
PMV BLD AUTO: 9.7 FL (ref 8.9–12.9)
POTASSIUM SERPL-SCNC: 4.8 MMOL/L (ref 3.5–5.1)
RBC # BLD AUTO: 3.55 M/UL (ref 3.8–5.2)
SODIUM SERPL-SCNC: 140 MMOL/L (ref 136–145)
WBC # BLD AUTO: 7.9 K/UL (ref 3.6–11)

## 2024-07-25 PROCEDURE — 80048 BASIC METABOLIC PNL TOTAL CA: CPT

## 2024-07-25 PROCEDURE — 6370000000 HC RX 637 (ALT 250 FOR IP): Performed by: NEUROLOGICAL SURGERY

## 2024-07-25 PROCEDURE — 97112 NEUROMUSCULAR REEDUCATION: CPT

## 2024-07-25 PROCEDURE — 6360000002 HC RX W HCPCS: Performed by: PHYSICIAN ASSISTANT

## 2024-07-25 PROCEDURE — 97168 OT RE-EVAL EST PLAN CARE: CPT

## 2024-07-25 PROCEDURE — 6360000002 HC RX W HCPCS: Performed by: NEUROLOGICAL SURGERY

## 2024-07-25 PROCEDURE — 97530 THERAPEUTIC ACTIVITIES: CPT

## 2024-07-25 PROCEDURE — 2500000003 HC RX 250 WO HCPCS

## 2024-07-25 PROCEDURE — 6370000000 HC RX 637 (ALT 250 FOR IP): Performed by: PHYSICIAN ASSISTANT

## 2024-07-25 PROCEDURE — 2580000003 HC RX 258: Performed by: NEUROLOGICAL SURGERY

## 2024-07-25 PROCEDURE — 97535 SELF CARE MNGMENT TRAINING: CPT

## 2024-07-25 PROCEDURE — 85027 COMPLETE CBC AUTOMATED: CPT

## 2024-07-25 PROCEDURE — 99024 POSTOP FOLLOW-UP VISIT: CPT | Performed by: PHYSICIAN ASSISTANT

## 2024-07-25 PROCEDURE — 36415 COLL VENOUS BLD VENIPUNCTURE: CPT

## 2024-07-25 PROCEDURE — 97164 PT RE-EVAL EST PLAN CARE: CPT

## 2024-07-25 PROCEDURE — 1100000000 HC RM PRIVATE

## 2024-07-25 PROCEDURE — 97110 THERAPEUTIC EXERCISES: CPT

## 2024-07-25 RX ORDER — BACLOFEN 10 MG/1
2.5 TABLET ORAL 2 TIMES DAILY
Status: DISCONTINUED | OUTPATIENT
Start: 2024-07-25 | End: 2024-07-28

## 2024-07-25 RX ORDER — FAMOTIDINE 20 MG/1
20 TABLET, FILM COATED ORAL 2 TIMES DAILY
Status: DISCONTINUED | OUTPATIENT
Start: 2024-07-25 | End: 2024-08-05 | Stop reason: HOSPADM

## 2024-07-25 RX ORDER — DEXAMETHASONE 1 MG
2 TABLET ORAL EVERY 12 HOURS SCHEDULED
Status: DISCONTINUED | OUTPATIENT
Start: 2024-07-25 | End: 2024-07-27

## 2024-07-25 RX ADMIN — OXYCODONE 5 MG: 5 TABLET ORAL at 23:38

## 2024-07-25 RX ADMIN — BACLOFEN 2.5 MG: 10 TABLET ORAL at 22:28

## 2024-07-25 RX ADMIN — MODAFINIL 100 MG: 100 TABLET ORAL at 08:33

## 2024-07-25 RX ADMIN — HYDROMORPHONE HYDROCHLORIDE 0.5 MG: 1 INJECTION, SOLUTION INTRAMUSCULAR; INTRAVENOUS; SUBCUTANEOUS at 08:33

## 2024-07-25 RX ADMIN — ACETAMINOPHEN 650 MG: 325 TABLET ORAL at 22:28

## 2024-07-25 RX ADMIN — DEXAMETHASONE 4 MG: 4 TABLET ORAL at 08:33

## 2024-07-25 RX ADMIN — Medication 200 MG: at 08:32

## 2024-07-25 RX ADMIN — OXYCODONE 5 MG: 5 TABLET ORAL at 12:16

## 2024-07-25 RX ADMIN — MIDODRINE HYDROCHLORIDE 5 MG: 5 TABLET ORAL at 13:59

## 2024-07-25 RX ADMIN — GABAPENTIN 300 MG: 300 CAPSULE ORAL at 08:33

## 2024-07-25 RX ADMIN — PHENYTOIN SODIUM 100 MG: 100 CAPSULE, EXTENDED RELEASE ORAL at 13:58

## 2024-07-25 RX ADMIN — Medication 2000 UNITS: at 08:32

## 2024-07-25 RX ADMIN — GABAPENTIN 300 MG: 300 CAPSULE ORAL at 22:29

## 2024-07-25 RX ADMIN — BACLOFEN 2.5 MG: 10 TABLET ORAL at 08:32

## 2024-07-25 RX ADMIN — DEXAMETHASONE 2 MG: 1 TABLET ORAL at 22:28

## 2024-07-25 RX ADMIN — POLYETHYLENE GLYCOL 3350 17 G: 17 POWDER, FOR SOLUTION ORAL at 08:33

## 2024-07-25 RX ADMIN — Medication 667 MG: at 08:33

## 2024-07-25 RX ADMIN — PHENYTOIN SODIUM 100 MG: 100 CAPSULE, EXTENDED RELEASE ORAL at 22:28

## 2024-07-25 RX ADMIN — POLYETHYLENE GLYCOL 3350 17 G: 17 POWDER, FOR SOLUTION ORAL at 22:29

## 2024-07-25 RX ADMIN — FAMOTIDINE 20 MG: 20 TABLET, FILM COATED ORAL at 12:16

## 2024-07-25 RX ADMIN — GABAPENTIN 300 MG: 300 CAPSULE ORAL at 13:58

## 2024-07-25 RX ADMIN — ATORVASTATIN CALCIUM 10 MG: 10 TABLET, FILM COATED ORAL at 22:28

## 2024-07-25 RX ADMIN — OXYCODONE 5 MG: 5 TABLET ORAL at 02:27

## 2024-07-25 RX ADMIN — FAMOTIDINE 20 MG: 20 TABLET, FILM COATED ORAL at 22:28

## 2024-07-25 RX ADMIN — PHENYTOIN SODIUM 100 MG: 100 CAPSULE, EXTENDED RELEASE ORAL at 06:52

## 2024-07-25 RX ADMIN — Medication 10 ML: at 08:35

## 2024-07-25 RX ADMIN — OXYCODONE 5 MG: 5 TABLET ORAL at 19:17

## 2024-07-25 RX ADMIN — Medication 10 ML: at 22:29

## 2024-07-25 RX ADMIN — OXYCODONE 5 MG: 5 TABLET ORAL at 06:52

## 2024-07-25 ASSESSMENT — PAIN SCALES - GENERAL
PAINLEVEL_OUTOF10: 7
PAINLEVEL_OUTOF10: 6
PAINLEVEL_OUTOF10: 7
PAINLEVEL_OUTOF10: 8

## 2024-07-25 ASSESSMENT — PAIN DESCRIPTION - LOCATION
LOCATION: BACK;NECK
LOCATION: BACK;NECK
LOCATION: NECK
LOCATION: NECK;BACK

## 2024-07-25 ASSESSMENT — PAIN DESCRIPTION - DESCRIPTORS
DESCRIPTORS: ACHING

## 2024-07-25 ASSESSMENT — PAIN SCALES - WONG BAKER: WONGBAKER_NUMERICALRESPONSE: NO HURT

## 2024-07-25 ASSESSMENT — PAIN DESCRIPTION - ORIENTATION: ORIENTATION: POSTERIOR

## 2024-07-25 NOTE — CARE COORDINATION
Transition of Care Plan:    RUR: 14%  Prior Level of Functioning: Independent   Disposition: IPR   If SNF or IPR: Date FOC offered: 7/25  Date FOC received: 7/25   Accepting facility: [facility]   Previous Dispo prior to procedure parish 7/24: [facility]. Therapy recommendations updated to IPR. The pt is agreeable and open to [facility]. Medical team in agreement.   Date authorization started with reference number: AUTH initiated on 7/26  Date authorization received and expires: AUTH initiated on 7/26  Follow up appointments: PCP   DME needed: None   Transportation at discharge: BLS   IM/IMM Medicare/ letter given: Received   Caregiver Contact: Janell Mcgarry (Other)  [phone]   Discharge Caregiver contacted prior to discharge? N.A   Care Conference needed? N/A   Barriers to discharge: Medical, AUTH, PM&R consulted    Admitting DX: Bilateral leg edema, Seizure secondary to subtherapeutic anticonvulsant medication   S/P; s/p posterior C1 laminectomy 07/24   Discharge: Early Next week

## 2024-07-25 NOTE — OP NOTE
[address]  [address]                            OPERATIVE REPORT      PATIENT NAME: ELOINA GUTIERREZ               : 1955  MED REC NO: [id number]                       ROOM: 544  ACCOUNT NO: [id number]                       ADMIT DATE: 2024  PROVIDER: Js Tan MD    DATE OF SERVICE:  2024    PREOPERATIVE DIAGNOSES:  Cervical canal stenosis, cervical medullary junction with myelopathy.    POSTOPERATIVE DIAGNOSES:  Cervical canal stenosis, cervical medullary junction with myelopathy.    PROCEDURES PERFORMED:  Posterior C1 laminectomy.    SURGEON:  Js Tan MD    ASSISTANT:  [facility].    ANESTHESIA:  General.    ESTIMATED BLOOD LOSS:  Minimal.    SPECIMENS REMOVED:  None.    INTRAOPERATIVE FINDINGS:  See body of report.     COMPLICATIONS:  None.    IMPLANTS:  None.    INDICATIONS:  Cervical myelopathy.    DESCRIPTION OF PROCEDURE:  Review of imaging studies revealed severe cervical canal stenosis at the cervical medullary junction in this patient who had previously undergone anterior cervical diskectomy several years ago with improvement of her myelopathic signs.  She presented with a return of these myelopathic signs and significant cord compression at the C1 level apart from the prior operation.  Initially, the patient refused surgery, but after discussing the risks, benefits, and alternatives with me and in discussing this with her family, she then wished to proceed with surgery.  She was taken to the operating room where she was induced under general endotracheal anesthesia, placed on the operating table in the prone position on chest rolls with the head secured in a three-point Yepez head hidalgo.  The posterior cervical region was then shaved, scrubbed, prepped, and draped in the usual sterile fashion.  A midline vertical incision was made after a time-out was performed to identify the correct patient

## 2024-07-26 ENCOUNTER — APPOINTMENT (OUTPATIENT)
Facility: HOSPITAL | Age: 69
DRG: 519 | End: 2024-07-26
Payer: MEDICARE

## 2024-07-26 LAB
ANION GAP SERPL CALC-SCNC: 3 MMOL/L (ref 5–15)
BASOPHILS # BLD: 0 K/UL (ref 0–0.1)
BASOPHILS NFR BLD: 0 % (ref 0–1)
BUN SERPL-MCNC: 14 MG/DL (ref 6–20)
BUN/CREAT SERPL: 28 (ref 12–20)
CALCIUM SERPL-MCNC: 9.3 MG/DL (ref 8.5–10.1)
CHLORIDE SERPL-SCNC: 105 MMOL/L (ref 97–108)
CO2 SERPL-SCNC: 32 MMOL/L (ref 21–32)
CREAT SERPL-MCNC: 0.5 MG/DL (ref 0.55–1.02)
DIFFERENTIAL METHOD BLD: ABNORMAL
EOSINOPHIL # BLD: 0 K/UL (ref 0–0.4)
EOSINOPHIL NFR BLD: 0 % (ref 0–7)
ERYTHROCYTE [DISTWIDTH] IN BLOOD BY AUTOMATED COUNT: 17.2 % (ref 11.5–14.5)
GLUCOSE SERPL-MCNC: 94 MG/DL (ref 65–100)
HCT VFR BLD AUTO: 35.7 % (ref 35–47)
HGB BLD-MCNC: 11.7 G/DL (ref 11.5–16)
IMM GRANULOCYTES # BLD AUTO: 0 K/UL (ref 0–0.04)
IMM GRANULOCYTES NFR BLD AUTO: 0 % (ref 0–0.5)
LYMPHOCYTES # BLD: 1.3 K/UL (ref 0.8–3.5)
LYMPHOCYTES NFR BLD: 19 % (ref 12–49)
MCH RBC QN AUTO: 33.8 PG (ref 26–34)
MCHC RBC AUTO-ENTMCNC: 32.8 G/DL (ref 30–36.5)
MCV RBC AUTO: 103.2 FL (ref 80–99)
MONOCYTES # BLD: 0.7 K/UL (ref 0–1)
MONOCYTES NFR BLD: 11 % (ref 5–13)
NEUTS SEG # BLD: 4.8 K/UL (ref 1.8–8)
NEUTS SEG NFR BLD: 70 % (ref 32–75)
NRBC # BLD: 0 K/UL (ref 0–0.01)
NRBC BLD-RTO: 0 PER 100 WBC
PLATELET # BLD AUTO: 200 K/UL (ref 150–400)
PMV BLD AUTO: 9.4 FL (ref 8.9–12.9)
POTASSIUM SERPL-SCNC: 4.1 MMOL/L (ref 3.5–5.1)
RBC # BLD AUTO: 3.46 M/UL (ref 3.8–5.2)
SODIUM SERPL-SCNC: 140 MMOL/L (ref 136–145)
WBC # BLD AUTO: 7 K/UL (ref 3.6–11)

## 2024-07-26 PROCEDURE — 36415 COLL VENOUS BLD VENIPUNCTURE: CPT

## 2024-07-26 PROCEDURE — 99024 POSTOP FOLLOW-UP VISIT: CPT | Performed by: PHYSICIAN ASSISTANT

## 2024-07-26 PROCEDURE — 97530 THERAPEUTIC ACTIVITIES: CPT

## 2024-07-26 PROCEDURE — 80048 BASIC METABOLIC PNL TOTAL CA: CPT

## 2024-07-26 PROCEDURE — 70450 CT HEAD/BRAIN W/O DYE: CPT

## 2024-07-26 PROCEDURE — 71045 X-RAY EXAM CHEST 1 VIEW: CPT

## 2024-07-26 PROCEDURE — 6370000000 HC RX 637 (ALT 250 FOR IP): Performed by: NEUROLOGICAL SURGERY

## 2024-07-26 PROCEDURE — 6370000000 HC RX 637 (ALT 250 FOR IP): Performed by: PHYSICIAN ASSISTANT

## 2024-07-26 PROCEDURE — 1100000000 HC RM PRIVATE

## 2024-07-26 PROCEDURE — 85025 COMPLETE CBC W/AUTO DIFF WBC: CPT

## 2024-07-26 PROCEDURE — 72125 CT NECK SPINE W/O DYE: CPT

## 2024-07-26 PROCEDURE — 6360000002 HC RX W HCPCS: Performed by: PHYSICIAN ASSISTANT

## 2024-07-26 PROCEDURE — 2580000003 HC RX 258: Performed by: NEUROLOGICAL SURGERY

## 2024-07-26 PROCEDURE — 2580000003 HC RX 258: Performed by: PHYSICIAN ASSISTANT

## 2024-07-26 RX ORDER — HYDROMORPHONE HYDROCHLORIDE 1 MG/ML
0.25 INJECTION, SOLUTION INTRAMUSCULAR; INTRAVENOUS; SUBCUTANEOUS EVERY 4 HOURS PRN
Status: DISCONTINUED | OUTPATIENT
Start: 2024-07-26 | End: 2024-08-05 | Stop reason: HOSPADM

## 2024-07-26 RX ADMIN — POLYETHYLENE GLYCOL 3350 17 G: 17 POWDER, FOR SOLUTION ORAL at 21:41

## 2024-07-26 RX ADMIN — MODAFINIL 100 MG: 100 TABLET ORAL at 09:10

## 2024-07-26 RX ADMIN — Medication 667 MG: at 09:10

## 2024-07-26 RX ADMIN — FAMOTIDINE 20 MG: 20 TABLET, FILM COATED ORAL at 09:09

## 2024-07-26 RX ADMIN — Medication 2000 UNITS: at 09:10

## 2024-07-26 RX ADMIN — OXYCODONE 5 MG: 5 TABLET ORAL at 09:47

## 2024-07-26 RX ADMIN — PHENYTOIN SODIUM 100 MG: 100 CAPSULE, EXTENDED RELEASE ORAL at 06:04

## 2024-07-26 RX ADMIN — OXYCODONE 5 MG: 5 TABLET ORAL at 06:04

## 2024-07-26 RX ADMIN — DEXAMETHASONE 2 MG: 1 TABLET ORAL at 21:41

## 2024-07-26 RX ADMIN — GABAPENTIN 300 MG: 300 CAPSULE ORAL at 15:01

## 2024-07-26 RX ADMIN — BISACODYL 10 MG: 10 SUPPOSITORY RECTAL at 09:10

## 2024-07-26 RX ADMIN — ATORVASTATIN CALCIUM 10 MG: 10 TABLET, FILM COATED ORAL at 21:41

## 2024-07-26 RX ADMIN — GABAPENTIN 300 MG: 300 CAPSULE ORAL at 21:41

## 2024-07-26 RX ADMIN — PHENYTOIN SODIUM 100 MG: 100 CAPSULE, EXTENDED RELEASE ORAL at 15:00

## 2024-07-26 RX ADMIN — FAMOTIDINE 20 MG: 20 TABLET, FILM COATED ORAL at 21:41

## 2024-07-26 RX ADMIN — BACLOFEN 2.5 MG: 10 TABLET ORAL at 21:39

## 2024-07-26 RX ADMIN — Medication 10 ML: at 21:58

## 2024-07-26 RX ADMIN — MIDODRINE HYDROCHLORIDE 5 MG: 5 TABLET ORAL at 06:04

## 2024-07-26 RX ADMIN — GABAPENTIN 300 MG: 300 CAPSULE ORAL at 09:10

## 2024-07-26 RX ADMIN — POLYETHYLENE GLYCOL 3350 17 G: 17 POWDER, FOR SOLUTION ORAL at 09:11

## 2024-07-26 RX ADMIN — WATER 2000 MG: 1 INJECTION INTRAMUSCULAR; INTRAVENOUS; SUBCUTANEOUS at 17:11

## 2024-07-26 RX ADMIN — Medication 10 ML: at 17:14

## 2024-07-26 RX ADMIN — OXYCODONE 5 MG: 5 TABLET ORAL at 21:40

## 2024-07-26 RX ADMIN — OXYCODONE 5 MG: 5 TABLET ORAL at 15:00

## 2024-07-26 RX ADMIN — Medication 200 MG: at 09:09

## 2024-07-26 RX ADMIN — ACETAMINOPHEN 650 MG: 325 TABLET ORAL at 20:44

## 2024-07-26 RX ADMIN — BACLOFEN 2.5 MG: 10 TABLET ORAL at 09:10

## 2024-07-26 RX ADMIN — DEXAMETHASONE 2 MG: 1 TABLET ORAL at 09:10

## 2024-07-26 RX ADMIN — PHENYTOIN SODIUM 100 MG: 100 CAPSULE, EXTENDED RELEASE ORAL at 21:39

## 2024-07-26 ASSESSMENT — PAIN DESCRIPTION - LOCATION
LOCATION: INCISION;NECK
LOCATION: NECK
LOCATION: HEAD;NECK

## 2024-07-26 ASSESSMENT — PAIN SCALES - GENERAL
PAINLEVEL_OUTOF10: 6
PAINLEVEL_OUTOF10: 6
PAINLEVEL_OUTOF10: 8
PAINLEVEL_OUTOF10: 9

## 2024-07-26 ASSESSMENT — PAIN DESCRIPTION - ORIENTATION
ORIENTATION: POSTERIOR
ORIENTATION: POSTERIOR

## 2024-07-26 ASSESSMENT — PAIN DESCRIPTION - DESCRIPTORS
DESCRIPTORS: ACHING
DESCRIPTORS: ACHING

## 2024-07-26 NOTE — ANESTHESIA POSTPROCEDURE EVALUATION
Department of Anesthesiology  Postprocedure Note    Patient: Yesy Lyle  MRN: [id number]  YOB: 1955  Date of evaluation: 7/26/2024    Procedure Summary       Date: 07/24/24 Room / Location: [facility] MAIN OR  / [facility] MAIN OR    Anesthesia Start: 1618 Anesthesia Stop: 1901    Procedure: C1 LAMINECTOMY (Neck) Diagnosis:       Spinal stenosis in cervical region      (Spinal stenosis in cervical region [M48.02])    Providers: Js Tan MD Responsible Provider: Derrick Gramajo MD    Anesthesia Type: general ASA Status: 3            Anesthesia Type: No value filed.    Nurys Phase I: Nurys Score: 9    Nurys Phase II:      Anesthesia Post Evaluation  Post-Anesthesia Evaluation and Assessment    Patient: Yesy Lyle MRN: [id number]  SSN: [id number]    YOB: 1955  Age: 68 y.o.  Sex: female      I have evaluated the patient and they are stable and ready for discharge from the PACU.     Cardiovascular Function/Vital Signs  Visit Vitals  BP (!) 105/58   Pulse 67   Temp 99 °F (37.2 °C) (Oral)   Resp 16   Ht 1.626 m (5' 4.02\")   Wt 75.8 kg (167 lb 3.2 oz)   SpO2 98%   BMI 28.69 kg/m²       Patient is status post General anesthesia for Procedure(s):  C1 LAMINECTOMY.    Nausea/Vomiting: None    Postoperative hydration reviewed and adequate.    Pain:      Managed    Neurological Status:       At baseline    Mental Status, Level of Consciousness: Arousable    Pulmonary Status:       Adequate oxygenation and airway patent    Complications related to anesthesia: None    Post-anesthesia assessment completed. No concerns    Signed By: Derrick Gramajo MD     July 26, 2024                No notable events documented.

## 2024-07-27 ENCOUNTER — APPOINTMENT (OUTPATIENT)
Facility: HOSPITAL | Age: 69
DRG: 519 | End: 2024-07-27
Payer: MEDICARE

## 2024-07-27 LAB
ALBUMIN SERPL-MCNC: 2.4 G/DL (ref 3.5–5)
ALBUMIN/GLOB SERPL: 0.6 (ref 1.1–2.2)
ALP SERPL-CCNC: 92 U/L (ref 45–117)
ALT SERPL-CCNC: 47 U/L (ref 12–78)
ANION GAP SERPL CALC-SCNC: 2 MMOL/L (ref 5–15)
APPEARANCE UR: CLEAR
AST SERPL-CCNC: 23 U/L (ref 15–37)
BACTERIA URNS QL MICRO: NEGATIVE /HPF
BASOPHILS # BLD: 0 K/UL (ref 0–0.1)
BASOPHILS NFR BLD: 0 % (ref 0–1)
BILIRUB SERPL-MCNC: 0.5 MG/DL (ref 0.2–1)
BILIRUB UR QL: NEGATIVE
BUN SERPL-MCNC: 11 MG/DL (ref 6–20)
BUN/CREAT SERPL: 22 (ref 12–20)
CALCIUM SERPL-MCNC: 9.6 MG/DL (ref 8.5–10.1)
CHLORIDE SERPL-SCNC: 105 MMOL/L (ref 97–108)
CO2 SERPL-SCNC: 30 MMOL/L (ref 21–32)
COLOR UR: ABNORMAL
COMMENT:: NORMAL
CREAT SERPL-MCNC: 0.5 MG/DL (ref 0.55–1.02)
DIFFERENTIAL METHOD BLD: ABNORMAL
EOSINOPHIL # BLD: 0.1 K/UL (ref 0–0.4)
EOSINOPHIL NFR BLD: 1 % (ref 0–7)
EPITH CASTS URNS QL MICRO: ABNORMAL /LPF
ERYTHROCYTE [DISTWIDTH] IN BLOOD BY AUTOMATED COUNT: 16.8 % (ref 11.5–14.5)
GLOBULIN SER CALC-MCNC: 4 G/DL (ref 2–4)
GLUCOSE SERPL-MCNC: 109 MG/DL (ref 65–100)
GLUCOSE UR STRIP.AUTO-MCNC: NEGATIVE MG/DL
HCT VFR BLD AUTO: 33.6 % (ref 35–47)
HGB BLD-MCNC: 11.3 G/DL (ref 11.5–16)
HGB UR QL STRIP: ABNORMAL
IMM GRANULOCYTES # BLD AUTO: 0 K/UL (ref 0–0.04)
IMM GRANULOCYTES NFR BLD AUTO: 0 % (ref 0–0.5)
KETONES UR QL STRIP.AUTO: ABNORMAL MG/DL
LACTATE SERPL-SCNC: 0.6 MMOL/L (ref 0.4–2)
LEUKOCYTE ESTERASE UR QL STRIP.AUTO: ABNORMAL
LYMPHOCYTES # BLD: 1.2 K/UL (ref 0.8–3.5)
LYMPHOCYTES NFR BLD: 14 % (ref 12–49)
MCH RBC QN AUTO: 34.1 PG (ref 26–34)
MCHC RBC AUTO-ENTMCNC: 33.6 G/DL (ref 30–36.5)
MCV RBC AUTO: 101.5 FL (ref 80–99)
MONOCYTES # BLD: 0.8 K/UL (ref 0–1)
MONOCYTES NFR BLD: 10 % (ref 5–13)
NEUTS SEG # BLD: 6.5 K/UL (ref 1.8–8)
NEUTS SEG NFR BLD: 75 % (ref 32–75)
NITRITE UR QL STRIP.AUTO: NEGATIVE
NRBC # BLD: 0 K/UL (ref 0–0.01)
NRBC BLD-RTO: 0 PER 100 WBC
PH UR STRIP: 5.5 (ref 5–8)
PLATELET # BLD AUTO: 215 K/UL (ref 150–400)
PMV BLD AUTO: 9.5 FL (ref 8.9–12.9)
POTASSIUM SERPL-SCNC: 3.8 MMOL/L (ref 3.5–5.1)
PROCALCITONIN SERPL-MCNC: <0.05 NG/ML
PROT SERPL-MCNC: 6.4 G/DL (ref 6.4–8.2)
PROT UR STRIP-MCNC: 30 MG/DL
RBC # BLD AUTO: 3.31 M/UL (ref 3.8–5.2)
RBC #/AREA URNS HPF: ABNORMAL /HPF (ref 0–5)
SODIUM SERPL-SCNC: 137 MMOL/L (ref 136–145)
SP GR UR REFRACTOMETRY: 1.03 (ref 1–1.03)
SPECIMEN HOLD: NORMAL
URINE CULTURE IF INDICATED: ABNORMAL
UROBILINOGEN UR QL STRIP.AUTO: 0.2 EU/DL (ref 0.2–1)
WBC # BLD AUTO: 8.7 K/UL (ref 3.6–11)
WBC URNS QL MICRO: ABNORMAL /HPF (ref 0–4)

## 2024-07-27 PROCEDURE — 6370000000 HC RX 637 (ALT 250 FOR IP): Performed by: NEUROLOGICAL SURGERY

## 2024-07-27 PROCEDURE — 2500000003 HC RX 250 WO HCPCS: Performed by: PHYSICIAN ASSISTANT

## 2024-07-27 PROCEDURE — 6360000002 HC RX W HCPCS: Performed by: NEUROLOGICAL SURGERY

## 2024-07-27 PROCEDURE — 2580000003 HC RX 258: Performed by: PHYSICIAN ASSISTANT

## 2024-07-27 PROCEDURE — 87040 BLOOD CULTURE FOR BACTERIA: CPT

## 2024-07-27 PROCEDURE — 81001 URINALYSIS AUTO W/SCOPE: CPT

## 2024-07-27 PROCEDURE — 80053 COMPREHEN METABOLIC PANEL: CPT

## 2024-07-27 PROCEDURE — 36415 COLL VENOUS BLD VENIPUNCTURE: CPT

## 2024-07-27 PROCEDURE — 1100000000 HC RM PRIVATE

## 2024-07-27 PROCEDURE — 6370000000 HC RX 637 (ALT 250 FOR IP): Performed by: PHYSICIAN ASSISTANT

## 2024-07-27 PROCEDURE — 83605 ASSAY OF LACTIC ACID: CPT

## 2024-07-27 PROCEDURE — 51702 INSERT TEMP BLADDER CATH: CPT

## 2024-07-27 PROCEDURE — 6360000002 HC RX W HCPCS: Performed by: PHYSICIAN ASSISTANT

## 2024-07-27 PROCEDURE — 84145 PROCALCITONIN (PCT): CPT

## 2024-07-27 PROCEDURE — 2580000003 HC RX 258: Performed by: NEUROLOGICAL SURGERY

## 2024-07-27 PROCEDURE — 85025 COMPLETE CBC W/AUTO DIFF WBC: CPT

## 2024-07-27 PROCEDURE — 93971 EXTREMITY STUDY: CPT

## 2024-07-27 RX ORDER — KETOROLAC TROMETHAMINE 30 MG/ML
15 INJECTION, SOLUTION INTRAMUSCULAR; INTRAVENOUS EVERY 6 HOURS
Status: COMPLETED | OUTPATIENT
Start: 2024-07-27 | End: 2024-07-28

## 2024-07-27 RX ORDER — DEXAMETHASONE 4 MG/1
4 TABLET ORAL EVERY 8 HOURS SCHEDULED
Status: DISCONTINUED | OUTPATIENT
Start: 2024-07-27 | End: 2024-07-29

## 2024-07-27 RX ADMIN — ATORVASTATIN CALCIUM 10 MG: 10 TABLET, FILM COATED ORAL at 21:13

## 2024-07-27 RX ADMIN — PHENYTOIN SODIUM 100 MG: 100 CAPSULE, EXTENDED RELEASE ORAL at 05:17

## 2024-07-27 RX ADMIN — Medication 200 MG: at 08:55

## 2024-07-27 RX ADMIN — DEXAMETHASONE 2 MG: 1 TABLET ORAL at 08:55

## 2024-07-27 RX ADMIN — GABAPENTIN 300 MG: 300 CAPSULE ORAL at 21:13

## 2024-07-27 RX ADMIN — POLYETHYLENE GLYCOL 3350 17 G: 17 POWDER, FOR SOLUTION ORAL at 19:30

## 2024-07-27 RX ADMIN — BISACODYL 10 MG: 10 SUPPOSITORY RECTAL at 08:55

## 2024-07-27 RX ADMIN — Medication 667 MG: at 08:55

## 2024-07-27 RX ADMIN — HYDROMORPHONE HYDROCHLORIDE 0.25 MG: 1 INJECTION, SOLUTION INTRAMUSCULAR; INTRAVENOUS; SUBCUTANEOUS at 00:52

## 2024-07-27 RX ADMIN — Medication 2000 UNITS: at 08:55

## 2024-07-27 RX ADMIN — GABAPENTIN 300 MG: 300 CAPSULE ORAL at 14:45

## 2024-07-27 RX ADMIN — BACLOFEN 2.5 MG: 10 TABLET ORAL at 21:12

## 2024-07-27 RX ADMIN — POLYETHYLENE GLYCOL 3350 17 G: 17 POWDER, FOR SOLUTION ORAL at 08:54

## 2024-07-27 RX ADMIN — KETOROLAC TROMETHAMINE 15 MG: 30 INJECTION, SOLUTION INTRAMUSCULAR at 11:25

## 2024-07-27 RX ADMIN — Medication 10 ML: at 08:55

## 2024-07-27 RX ADMIN — MODAFINIL 100 MG: 100 TABLET ORAL at 08:55

## 2024-07-27 RX ADMIN — KETOROLAC TROMETHAMINE 15 MG: 30 INJECTION, SOLUTION INTRAMUSCULAR at 16:54

## 2024-07-27 RX ADMIN — OXYCODONE 5 MG: 5 TABLET ORAL at 14:45

## 2024-07-27 RX ADMIN — GABAPENTIN 300 MG: 300 CAPSULE ORAL at 08:55

## 2024-07-27 RX ADMIN — WATER 2000 MG: 1 INJECTION INTRAMUSCULAR; INTRAVENOUS; SUBCUTANEOUS at 16:55

## 2024-07-27 RX ADMIN — DEXAMETHASONE 4 MG: 4 TABLET ORAL at 21:13

## 2024-07-27 RX ADMIN — WATER 2000 MG: 1 INJECTION INTRAMUSCULAR; INTRAVENOUS; SUBCUTANEOUS at 08:54

## 2024-07-27 RX ADMIN — KETOROLAC TROMETHAMINE 15 MG: 30 INJECTION, SOLUTION INTRAMUSCULAR at 21:13

## 2024-07-27 RX ADMIN — PHENYTOIN SODIUM 100 MG: 100 CAPSULE, EXTENDED RELEASE ORAL at 14:45

## 2024-07-27 RX ADMIN — PHENYTOIN SODIUM 100 MG: 100 CAPSULE, EXTENDED RELEASE ORAL at 21:32

## 2024-07-27 RX ADMIN — FAMOTIDINE 20 MG: 20 TABLET, FILM COATED ORAL at 08:55

## 2024-07-27 RX ADMIN — DEXAMETHASONE 4 MG: 4 TABLET ORAL at 14:45

## 2024-07-27 RX ADMIN — WATER 2000 MG: 1 INJECTION INTRAMUSCULAR; INTRAVENOUS; SUBCUTANEOUS at 00:52

## 2024-07-27 RX ADMIN — BACLOFEN 2.5 MG: 10 TABLET ORAL at 08:55

## 2024-07-27 RX ADMIN — FAMOTIDINE 20 MG: 20 TABLET, FILM COATED ORAL at 21:13

## 2024-07-27 ASSESSMENT — PAIN SCALES - GENERAL
PAINLEVEL_OUTOF10: 9
PAINLEVEL_OUTOF10: 7
PAINLEVEL_OUTOF10: 7
PAINLEVEL_OUTOF10: 10
PAINLEVEL_OUTOF10: 7

## 2024-07-27 ASSESSMENT — PAIN DESCRIPTION - ORIENTATION
ORIENTATION: ANTERIOR
ORIENTATION: ANTERIOR

## 2024-07-27 ASSESSMENT — PAIN DESCRIPTION - LOCATION
LOCATION: NECK
LOCATION: NECK;HEAD

## 2024-07-27 ASSESSMENT — PAIN DESCRIPTION - DESCRIPTORS
DESCRIPTORS: ACHING

## 2024-07-28 PROCEDURE — 6370000000 HC RX 637 (ALT 250 FOR IP): Performed by: PHYSICIAN ASSISTANT

## 2024-07-28 PROCEDURE — 2580000003 HC RX 258: Performed by: NEUROLOGICAL SURGERY

## 2024-07-28 PROCEDURE — 6360000002 HC RX W HCPCS: Performed by: PHYSICIAN ASSISTANT

## 2024-07-28 PROCEDURE — 6370000000 HC RX 637 (ALT 250 FOR IP): Performed by: NEUROLOGICAL SURGERY

## 2024-07-28 PROCEDURE — 6360000002 HC RX W HCPCS: Performed by: NEUROLOGICAL SURGERY

## 2024-07-28 PROCEDURE — 2580000003 HC RX 258: Performed by: PHYSICIAN ASSISTANT

## 2024-07-28 PROCEDURE — 1100000000 HC RM PRIVATE

## 2024-07-28 RX ORDER — BACLOFEN 10 MG/1
5 TABLET ORAL 2 TIMES DAILY
Status: DISCONTINUED | OUTPATIENT
Start: 2024-07-28 | End: 2024-07-29

## 2024-07-28 RX ADMIN — PHENYTOIN SODIUM 100 MG: 100 CAPSULE, EXTENDED RELEASE ORAL at 14:23

## 2024-07-28 RX ADMIN — DEXAMETHASONE 4 MG: 4 TABLET ORAL at 07:05

## 2024-07-28 RX ADMIN — DEXAMETHASONE 4 MG: 4 TABLET ORAL at 14:23

## 2024-07-28 RX ADMIN — FAMOTIDINE 20 MG: 20 TABLET, FILM COATED ORAL at 09:57

## 2024-07-28 RX ADMIN — Medication 667 MG: at 09:56

## 2024-07-28 RX ADMIN — PHENYTOIN SODIUM 100 MG: 100 CAPSULE, EXTENDED RELEASE ORAL at 06:30

## 2024-07-28 RX ADMIN — PHENYTOIN SODIUM 100 MG: 100 CAPSULE, EXTENDED RELEASE ORAL at 21:12

## 2024-07-28 RX ADMIN — WATER 2000 MG: 1 INJECTION INTRAMUSCULAR; INTRAVENOUS; SUBCUTANEOUS at 17:15

## 2024-07-28 RX ADMIN — Medication 10 ML: at 09:58

## 2024-07-28 RX ADMIN — Medication 10 ML: at 21:28

## 2024-07-28 RX ADMIN — BISACODYL 10 MG: 10 SUPPOSITORY RECTAL at 09:57

## 2024-07-28 RX ADMIN — GABAPENTIN 300 MG: 300 CAPSULE ORAL at 21:12

## 2024-07-28 RX ADMIN — GABAPENTIN 300 MG: 300 CAPSULE ORAL at 09:57

## 2024-07-28 RX ADMIN — GABAPENTIN 300 MG: 300 CAPSULE ORAL at 14:23

## 2024-07-28 RX ADMIN — OXYCODONE 2.5 MG: 5 TABLET ORAL at 21:11

## 2024-07-28 RX ADMIN — POLYETHYLENE GLYCOL 3350 17 G: 17 POWDER, FOR SOLUTION ORAL at 09:57

## 2024-07-28 RX ADMIN — DEXAMETHASONE 4 MG: 4 TABLET ORAL at 21:11

## 2024-07-28 RX ADMIN — MODAFINIL 100 MG: 100 TABLET ORAL at 09:56

## 2024-07-28 RX ADMIN — KETOROLAC TROMETHAMINE 15 MG: 30 INJECTION, SOLUTION INTRAMUSCULAR at 09:57

## 2024-07-28 RX ADMIN — WATER 2000 MG: 1 INJECTION INTRAMUSCULAR; INTRAVENOUS; SUBCUTANEOUS at 01:14

## 2024-07-28 RX ADMIN — BACLOFEN 5 MG: 10 TABLET ORAL at 21:11

## 2024-07-28 RX ADMIN — ATORVASTATIN CALCIUM 10 MG: 10 TABLET, FILM COATED ORAL at 21:12

## 2024-07-28 RX ADMIN — OXYCODONE 5 MG: 5 TABLET ORAL at 14:23

## 2024-07-28 RX ADMIN — Medication 2000 UNITS: at 09:57

## 2024-07-28 RX ADMIN — WATER 2000 MG: 1 INJECTION INTRAMUSCULAR; INTRAVENOUS; SUBCUTANEOUS at 09:56

## 2024-07-28 RX ADMIN — FAMOTIDINE 20 MG: 20 TABLET, FILM COATED ORAL at 21:12

## 2024-07-28 RX ADMIN — Medication 200 MG: at 09:56

## 2024-07-28 RX ADMIN — KETOROLAC TROMETHAMINE 15 MG: 30 INJECTION, SOLUTION INTRAMUSCULAR at 04:30

## 2024-07-28 ASSESSMENT — PAIN DESCRIPTION - LOCATION
LOCATION: NECK;HEAD
LOCATION: NECK;HEAD
LOCATION: NECK

## 2024-07-28 ASSESSMENT — PAIN DESCRIPTION - DESCRIPTORS
DESCRIPTORS: ACHING

## 2024-07-28 ASSESSMENT — PAIN SCALES - WONG BAKER: WONGBAKER_NUMERICALRESPONSE: HURTS A LITTLE BIT

## 2024-07-28 ASSESSMENT — PAIN SCALES - GENERAL
PAINLEVEL_OUTOF10: 7

## 2024-07-28 ASSESSMENT — PAIN DESCRIPTION - ORIENTATION: ORIENTATION: ANTERIOR

## 2024-07-29 PROCEDURE — 6370000000 HC RX 637 (ALT 250 FOR IP): Performed by: NEUROLOGICAL SURGERY

## 2024-07-29 PROCEDURE — 97530 THERAPEUTIC ACTIVITIES: CPT

## 2024-07-29 PROCEDURE — 6360000002 HC RX W HCPCS: Performed by: NURSE PRACTITIONER

## 2024-07-29 PROCEDURE — 6370000000 HC RX 637 (ALT 250 FOR IP): Performed by: NURSE PRACTITIONER

## 2024-07-29 PROCEDURE — 6370000000 HC RX 637 (ALT 250 FOR IP): Performed by: PHYSICIAN ASSISTANT

## 2024-07-29 PROCEDURE — 99024 POSTOP FOLLOW-UP VISIT: CPT | Performed by: NURSE PRACTITIONER

## 2024-07-29 PROCEDURE — 2580000003 HC RX 258: Performed by: NEUROLOGICAL SURGERY

## 2024-07-29 PROCEDURE — 6360000002 HC RX W HCPCS: Performed by: NEUROLOGICAL SURGERY

## 2024-07-29 PROCEDURE — 6360000002 HC RX W HCPCS: Performed by: FAMILY MEDICINE

## 2024-07-29 PROCEDURE — 6360000002 HC RX W HCPCS: Performed by: PHYSICIAN ASSISTANT

## 2024-07-29 PROCEDURE — 2580000003 HC RX 258: Performed by: PHYSICIAN ASSISTANT

## 2024-07-29 PROCEDURE — 1100000000 HC RM PRIVATE

## 2024-07-29 RX ORDER — BACLOFEN 10 MG/1
5 TABLET ORAL 3 TIMES DAILY
Status: DISCONTINUED | OUTPATIENT
Start: 2024-07-29 | End: 2024-08-05 | Stop reason: HOSPADM

## 2024-07-29 RX ORDER — DEXAMETHASONE 1 MG
2 TABLET ORAL EVERY 8 HOURS SCHEDULED
Status: DISCONTINUED | OUTPATIENT
Start: 2024-07-29 | End: 2024-07-31

## 2024-07-29 RX ADMIN — OXYCODONE 5 MG: 5 TABLET ORAL at 14:36

## 2024-07-29 RX ADMIN — FAMOTIDINE 20 MG: 20 TABLET, FILM COATED ORAL at 09:09

## 2024-07-29 RX ADMIN — DEXAMETHASONE 4 MG: 4 TABLET ORAL at 14:36

## 2024-07-29 RX ADMIN — PHENYTOIN SODIUM 100 MG: 100 CAPSULE, EXTENDED RELEASE ORAL at 05:32

## 2024-07-29 RX ADMIN — PHENYTOIN SODIUM 100 MG: 100 CAPSULE, EXTENDED RELEASE ORAL at 14:36

## 2024-07-29 RX ADMIN — Medication 2000 UNITS: at 09:09

## 2024-07-29 RX ADMIN — WATER 2000 MG: 1 INJECTION INTRAMUSCULAR; INTRAVENOUS; SUBCUTANEOUS at 00:09

## 2024-07-29 RX ADMIN — FAMOTIDINE 20 MG: 20 TABLET, FILM COATED ORAL at 21:57

## 2024-07-29 RX ADMIN — PHENYTOIN SODIUM 100 MG: 100 CAPSULE, EXTENDED RELEASE ORAL at 21:57

## 2024-07-29 RX ADMIN — DEXAMETHASONE 2 MG: 1 TABLET ORAL at 21:58

## 2024-07-29 RX ADMIN — Medication 10 ML: at 09:10

## 2024-07-29 RX ADMIN — POLYETHYLENE GLYCOL 3350 17 G: 17 POWDER, FOR SOLUTION ORAL at 09:10

## 2024-07-29 RX ADMIN — ATORVASTATIN CALCIUM 10 MG: 10 TABLET, FILM COATED ORAL at 21:57

## 2024-07-29 RX ADMIN — ENOXAPARIN SODIUM 40 MG: 100 INJECTION SUBCUTANEOUS at 09:09

## 2024-07-29 RX ADMIN — GABAPENTIN 300 MG: 300 CAPSULE ORAL at 14:36

## 2024-07-29 RX ADMIN — BACLOFEN 5 MG: 10 TABLET ORAL at 21:57

## 2024-07-29 RX ADMIN — GABAPENTIN 300 MG: 300 CAPSULE ORAL at 21:57

## 2024-07-29 RX ADMIN — OXYCODONE 5 MG: 5 TABLET ORAL at 01:24

## 2024-07-29 RX ADMIN — DEXAMETHASONE 4 MG: 4 TABLET ORAL at 05:32

## 2024-07-29 RX ADMIN — Medication 10 ML: at 21:00

## 2024-07-29 RX ADMIN — WATER 2000 MG: 1 INJECTION INTRAMUSCULAR; INTRAVENOUS; SUBCUTANEOUS at 09:10

## 2024-07-29 RX ADMIN — BISACODYL 10 MG: 10 SUPPOSITORY RECTAL at 09:10

## 2024-07-29 RX ADMIN — Medication 667 MG: at 09:09

## 2024-07-29 RX ADMIN — WATER 2000 MG: 1 INJECTION INTRAMUSCULAR; INTRAVENOUS; SUBCUTANEOUS at 17:23

## 2024-07-29 RX ADMIN — GABAPENTIN 300 MG: 300 CAPSULE ORAL at 09:09

## 2024-07-29 RX ADMIN — OXYCODONE 5 MG: 5 TABLET ORAL at 05:56

## 2024-07-29 RX ADMIN — Medication 200 MG: at 09:09

## 2024-07-29 RX ADMIN — MODAFINIL 100 MG: 100 TABLET ORAL at 09:09

## 2024-07-29 RX ADMIN — BACLOFEN 5 MG: 10 TABLET ORAL at 09:09

## 2024-07-29 ASSESSMENT — PAIN SCALES - GENERAL
PAINLEVEL_OUTOF10: 6
PAINLEVEL_OUTOF10: 7
PAINLEVEL_OUTOF10: 1
PAINLEVEL_OUTOF10: 8
PAINLEVEL_OUTOF10: 8

## 2024-07-29 ASSESSMENT — PAIN DESCRIPTION - LOCATION: LOCATION: NECK

## 2024-07-29 ASSESSMENT — PAIN SCALES - WONG BAKER: WONGBAKER_NUMERICALRESPONSE: NO HURT

## 2024-07-30 ENCOUNTER — APPOINTMENT (OUTPATIENT)
Facility: HOSPITAL | Age: 69
DRG: 519 | End: 2024-07-30
Payer: MEDICARE

## 2024-07-30 LAB
ANION GAP SERPL CALC-SCNC: 7 MMOL/L (ref 5–15)
BASOPHILS # BLD: 0 K/UL (ref 0–0.1)
BASOPHILS NFR BLD: 0 % (ref 0–1)
BUN SERPL-MCNC: 13 MG/DL (ref 6–20)
BUN/CREAT SERPL: 28 (ref 12–20)
CALCIUM SERPL-MCNC: 9.2 MG/DL (ref 8.5–10.1)
CHLORIDE SERPL-SCNC: 107 MMOL/L (ref 97–108)
CO2 SERPL-SCNC: 27 MMOL/L (ref 21–32)
CREAT SERPL-MCNC: 0.47 MG/DL (ref 0.55–1.02)
DIFFERENTIAL METHOD BLD: ABNORMAL
ECHO BSA: 1.85 M2
EOSINOPHIL # BLD: 0.2 K/UL (ref 0–0.4)
EOSINOPHIL NFR BLD: 3 % (ref 0–7)
ERYTHROCYTE [DISTWIDTH] IN BLOOD BY AUTOMATED COUNT: 15.9 % (ref 11.5–14.5)
GLUCOSE SERPL-MCNC: 82 MG/DL (ref 65–100)
HCT VFR BLD AUTO: 34.1 % (ref 35–47)
HGB BLD-MCNC: 11.1 G/DL (ref 11.5–16)
IMM GRANULOCYTES # BLD AUTO: 0 K/UL (ref 0–0.04)
IMM GRANULOCYTES NFR BLD AUTO: 0 % (ref 0–0.5)
LYMPHOCYTES # BLD: 0.9 K/UL (ref 0.8–3.5)
LYMPHOCYTES NFR BLD: 12 % (ref 12–49)
MCH RBC QN AUTO: 33.2 PG (ref 26–34)
MCHC RBC AUTO-ENTMCNC: 32.6 G/DL (ref 30–36.5)
MCV RBC AUTO: 102.1 FL (ref 80–99)
MONOCYTES # BLD: 0.5 K/UL (ref 0–1)
MONOCYTES NFR BLD: 7 % (ref 5–13)
NEUTS SEG # BLD: 6.1 K/UL (ref 1.8–8)
NEUTS SEG NFR BLD: 78 % (ref 32–75)
NRBC # BLD: 0 K/UL (ref 0–0.01)
NRBC BLD-RTO: 0 PER 100 WBC
PLATELET # BLD AUTO: 205 K/UL (ref 150–400)
PMV BLD AUTO: 9.7 FL (ref 8.9–12.9)
POTASSIUM SERPL-SCNC: 4.3 MMOL/L (ref 3.5–5.1)
RBC # BLD AUTO: 3.34 M/UL (ref 3.8–5.2)
SODIUM SERPL-SCNC: 141 MMOL/L (ref 136–145)
WBC # BLD AUTO: 7.8 K/UL (ref 3.6–11)

## 2024-07-30 PROCEDURE — 2580000003 HC RX 258: Performed by: NEUROLOGICAL SURGERY

## 2024-07-30 PROCEDURE — 85025 COMPLETE CBC W/AUTO DIFF WBC: CPT

## 2024-07-30 PROCEDURE — 6360000002 HC RX W HCPCS: Performed by: NURSE PRACTITIONER

## 2024-07-30 PROCEDURE — 6360000002 HC RX W HCPCS: Performed by: FAMILY MEDICINE

## 2024-07-30 PROCEDURE — 97110 THERAPEUTIC EXERCISES: CPT

## 2024-07-30 PROCEDURE — 71045 X-RAY EXAM CHEST 1 VIEW: CPT

## 2024-07-30 PROCEDURE — 93970 EXTREMITY STUDY: CPT

## 2024-07-30 PROCEDURE — 6370000000 HC RX 637 (ALT 250 FOR IP): Performed by: NEUROLOGICAL SURGERY

## 2024-07-30 PROCEDURE — 6370000000 HC RX 637 (ALT 250 FOR IP): Performed by: PHYSICIAN ASSISTANT

## 2024-07-30 PROCEDURE — 80048 BASIC METABOLIC PNL TOTAL CA: CPT

## 2024-07-30 PROCEDURE — 99024 POSTOP FOLLOW-UP VISIT: CPT | Performed by: PHYSICIAN ASSISTANT

## 2024-07-30 PROCEDURE — 2580000003 HC RX 258: Performed by: PHYSICIAN ASSISTANT

## 2024-07-30 PROCEDURE — 97530 THERAPEUTIC ACTIVITIES: CPT

## 2024-07-30 PROCEDURE — 6370000000 HC RX 637 (ALT 250 FOR IP): Performed by: NURSE PRACTITIONER

## 2024-07-30 PROCEDURE — 97112 NEUROMUSCULAR REEDUCATION: CPT

## 2024-07-30 PROCEDURE — 70450 CT HEAD/BRAIN W/O DYE: CPT

## 2024-07-30 PROCEDURE — 6360000002 HC RX W HCPCS: Performed by: PHYSICIAN ASSISTANT

## 2024-07-30 PROCEDURE — 1100000000 HC RM PRIVATE

## 2024-07-30 RX ADMIN — Medication 2000 UNITS: at 08:45

## 2024-07-30 RX ADMIN — MIDODRINE HYDROCHLORIDE 5 MG: 5 TABLET ORAL at 16:36

## 2024-07-30 RX ADMIN — BACLOFEN 5 MG: 10 TABLET ORAL at 21:16

## 2024-07-30 RX ADMIN — FAMOTIDINE 20 MG: 20 TABLET, FILM COATED ORAL at 21:16

## 2024-07-30 RX ADMIN — ENOXAPARIN SODIUM 40 MG: 100 INJECTION SUBCUTANEOUS at 08:42

## 2024-07-30 RX ADMIN — GABAPENTIN 300 MG: 300 CAPSULE ORAL at 21:16

## 2024-07-30 RX ADMIN — WATER 2000 MG: 1 INJECTION INTRAMUSCULAR; INTRAVENOUS; SUBCUTANEOUS at 00:25

## 2024-07-30 RX ADMIN — OXYCODONE 5 MG: 5 TABLET ORAL at 13:36

## 2024-07-30 RX ADMIN — DEXAMETHASONE 2 MG: 1 TABLET ORAL at 13:36

## 2024-07-30 RX ADMIN — PHENYTOIN SODIUM 100 MG: 100 CAPSULE, EXTENDED RELEASE ORAL at 13:36

## 2024-07-30 RX ADMIN — Medication 667 MG: at 08:44

## 2024-07-30 RX ADMIN — WATER 2000 MG: 1 INJECTION INTRAMUSCULAR; INTRAVENOUS; SUBCUTANEOUS at 16:33

## 2024-07-30 RX ADMIN — WATER 2000 MG: 1 INJECTION INTRAMUSCULAR; INTRAVENOUS; SUBCUTANEOUS at 08:42

## 2024-07-30 RX ADMIN — MIDODRINE HYDROCHLORIDE 5 MG: 5 TABLET ORAL at 03:46

## 2024-07-30 RX ADMIN — Medication 10 ML: at 09:20

## 2024-07-30 RX ADMIN — Medication 200 MG: at 08:43

## 2024-07-30 RX ADMIN — PHENYTOIN SODIUM 100 MG: 100 CAPSULE, EXTENDED RELEASE ORAL at 21:16

## 2024-07-30 RX ADMIN — MODAFINIL 100 MG: 100 TABLET ORAL at 08:46

## 2024-07-30 RX ADMIN — FAMOTIDINE 20 MG: 20 TABLET, FILM COATED ORAL at 09:19

## 2024-07-30 RX ADMIN — ATORVASTATIN CALCIUM 10 MG: 10 TABLET, FILM COATED ORAL at 21:16

## 2024-07-30 RX ADMIN — DEXAMETHASONE 2 MG: 1 TABLET ORAL at 05:58

## 2024-07-30 RX ADMIN — DEXAMETHASONE 2 MG: 1 TABLET ORAL at 21:16

## 2024-07-30 RX ADMIN — GABAPENTIN 300 MG: 300 CAPSULE ORAL at 13:36

## 2024-07-30 RX ADMIN — Medication 10 ML: at 21:21

## 2024-07-30 RX ADMIN — BACLOFEN 5 MG: 10 TABLET ORAL at 13:36

## 2024-07-30 RX ADMIN — GABAPENTIN 300 MG: 300 CAPSULE ORAL at 08:44

## 2024-07-30 RX ADMIN — BACLOFEN 5 MG: 10 TABLET ORAL at 08:42

## 2024-07-30 RX ADMIN — PHENYTOIN SODIUM 100 MG: 100 CAPSULE, EXTENDED RELEASE ORAL at 05:58

## 2024-07-30 RX ADMIN — OXYCODONE 5 MG: 5 TABLET ORAL at 00:25

## 2024-07-30 ASSESSMENT — PAIN DESCRIPTION - LOCATION: LOCATION: NECK

## 2024-07-30 ASSESSMENT — PAIN SCALES - GENERAL
PAINLEVEL_OUTOF10: 7
PAINLEVEL_OUTOF10: 9
PAINLEVEL_OUTOF10: 4
PAINLEVEL_OUTOF10: 7
PAINLEVEL_OUTOF10: 7

## 2024-07-30 ASSESSMENT — PAIN SCALES - WONG BAKER: WONGBAKER_NUMERICALRESPONSE: HURTS EVEN MORE

## 2024-07-31 LAB
ALBUMIN SERPL-MCNC: 2.3 G/DL (ref 3.5–5)
ALBUMIN/GLOB SERPL: 0.6 (ref 1.1–2.2)
ALP SERPL-CCNC: 112 U/L (ref 45–117)
ALT SERPL-CCNC: 26 U/L (ref 12–78)
ANION GAP SERPL CALC-SCNC: 5 MMOL/L (ref 5–15)
AST SERPL-CCNC: 30 U/L (ref 15–37)
B PERT DNA SPEC QL NAA+PROBE: NOT DETECTED
BILIRUB SERPL-MCNC: 0.3 MG/DL (ref 0.2–1)
BORDETELLA PARAPERTUSSIS BY PCR: NOT DETECTED
BUN SERPL-MCNC: 12 MG/DL (ref 6–20)
BUN/CREAT SERPL: 29 (ref 12–20)
C PNEUM DNA SPEC QL NAA+PROBE: NOT DETECTED
CALCIUM SERPL-MCNC: 9.8 MG/DL (ref 8.5–10.1)
CHLORIDE SERPL-SCNC: 106 MMOL/L (ref 97–108)
CO2 SERPL-SCNC: 31 MMOL/L (ref 21–32)
CREAT SERPL-MCNC: 0.42 MG/DL (ref 0.55–1.02)
ERYTHROCYTE [DISTWIDTH] IN BLOOD BY AUTOMATED COUNT: 15.9 % (ref 11.5–14.5)
FLUAV SUBTYP SPEC NAA+PROBE: NOT DETECTED
FLUBV RNA SPEC QL NAA+PROBE: NOT DETECTED
GLOBULIN SER CALC-MCNC: 3.8 G/DL (ref 2–4)
GLUCOSE BLD STRIP.AUTO-MCNC: 85 MG/DL (ref 65–117)
GLUCOSE SERPL-MCNC: 88 MG/DL (ref 65–100)
HADV DNA SPEC QL NAA+PROBE: NOT DETECTED
HCOV 229E RNA SPEC QL NAA+PROBE: NOT DETECTED
HCOV HKU1 RNA SPEC QL NAA+PROBE: NOT DETECTED
HCOV NL63 RNA SPEC QL NAA+PROBE: NOT DETECTED
HCOV OC43 RNA SPEC QL NAA+PROBE: NOT DETECTED
HCT VFR BLD AUTO: 36.3 % (ref 35–47)
HGB BLD-MCNC: 11.7 G/DL (ref 11.5–16)
HMPV RNA SPEC QL NAA+PROBE: NOT DETECTED
HPIV1 RNA SPEC QL NAA+PROBE: NOT DETECTED
HPIV2 RNA SPEC QL NAA+PROBE: NOT DETECTED
HPIV3 RNA SPEC QL NAA+PROBE: NOT DETECTED
HPIV4 RNA SPEC QL NAA+PROBE: NOT DETECTED
M PNEUMO DNA SPEC QL NAA+PROBE: NOT DETECTED
MCH RBC QN AUTO: 33.2 PG (ref 26–34)
MCHC RBC AUTO-ENTMCNC: 32.2 G/DL (ref 30–36.5)
MCV RBC AUTO: 103.1 FL (ref 80–99)
NRBC # BLD: 0 K/UL (ref 0–0.01)
NRBC BLD-RTO: 0 PER 100 WBC
PLATELET # BLD AUTO: 204 K/UL (ref 150–400)
PMV BLD AUTO: 9.7 FL (ref 8.9–12.9)
POTASSIUM SERPL-SCNC: 4.4 MMOL/L (ref 3.5–5.1)
PROT SERPL-MCNC: 6.1 G/DL (ref 6.4–8.2)
RBC # BLD AUTO: 3.52 M/UL (ref 3.8–5.2)
RSV RNA SPEC QL NAA+PROBE: NOT DETECTED
RV+EV RNA SPEC QL NAA+PROBE: NOT DETECTED
SARS-COV-2 RNA RESP QL NAA+PROBE: NOT DETECTED
SERVICE CMNT-IMP: NORMAL
SODIUM SERPL-SCNC: 142 MMOL/L (ref 136–145)
WBC # BLD AUTO: 6.3 K/UL (ref 3.6–11)

## 2024-07-31 PROCEDURE — 6360000002 HC RX W HCPCS: Performed by: NURSE PRACTITIONER

## 2024-07-31 PROCEDURE — 6370000000 HC RX 637 (ALT 250 FOR IP): Performed by: NEUROLOGICAL SURGERY

## 2024-07-31 PROCEDURE — 2580000003 HC RX 258: Performed by: NEUROLOGICAL SURGERY

## 2024-07-31 PROCEDURE — 6370000000 HC RX 637 (ALT 250 FOR IP): Performed by: PHYSICIAN ASSISTANT

## 2024-07-31 PROCEDURE — 6360000002 HC RX W HCPCS: Performed by: PHYSICIAN ASSISTANT

## 2024-07-31 PROCEDURE — 80053 COMPREHEN METABOLIC PANEL: CPT

## 2024-07-31 PROCEDURE — 6360000002 HC RX W HCPCS: Performed by: FAMILY MEDICINE

## 2024-07-31 PROCEDURE — 2580000003 HC RX 258: Performed by: PHYSICIAN ASSISTANT

## 2024-07-31 PROCEDURE — 6370000000 HC RX 637 (ALT 250 FOR IP): Performed by: NURSE PRACTITIONER

## 2024-07-31 PROCEDURE — 82962 GLUCOSE BLOOD TEST: CPT

## 2024-07-31 PROCEDURE — 97530 THERAPEUTIC ACTIVITIES: CPT

## 2024-07-31 PROCEDURE — 36415 COLL VENOUS BLD VENIPUNCTURE: CPT

## 2024-07-31 PROCEDURE — 99222 1ST HOSP IP/OBS MODERATE 55: CPT | Performed by: INTERNAL MEDICINE

## 2024-07-31 PROCEDURE — 85027 COMPLETE CBC AUTOMATED: CPT

## 2024-07-31 PROCEDURE — 0202U NFCT DS 22 TRGT SARS-COV-2: CPT

## 2024-07-31 PROCEDURE — 1100000000 HC RM PRIVATE

## 2024-07-31 RX ORDER — DEXAMETHASONE 1 MG
2 TABLET ORAL EVERY 12 HOURS SCHEDULED
Status: DISCONTINUED | OUTPATIENT
Start: 2024-08-01 | End: 2024-08-01

## 2024-07-31 RX ADMIN — MODAFINIL 100 MG: 100 TABLET ORAL at 09:08

## 2024-07-31 RX ADMIN — Medication 667 MG: at 09:08

## 2024-07-31 RX ADMIN — PHENYTOIN SODIUM 100 MG: 100 CAPSULE, EXTENDED RELEASE ORAL at 12:58

## 2024-07-31 RX ADMIN — POLYETHYLENE GLYCOL 3350 17 G: 17 POWDER, FOR SOLUTION ORAL at 09:09

## 2024-07-31 RX ADMIN — FAMOTIDINE 20 MG: 20 TABLET, FILM COATED ORAL at 21:02

## 2024-07-31 RX ADMIN — DEXAMETHASONE 2 MG: 1 TABLET ORAL at 05:56

## 2024-07-31 RX ADMIN — OXYCODONE 5 MG: 5 TABLET ORAL at 14:08

## 2024-07-31 RX ADMIN — BACLOFEN 5 MG: 10 TABLET ORAL at 09:07

## 2024-07-31 RX ADMIN — GABAPENTIN 300 MG: 300 CAPSULE ORAL at 12:59

## 2024-07-31 RX ADMIN — ENOXAPARIN SODIUM 40 MG: 100 INJECTION SUBCUTANEOUS at 09:07

## 2024-07-31 RX ADMIN — MIDODRINE HYDROCHLORIDE 5 MG: 5 TABLET ORAL at 21:01

## 2024-07-31 RX ADMIN — WATER 2000 MG: 1 INJECTION INTRAMUSCULAR; INTRAVENOUS; SUBCUTANEOUS at 09:09

## 2024-07-31 RX ADMIN — BACLOFEN 5 MG: 10 TABLET ORAL at 21:01

## 2024-07-31 RX ADMIN — OXYCODONE 5 MG: 5 TABLET ORAL at 21:01

## 2024-07-31 RX ADMIN — WATER 2000 MG: 1 INJECTION INTRAMUSCULAR; INTRAVENOUS; SUBCUTANEOUS at 01:57

## 2024-07-31 RX ADMIN — BACLOFEN 5 MG: 10 TABLET ORAL at 13:03

## 2024-07-31 RX ADMIN — PHENYTOIN SODIUM 100 MG: 100 CAPSULE, EXTENDED RELEASE ORAL at 21:02

## 2024-07-31 RX ADMIN — ATORVASTATIN CALCIUM 10 MG: 10 TABLET, FILM COATED ORAL at 21:02

## 2024-07-31 RX ADMIN — GABAPENTIN 300 MG: 300 CAPSULE ORAL at 21:01

## 2024-07-31 RX ADMIN — FAMOTIDINE 20 MG: 20 TABLET, FILM COATED ORAL at 09:08

## 2024-07-31 RX ADMIN — Medication 10 ML: at 09:07

## 2024-07-31 RX ADMIN — DEXAMETHASONE 2 MG: 1 TABLET ORAL at 12:59

## 2024-07-31 RX ADMIN — GABAPENTIN 300 MG: 300 CAPSULE ORAL at 09:08

## 2024-07-31 RX ADMIN — PHENYTOIN SODIUM 100 MG: 100 CAPSULE, EXTENDED RELEASE ORAL at 05:56

## 2024-07-31 RX ADMIN — ACETAMINOPHEN 650 MG: 325 TABLET ORAL at 07:22

## 2024-07-31 RX ADMIN — Medication 200 MG: at 09:08

## 2024-07-31 RX ADMIN — Medication 2000 UNITS: at 09:08

## 2024-07-31 ASSESSMENT — PAIN SCALES - GENERAL
PAINLEVEL_OUTOF10: 8
PAINLEVEL_OUTOF10: 4
PAINLEVEL_OUTOF10: 6
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
## 2024-08-01 LAB
BACTERIA SPEC CULT: NORMAL
BACTERIA SPEC CULT: NORMAL
BASOPHILS # BLD: 0 K/UL (ref 0–0.1)
BASOPHILS NFR BLD: 0 % (ref 0–1)
DIFFERENTIAL METHOD BLD: ABNORMAL
ECHO BSA: 1.85 M2
EOSINOPHIL # BLD: 0.3 K/UL (ref 0–0.4)
EOSINOPHIL NFR BLD: 4 % (ref 0–7)
ERYTHROCYTE [DISTWIDTH] IN BLOOD BY AUTOMATED COUNT: 15.8 % (ref 11.5–14.5)
HCT VFR BLD AUTO: 35.2 % (ref 35–47)
HGB BLD-MCNC: 11.7 G/DL (ref 11.5–16)
IMM GRANULOCYTES # BLD AUTO: 0 K/UL (ref 0–0.04)
IMM GRANULOCYTES NFR BLD AUTO: 0 % (ref 0–0.5)
LYMPHOCYTES # BLD: 1.4 K/UL (ref 0.8–3.5)
LYMPHOCYTES NFR BLD: 21 % (ref 12–49)
MCH RBC QN AUTO: 33.8 PG (ref 26–34)
MCHC RBC AUTO-ENTMCNC: 33.2 G/DL (ref 30–36.5)
MCV RBC AUTO: 101.7 FL (ref 80–99)
MONOCYTES # BLD: 0.5 K/UL (ref 0–1)
MONOCYTES NFR BLD: 8 % (ref 5–13)
NEUTS SEG # BLD: 4.4 K/UL (ref 1.8–8)
NEUTS SEG NFR BLD: 67 % (ref 32–75)
NRBC # BLD: 0 K/UL (ref 0–0.01)
NRBC BLD-RTO: 0 PER 100 WBC
PLATELET # BLD AUTO: 226 K/UL (ref 150–400)
PMV BLD AUTO: 9.4 FL (ref 8.9–12.9)
RBC # BLD AUTO: 3.46 M/UL (ref 3.8–5.2)
SERVICE CMNT-IMP: NORMAL
SERVICE CMNT-IMP: NORMAL
WBC # BLD AUTO: 6.6 K/UL (ref 3.6–11)

## 2024-08-01 PROCEDURE — 99024 POSTOP FOLLOW-UP VISIT: CPT | Performed by: PHYSICIAN ASSISTANT

## 2024-08-01 PROCEDURE — 6360000002 HC RX W HCPCS: Performed by: FAMILY MEDICINE

## 2024-08-01 PROCEDURE — 2580000003 HC RX 258: Performed by: NEUROLOGICAL SURGERY

## 2024-08-01 PROCEDURE — 6370000000 HC RX 637 (ALT 250 FOR IP): Performed by: PHYSICIAN ASSISTANT

## 2024-08-01 PROCEDURE — 6370000000 HC RX 637 (ALT 250 FOR IP): Performed by: NEUROLOGICAL SURGERY

## 2024-08-01 PROCEDURE — 76937 US GUIDE VASCULAR ACCESS: CPT

## 2024-08-01 PROCEDURE — 36415 COLL VENOUS BLD VENIPUNCTURE: CPT

## 2024-08-01 PROCEDURE — 6360000002 HC RX W HCPCS: Performed by: PHYSICIAN ASSISTANT

## 2024-08-01 PROCEDURE — 85025 COMPLETE CBC W/AUTO DIFF WBC: CPT

## 2024-08-01 PROCEDURE — 6370000000 HC RX 637 (ALT 250 FOR IP): Performed by: NURSE PRACTITIONER

## 2024-08-01 PROCEDURE — 97164 PT RE-EVAL EST PLAN CARE: CPT

## 2024-08-01 PROCEDURE — 97530 THERAPEUTIC ACTIVITIES: CPT

## 2024-08-01 PROCEDURE — 6370000000 HC RX 637 (ALT 250 FOR IP)

## 2024-08-01 PROCEDURE — 1100000000 HC RM PRIVATE

## 2024-08-01 RX ORDER — DEXAMETHASONE 1 MG
2 TABLET ORAL EVERY 12 HOURS SCHEDULED
Status: COMPLETED | OUTPATIENT
Start: 2024-08-01 | End: 2024-08-03

## 2024-08-01 RX ADMIN — FAMOTIDINE 20 MG: 20 TABLET, FILM COATED ORAL at 22:09

## 2024-08-01 RX ADMIN — DICLOFENAC SODIUM 2 G: 10 GEL TOPICAL at 13:18

## 2024-08-01 RX ADMIN — GABAPENTIN 300 MG: 300 CAPSULE ORAL at 22:09

## 2024-08-01 RX ADMIN — BACLOFEN 5 MG: 10 TABLET ORAL at 22:09

## 2024-08-01 RX ADMIN — GABAPENTIN 300 MG: 300 CAPSULE ORAL at 08:37

## 2024-08-01 RX ADMIN — DICLOFENAC SODIUM 2 G: 10 GEL TOPICAL at 22:08

## 2024-08-01 RX ADMIN — FAMOTIDINE 20 MG: 20 TABLET, FILM COATED ORAL at 08:38

## 2024-08-01 RX ADMIN — ATORVASTATIN CALCIUM 10 MG: 10 TABLET, FILM COATED ORAL at 22:09

## 2024-08-01 RX ADMIN — MIDODRINE HYDROCHLORIDE 5 MG: 5 TABLET ORAL at 06:20

## 2024-08-01 RX ADMIN — OXYCODONE 5 MG: 5 TABLET ORAL at 06:19

## 2024-08-01 RX ADMIN — PHENYTOIN SODIUM 100 MG: 100 CAPSULE, EXTENDED RELEASE ORAL at 22:09

## 2024-08-01 RX ADMIN — PHENYTOIN SODIUM 100 MG: 100 CAPSULE, EXTENDED RELEASE ORAL at 15:05

## 2024-08-01 RX ADMIN — Medication 10 ML: at 22:10

## 2024-08-01 RX ADMIN — GABAPENTIN 300 MG: 300 CAPSULE ORAL at 15:05

## 2024-08-01 RX ADMIN — ENOXAPARIN SODIUM 40 MG: 100 INJECTION SUBCUTANEOUS at 08:37

## 2024-08-01 RX ADMIN — Medication 2000 UNITS: at 08:37

## 2024-08-01 RX ADMIN — DEXAMETHASONE 2 MG: 1 TABLET ORAL at 08:37

## 2024-08-01 RX ADMIN — Medication 200 MG: at 08:37

## 2024-08-01 RX ADMIN — DEXAMETHASONE 2 MG: 1 TABLET ORAL at 22:09

## 2024-08-01 RX ADMIN — BACLOFEN 5 MG: 10 TABLET ORAL at 15:05

## 2024-08-01 RX ADMIN — MODAFINIL 100 MG: 100 TABLET ORAL at 08:38

## 2024-08-01 RX ADMIN — BACLOFEN 5 MG: 10 TABLET ORAL at 08:38

## 2024-08-01 RX ADMIN — PHENYTOIN SODIUM 100 MG: 100 CAPSULE, EXTENDED RELEASE ORAL at 06:02

## 2024-08-01 RX ADMIN — MIDODRINE HYDROCHLORIDE 5 MG: 5 TABLET ORAL at 22:09

## 2024-08-01 RX ADMIN — Medication 667 MG: at 08:37

## 2024-08-01 ASSESSMENT — PAIN SCALES - GENERAL
PAINLEVEL_OUTOF10: 8
PAINLEVEL_OUTOF10: 4
PAINLEVEL_OUTOF10: 8
PAINLEVEL_OUTOF10: 6

## 2024-08-01 ASSESSMENT — PAIN DESCRIPTION - LOCATION: LOCATION: GENERALIZED

## 2024-08-01 ASSESSMENT — PAIN DESCRIPTION - DESCRIPTORS: DESCRIPTORS: ACHING;BURNING

## 2024-08-01 ASSESSMENT — PAIN - FUNCTIONAL ASSESSMENT: PAIN_FUNCTIONAL_ASSESSMENT: ACTIVITIES ARE NOT PREVENTED

## 2024-08-01 NOTE — PROCEDURES
Vascular Access Team - peripheral IV catheter insertion note     A 22 gauge, 45 mm (1.75 inch) PIV was inserted into the right proximal ventral forearm using ultrasound guidance. The IV flushed easily and had brisk blood return. The patient tolerated the procedure well.   Misael Perez RN

## 2024-08-01 NOTE — CARE COORDINATION
Transition of Care Plan:    RUR: 15%  Prior Level of Functioning: Independent    Disposition: IPR   If SNF or IPR: Date FOC offered: 7/25   Date FOC received: 7/25   Accepting facility: [facility]   Accepting SNF-2nd Option: [facility]   Date authorization started with reference number: AUTH pending initiated 7/26   Date authorization received and expires: AUTH pending initiated 7/26  Follow up appointments: PCP   DME needed: None   Transportation at discharge: BLS   IM/IMM Medicare/ letter given: Received 2nd IMM letter Needed  Caregiver Contact:  Janell Mcgarry (Other)  [phone]   Discharge Caregiver contacted prior to discharge? N/A   Care Conference needed?   Barriers to discharge: AUTH-Liaison to get a status update today

## 2024-08-02 PROCEDURE — 1100000000 HC RM PRIVATE

## 2024-08-02 PROCEDURE — 6370000000 HC RX 637 (ALT 250 FOR IP): Performed by: NEUROLOGICAL SURGERY

## 2024-08-02 PROCEDURE — 2580000003 HC RX 258: Performed by: NEUROLOGICAL SURGERY

## 2024-08-02 PROCEDURE — 6360000002 HC RX W HCPCS: Performed by: PHYSICIAN ASSISTANT

## 2024-08-02 PROCEDURE — 6370000000 HC RX 637 (ALT 250 FOR IP): Performed by: PHYSICIAN ASSISTANT

## 2024-08-02 PROCEDURE — 6360000002 HC RX W HCPCS: Performed by: FAMILY MEDICINE

## 2024-08-02 PROCEDURE — 97112 NEUROMUSCULAR REEDUCATION: CPT

## 2024-08-02 PROCEDURE — 6370000000 HC RX 637 (ALT 250 FOR IP): Performed by: NURSE PRACTITIONER

## 2024-08-02 PROCEDURE — 97530 THERAPEUTIC ACTIVITIES: CPT

## 2024-08-02 RX ADMIN — FAMOTIDINE 20 MG: 20 TABLET, FILM COATED ORAL at 22:29

## 2024-08-02 RX ADMIN — DEXAMETHASONE 2 MG: 1 TABLET ORAL at 22:29

## 2024-08-02 RX ADMIN — GABAPENTIN 300 MG: 300 CAPSULE ORAL at 09:22

## 2024-08-02 RX ADMIN — GABAPENTIN 300 MG: 300 CAPSULE ORAL at 22:29

## 2024-08-02 RX ADMIN — PHENYTOIN SODIUM 100 MG: 100 CAPSULE, EXTENDED RELEASE ORAL at 14:25

## 2024-08-02 RX ADMIN — ATORVASTATIN CALCIUM 10 MG: 10 TABLET, FILM COATED ORAL at 22:29

## 2024-08-02 RX ADMIN — GABAPENTIN 300 MG: 300 CAPSULE ORAL at 14:25

## 2024-08-02 RX ADMIN — Medication 2000 UNITS: at 09:22

## 2024-08-02 RX ADMIN — BACLOFEN 5 MG: 10 TABLET ORAL at 14:25

## 2024-08-02 RX ADMIN — OXYCODONE 5 MG: 5 TABLET ORAL at 14:25

## 2024-08-02 RX ADMIN — ENOXAPARIN SODIUM 40 MG: 100 INJECTION SUBCUTANEOUS at 09:22

## 2024-08-02 RX ADMIN — Medication 10 ML: at 22:30

## 2024-08-02 RX ADMIN — PHENYTOIN SODIUM 100 MG: 100 CAPSULE, EXTENDED RELEASE ORAL at 06:10

## 2024-08-02 RX ADMIN — Medication 667 MG: at 09:22

## 2024-08-02 RX ADMIN — DICLOFENAC SODIUM 2 G: 10 GEL TOPICAL at 09:22

## 2024-08-02 RX ADMIN — Medication 200 MG: at 09:21

## 2024-08-02 RX ADMIN — BACLOFEN 5 MG: 10 TABLET ORAL at 22:29

## 2024-08-02 RX ADMIN — BACLOFEN 5 MG: 10 TABLET ORAL at 09:22

## 2024-08-02 RX ADMIN — OXYCODONE 5 MG: 5 TABLET ORAL at 07:46

## 2024-08-02 RX ADMIN — DEXAMETHASONE 2 MG: 1 TABLET ORAL at 09:22

## 2024-08-02 RX ADMIN — FAMOTIDINE 20 MG: 20 TABLET, FILM COATED ORAL at 09:22

## 2024-08-02 RX ADMIN — PHENYTOIN SODIUM 100 MG: 100 CAPSULE, EXTENDED RELEASE ORAL at 22:29

## 2024-08-02 RX ADMIN — MODAFINIL 100 MG: 100 TABLET ORAL at 09:22

## 2024-08-02 RX ADMIN — MIDODRINE HYDROCHLORIDE 5 MG: 5 TABLET ORAL at 07:46

## 2024-08-02 ASSESSMENT — PAIN SCALES - GENERAL
PAINLEVEL_OUTOF10: 7
PAINLEVEL_OUTOF10: 8
PAINLEVEL_OUTOF10: 7
PAINLEVEL_OUTOF10: 0

## 2024-08-02 ASSESSMENT — PAIN SCALES - WONG BAKER: WONGBAKER_NUMERICALRESPONSE: HURTS A LITTLE BIT

## 2024-08-03 PROCEDURE — 6370000000 HC RX 637 (ALT 250 FOR IP): Performed by: NEUROLOGICAL SURGERY

## 2024-08-03 PROCEDURE — 6370000000 HC RX 637 (ALT 250 FOR IP): Performed by: NURSE PRACTITIONER

## 2024-08-03 PROCEDURE — 2580000003 HC RX 258: Performed by: NEUROLOGICAL SURGERY

## 2024-08-03 PROCEDURE — 6370000000 HC RX 637 (ALT 250 FOR IP): Performed by: PHYSICIAN ASSISTANT

## 2024-08-03 PROCEDURE — 6360000002 HC RX W HCPCS: Performed by: FAMILY MEDICINE

## 2024-08-03 PROCEDURE — 6360000002 HC RX W HCPCS: Performed by: PHYSICIAN ASSISTANT

## 2024-08-03 PROCEDURE — 1100000000 HC RM PRIVATE

## 2024-08-03 RX ADMIN — PHENYTOIN SODIUM 100 MG: 100 CAPSULE, EXTENDED RELEASE ORAL at 15:37

## 2024-08-03 RX ADMIN — ATORVASTATIN CALCIUM 10 MG: 10 TABLET, FILM COATED ORAL at 21:07

## 2024-08-03 RX ADMIN — GABAPENTIN 300 MG: 300 CAPSULE ORAL at 15:37

## 2024-08-03 RX ADMIN — Medication 10 ML: at 21:09

## 2024-08-03 RX ADMIN — GABAPENTIN 300 MG: 300 CAPSULE ORAL at 10:08

## 2024-08-03 RX ADMIN — BACLOFEN 5 MG: 10 TABLET ORAL at 10:10

## 2024-08-03 RX ADMIN — FAMOTIDINE 20 MG: 20 TABLET, FILM COATED ORAL at 10:09

## 2024-08-03 RX ADMIN — BACLOFEN 5 MG: 10 TABLET ORAL at 21:07

## 2024-08-03 RX ADMIN — PHENYTOIN SODIUM 100 MG: 100 CAPSULE, EXTENDED RELEASE ORAL at 21:07

## 2024-08-03 RX ADMIN — BACLOFEN 5 MG: 10 TABLET ORAL at 15:36

## 2024-08-03 RX ADMIN — DICLOFENAC SODIUM 2 G: 10 GEL TOPICAL at 10:10

## 2024-08-03 RX ADMIN — Medication 200 MG: at 10:08

## 2024-08-03 RX ADMIN — DEXAMETHASONE 2 MG: 1 TABLET ORAL at 10:09

## 2024-08-03 RX ADMIN — FAMOTIDINE 20 MG: 20 TABLET, FILM COATED ORAL at 21:07

## 2024-08-03 RX ADMIN — OXYCODONE 5 MG: 5 TABLET ORAL at 17:22

## 2024-08-03 RX ADMIN — Medication 2000 UNITS: at 10:09

## 2024-08-03 RX ADMIN — MIDODRINE HYDROCHLORIDE 5 MG: 5 TABLET ORAL at 17:22

## 2024-08-03 RX ADMIN — ENOXAPARIN SODIUM 40 MG: 100 INJECTION SUBCUTANEOUS at 10:11

## 2024-08-03 RX ADMIN — MODAFINIL 100 MG: 100 TABLET ORAL at 10:09

## 2024-08-03 RX ADMIN — Medication 667 MG: at 10:08

## 2024-08-03 RX ADMIN — GABAPENTIN 300 MG: 300 CAPSULE ORAL at 21:07

## 2024-08-03 RX ADMIN — PHENYTOIN SODIUM 100 MG: 100 CAPSULE, EXTENDED RELEASE ORAL at 05:56

## 2024-08-03 ASSESSMENT — PAIN SCALES - GENERAL
PAINLEVEL_OUTOF10: 7
PAINLEVEL_OUTOF10: 5
PAINLEVEL_OUTOF10: 7
PAINLEVEL_OUTOF10: 5

## 2024-08-03 ASSESSMENT — PAIN DESCRIPTION - LOCATION
LOCATION: GENERALIZED
LOCATION: BACK
LOCATION: NECK
LOCATION: GENERALIZED

## 2024-08-03 NOTE — CASE COMMUNICATION
Transition of Care Plan:    RUR: 10%    This CM spoke with weekend liaison Juan with [facility] [phone], authorization is still pending.  CM to follow.    ANITA VÁSQUEZ  1:14 PM

## 2024-08-04 LAB
ANION GAP SERPL CALC-SCNC: 5 MMOL/L (ref 5–15)
BASOPHILS # BLD: 0 K/UL (ref 0–0.1)
BASOPHILS NFR BLD: 1 % (ref 0–1)
BUN SERPL-MCNC: 12 MG/DL (ref 6–20)
BUN/CREAT SERPL: 27 (ref 12–20)
CALCIUM SERPL-MCNC: 9.4 MG/DL (ref 8.5–10.1)
CHLORIDE SERPL-SCNC: 105 MMOL/L (ref 97–108)
CO2 SERPL-SCNC: 29 MMOL/L (ref 21–32)
CREAT SERPL-MCNC: 0.44 MG/DL (ref 0.55–1.02)
DIFFERENTIAL METHOD BLD: ABNORMAL
EOSINOPHIL # BLD: 0.2 K/UL (ref 0–0.4)
EOSINOPHIL NFR BLD: 4 % (ref 0–7)
ERYTHROCYTE [DISTWIDTH] IN BLOOD BY AUTOMATED COUNT: 15.8 % (ref 11.5–14.5)
GLUCOSE SERPL-MCNC: 88 MG/DL (ref 65–100)
HCT VFR BLD AUTO: 35.3 % (ref 35–47)
HGB BLD-MCNC: 11.6 G/DL (ref 11.5–16)
IMM GRANULOCYTES # BLD AUTO: 0 K/UL (ref 0–0.04)
IMM GRANULOCYTES NFR BLD AUTO: 0 % (ref 0–0.5)
LYMPHOCYTES # BLD: 1.3 K/UL (ref 0.8–3.5)
LYMPHOCYTES NFR BLD: 23 % (ref 12–49)
MCH RBC QN AUTO: 33.4 PG (ref 26–34)
MCHC RBC AUTO-ENTMCNC: 32.9 G/DL (ref 30–36.5)
MCV RBC AUTO: 101.7 FL (ref 80–99)
MONOCYTES # BLD: 0.4 K/UL (ref 0–1)
MONOCYTES NFR BLD: 7 % (ref 5–13)
NEUTS SEG # BLD: 3.7 K/UL (ref 1.8–8)
NEUTS SEG NFR BLD: 65 % (ref 32–75)
NRBC # BLD: 0 K/UL (ref 0–0.01)
NRBC BLD-RTO: 0 PER 100 WBC
PLATELET # BLD AUTO: 294 K/UL (ref 150–400)
PMV BLD AUTO: 9.4 FL (ref 8.9–12.9)
POTASSIUM SERPL-SCNC: 4.2 MMOL/L (ref 3.5–5.1)
RBC # BLD AUTO: 3.47 M/UL (ref 3.8–5.2)
SODIUM SERPL-SCNC: 139 MMOL/L (ref 136–145)
WBC # BLD AUTO: 5.7 K/UL (ref 3.6–11)

## 2024-08-04 PROCEDURE — 1100000000 HC RM PRIVATE

## 2024-08-04 PROCEDURE — 6360000002 HC RX W HCPCS: Performed by: FAMILY MEDICINE

## 2024-08-04 PROCEDURE — 6370000000 HC RX 637 (ALT 250 FOR IP): Performed by: NEUROLOGICAL SURGERY

## 2024-08-04 PROCEDURE — 80048 BASIC METABOLIC PNL TOTAL CA: CPT

## 2024-08-04 PROCEDURE — 6370000000 HC RX 637 (ALT 250 FOR IP): Performed by: NURSE PRACTITIONER

## 2024-08-04 PROCEDURE — 85025 COMPLETE CBC W/AUTO DIFF WBC: CPT

## 2024-08-04 PROCEDURE — 6370000000 HC RX 637 (ALT 250 FOR IP): Performed by: PHYSICIAN ASSISTANT

## 2024-08-04 PROCEDURE — 36415 COLL VENOUS BLD VENIPUNCTURE: CPT

## 2024-08-04 RX ADMIN — BACLOFEN 5 MG: 10 TABLET ORAL at 14:47

## 2024-08-04 RX ADMIN — ENOXAPARIN SODIUM 40 MG: 100 INJECTION SUBCUTANEOUS at 10:27

## 2024-08-04 RX ADMIN — Medication 667 MG: at 10:27

## 2024-08-04 RX ADMIN — Medication 200 MG: at 10:25

## 2024-08-04 RX ADMIN — BACLOFEN 5 MG: 10 TABLET ORAL at 10:27

## 2024-08-04 RX ADMIN — PHENYTOIN SODIUM 100 MG: 100 CAPSULE, EXTENDED RELEASE ORAL at 06:29

## 2024-08-04 RX ADMIN — BACLOFEN 5 MG: 10 TABLET ORAL at 21:19

## 2024-08-04 RX ADMIN — FAMOTIDINE 20 MG: 20 TABLET, FILM COATED ORAL at 10:26

## 2024-08-04 RX ADMIN — GABAPENTIN 300 MG: 300 CAPSULE ORAL at 10:26

## 2024-08-04 RX ADMIN — ATORVASTATIN CALCIUM 10 MG: 10 TABLET, FILM COATED ORAL at 21:19

## 2024-08-04 RX ADMIN — Medication 2000 UNITS: at 10:26

## 2024-08-04 RX ADMIN — PHENYTOIN SODIUM 100 MG: 100 CAPSULE, EXTENDED RELEASE ORAL at 21:19

## 2024-08-04 RX ADMIN — MODAFINIL 100 MG: 100 TABLET ORAL at 10:26

## 2024-08-04 RX ADMIN — PHENYTOIN SODIUM 100 MG: 100 CAPSULE, EXTENDED RELEASE ORAL at 14:47

## 2024-08-04 RX ADMIN — DICLOFENAC SODIUM 2 G: 10 GEL TOPICAL at 10:27

## 2024-08-04 RX ADMIN — FAMOTIDINE 20 MG: 20 TABLET, FILM COATED ORAL at 21:19

## 2024-08-04 RX ADMIN — GABAPENTIN 300 MG: 300 CAPSULE ORAL at 21:19

## 2024-08-04 RX ADMIN — GABAPENTIN 300 MG: 300 CAPSULE ORAL at 14:46

## 2024-08-04 RX ADMIN — DICLOFENAC SODIUM 2 G: 10 GEL TOPICAL at 14:47

## 2024-08-04 ASSESSMENT — PAIN DESCRIPTION - LOCATION: LOCATION: NECK

## 2024-08-04 ASSESSMENT — PAIN SCALES - GENERAL: PAINLEVEL_OUTOF10: 7
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
## 2024-08-05 VITALS
WEIGHT: 167.2 LBS | BODY MASS INDEX: 28.54 KG/M2 | DIASTOLIC BLOOD PRESSURE: 70 MMHG | OXYGEN SATURATION: 100 % | HEIGHT: 64 IN | TEMPERATURE: 99.9 F | HEART RATE: 62 BPM | SYSTOLIC BLOOD PRESSURE: 115 MMHG | RESPIRATION RATE: 16 BRPM

## 2024-08-05 PROCEDURE — 6370000000 HC RX 637 (ALT 250 FOR IP): Performed by: NEUROLOGICAL SURGERY

## 2024-08-05 PROCEDURE — 97112 NEUROMUSCULAR REEDUCATION: CPT

## 2024-08-05 PROCEDURE — 6360000002 HC RX W HCPCS: Performed by: FAMILY MEDICINE

## 2024-08-05 PROCEDURE — 6370000000 HC RX 637 (ALT 250 FOR IP): Performed by: PHYSICIAN ASSISTANT

## 2024-08-05 PROCEDURE — 97530 THERAPEUTIC ACTIVITIES: CPT

## 2024-08-05 PROCEDURE — 2580000003 HC RX 258: Performed by: NEUROLOGICAL SURGERY

## 2024-08-05 PROCEDURE — 6370000000 HC RX 637 (ALT 250 FOR IP): Performed by: NURSE PRACTITIONER

## 2024-08-05 PROCEDURE — 97110 THERAPEUTIC EXERCISES: CPT

## 2024-08-05 RX ORDER — OXYCODONE HYDROCHLORIDE 5 MG/1
5 TABLET ORAL EVERY 4 HOURS PRN
Qty: 12 TABLET | Refills: 0 | Status: SHIPPED
Start: 2024-08-05 | End: 2024-08-08

## 2024-08-05 RX ORDER — MODAFINIL 100 MG/1
100 TABLET ORAL DAILY
Qty: 30 TABLET | Refills: 0 | Status: SHIPPED | OUTPATIENT
Start: 2024-08-06 | End: 2024-09-05

## 2024-08-05 RX ORDER — BISACODYL 10 MG
10 SUPPOSITORY, RECTAL RECTAL DAILY
Qty: 30 SUPPOSITORY | Refills: 0 | Status: SHIPPED | OUTPATIENT
Start: 2024-08-06 | End: 2024-09-05

## 2024-08-05 RX ORDER — POLYETHYLENE GLYCOL 3350 17 G/17G
17 POWDER, FOR SOLUTION ORAL 2 TIMES DAILY
Qty: 527 G | Refills: 1 | Status: SHIPPED | OUTPATIENT
Start: 2024-08-05 | End: 2024-09-04

## 2024-08-05 RX ORDER — LANOLIN ALCOHOL/MO/W.PET/CERES
200 CREAM (GRAM) TOPICAL DAILY
Qty: 30 TABLET | Refills: 3 | Status: SHIPPED | OUTPATIENT
Start: 2024-08-06

## 2024-08-05 RX ORDER — FAMOTIDINE 20 MG/1
20 TABLET, FILM COATED ORAL 2 TIMES DAILY
Qty: 60 TABLET | Refills: 3 | Status: SHIPPED | OUTPATIENT
Start: 2024-08-05

## 2024-08-05 RX ADMIN — ENOXAPARIN SODIUM 40 MG: 100 INJECTION SUBCUTANEOUS at 10:10

## 2024-08-05 RX ADMIN — BACLOFEN 5 MG: 10 TABLET ORAL at 10:11

## 2024-08-05 RX ADMIN — FAMOTIDINE 20 MG: 20 TABLET, FILM COATED ORAL at 10:12

## 2024-08-05 RX ADMIN — POLYETHYLENE GLYCOL 3350 17 G: 17 POWDER, FOR SOLUTION ORAL at 10:42

## 2024-08-05 RX ADMIN — MODAFINIL 100 MG: 100 TABLET ORAL at 10:11

## 2024-08-05 RX ADMIN — GABAPENTIN 300 MG: 300 CAPSULE ORAL at 10:15

## 2024-08-05 RX ADMIN — Medication 10 ML: at 10:42

## 2024-08-05 RX ADMIN — BACLOFEN 5 MG: 10 TABLET ORAL at 14:05

## 2024-08-05 RX ADMIN — DICLOFENAC SODIUM 2 G: 10 GEL TOPICAL at 10:37

## 2024-08-05 RX ADMIN — Medication 2000 UNITS: at 10:12

## 2024-08-05 RX ADMIN — GABAPENTIN 300 MG: 300 CAPSULE ORAL at 14:04

## 2024-08-05 RX ADMIN — PHENYTOIN SODIUM 100 MG: 100 CAPSULE, EXTENDED RELEASE ORAL at 14:04

## 2024-08-05 RX ADMIN — PHENYTOIN SODIUM 100 MG: 100 CAPSULE, EXTENDED RELEASE ORAL at 06:03

## 2024-08-05 RX ADMIN — OXYCODONE 2.5 MG: 5 TABLET ORAL at 10:38

## 2024-08-05 RX ADMIN — Medication 200 MG: at 10:11

## 2024-08-05 ASSESSMENT — PAIN DESCRIPTION - ORIENTATION: ORIENTATION: POSTERIOR

## 2024-08-05 ASSESSMENT — PAIN DESCRIPTION - DESCRIPTORS: DESCRIPTORS: BURNING

## 2024-08-05 ASSESSMENT — PAIN SCALES - GENERAL
PAINLEVEL_OUTOF10: 0
PAINLEVEL_OUTOF10: 7
PAINLEVEL_OUTOF10: 8

## 2024-08-05 ASSESSMENT — PAIN DESCRIPTION - LOCATION: LOCATION: BUTTOCKS

## 2024-08-05 NOTE — CARE COORDINATION
Transition of Care Plan:    RUR: 15%   Prior Level of Functioning: Total A   Disposition: IPR   If SNF or IPR: Date FOC offered: 7/25   Date FOC received: 7/25   Accepting facility: [facility]   Report#[phone]  Date authorization started with reference number: AUTH Approved   Date authorization received and expires: AUTH Approved   Follow up appointments: PCP   DME needed: None   Trans[facility] at discharge: [facility] 4:50pm   IM/IMM Medicare/ letter given: Received   Caregiver Contact: Janell Mcgarry (Other)  [phone]   Discharge Caregiver contacted prior to discharge? Yes   Care Conference needed? N/A      Inpatient Rehab Transition of Care Note Discharge Note     Accepting Physician: Dr. Watt    Discharging Physician: Dr. Wlech     Accepting Representative: Yumiko     Accepting Facility: [facility]    RN call to report: [phone]    Transport: [facility] phone [phone]      time: 4:50pm    Ambulance packet at bedside chart.       The attending physician and the primary nurse were notified of the plan.

## 2024-08-05 NOTE — DISCHARGE SUMMARY
Cognition     Lucid    x Forgetful     Dementia      Catheters/lines (plus indication)    Chou     PICC     PEG    x None      Code status    x Full code     DNR      PHYSICAL EXAMINATION AT DISCHARGE:    General : alert x 3, awake, no acute distress,   HEENT: PEERL, EOMI, moist mucus membrane  Neck: supple, no JVD, no meningeal signs  Chest: Clear to auscultation bilaterally   CVS: S1 S2 heard, Capillary refill less than 2 seconds  Abd: soft/ Non tender, non distended, BS physiological,   Ext: no clubbing, no cyanosis, no edema, brisk 2+ DP pulses  Neuro: A&Ox3. Follows commands. Speech clear. Affect normal.  COE spontaneously but with some LUE neglect. Strength: 3/5 right deltoid, 4-/5 right bicep and tricep and , 4+/5 wrist flexion,extension, very weak intrinsics, LUE anti-gravity but no strength against resistance. No able to lift LUE above head. LLE 4/5 strength throughout, RLE 4-/5 proximally, 3+/5 distally  Sensation intact.  Ataxic    CHRONIC MEDICAL DIAGNOSES:  Principal Problem:    Seizure secondary to subtherapeutic anticonvulsant medication (HCC)  Resolved Problems:    * No resolved hospital problems. *        Greater than 31 minutes were spent with the patient on counseling and coordination of care    Signed:   SHERIN Diego NP  8/5/2024  2:45 PM

## 2024-08-05 NOTE — PLAN OF CARE
Problem: Nutrition Deficit:  Goal: Optimize nutritional status  Outcome: Progressing     Problem: Pain  Goal: Verbalizes/displays adequate comfort level or baseline comfort level  Outcome: Progressing     Problem: Safety - Adult  Goal: Free from fall injury  Outcome: Progressing     Problem: ABCDS Injury Assessment  Goal: Absence of physical injury  Outcome: Progressing     
  Problem: Occupational Therapy - Adult  Goal: By Discharge: Performs self-care activities at highest level of function for planned discharge setting.  See evaluation for individualized goals.  Description: FUNCTIONAL STATUS PRIOR TO ADMISSION:  Patient was ambulatory using w/c    Receives Help From: Family, ADL Assistance: Independent, Homemaking Assistance: Independent, Ambulation Assistance: Non-ambulatory (Ambulates only short household distances, utilizes wheelchair primarily), Transfer Assistance: Independent, Active : No     HOME SUPPORT: Patient lived alone and had caregiver to assist her; used w/c at baseline.    Occupational Therapy Goals:  1.  Patient will perform self-feeding with built up handle and Set-up Assist within 7 day(s).   2.  Patient will perform grooming sitting EOB with moderate Assist within 7 day(s).   3.  Patient will perform anterior bathing from neck to thighs with moderate Assist within 7 day(s).   4.  Patient will perform ADL tasks in unsupported sitting with moderate assist for balance aspect within 7 day(s).   5.  Patient will participate in upper extremity therapeutic exercise/activities with Moderate Assist for 5 minutes within 7 day(s).    7.  Patient will utilize energy conservation techniques during functional activities with verbal cues within 7 day(s).      Goals revised at weekly RA 7/15/2024  1.  Patient will perform self-feeding with built up handle and Set-up Assist within 7 day(s). CONT for CONSISTENCY  2.  Patient will perform grooming sitting EOB with Minimal Assist within 7 day(s). DOWNGRADE  3.  Patient will perform anterior bathing from neck to thighs with Minimal Assist within 7 day(s). DOWNGRADE  4.  Patient will perform toilet transfers to [facility] [place] with Maximal Assist  within 7 day(s). MODIFY TO sitting goal  5.  Patient will perform all aspects of toileting with Maximal Assist within 7 day(s). DC  6.  Patient will participate in upper extremity therapeutic 
  Problem: Occupational Therapy - Adult  Goal: By Discharge: Performs self-care activities at highest level of function for planned discharge setting.  See evaluation for individualized goals.  Description: FUNCTIONAL STATUS PRIOR TO ADMISSION:  Patient was ambulatory using w/c    Receives Help From: Family, ADL Assistance: Independent, Homemaking Assistance: Independent, Ambulation Assistance: Non-ambulatory (Ambulates only short household distances, utilizes wheelchair primarily), Transfer Assistance: Independent, Active : No     HOME SUPPORT: Patient lived alone and had caregiver to assist her; used w/c at baseline.    Occupational Therapy Goals:  1.  Patient will perform self-feeding with built up handle and Set-up Assist within 7 day(s).   2.  Patient will perform grooming sitting EOB with moderate Assist within 7 day(s).   3.  Patient will perform anterior bathing from neck to thighs with moderate Assist within 7 day(s).   4.  Patient will perform ADL tasks in unsupported sitting with moderate assist for balance aspect within 7 day(s).   5.  Patient will participate in upper extremity therapeutic exercise/activities with Moderate Assist for 5 minutes within 7 day(s).    7.  Patient will utilize energy conservation techniques during functional activities with verbal cues within 7 day(s).      Goals revised at weekly RA 7/15/2024  1.  Patient will perform self-feeding with built up handle and Set-up Assist within 7 day(s). CONT for CONSISTENCY  2.  Patient will perform grooming sitting EOB with Minimal Assist within 7 day(s). DOWNGRADE  3.  Patient will perform anterior bathing from neck to thighs with Minimal Assist within 7 day(s). DOWNGRADE  4.  Patient will perform toilet transfers to [facility] [place] with Maximal Assist  within 7 day(s). MODIFY TO sitting goal  5.  Patient will perform all aspects of toileting with Maximal Assist within 7 day(s). DC  6.  Patient will participate in upper extremity therapeutic 
  Problem: Occupational Therapy - Adult  Goal: By Discharge: Performs self-care activities at highest level of function for planned discharge setting.  See evaluation for individualized goals.  Description: FUNCTIONAL STATUS PRIOR TO ADMISSION:  Patient was ambulatory using w/c    Receives Help From: Family, ADL Assistance: Independent, Homemaking Assistance: Independent, Ambulation Assistance: Non-ambulatory (Ambulates only short household distances, utilizes wheelchair primarily), Transfer Assistance: Independent, Active : No     HOME SUPPORT: Patient lived alone and had caregiver to assist her; used w/c at baseline.    Occupational Therapy Goals:  Goals revised at weekly RA 7/15/2024  1.  Patient will perform self-feeding with built up handle and Set-up Assist within 7 day(s).  2.  Patient will perform grooming sitting EOB with Minimal Assist within 7 day(s).  3.  Patient will perform anterior bathing from neck to thighs with Minimal Assist within 7 day(s).  4.  Patient will perform toilet transfers to [facility] [place] with Maximal Assist  within 7 day(s).  5.  Patient will perform all aspects of toileting with Maximal Assist within 7 day(s).  6.  Patient will participate in upper extremity therapeutic exercise/activities with Moderate Assist for 5 minutes within 7 day(s).    7.  Patient will utilize energy conservation techniques during functional activities with verbal cues within 7 day(s).     Initiated 7/8/2024  1.  Patient will perform self-feeding with Stand by Assist within 7 day(s).  2.  Patient will perform grooming with Contact Guard Assist within 7 day(s).  3.  Patient will perform bathing with Moderate Assist within 7 day(s).  4.  Patient will perform toilet transfers with Maximal Assist  within 7 day(s).  5.  Patient will perform all aspects of toileting with Moderate Assist within 7 day(s).  6.  Patient will participate in upper extremity therapeutic exercise/activities with Moderate Assist for 5 minutes 
  Problem: Occupational Therapy - Adult  Goal: By Discharge: Performs self-care activities at highest level of function for planned discharge setting.  See evaluation for individualized goals.  Description: FUNCTIONAL STATUS PRIOR TO ADMISSION:  Patient was ambulatory using w/c    Receives Help From: Family, ADL Assistance: Independent, Homemaking Assistance: Independent, Ambulation Assistance: Non-ambulatory (Ambulates only short household distances, utilizes wheelchair primarily), Transfer Assistance: Independent, Active : No     HOME SUPPORT: Patient lived alone and had caregiver to assist her; used w/c at baseline.    Re-evaluation 7/25/24   Occupational Therapy Goals:  1.  Patient will perform self-feeding with built up handle and Set-up Assist within 7 day(s).   2.  Patient will perform grooming sitting EOB with max Assist within 7 day(s).   3.  Patient will perform anterior bathing from neck to thighs with moderate Assist within 7 day(s).   4.  Patient will perform ADL tasks in unsupported sitting with max assist for balance aspect within 7 day(s).   5.  Patient will participate in upper extremity therapeutic exercise/activities with Moderate Assist for 5 minutes within 7 day(s).    7.  Patient will utilize energy conservation techniques during functional activities with verbal cues within 7 day(s).     Occupational Therapy Goals:  1.  Patient will perform self-feeding with built up handle and Set-up Assist within 7 day(s).   2.  Patient will perform grooming sitting EOB with moderate Assist within 7 day(s).   3.  Patient will perform anterior bathing from neck to thighs with moderate Assist within 7 day(s).   4.  Patient will perform ADL tasks in unsupported sitting with moderate assist for balance aspect within 7 day(s).   5.  Patient will participate in upper extremity therapeutic exercise/activities with Moderate Assist for 5 minutes within 7 day(s).    7.  Patient will utilize energy conservation 
  Problem: Occupational Therapy - Adult  Goal: By Discharge: Performs self-care activities at highest level of function for planned discharge setting.  See evaluation for individualized goals.  Description: FUNCTIONAL STATUS PRIOR TO ADMISSION:  Patient was ambulatory using w/c    Receives Help From: Family, ADL Assistance: Independent, Homemaking Assistance: Independent, Ambulation Assistance: Non-ambulatory (Ambulates only short household distances, utilizes wheelchair primarily), Transfer Assistance: Independent, Active : No     HOME SUPPORT: Patient lived alone and had caregiver to assist her; used w/c at baseline.    Re-evaluation 8/1/24  Occupational Therapy Goals:  1.  Patient will perform self-feeding with built up handle and Set-up Assist within 7 day(s).   2.  Patient will perform grooming sitting EOB with max Assist within 7 day(s).   3.  Patient will perform anterior bathing from neck to thighs with max Assist within 7 day(s). DOWNGRADE  4.  Patient will perform ADL tasks in unsupported sitting with max assist for balance aspect within 7 day(s).   5.  Patient will participate in upper extremity therapeutic exercise/activities with Moderate Assist for 5 minutes within 7 day(s).    7.  Patient will utilize energy conservation techniques during functional activities with verbal cues within 7 day(s).     Re-evaluation 7/25/24   Occupational Therapy Goals:  1.  Patient will perform self-feeding with built up handle and Set-up Assist within 7 day(s).   2.  Patient will perform grooming sitting EOB with max Assist within 7 day(s).   3.  Patient will perform anterior bathing from neck to thighs with moderate Assist within 7 day(s).   4.  Patient will perform ADL tasks in unsupported sitting with max assist for balance aspect within 7 day(s).   5.  Patient will participate in upper extremity therapeutic exercise/activities with Moderate Assist for 5 minutes within 7 day(s).    7.  Patient will utilize 
  Problem: Occupational Therapy - Adult  Goal: By Discharge: Performs self-care activities at highest level of function for planned discharge setting.  See evaluation for individualized goals.  Description: FUNCTIONAL STATUS PRIOR TO ADMISSION:  Patient was ambulatory using w/c    Receives Help From: Family, ADL Assistance: Independent, Homemaking Assistance: Independent, Ambulation Assistance: Non-ambulatory (Ambulates only short household distances, utilizes wheelchair primarily), Transfer Assistance: Independent, Active : No     HOME SUPPORT: Patient lived alone and had caregiver to assist her; used w/c at baseline.  Weekly re-assessment 8/5/24  1.  Patient will perform self-feeding with built up handle and Set-up Assist within 7 day(s). CONTINUE  2.  Patient will perform grooming sitting EOB with max Assist within 7 day(s). CONTINUE  3.  Patient will perform anterior bathing from neck to thighs with max Assist in long sitting or supported sitting within 7 day(s). MODIFIED 8/5  4.  Patient will perform ADL tasks in unsupported sitting with max assist for balance aspect within 7 day(s). CONTINUE 8/5  5.  Patient will participate in upper extremity therapeutic exercise/activities with Moderate Assist for 5 minutes within 7 day(s). CONTINUE  7.  Patient will utilize energy conservation techniques during functional activities with verbal cues within 7 day(s). CONTINUE    Re-evaluation 8/1/24  Occupational Therapy Goals:  1.  Patient will perform self-feeding with built up handle and Set-up Assist within 7 day(s).   2.  Patient will perform grooming sitting EOB with max Assist within 7 day(s).   3.  Patient will perform anterior bathing from neck to thighs with max Assist within 7 day(s). DOWNGRADE  4.  Patient will perform ADL tasks in unsupported sitting with max assist for balance aspect within 7 day(s).   5.  Patient will participate in upper extremity therapeutic exercise/activities with Moderate Assist for 
  Problem: Occupational Therapy - Adult  Goal: By Discharge: Performs self-care activities at highest level of function for planned discharge setting.  See evaluation for individualized goals.  Description: FUNCTIONAL STATUS PRIOR TO ADMISSION:  Patient was ambulatory using w/c  Receives Help From: Family, ADL Assistance: Independent,  ,  ,  ,  ,  , Homemaking Assistance: Independent, Ambulation Assistance: Non-ambulatory (Ambulates only short household distances, utilizes wheelchair primarily), Transfer Assistance: Independent, Active : No     HOME SUPPORT: Patient lived alone and had caregiver to assist her; used w/c at baseline.    Occupational Therapy Goals:  Initiated 7/8/2024  1.  Patient will perform self-feeding with Stand by Assist within 7 day(s).  2.  Patient will perform grooming with Contact Guard Assist within 7 day(s).  3.  Patient will perform bathing with Moderate Assist within 7 day(s).  4.  Patient will perform toilet transfers with Maximal Assist  within 7 day(s).  5.  Patient will perform all aspects of toileting with Moderate Assist within 7 day(s).  6.  Patient will participate in upper extremity therapeutic exercise/activities with Moderate Assist for 5 minutes within 7 day(s).    7.  Patient will utilize energy conservation techniques during functional activities with verbal cues within 7 day(s).   Outcome: Progressing   OCCUPATIONAL THERAPY TREATMENT  Patient: Yesy Lyle (68 y.o. female)  Date: 7/11/2024  Primary Diagnosis: Hypokalemia [E87.6]  Bilateral leg edema [R60.0]  Seizure secondary to subtherapeutic anticonvulsant medication (HCC) [R56.9, Z79.899]  Fecal impaction in rectum (HCC) [K56.41]  Cervical myopathy [G72.9]  Edema of both lower extremities [R60.0]       Precautions: Fall Risk, Bed Alarm, Seizure, Up as Tolerated                Chart, occupational therapy assessment, plan of care, and goals were reviewed.    ASSESSMENT  Patient continues to benefit from skilled OT 
  Problem: Occupational Therapy - Adult  Goal: By Discharge: Performs self-care activities at highest level of function for planned discharge setting.  See evaluation for individualized goals.  Description: FUNCTIONAL STATUS PRIOR TO ADMISSION:  Patient was ambulatory using w/c  Receives Help From: Family, ADL Assistance: Independent,  ,  ,  ,  ,  , Homemaking Assistance: Independent, Ambulation Assistance: Non-ambulatory (Ambulates only short household distances, utilizes wheelchair primarily), Transfer Assistance: Independent, Active : No     HOME SUPPORT: Patient lived alone and had caregiver to assist her; used w/c at baseline.    Occupational Therapy Goals:  Initiated 7/8/2024  1.  Patient will perform self-feeding with Stand by Assist within 7 day(s).  2.  Patient will perform grooming with Contact Guard Assist within 7 day(s).  3.  Patient will perform bathing with Moderate Assist within 7 day(s).  4.  Patient will perform toilet transfers with Maximal Assist  within 7 day(s).  5.  Patient will perform all aspects of toileting with Moderate Assist within 7 day(s).  6.  Patient will participate in upper extremity therapeutic exercise/activities with Moderate Assist for 5 minutes within 7 day(s).    7.  Patient will utilize energy conservation techniques during functional activities with verbal cues within 7 day(s).   Outcome: Progressing   OCCUPATIONAL THERAPY TREATMENT  Patient: Yesy Lyle (68 y.o. female)  Date: 7/9/2024  Primary Diagnosis: Hypokalemia [E87.6]  Bilateral leg edema [R60.0]  Seizure secondary to subtherapeutic anticonvulsant medication (HCC) [R56.9, Z79.899]  Fecal impaction in rectum (HCC) [K56.41]  Cervical myopathy [G72.9]  Edema of both lower extremities [R60.0]       Precautions: Fall Risk, Bed Alarm, Seizure, Up as Tolerated                Chart, occupational therapy assessment, plan of care, and goals were reviewed.    ASSESSMENT  Patient continues to benefit from skilled OT 
  Problem: Pain  Goal: Verbalizes/displays adequate comfort level or baseline comfort level  Outcome: Progressing     Problem: Safety - Adult  Goal: Free from fall injury  Recent Flowsheet Documentation  Taken 7/13/2024 1056 by Kendy Zamora, RN  Free From Fall Injury: Instruct family/caregiver on patient safety     
  Problem: Physical Therapy - Adult  Goal: By Discharge: Performs mobility at highest level of function for planned discharge setting.  See evaluation for individualized goals.  Description: FUNCTIONAL STATUS PRIOR TO ADMISSION: Patient lives independently (?) but has been utilizing wheelchair primarily for mobility for several years. She reports she is able to ambulate short distances in the home with RW and complete transfers independently typically.     HOME SUPPORT PRIOR TO ADMISSION: The patient lived alone. She states she has hired caregivers to provide assistance and they are scheduled to come M-Sat 9am-6pm to assist with grocery shopping, homemaking, iADLs, etc..    Physical Therapy Goals  Reassessment 7/25/2024 post Cervical Laminectomy  1.  Patient will move from supine to sit and sit to supine and roll side to side in bed with moderate assistance within 7 day(s).    2.  Patient will perform sit to stand with moderate assistance within 7 day(s).  3.  Patient will transfer from bed to chair and chair to bed with maximal assistance using the least restrictive device within 7 day(s).  4.  Patient will tolerate sitting up in chair for 1 hour, 3x per day within 7 day(s).   5. Patient will perform UE/LE and trunk exercises in bed independently.        Physical Therapy Goals  Updated 7/15/2024, reassessment 7/22/2024- goals remain appropriate, will need to reassess following surgery in 2 days  1.  Patient will move from supine to sit and sit to supine and roll side to side in bed with moderate assistance within 7 day(s).    2.  Patient will perform sit to stand with moderate assistance within 7 day(s).  3.  Patient will transfer from bed to chair and chair to bed with maximal assistance using the least restrictive device within 7 day(s).  4.  Patient will tolerate sitting up in chair for 1 hour, 3x per day within 7 day(s).     Initiated 7/8/2024  1.  Patient will move from supine to sit and sit to supine and roll 
  Problem: Physical Therapy - Adult  Goal: By Discharge: Performs mobility at highest level of function for planned discharge setting.  See evaluation for individualized goals.  Description: FUNCTIONAL STATUS PRIOR TO ADMISSION: Patient lives independently (?) but has been utilizing wheelchair primarily for mobility for several years. She reports she is able to ambulate short distances in the home with RW and complete transfers independently typically.     HOME SUPPORT PRIOR TO ADMISSION: The patient lived alone. She states she has hired caregivers to provide assistance and they are scheduled to come M-Sat 9am-6pm to assist with grocery shopping, homemaking, iADLs, etc..    Physical Therapy Goals  Weekly Reassessment 8/1/24  1.  Patient will move from supine to sit and sit to supine and roll side to side in bed with moderate assistance within 7 day(s).    2.  Patient will perform sit to stand with moderate assistance within 7 day(s).  3.  Patient will transfer from bed to chair and chair to bed with maximal assistance using the least restrictive device within 7 day(s).  4.  Patient will tolerate sitting up in chair for 1 hour, 3x per day within 7 day(s).   5. Patient will perform UE/LE and trunk exercises in bed independently.      Physical Therapy Goals  Reassessment 7/25/2024 post Cervical Laminectomy  1.  Patient will move from supine to sit and sit to supine and roll side to side in bed with moderate assistance within 7 day(s).    2.  Patient will perform sit to stand with moderate assistance within 7 day(s).  3.  Patient will transfer from bed to chair and chair to bed with maximal assistance using the least restrictive device within 7 day(s).  4.  Patient will tolerate sitting up in chair for 1 hour, 3x per day within 7 day(s).   5. Patient will perform UE/LE and trunk exercises in bed independently.        Physical Therapy Goals  Updated 7/15/2024, reassessment 7/22/2024- goals remain appropriate, will need 
  Problem: Physical Therapy - Adult  Goal: By Discharge: Performs mobility at highest level of function for planned discharge setting.  See evaluation for individualized goals.  Description: FUNCTIONAL STATUS PRIOR TO ADMISSION: Patient lives independently (?) but has been utilizing wheelchair primarily for mobility for several years. She reports she is able to ambulate short distances in the home with RW and complete transfers independently typically.     HOME SUPPORT PRIOR TO ADMISSION: The patient lived alone. She states she has hired caregivers to provide assistance and they are scheduled to come M-Sat 9am-6pm to assist with grocery shopping, homemaking, iADLs, etc..    Physical Therapy Goals  Weekly Reassessment 8/1/24  1.  Patient will move from supine to sit and sit to supine and roll side to side in bed with moderate assistance within 7 day(s).    2.  Patient will perform sit to stand with moderate assistance within 7 day(s).  3.  Patient will transfer from bed to chair and chair to bed with maximal assistance using the least restrictive device within 7 day(s).  4.  Patient will tolerate sitting up in chair for 1 hour, 3x per day within 7 day(s).   5. Patient will perform UE/LE and trunk exercises in bed independently.    Physical Therapy Goals  Reassessment 7/25/2024 post Cervical Laminectomy  1.  Patient will move from supine to sit and sit to supine and roll side to side in bed with moderate assistance within 7 day(s).    2.  Patient will perform sit to stand with moderate assistance within 7 day(s).  3.  Patient will transfer from bed to chair and chair to bed with maximal assistance using the least restrictive device within 7 day(s).  4.  Patient will tolerate sitting up in chair for 1 hour, 3x per day within 7 day(s).   5. Patient will perform UE/LE and trunk exercises in bed independently.    Physical Therapy Goals  Updated 7/15/2024, reassessment 7/22/2024- goals remain appropriate, will need to 
  Problem: Physical Therapy - Adult  Goal: By Discharge: Performs mobility at highest level of function for planned discharge setting.  See evaluation for individualized goals.  Description: FUNCTIONAL STATUS PRIOR TO ADMISSION: Patient lives independently but has been utilizing wheelchair primarily for mobility for several years. She reports she is able to ambulate short distances in the home with RW and complete transfers independently typically.     HOME SUPPORT PRIOR TO ADMISSION: The patient lived alone. She states she has hired caregivers to provide assistance and they are scheduled to come M-Sat 9am-6pm to assist with grocery shopping, homemaking, iADLs, etc..    Physical Therapy Goals  Initiated 7/8/2024  1.  Patient will move from supine to sit and sit to supine and roll side to side in bed with maximal assistance within 7 day(s).    2.  Patient will perform sit to stand with maximal assistance within 7 day(s).  3.  Patient will transfer from bed to chair and chair to bed with maximal assistance using the least restrictive device within 7 day(s).  4.  Patient will tolerate sitting up in chair for 1 hour, 3x per day within 7 day(s).     Outcome: Not Progressing   PHYSICAL THERAPY TREATMENT    Patient: Yesy Lyle (68 y.o. female)  Date: 7/10/2024  Diagnosis: Hypokalemia [E87.6]  Bilateral leg edema [R60.0]  Seizure secondary to subtherapeutic anticonvulsant medication (HCC) [R56.9, Z79.899]  Fecal impaction in rectum (HCC) [K56.41]  Cervical myopathy [G72.9]  Edema of both lower extremities [R60.0] Seizure secondary to subtherapeutic anticonvulsant medication (HCC)      Precautions: Fall Risk, Bed Alarm, Seizure, Up as Tolerated               Required Braces or Orthoses  Cervical: c-collar      ASSESSMENT:  Patient continues to benefit from skilled PT services and is slowly progressing towards goals. Pt tolerated session fairly and continues to be limited by grossly decreased strength (only trace movement 
  Problem: Physical Therapy - Adult  Goal: By Discharge: Performs mobility at highest level of function for planned discharge setting.  See evaluation for individualized goals.  Description: FUNCTIONAL STATUS PRIOR TO ADMISSION: Patient lives independently but has been utilizing wheelchair primarily for mobility for several years. She reports she is able to ambulate short distances in the home with RW and complete transfers independently typically.     HOME SUPPORT PRIOR TO ADMISSION: The patient lived alone. She states she has hired caregivers to provide assistance and they are scheduled to come M-Sat 9am-6pm to assist with grocery shopping, homemaking, iADLs, etc..    Physical Therapy Goals  Initiated 7/8/2024  1.  Patient will move from supine to sit and sit to supine and roll side to side in bed with maximal assistance within 7 day(s).    2.  Patient will perform sit to stand with maximal assistance within 7 day(s).  3.  Patient will transfer from bed to chair and chair to bed with maximal assistance using the least restrictive device within 7 day(s).  4.  Patient will tolerate sitting up in chair for 1 hour, 3x per day within 7 day(s).     Outcome: Progressing   PHYSICAL THERAPY EVALUATION    Patient: Yesy Lyle (68 y.o. female)  Date: 7/8/2024  Primary Diagnosis: Hypokalemia [E87.6]  Bilateral leg edema [R60.0]  Seizure secondary to subtherapeutic anticonvulsant medication (HCC) [R56.9, Z79.899]  Fecal impaction in rectum (HCC) [K56.41]  Cervical myopathy [G72.9]  Edema of both lower extremities [R60.0]       Precautions: Restrictions/Precautions: Fall Risk, Bed Alarm, Seizure                      ASSESSMENT :   DEFICITS/IMPAIRMENTS:   The patient is limited by decreased functional mobility, independence in ADLs, high-level IADLs, ROM, strength, sensation, body mechanics, activity tolerance, safety awareness, cognition, command following, attention/concentration, balance, fine-motor control s/p admission 
  Problem: Physical Therapy - Adult  Goal: By Discharge: Performs mobility at highest level of function for planned discharge setting.  See evaluation for individualized goals.  Description: FUNCTIONAL STATUS PRIOR TO ADMISSION: Patient lives independently but has been utilizing wheelchair primarily for mobility for several years. She reports she is able to ambulate short distances in the home with RW and complete transfers independently typically.     HOME SUPPORT PRIOR TO ADMISSION: The patient lived alone. She states she has hired caregivers to provide assistance and they are scheduled to come M-Sat 9am-6pm to assist with grocery shopping, homemaking, iADLs, etc..    Physical Therapy Goals  Updated 7/15/2024  1.  Patient will move from supine to sit and sit to supine and roll side to side in bed with moderate assistance within 7 day(s).    2.  Patient will perform sit to stand with moderate assistance within 7 day(s).  3.  Patient will transfer from bed to chair and chair to bed with maximal assistance using the least restrictive device within 7 day(s).  4.  Patient will tolerate sitting up in chair for 1 hour, 3x per day within 7 day(s).     Initiated 7/8/2024  1.  Patient will move from supine to sit and sit to supine and roll side to side in bed with maximal assistance within 7 day(s).    2.  Patient will perform sit to stand with maximal assistance within 7 day(s).  3.  Patient will transfer from bed to chair and chair to bed with maximal assistance using the least restrictive device within 7 day(s).  4.  Patient will tolerate sitting up in chair for 1 hour, 3x per day within 7 day(s).     7/17/2024 0924 by Denise Santiago, PT  Outcome: Not Progressing     
  Problem: Physical Therapy - Adult  Goal: By Discharge: Performs mobility at highest level of function for planned discharge setting.  See evaluation for individualized goals.  Description: FUNCTIONAL STATUS PRIOR TO ADMISSION: Patient lives independently but has been utilizing wheelchair primarily for mobility for several years. She reports she is able to ambulate short distances in the home with RW and complete transfers independently typically.     HOME SUPPORT PRIOR TO ADMISSION: The patient lived alone. She states she has hired caregivers to provide assistance and they are scheduled to come M-Sat 9am-6pm to assist with grocery shopping, homemaking, iADLs, etc..    Physical Therapy Goals  Updated 7/15/2024  1.  Patient will move from supine to sit and sit to supine and roll side to side in bed with moderate assistance within 7 day(s).    2.  Patient will perform sit to stand with moderate assistance within 7 day(s).  3.  Patient will transfer from bed to chair and chair to bed with maximal assistance using the least restrictive device within 7 day(s).  4.  Patient will tolerate sitting up in chair for 1 hour, 3x per day within 7 day(s).     Initiated 7/8/2024  1.  Patient will move from supine to sit and sit to supine and roll side to side in bed with maximal assistance within 7 day(s).    2.  Patient will perform sit to stand with maximal assistance within 7 day(s).  3.  Patient will transfer from bed to chair and chair to bed with maximal assistance using the least restrictive device within 7 day(s).  4.  Patient will tolerate sitting up in chair for 1 hour, 3x per day within 7 day(s).     7/19/2024 1621 by Denise Santiago, PT  Outcome: Progressing   PHYSICAL THERAPY TREATMENT    Patient: Yesy Lyle (68 y.o. female)  Date: 7/19/2024  Diagnosis: Hypokalemia [E87.6]  Bilateral leg edema [R60.0]  Seizure secondary to subtherapeutic anticonvulsant medication (HCC) [R56.9, Z79.899]  Fecal impaction in rectum 
  Problem: Physical Therapy - Adult  Goal: By Discharge: Performs mobility at highest level of function for planned discharge setting.  See evaluation for individualized goals.  Description: FUNCTIONAL STATUS PRIOR TO ADMISSION: Patient lives independently but has been utilizing wheelchair primarily for mobility for several years. She reports she is able to ambulate short distances in the home with RW and complete transfers independently typically.     HOME SUPPORT PRIOR TO ADMISSION: The patient lived alone. She states she has hired caregivers to provide assistance and they are scheduled to come M-Sat 9am-6pm to assist with grocery shopping, homemaking, iADLs, etc..    Physical Therapy Goals  Updated 7/15/2024  1.  Patient will move from supine to sit and sit to supine and roll side to side in bed with moderate assistance within 7 day(s).    2.  Patient will perform sit to stand with moderate assistance within 7 day(s).  3.  Patient will transfer from bed to chair and chair to bed with maximal assistance using the least restrictive device within 7 day(s).  4.  Patient will tolerate sitting up in chair for 1 hour, 3x per day within 7 day(s).     Initiated 7/8/2024  1.  Patient will move from supine to sit and sit to supine and roll side to side in bed with maximal assistance within 7 day(s).    2.  Patient will perform sit to stand with maximal assistance within 7 day(s).  3.  Patient will transfer from bed to chair and chair to bed with maximal assistance using the least restrictive device within 7 day(s).  4.  Patient will tolerate sitting up in chair for 1 hour, 3x per day within 7 day(s).     Outcome: Progressing     PHYSICAL THERAPY TREATMENT    Patient: Yesy Lyle (68 y.o. female)  Date: 7/16/2024  Diagnosis: Hypokalemia [E87.6]  Bilateral leg edema [R60.0]  Seizure secondary to subtherapeutic anticonvulsant medication (HCC) [R56.9, Z79.899]  Fecal impaction in rectum (HCC) [K56.41]  Cervical myopathy 
  Problem: Physical Therapy - Adult  Goal: By Discharge: Performs mobility at highest level of function for planned discharge setting.  See evaluation for individualized goals.  Description: FUNCTIONAL STATUS PRIOR TO ADMISSION: Patient lives independently but has been utilizing wheelchair primarily for mobility for several years. She reports she is able to ambulate short distances in the home with RW and complete transfers independently typically.     HOME SUPPORT PRIOR TO ADMISSION: The patient lived alone. She states she has hired caregivers to provide assistance and they are scheduled to come M-Sat 9am-6pm to assist with grocery shopping, homemaking, iADLs, etc..    Physical Therapy Goals  Updated 7/15/2024  1.  Patient will move from supine to sit and sit to supine and roll side to side in bed with moderate assistance within 7 day(s).    2.  Patient will perform sit to stand with moderate assistance within 7 day(s).  3.  Patient will transfer from bed to chair and chair to bed with maximal assistance using the least restrictive device within 7 day(s).  4.  Patient will tolerate sitting up in chair for 1 hour, 3x per day within 7 day(s).     Initiated 7/8/2024  1.  Patient will move from supine to sit and sit to supine and roll side to side in bed with maximal assistance within 7 day(s).    2.  Patient will perform sit to stand with maximal assistance within 7 day(s).  3.  Patient will transfer from bed to chair and chair to bed with maximal assistance using the least restrictive device within 7 day(s).  4.  Patient will tolerate sitting up in chair for 1 hour, 3x per day within 7 day(s).     Outcome: Progressing     PHYSICAL THERAPY TREATMENT: WEEKLY REASSESSMENT    Patient: Yesy Lyle (68 y.o. female)  Date: 7/15/2024  Primary Diagnosis: Hypokalemia [E87.6]  Bilateral leg edema [R60.0]  Seizure secondary to subtherapeutic anticonvulsant medication (HCC) [R56.9, Z79.899]  Fecal impaction in rectum (HCC) 
  Problem: Physical Therapy - Adult  Goal: By Discharge: Performs mobility at highest level of function for planned discharge setting.  See evaluation for individualized goals.  Description: FUNCTIONAL STATUS PRIOR TO ADMISSION: Patient lives independently but has been utilizing wheelchair primarily for mobility for several years. She reports she is able to ambulate short distances in the home with RW and complete transfers independently typically.     HOME SUPPORT PRIOR TO ADMISSION: The patient lived alone. She states she has hired caregivers to provide assistance and they are scheduled to come M-Sat 9am-6pm to assist with grocery shopping, homemaking, iADLs, etc..    Physical Therapy Goals  Updated 7/15/2024  1.  Patient will move from supine to sit and sit to supine and roll side to side in bed with moderate assistance within 7 day(s).    2.  Patient will perform sit to stand with moderate assistance within 7 day(s).  3.  Patient will transfer from bed to chair and chair to bed with maximal assistance using the least restrictive device within 7 day(s).  4.  Patient will tolerate sitting up in chair for 1 hour, 3x per day within 7 day(s).     Initiated 7/8/2024  1.  Patient will move from supine to sit and sit to supine and roll side to side in bed with maximal assistance within 7 day(s).    2.  Patient will perform sit to stand with maximal assistance within 7 day(s).  3.  Patient will transfer from bed to chair and chair to bed with maximal assistance using the least restrictive device within 7 day(s).  4.  Patient will tolerate sitting up in chair for 1 hour, 3x per day within 7 day(s).     Outcome: Progressing   PHYSICAL THERAPY TREATMENT    Patient: Yesy Lyle (68 y.o. female)  Date: 7/18/2024  Diagnosis: Hypokalemia [E87.6]  Bilateral leg edema [R60.0]  Seizure secondary to subtherapeutic anticonvulsant medication (HCC) [R56.9, Z79.899]  Fecal impaction in rectum (HCC) [K56.41]  Cervical myopathy 
  Problem: Physical Therapy - Adult  Goal: By Discharge: Performs mobility at highest level of function for planned discharge setting.  See evaluation for individualized goals.  Description: FUNCTIONAL STATUS PRIOR TO ADMISSION: Patient lives independently but has been utilizing wheelchair primarily for mobility for several years. She reports she is able to ambulate short distances in the home with RW and complete transfers independently typically.     HOME SUPPORT PRIOR TO ADMISSION: The patient lived alone. She states she has hired caregivers to provide assistance and they are scheduled to come M-Sat 9am-6pm to assist with grocery shopping, homemaking, iADLs, etc..    Physical Therapy Goals  Updated 7/15/2024, reassessment 7/22/2024- goals remain appropriate, will need to reassess following surgery in 2 days  1.  Patient will move from supine to sit and sit to supine and roll side to side in bed with moderate assistance within 7 day(s).    2.  Patient will perform sit to stand with moderate assistance within 7 day(s).  3.  Patient will transfer from bed to chair and chair to bed with maximal assistance using the least restrictive device within 7 day(s).  4.  Patient will tolerate sitting up in chair for 1 hour, 3x per day within 7 day(s).     Initiated 7/8/2024  1.  Patient will move from supine to sit and sit to supine and roll side to side in bed with maximal assistance within 7 day(s).    2.  Patient will perform sit to stand with maximal assistance within 7 day(s).  3.  Patient will transfer from bed to chair and chair to bed with maximal assistance using the least restrictive device within 7 day(s).  4.  Patient will tolerate sitting up in chair for 1 hour, 3x per day within 7 day(s).   PHYSICAL THERAPY TREATMENT: WEEKLY REASSESSMENT    Patient: Yesy Lyle (68 y.o. female)  Date: 7/22/2024  Primary Diagnosis: Hypokalemia [E87.6]  Bilateral leg edema [R60.0]  Seizure secondary to subtherapeutic 
  Problem: Safety - Adult  Goal: Free from fall injury  7/21/2024 1154 by Chikis Watkins RN  Outcome: Progressing  7/21/2024 0653 by Akira Aguilar RN  Outcome: Progressing     Problem: Discharge Planning  Goal: Discharge to home or other facility with appropriate resources  7/21/2024 1154 by Chikis Watkins RN  Outcome: Progressing  7/21/2024 0653 by Akira Aguilar RN  Outcome: Progressing     Problem: Skin/Tissue Integrity  Goal: Absence of new skin breakdown  Description: 1.  Monitor for areas of redness and/or skin breakdown  2.  Assess vascular access sites hourly  3.  Every 4-6 hours minimum:  Change oxygen saturation probe site  4.  Every 4-6 hours:  If on nasal continuous positive airway pressure, respiratory therapy assess nares and determine need for appliance change or resting period.  7/21/2024 1154 by Chikis Watkins RN  Outcome: Progressing  7/21/2024 0653 by Akira Aguilar RN  Outcome: Progressing     
  Problem: Safety - Adult  Goal: Free from fall injury  7/21/2024 2243 by Sandra Mann RN  Outcome: Progressing  7/21/2024 1154 by Chikis Watkins RN  Outcome: Progressing     Problem: Discharge Planning  Goal: Discharge to home or other facility with appropriate resources  7/21/2024 2243 by Sandra Mann RN  Outcome: Progressing  7/21/2024 1154 by Chikis Watkins RN  Outcome: Progressing     Problem: Skin/Tissue Integrity  Goal: Absence of new skin breakdown  Description: 1.  Monitor for areas of redness and/or skin breakdown  2.  Assess vascular access sites hourly  3.  Every 4-6 hours minimum:  Change oxygen saturation probe site  4.  Every 4-6 hours:  If on nasal continuous positive airway pressure, respiratory therapy assess nares and determine need for appliance change or resting period.  7/21/2024 2243 by Sandra Mann RN  Outcome: Progressing  7/21/2024 1154 by Chikis Watkins RN  Outcome: Progressing     Problem: Pain  Goal: Verbalizes/displays adequate comfort level or baseline comfort level  Outcome: Progressing     Problem: Nutrition Deficit:  Goal: Optimize nutritional status  Outcome: Progressing     Problem: ABCDS Injury Assessment  Goal: Absence of physical injury  Outcome: Progressing     
  Problem: Safety - Adult  Goal: Free from fall injury  7/22/2024 1039 by Chikis Watkins RN  Outcome: Progressing  7/21/2024 2243 by Sandra Mann RN  Outcome: Progressing     Problem: Skin/Tissue Integrity  Goal: Absence of new skin breakdown  Description: 1.  Monitor for areas of redness and/or skin breakdown  2.  Assess vascular access sites hourly  3.  Every 4-6 hours minimum:  Change oxygen saturation probe site  4.  Every 4-6 hours:  If on nasal continuous positive airway pressure, respiratory therapy assess nares and determine need for appliance change or resting period.  7/22/2024 1039 by Chikis Watkins RN  Outcome: Progressing  7/21/2024 2243 by Sandra Mann RN  Outcome: Progressing     
  Problem: Safety - Adult  Goal: Free from fall injury  7/23/2024 2358 by Monika Webb RN  Outcome: Progressing  7/23/2024 2246 by Cecily Roger LPN  Outcome: Progressing     Problem: Skin/Tissue Integrity  Goal: Absence of new skin breakdown  Description: 1.  Monitor for areas of redness and/or skin breakdown  2.  Assess vascular access sites hourly  3.  Every 4-6 hours minimum:  Change oxygen saturation probe site  4.  Every 4-6 hours:  If on nasal continuous positive airway pressure, respiratory therapy assess nares and determine need for appliance change or resting period.  7/23/2024 2358 by Monika Webb RN  Outcome: Progressing  7/23/2024 2246 by Cecily Roger LPN  Outcome: Progressing     Problem: Pain  Goal: Verbalizes/displays adequate comfort level or baseline comfort level  Outcome: Progressing     
  Problem: Safety - Adult  Goal: Free from fall injury  7/24/2024 1040 by Nancy Person  Outcome: Progressing  7/23/2024 2358 by Monika Webb RN  Outcome: Progressing  7/23/2024 2246 by Cecily Roger LPN  Outcome: Progressing     Problem: Skin/Tissue Integrity  Goal: Absence of new skin breakdown  Description: 1.  Monitor for areas of redness and/or skin breakdown  2.  Assess vascular access sites hourly  3.  Every 4-6 hours minimum:  Change oxygen saturation probe site  4.  Every 4-6 hours:  If on nasal continuous positive airway pressure, respiratory therapy assess nares and determine need for appliance change or resting period.  7/24/2024 1040 by Nancy Person  Outcome: Progressing  7/23/2024 2358 by Monika Webb, RN  Outcome: Progressing  7/23/2024 2246 by Cecily Roger LPN  Outcome: Progressing     Problem: Pain  Goal: Verbalizes/displays adequate comfort level or baseline comfort level  7/24/2024 1040 by Nancy Person  Outcome: Progressing  7/23/2024 2358 by Monika Webb, RN  Outcome: Progressing     
  Problem: Safety - Adult  Goal: Free from fall injury  7/27/2024 1310 by Lorna Avila, RN  Outcome: Progressing  7/27/2024 0321 by Pamela Weinberg, RN  Outcome: Progressing     
  Problem: Safety - Adult  Goal: Free from fall injury  7/30/2024 0004 by Sandra Mann RN  Outcome: Progressing  7/29/2024 1032 by Louisa Gruber RN  Outcome: Progressing     Problem: Discharge Planning  Goal: Discharge to home or other facility with appropriate resources  7/30/2024 0004 by Sandra Mann RN  Outcome: Progressing  7/29/2024 1032 by Louisa Gruber RN  Outcome: Progressing     Problem: Skin/Tissue Integrity  Goal: Absence of new skin breakdown  Description: 1.  Monitor for areas of redness and/or skin breakdown  2.  Assess vascular access sites hourly  3.  Every 4-6 hours minimum:  Change oxygen saturation probe site  4.  Every 4-6 hours:  If on nasal continuous positive airway pressure, respiratory therapy assess nares and determine need for appliance change or resting period.  7/30/2024 0004 by Sandra Mann RN  Outcome: Progressing  7/29/2024 1032 by Louisa Gruber RN  Outcome: Progressing     Problem: Pain  Goal: Verbalizes/displays adequate comfort level or baseline comfort level  7/30/2024 0004 by Sandra Mann RN  Outcome: Progressing  7/29/2024 1032 by Louisa Gruber RN  Outcome: Progressing     Problem: Nutrition Deficit:  Goal: Optimize nutritional status  7/30/2024 0004 by Sandra Mann RN  Outcome: Progressing  7/29/2024 1032 by Louisa Gruber RN  Outcome: Progressing     Problem: ABCDS Injury Assessment  Goal: Absence of physical injury  7/30/2024 0004 by Sandra Mann RN  Outcome: Progressing  7/29/2024 1032 by Louisa Gruber RN  Outcome: Progressing     
  Problem: Safety - Adult  Goal: Free from fall injury  8/2/2024 1017 by Anastasia Espana RN  Outcome: Progressing  8/1/2024 2347 by Derrick Davis RN  Outcome: Progressing     Problem: Discharge Planning  Goal: Discharge to home or other facility with appropriate resources  8/2/2024 1017 by Anastasia Espana RN  Outcome: Progressing  8/1/2024 2347 by Derrick Davis RN  Outcome: Progressing     Problem: Skin/Tissue Integrity  Goal: Absence of new skin breakdown  Description: 1.  Monitor for areas of redness and/or skin breakdown  2.  Assess vascular access sites hourly  3.  Every 4-6 hours minimum:  Change oxygen saturation probe site  4.  Every 4-6 hours:  If on nasal continuous positive airway pressure, respiratory therapy assess nares and determine need for appliance change or resting period.  8/2/2024 1017 by Anastasia Espana RN  Outcome: Progressing  8/1/2024 2347 by Derrick Davis RN  Outcome: Progressing     Problem: Pain  Goal: Verbalizes/displays adequate comfort level or baseline comfort level  8/2/2024 1017 by Anastasia Espana RN  Outcome: Progressing  8/1/2024 2347 by Derrick Davis RN  Outcome: Progressing     Problem: Nutrition Deficit:  Goal: Optimize nutritional status  8/2/2024 1017 by Anastasia Espana RN  Outcome: Progressing  8/1/2024 2347 by Derrick Davis RN  Outcome: Progressing     Problem: ABCDS Injury Assessment  Goal: Absence of physical injury  8/2/2024 1017 by Anastasia Espana RN  Outcome: Progressing  8/1/2024 2347 by Derrick Davis RN  Outcome: Progressing     
  Problem: Safety - Adult  Goal: Free from fall injury  8/3/2024 1247 by Anastasia Espana RN  Outcome: Progressing  8/3/2024 0016 by Madelaine Andersen RN  Outcome: Progressing     Problem: Discharge Planning  Goal: Discharge to home or other facility with appropriate resources  8/3/2024 1247 by Anastasia Espana RN  Outcome: Progressing  8/3/2024 0016 by Madelaine Andersen RN  Outcome: Progressing  Flowsheets (Taken 8/2/2024 2014)  Discharge to home or other facility with appropriate resources: Identify barriers to discharge with patient and caregiver     Problem: Skin/Tissue Integrity  Goal: Absence of new skin breakdown  Description: 1.  Monitor for areas of redness and/or skin breakdown  2.  Assess vascular access sites hourly  3.  Every 4-6 hours minimum:  Change oxygen saturation probe site  4.  Every 4-6 hours:  If on nasal continuous positive airway pressure, respiratory therapy assess nares and determine need for appliance change or resting period.  8/3/2024 1247 by Anastasia Espana RN  Outcome: Progressing  8/3/2024 0016 by Madelaine Andersen RN  Outcome: Progressing     Problem: Pain  Goal: Verbalizes/displays adequate comfort level or baseline comfort level  8/3/2024 1247 by Anastasia Espana RN  Outcome: Progressing  8/3/2024 0016 by Madelaine Andersen RN  Outcome: Progressing     Problem: Nutrition Deficit:  Goal: Optimize nutritional status  8/3/2024 1247 by Anastasia Espana RN  Outcome: Progressing  8/3/2024 0016 by Madelaine Andersen RN  Outcome: Progressing     Problem: ABCDS Injury Assessment  Goal: Absence of physical injury  8/3/2024 1247 by Anastasia Espana RN  Outcome: Progressing  8/3/2024 0016 by Madelaine Andersen RN  Outcome: Progressing     
  Problem: Safety - Adult  Goal: Free from fall injury  8/3/2024 2205 by Madelaine Andersen RN  Outcome: Progressing  8/3/2024 1247 by Anastasia Espana RN  Outcome: Progressing     Problem: Discharge Planning  Goal: Discharge to home or other facility with appropriate resources  8/3/2024 2205 by Madelaine Andersen RN  Outcome: Progressing  Flowsheets (Taken 8/3/2024 2000)  Discharge to home or other facility with appropriate resources: Identify barriers to discharge with patient and caregiver  8/3/2024 1247 by Anastasia Espana RN  Outcome: Progressing     Problem: Skin/Tissue Integrity  Goal: Absence of new skin breakdown  Description: 1.  Monitor for areas of redness and/or skin breakdown  2.  Assess vascular access sites hourly  3.  Every 4-6 hours minimum:  Change oxygen saturation probe site  4.  Every 4-6 hours:  If on nasal continuous positive airway pressure, respiratory therapy assess nares and determine need for appliance change or resting period.  8/3/2024 2205 by Madelaine Andersen RN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  Balance  Sitting: Impaired (Posterior lean; able to correct occasionally but requires support for maintaining upright posture ~75% of the time in sitting EOB)  Sitting - Static: Other 
Patient will perform all aspects of toileting with Moderate Assist within 7 day(s).  6.  Patient will participate in upper extremity therapeutic exercise/activities with Moderate Assist for 5 minutes within 7 day(s).    7.  Patient will utilize energy conservation techniques during functional activities with verbal cues within 7 day(s).   Outcome: Progressing   OCCUPATIONAL THERAPY TREATMENT  Patient: Yesy Lyle (68 y.o. female)  Date: 8/2/2024  Primary Diagnosis: Hypokalemia [E87.6]  Bilateral leg edema [R60.0]  Seizure secondary to subtherapeutic anticonvulsant medication (HCC) [R56.9, Z79.899]  Fecal impaction in rectum (HCC) [K56.41]  Cervical myopathy [G72.9]  Edema of both lower extremities [R60.0]  Procedure(s) (LRB):  C1 LAMINECTOMY (N/A) 9 Days Post-Op   Precautions: Fall Risk, Skin                Chart, occupational therapy assessment, plan of care, and goals were reviewed.    ASSESSMENT  Patient continues to benefit from skilled OT services and is slowly progressing towards goals. Patient continues to require max-total assistance for ADL completion due to impaired functional use of BUE (weakness,  poor coordination, impaired proprioception), impaired sitting balance, inability to stand. Patient eager for therapy services though limited by sacral pain due to wound, and neck pain. Patient full participation is impacted by environmental constraints in hospital setting. Patient will best served by comprehensive therapy services in intensive inpatient rehab setting where specialized there is equipment for UE neuro recovery and ADL related mobility.               PLAN :  Patient continues to benefit from skilled intervention to address the above impairments.  Continue treatment per established plan of care to address goals.    Recommend with staff: enable ADL participation     Recommend next OT session: transfer training and EOB activity - 2 person assist; UE strength, coordination. Grooming, self feeding. 
hurts.\"    OBJECTIVE DATA SUMMARY:   Critical Behavior:          Functional Mobility Training:  Bed Mobility:  Bed Mobility Training  Bed Mobility Training: Yes  Rolling: Moderate assistance  Supine to Sit: Total assistance;Assist X2  Sit to Supine: Total assistance;Assist X2  Scooting: Total assistance;Assist X2  Transfers:  Transfer Training  Transfer Training: No  Balance:  Balance  Sitting - Static: Poor (constant support)  Sitting - Dynamic: Poor (constant support)  Standing:  (unable)  Standing - Static:  (Unable)   Ambulation/Gait Training:     Gait  Gait Training: No        Neuro Re-Education:                                                                                                                                                                                                                                          Barthel Index:    Barthel Index Scale  Feeding: Needs help, i.e. for cutting  Bathing: Cannot perform activity  Grooming: Cannot perform activity  Dressing: Cannot perform activity  Bowel Control: Cannot perform activity  Bladder Control: Cannot perform activity  Toilet Transfers: Cannot perform activity  Chair/Bed Trannsfers: Cannot perform activity  Ambulation: Cannot perform activity  Stairs: Cannot perform activity  Total Barthel Index Score: 5       The Barthel ADL Index: Guidelines  1. The index should be used as a record of what a patient does, not as a record of what a patient could do.  2. The main aim is to establish degree of independence from any help, physical or verbal, however minor and for whatever reason.  3. The need for supervision renders the patient not independent.  4. A patient's performance should be established using the best available evidence. Asking the patient, friends/relatives and nurses are the usual sources, but direct observation and common sense are also important. However direct testing is not needed.  5. Usually the patient's performance over the 
signs taken during this treatment.    After treatment:   Patient left in no apparent distress in bed, Call bell within reach, Bed/ chair alarm activated, Side rails x3, and Heels elevated for pressure relief    COMMUNICATION/EDUCATION:   The patient's plan of care was discussed with: physical therapist and registered nurse         Thank you for this referral.  Suly Tucker OT  Minutes: 16         
[place]    COMMUNICATION/EDUCATION:   The patient's plan of care was discussed with: physical therapist and registered nurse    Patient Education  Education Given To: Patient  Education Provided: Role of Therapy;ADL Adaptive Strategies  Education Method: Verbal  Barriers to Learning: Cognition  Education Outcome: Verbalized understanding;Continued education needed    Thank you for this referral.  Patti Suarez OT  Minutes: 30     
participate in upper extremity therapeutic exercise/activities with Moderate Assist for 5 minutes within 7 day(s).    7.  Patient will utilize energy conservation techniques during functional activities with verbal cues within 7 day(s).     Occupational Therapy Goals:  1.  Patient will perform self-feeding with built up handle and Set-up Assist within 7 day(s).   2.  Patient will perform grooming sitting EOB with moderate Assist within 7 day(s).   3.  Patient will perform anterior bathing from neck to thighs with moderate Assist within 7 day(s).   4.  Patient will perform ADL tasks in unsupported sitting with moderate assist for balance aspect within 7 day(s).   5.  Patient will participate in upper extremity therapeutic exercise/activities with Moderate Assist for 5 minutes within 7 day(s).    7.  Patient will utilize energy conservation techniques during functional activities with verbal cues within 7 day(s).      Goals revised at weekly RA 7/15/2024  1.  Patient will perform self-feeding with built up handle and Set-up Assist within 7 day(s). CONT for CONSISTENCY  2.  Patient will perform grooming sitting EOB with Minimal Assist within 7 day(s). DOWNGRADE  3.  Patient will perform anterior bathing from neck to thighs with Minimal Assist within 7 day(s). DOWNGRADE  4.  Patient will perform toilet transfers to C with Maximal Assist  within 7 day(s). MODIFY TO sitting goal  5.  Patient will perform all aspects of toileting with Maximal Assist within 7 day(s). DC  6.  Patient will participate in upper extremity therapeutic exercise/activities with Moderate Assist for 5 minutes within 7 day(s).  CONT  7.  Patient will utilize energy conservation techniques during functional activities with verbal cues within 7 day(s).  CONT    Initiated 7/8/2024  1.  Patient will perform self-feeding with Stand by Assist within 7 day(s).  2.  Patient will perform grooming with Contact Guard Assist within 7 day(s).  3.  Patient will 
support)  Sitting - Dynamic: Poor (constant support)    ADL Intervention :    Feeding: Adaptive utensils (Comment);Increased time to complete;Verbal cueing;Bringing food to mouth assist   Feeding Skilled Clinical Factors: Two folded blankets + pillow for proximal UE support to facilitate greater endurance with hand-to-mouth. Patient is able to grasp built-up utensil with increased time to achieve ; places on table and picks back up as needed to readjust as she has difficulty with in-hand manipulation. Patient participates in problem-solving to identify most effective method of utensil use and environmental set-up (plate placed directly on table - aka taken off heater base and tray - for best access). Modified cup to allow increased independence managing hospital water pitcher - recommend limited amounts in cup at a time to minimize weight. Performance impeded by ataxia, impaired proprioception, and global weakness. Education on positioning for midline sitting. Tolerates extended time self-feeding in modified set-up.     LE Dressing: Dependent/Total    Toileting: Dependent/Total  Toileting Skilled Clinical Factors: reliant on purewick    Functional Mobility: Maximum assistance    Reviewed spinal protection techniques.     Therapeutic Exercise:  Scapular retraction (AROM RUE; AAROM LUE)    Scapular elevation (AROM RUE; AAROM LUE)    Cervical extension (AAROM)    Cervical rotation L / R (significantly greater available range to R; increased pain to L)    Hand-to-mouth    Gross grasp / release    UE coordination / targeted reach    Pain Rating:  Elevated    Pain Intervention(s):   patient medicated for pain prior to session, rest, and repositioning    Activity Tolerance:   Fair  and requires frequent rest breaks  Please refer to the flowsheet for vital signs taken during this treatment.    After treatment:   Patient left in no apparent distress in bed and placed in chair position., Call bell within reach, Side rails 
Impaired  Sitting - Static: Poor (constant support)  Sitting - Dynamic: Poor (constant support)  Standing:  (giulia lift to the chair)      ADL Intervention:       Giulia from the bed to the chair with additional time for rolling and positioning with the sling.  She requires total A x 2 to adjust her position in the chair and place pillows around her for lateral support and stability.  Goal established to remain OOB in the chair for 30+ minutes.                                                                             Skin Care: Chlorhexidine wipes;Moisture barrier;Protective barrier    Pain Ratin/10   Pain Intervention(s):   nursing notified and repositioning      Activity Tolerance:   Good  Please refer to the flowsheet for vital signs taken during this treatment.    After treatment:   Patient left in no apparent distress sitting up in chair, Call bell within reach, and Bed/ chair alarm activated    COMMUNICATION/EDUCATION:   The patient's plan of care was discussed with: physical therapist and registered nurse         Thank you for this referral.  Allie Johnson, JEANMARIE  Minutes: 25    
X2;Maximum assistance  Sit to Supine: Total assistance;Assist X2  Scooting: Total assistance;Assist X2  Transfers:     Transfer Training  Bed to Chair: Total assistance;Adaptive equipment;Additional time;Assist X2;Other (comment) (fabiana lift/assistance of 3)  Balance:               Balance  Sitting: Impaired  Sitting - Static: Poor (constant support)  Sitting - Dynamic: Poor (constant support)  Ambulation/Gait Training:                       Gait  Gait Training: No                                                                                                                                                                                                                                                             Barthel Index:    Barthel Index Scale  Feeding: Needs help, i.e. for cutting  Bathing: Cannot perform activity  Grooming: Cannot perform activity  Dressing: Cannot perform activity  Bowel Control: Cannot perform activity  Bladder Control: Cannot perform activity (indwelling Chou)  Toilet Transfers: Cannot perform activity  Chair/Bed Trannsfers: Cannot perform activity  Ambulation: Cannot perform activity  Stairs: Cannot perform activity  Total Barthel Index Score: 5       The Barthel ADL Index: Guidelines  1. The index should be used as a record of what a patient does, not as a record of what a patient could do.  2. The main aim is to establish degree of independence from any help, physical or verbal, however minor and for whatever reason.  3. The need for supervision renders the patient not independent.  4. A patient's performance should be established using the best available evidence. Asking the patient, friends/relatives and nurses are the usual sources, but direct observation and common sense are also important. However direct testing is not needed.  5. Usually the patient's performance over the preceding 24-48 hours is important, but occasionally longer periods will be relevant.  6. Middle categories

## 2024-08-06 ENCOUNTER — TELEPHONE (OUTPATIENT)
Age: 69
End: 2024-08-06

## 2024-08-06 NOTE — TELEPHONE ENCOUNTER
Care Transitions Initial Follow Up Call    Outreach made within 2 business days of discharge: Yes    Patient: Yesy Lyle Patient : 1955   MRN: [id number]  Reason for Admission: seizure secondary to subtherapeutic anticonvulsant medication  Discharge Date: 24       Discharge department/facility: [facility] to inpatient facility    Scheduled appointment with PCP within 7-14 days    Follow Up  No future appointments.    Cheryl Mcginnis

## 2024-08-12 ENCOUNTER — TELEPHONE (OUTPATIENT)
Age: 69
End: 2024-08-12

## 2024-08-12 NOTE — TELEPHONE ENCOUNTER
Patrica with [facility] is checking to see if Dr. Castellon will follow this patient for Home Health. Patient is due to discharge on 8/15/24 from [facility] # [phone]

## 2024-10-18 NOTE — PROGRESS NOTES
Stress test   Echocardiogram     Referral previously to cardiology    Swelling improved; continue using lasix    T.O.C. · RUR- 8% Low  · DISPOSITION: SNF  · F/U with PCP/Specialist    · Transport: BLS    Referrals sent to [facility] and [facility] are still pending. [facility] has denied as the patient is out of network.     JESUS Gibbs, Care Manager

## (undated) DEVICE — Device

## (undated) DEVICE — TUBING, SUCTION, 1/4" X 10', STRAIGHT: Brand: MEDLINE

## (undated) DEVICE — SUTURE VICRYL + ABSRB L18IN 0 NDL L36MM OS6 VLT 1/2 CIR REV CUT

## (undated) DEVICE — BONE WAX WHITE: Brand: BONE WAX WHITE

## (undated) DEVICE — BLADE CLP TAPR HD WET DRY CAPABILITY GTT IN CHARGING USE

## (undated) DEVICE — CORD ES L12FT BPLR FRCP

## (undated) DEVICE — X-RAY DETECTABLE SPONGES,16 PLY: Brand: VISTEC

## (undated) DEVICE — ELECTRODE PT RET AD L9FT HI MOIST COND ADH HYDRGEL CORDED

## (undated) DEVICE — GLOVE ORANGE PI 7 1/2   MSG9075

## (undated) DEVICE — SUTURE VICRYL + SZ 2-0 L18IN ABSRB UD CP-2 L26MM 1/2 CIR REV VCP762D

## (undated) DEVICE — MAYFIELD® DISPOSABLE ADULT SKULL PIN (PLASTIC BASE): Brand: MAYFIELD®

## (undated) DEVICE — XTW CERVICAL COLLAR: Brand: DEROYAL

## (undated) DEVICE — 1LYRTR 16FR10ML100%SIL UMS SNP: Brand: MEDLINE INDUSTRIES, INC.

## (undated) DEVICE — GLOVE SURG SZ 65 L12IN FNGR THK94MIL STD WHT LTX FREE

## (undated) DEVICE — INTENT OT USE PROVIDES A STERILE INTERFACE BETWEEN THE OPERATING ROOM SURGICAL LAMPS (NON-STERILE) AND THE SURGEON OR STAFF WORKING IN THE STERILE FIELD.: Brand: ASPEN® ALC PLUS LIGHT HANDLE COVER

## (undated) DEVICE — ANTERIOR CERVICAL-SMH: Brand: MEDLINE INDUSTRIES, INC.

## (undated) DEVICE — SOLUTION IRRIG 1000ML 0.9% SOD CHL USP POUR PLAS BTL

## (undated) DEVICE — DRESSING BORDERED ADH GZ UNIV GEN USE 8INX4IN AND 6INX2IN

## (undated) DEVICE — DRAPE,LAPAROTOMY,T,PEDI,STERILE: Brand: MEDLINE